# Patient Record
Sex: FEMALE | Race: WHITE | Employment: OTHER | URBAN - METROPOLITAN AREA
[De-identification: names, ages, dates, MRNs, and addresses within clinical notes are randomized per-mention and may not be internally consistent; named-entity substitution may affect disease eponyms.]

---

## 2017-01-09 ENCOUNTER — TELEPHONE (OUTPATIENT)
Dept: INTERNAL MEDICINE CLINIC | Facility: CLINIC | Age: 82
End: 2017-01-09

## 2017-01-09 NOTE — TELEPHONE ENCOUNTER
Patient's daughter calling to speak with RN. Would like to ask a question about dosing instructions for patient's medication:    Current Outpatient Prescriptions:  Escitalopram Oxalate (LEXAPRO) 10 MG Oral Tab Take 1 tablet (10 mg total) by mouth daily.  Janna Adame

## 2017-01-10 NOTE — TELEPHONE ENCOUNTER
Pt's daughter stts pt not feeling well or better  on current dose of Lexapro but not sure if pt is taking Lexapro 5 mg or 10 mg . Pt is also trying to cut down on Clonazepam. I advised daughter to check her mothers medicine bottle tomorrow when she is there

## 2017-01-11 NOTE — TELEPHONE ENCOUNTER
Pt's daughter stts she would like a call back to speak to nurse about the Rx lexapro.  Please advise

## 2017-01-12 NOTE — TELEPHONE ENCOUNTER
As Dr. on call covering for Dr. Taylor Plunkett -     would advise to discuss with her Psychiatrist since she is seeing a Psychiatrist and working with her on adjustments on her medications.   If that is not possible to discussed with her primary care doctor when

## 2017-01-12 NOTE — TELEPHONE ENCOUNTER
[place] [id number] [place] [id number]      Per 11/22/16 acute encounter  Thelma Reyes MD at 11/22/2016  1:08 PM      Status: Signed : Thelma Reyes MD (Physician)     Expand All Collapse All    I would hold the escitolapram and refer sushila ent to

## 2017-01-12 NOTE — TELEPHONE ENCOUNTER
Farhan Brown- daughter indicated that patient has been taking lexapro 5mg every evening and saw psychiatrist Dr Aevry Lagos which wanted to slowly wean patient off of the Clonazepam. Patient is down to 1/2 tablet daily of Clonazepam 0.5mg.   Daughter indicated that p

## 2017-01-12 NOTE — TELEPHONE ENCOUNTER
Daughter Shaneka Bad informed of EV note as stated below. Daughter verbalized understanding/compliance. Will discuss with psychiatrist for possible lexapro adjustment.  In the mean time did schedule appt with [facility] for next Thursday as they would like to meet wi

## 2017-01-19 ENCOUNTER — APPOINTMENT (OUTPATIENT)
Dept: LAB | Facility: HOSPITAL | Age: 82
End: 2017-01-19
Attending: INTERNAL MEDICINE
Payer: MEDICARE

## 2017-01-19 DIAGNOSIS — E03.9 ACQUIRED HYPOTHYROIDISM: ICD-10-CM

## 2017-01-19 PROBLEM — R00.2 HEART PALPITATIONS: Status: ACTIVE | Noted: 2017-01-19

## 2017-01-19 LAB
T4 FREE SERPL-MCNC: 1.16 NG/DL (ref 0.58–1.64)
TSH SERPL-ACNC: 3.33 UIU/ML (ref 0.34–5.6)

## 2017-01-19 PROCEDURE — 84439 ASSAY OF FREE THYROXINE: CPT

## 2017-01-19 PROCEDURE — 84443 ASSAY THYROID STIM HORMONE: CPT

## 2017-01-19 PROCEDURE — 36415 COLL VENOUS BLD VENIPUNCTURE: CPT

## 2017-01-19 NOTE — PROGRESS NOTES
Ruth Jo is a 80year old female. HPI:   1. Moderate single current episode of major depressive disorder ([address]    Still has significant depression. Still cannot be alone or she gets incredibly nervous.  She  has to have someone stay with her at all ti Smokeless Status: Former User                       Alcohol Use: Yes           0.0 oz/week       0 Standard drinks or equivalent per week       Comment: 1 drink nightly        REVIEW OF SYSTEMS:   GENERAL HEALTH: feels well otherwise  SKIN: denies any unus palpitations. Short lived and goes away right away. Will check TSH today. The patient indicates understanding of these issues and agrees to the plan.     The patient is asked to return in 3 months

## 2017-03-09 ENCOUNTER — TELEPHONE (OUTPATIENT)
Dept: INTERNAL MEDICINE CLINIC | Facility: CLINIC | Age: 82
End: 2017-03-09

## 2017-03-09 NOTE — TELEPHONE ENCOUNTER
Daughter calling states pt is having hard time sleeping. Pt states benadryl sometimes when sleeping is and issue. She would like to check with Dr if this med would be ok to take or will it interact with her other meds.  Is there any thing better she can connie

## 2017-03-09 NOTE — TELEPHONE ENCOUNTER
Reason for Call/Chief Complaint: trouble sleeping  Onset: 2 nights  Nursing Assessment/Associated Symptoms: pt's daughter stts pt  having trouble falling asleep last couple of nights.  Pt wondering if ok to take Benadryl or is there anything else she can tr

## 2017-04-27 NOTE — TELEPHONE ENCOUNTER
Refill Protocol Appointment Criteria; MED LISTED AS HISTORICAL. PLEASE ADVISE ON REFILL. THANKS.   · Appointment scheduled in the past 6 months or in the next 3 months  Recent Visits       Provider Department Primary Dx    3 months ago Semaj Rogers,

## 2017-05-01 RX ORDER — ESCITALOPRAM OXALATE 5 MG/1
TABLET ORAL
Qty: 30 TABLET | Refills: 0 | Status: SHIPPED | OUTPATIENT
Start: 2017-05-01 | End: 2017-05-26

## 2017-05-02 ENCOUNTER — APPOINTMENT (OUTPATIENT)
Dept: LAB | Age: 82
End: 2017-05-02
Attending: INTERNAL MEDICINE
Payer: MEDICARE

## 2017-05-02 DIAGNOSIS — E03.9 ACQUIRED HYPOTHYROIDISM: ICD-10-CM

## 2017-05-02 PROBLEM — M25.561 ARTHRALGIA OF RIGHT LOWER LEG: Status: ACTIVE | Noted: 2017-05-02

## 2017-05-02 PROCEDURE — 36415 COLL VENOUS BLD VENIPUNCTURE: CPT

## 2017-05-02 PROCEDURE — 84443 ASSAY THYROID STIM HORMONE: CPT

## 2017-05-02 NOTE — PROGRESS NOTES
Landen Martini is a 80year old female. HPI:   1. Moderate single current episode of major depressive disorder ([address]    Still has significant depression. Still cannot be alone or she gets incredibly nervous.  She  has to have someone stay with her at all ti Smokeless Status: Former User                       Alcohol Use: Yes           0.0 oz/week       0 Standard drinks or equivalent per week       Comment: 1 drink nightly        REVIEW OF SYSTEMS:   GENERAL HEALTH: feels well otherwise  SKIN: denies any unu it helps. Hurts 3/10.     4. Rash    Has some seborrheic dermatitis. Has some itching. Will use steroid cream to help patient. Has been having some heart palpitations. Short lived and goes away right away. Will check TSH today.     The patient indicates

## 2017-05-11 ENCOUNTER — TELEPHONE (OUTPATIENT)
Dept: INTERNAL MEDICINE CLINIC | Facility: CLINIC | Age: 82
End: 2017-05-11

## 2017-05-11 NOTE — TELEPHONE ENCOUNTER
Daughter calling regarding results of blood test.  Please advise daughter wants to know if medication needs to be changed or not for Levothyroxine Sodium (SYNTHROID) 25 MCG Oral Tab

## 2017-05-20 NOTE — TELEPHONE ENCOUNTER
Results mailed to pt with MD recommendations.     May 8, 2017     Gavin Rodriguez 7      Dear Stephan De Jesus:    Below are the results from your recent visit:    Resulted Orders   ASSAY, THYROID STIM HORMONE   Result Value Ref Range

## 2017-05-26 RX ORDER — ESCITALOPRAM OXALATE 5 MG/1
TABLET ORAL
Qty: 30 TABLET | Refills: 0 | Status: SHIPPED | OUTPATIENT
Start: 2017-05-26 | End: 2017-06-27

## 2017-06-14 ENCOUNTER — TELEPHONE (OUTPATIENT)
Dept: INTERNAL MEDICINE CLINIC | Facility: CLINIC | Age: 82
End: 2017-06-14

## 2017-06-14 NOTE — TELEPHONE ENCOUNTER
Actions Requested:    Please advise Dr. Lisa Gutierrez  Situation/Background   Problem:   Red area under the eye   Onset:   This Morning   Associated Symptoms:    Patient's daughter stated the patient woke up this morning had has red , warm to touch, slightly puf in the past  * Pregnant > 20 weeks  * Postpartum (i.e. < 1 month since delivery)  * Fever  * Taking an ACE Inhibitor medication    (e.g., benazepril/LOTENSIN, captopril/CAPOTEN, enalapril/VASOTEC, lisinopril/ZESTRIL)  * Patient sounds very sick or weak to

## 2017-06-22 ENCOUNTER — TELEPHONE (OUTPATIENT)
Dept: INTERNAL MEDICINE CLINIC | Facility: CLINIC | Age: 82
End: 2017-06-22

## 2017-06-22 RX ORDER — CLONAZEPAM 0.5 MG/1
TABLET ORAL
Qty: 30 TABLET | Refills: 0 | Status: SHIPPED | OUTPATIENT
Start: 2017-06-22 | End: 2017-10-23

## 2017-06-27 RX ORDER — ESCITALOPRAM OXALATE 5 MG/1
TABLET ORAL
Qty: 30 TABLET | Refills: 0 | Status: SHIPPED | OUTPATIENT
Start: 2017-06-27 | End: 2017-07-25

## 2017-07-06 NOTE — TELEPHONE ENCOUNTER
CLONAZEPAM 0.5 MG Oral Tab called into the [address] in [place] as ordered on 6/22/17 by Dr. Brenda Newell.

## 2017-07-06 NOTE — TELEPHONE ENCOUNTER
Daughter was called back and inform that medication was approved on 6/23. I will call pharmacy to see if it has been call in if not I will call in. Thanks

## 2017-07-26 RX ORDER — ESCITALOPRAM OXALATE 5 MG/1
TABLET ORAL
Qty: 30 TABLET | Refills: 0 | Status: SHIPPED | OUTPATIENT
Start: 2017-07-26 | End: 2017-08-20

## 2017-07-26 NOTE — TELEPHONE ENCOUNTER
Refill Protocol Appointment Criteria: Refilled per protocol    · Appointment scheduled in the past 6 months or in the next 3 months  Recent Outpatient Visits            2 months ago Moderate single current episode of major depressive disorder [facility]    Precious

## 2017-08-21 RX ORDER — ESCITALOPRAM OXALATE 5 MG/1
TABLET ORAL
Qty: 30 TABLET | Refills: 0 | Status: SHIPPED | OUTPATIENT
Start: 2017-08-21 | End: 2017-09-26

## 2017-09-26 NOTE — PROGRESS NOTES
HPI:    Patient ID: Lauren Stovall is a 80year old female. HPI    Review of Systems         Current Outpatient Prescriptions:  escitalopram 10 MG Oral Tab Take 1 tablet (10 mg total) by mouth once daily.  Disp: 30 tablet Rfl: 1   CLONAZEPAM 0.5 MG Oral T

## 2017-09-26 NOTE — PROGRESS NOTES
HPI:    Patient ID: Dimitry Jin is a 80year old female. Abdominal Pain   This is a chronic problem. The current episode started more than 1 year ago (a year and a half ago). The onset quality is sudden (when her  passed away).  The problem occu Packs/day: 0.00      Years: 0.00      Smokeless tobacco: Former User                     Alcohol use: Yes           0.0 oz/week     Comment: 1 drink nightly               Current Outpatient Prescriptions:  escitalopram 10 MG Oral Tab Ta anxiety  (primary encounter diagnosis)  Plan: This is a chronic problem. The patient is currently taking ESCITALOPRAM 5 MG and CLONAZEPAM 0.5 MG for treatment with significant improvement.  The patient has been trying to stop taking the CLONAZEPAM 0.5 MG bu

## 2017-10-23 RX ORDER — CLONAZEPAM 0.5 MG/1
TABLET ORAL
Qty: 30 TABLET | Refills: 0 | Status: SHIPPED | OUTPATIENT
Start: 2017-10-23 | End: 2018-06-14 | Stop reason: ALTCHOICE

## 2017-10-23 NOTE — TELEPHONE ENCOUNTER
Pt's daughter calling to request a refill on medication below and states Pt is almost out of medication.      Current Outpatient Prescriptions:   •  CLONAZEPAM 0.5 MG Oral Tab, TAKE ONE-HALF TABLET BY MOUTH TWICE DAILY AS NEEDED, Disp: 30 tablet, Rfl: 0   T

## 2017-10-26 ENCOUNTER — OFFICE VISIT (OUTPATIENT)
Dept: DERMATOLOGY CLINIC | Facility: CLINIC | Age: 82
End: 2017-10-26

## 2017-10-26 DIAGNOSIS — L82.1 SEBORRHEIC KERATOSES: ICD-10-CM

## 2017-10-26 DIAGNOSIS — L82.0 INFLAMED SEBORRHEIC KERATOSIS: ICD-10-CM

## 2017-10-26 DIAGNOSIS — D48.5 NEOPLASM OF UNCERTAIN BEHAVIOR OF SKIN: Primary | ICD-10-CM

## 2017-10-26 DIAGNOSIS — D23.5 BENIGN NEOPLASM OF SKIN OF TRUNK, EXCEPT SCROTUM: ICD-10-CM

## 2017-10-26 PROCEDURE — 99202 OFFICE O/P NEW SF 15 MIN: CPT | Performed by: DERMATOLOGY

## 2017-10-26 PROCEDURE — 11100 BIOPSY OF SKIN LESION: CPT | Performed by: DERMATOLOGY

## 2017-10-26 PROCEDURE — 88305 TISSUE EXAM BY PATHOLOGIST: CPT | Performed by: DERMATOLOGY

## 2017-10-26 PROCEDURE — 17110 DESTRUCTION B9 LES UP TO 14: CPT | Performed by: DERMATOLOGY

## 2017-10-26 NOTE — PROGRESS NOTES
HPI:     Chief Complaint     Lesion        HPI     Lesion    Additional comments: New pt here for eval of a lesion to the left temple pt states she has had it for about 6 mo was seen by her PCP was given betamethasone dipropionate 0.05% states that it neve Rfl:    aspirin 81 MG Oral Chew Tab Chew 81 mg by mouth daily.  Disp:  Rfl:      Allergies:     Aspirin                     Comment:Ulcer    Past Medical History:   Diagnosis Date   • Skin cancer 2006     Past Surgical History:  No date: TONSILLECTOMY    So resolve  Seborrheic keratoses- diagnosis discussed–reassurance–no treatment necessary.   Benign neoplasm of skin of trunk, except scrotum      Orders Placed This Encounter      BIOPSY OF SKIN LESION      dest benign <15      Specimen to Pathology, Tissue [I

## 2017-10-31 NOTE — PROGRESS NOTES
Results logged in as requested. Daughter informed of bx results and all LSS' recc - she agrees to the plan.  Path and pt's info faxed to Dr. Vincent Wu - his info provided to daughter (Shaquille)

## 2017-11-07 ENCOUNTER — TELEPHONE (OUTPATIENT)
Dept: INTERNAL MEDICINE CLINIC | Facility: CLINIC | Age: 82
End: 2017-11-07

## 2017-11-07 RX ORDER — LEVOTHYROXINE SODIUM 0.03 MG/1
TABLET ORAL
Qty: 90 TABLET | Refills: 0 | Status: CANCELLED | OUTPATIENT
Start: 2017-11-07

## 2017-11-07 RX ORDER — LEVOTHYROXINE SODIUM 0.03 MG/1
25 TABLET ORAL
Qty: 90 TABLET | Refills: 0 | Status: SHIPPED | OUTPATIENT
Start: 2017-11-07 | End: 2018-02-04

## 2017-11-07 NOTE — TELEPHONE ENCOUNTER
Requesting Levothyroxine refill    Prescription refilled per IM/FM refill protocol, notified daughter.     Hypothyroid Medications  Protocol Criteria:  Appointment scheduled in the past 12 months or the next 3 months  TSH resulted in the past 12 months that

## 2017-11-20 ENCOUNTER — NURSE TRIAGE (OUTPATIENT)
Dept: OTHER | Age: 82
End: 2017-11-20

## 2017-11-20 NOTE — TELEPHONE ENCOUNTER
Action Requested: Summary for Provider     []  Critical Lab, Recommendations Needed  [x] Need Additional Advice  [x]   FYI    []   Need Orders  [] Need Medications Sent to Pharmacy  []  Other     SUMMARY: Colton (daughter) reports pt weaning off clonazep not feeling any better    Protocols used: DEPRESSION-A-OH

## 2017-11-24 RX ORDER — ESCITALOPRAM OXALATE 10 MG/1
TABLET ORAL
Qty: 30 TABLET | Refills: 0 | OUTPATIENT
Start: 2017-11-24

## 2017-11-24 NOTE — TELEPHONE ENCOUNTER
Please call pt and make a follow up    Please reply to pool: JIMENA RN TRIAGE  Refill Protocol Appointment Criteria  · Appointment scheduled in the past 6 months or in the next 3 months  Recent Outpatient Visits            4 weeks ago Neoplasm of uncertain beh

## 2017-11-27 NOTE — TELEPHONE ENCOUNTER
Contacted pt and states her daughter usually takes her to her appts and pt's daughter will be over tomorrow, states she will have daughter call back and schedule an appt.

## 2017-11-28 RX ORDER — ESCITALOPRAM OXALATE 10 MG/1
TABLET ORAL
Qty: 30 TABLET | Refills: 0 | Status: SHIPPED | OUTPATIENT
Start: 2017-11-28 | End: 2017-11-29 | Stop reason: DRUGHIGH

## 2017-11-28 NOTE — TELEPHONE ENCOUNTER
Please advise.     Please reply to pool: EM CALL CENTER  Please call patient to schedule with a new PCP to establish care

## 2017-11-28 NOTE — TELEPHONE ENCOUNTER
Refilled per protocol. Medication pended,,please advise.     Refill Protocol Appointment Criteria  · Appointment scheduled in the past 6 months or in the next 3 months  Recent Outpatient Visits            1 month ago Neoplasm of uncertain behavior of skin

## 2017-11-29 ENCOUNTER — TELEPHONE (OUTPATIENT)
Dept: INTERNAL MEDICINE CLINIC | Facility: CLINIC | Age: 82
End: 2017-11-29

## 2017-11-29 PROBLEM — F41.1 GENERALIZED ANXIETY DISORDER: Status: ACTIVE | Noted: 2017-11-29

## 2017-11-29 PROBLEM — F13.930 BENZODIAZEPINE WITHDRAWAL WITHOUT COMPLICATION (HCC): Status: ACTIVE | Noted: 2017-11-29

## 2017-11-29 PROBLEM — F13.230 BENZODIAZEPINE WITHDRAWAL WITHOUT COMPLICATION (HCC): Status: ACTIVE | Noted: 2017-11-29

## 2017-11-29 PROBLEM — K21.9 CHRONIC GERD: Status: ACTIVE | Noted: 2017-11-29

## 2017-11-29 PROBLEM — F32.A DEPRESSION: Status: ACTIVE | Noted: 2017-11-29

## 2017-11-29 NOTE — TELEPHONE ENCOUNTER
Pharmacy said ins wont cover Omeprazole 20 MG Oral Tab EC    Questioning  if can change to capsule?     Pt saw Dr Myla Chi today

## 2017-11-29 NOTE — TELEPHONE ENCOUNTER
Spoke with Alex Serrano at  [address] and changed Omeprazole to capsule as EC tab not covered by insurance.

## 2017-11-29 NOTE — PROGRESS NOTES
Araceli Adams is a 80year old female. Patient presents with:  Abdominal Pain: patient c/o of bloating, constipation. Anxiety  Ear Problem: right ear      HPI:   Here for abdominal pain  She has generalized  anxiety and depression.   She is currently on NEEDED Disp: 30 tablet Rfl: 0   betamethasone dipropionate 0.05 % External Cream Apply 1 Application topically 2 (two) times daily. Disp: 30 g Rfl: 1   Cholecalciferol (VITAMIN D) 2000 UNITS Oral Cap Take 2,000 Units by mouth.  Disp:  Rfl:    aspirin 81 MG ear canal -cerumen impaction noted bilaterally, cerumen removal done with irrigation. Tympanic membrane clear bilaterally, nasal mucosa normal, posterior pharynx normal.  Tonsils not enlarged. No exudates.   NECK: supple, no thyromegaly, no thyroid nodule stools  This is due to benzodiazepine withdrawal.  No blood or mucus in the stools. Can use Imodium only as needed    Other orders  -     escitalopram 20 MG Oral Tab; Take 1 tablet (20 mg total) by mouth daily.  -     Omeprazole 20 MG Oral Tab EC;  Take 20

## 2017-11-29 NOTE — PATIENT INSTRUCTIONS
Stop zantac and start omeprazole 40 mg at lunchtime  Increased escitalopram to 20 mg. Contact me if there is any side effects after increasing the dose. Plan to taper clonazepam by 2 months. Continue escitalopram at the newly prescribed dose of 20 mg.

## 2017-12-11 ENCOUNTER — TELEPHONE (OUTPATIENT)
Dept: INTERNAL MEDICINE CLINIC | Facility: CLINIC | Age: 82
End: 2017-12-11

## 2017-12-11 DIAGNOSIS — E03.9 ACQUIRED HYPOTHYROIDISM: Primary | ICD-10-CM

## 2017-12-11 NOTE — TELEPHONE ENCOUNTER
Discussed about this with daughters patient Pawel Navas. Advised to decrease the Lexapro to 10 mg. She can discontinue omeprazole. She is not tolerating the weaning off clonazepam to well.   Can go back to the old regimen of clonazepam.  Also informed if possib

## 2017-12-11 NOTE — TELEPHONE ENCOUNTER
Patient's daughter calling, states pt saw Dr Prasanna Shaikh where she increased pt's dosage of lexapro from 5mg to 20mg and also added omeprazole. Patient's daughter states pt has diarrhea \"most mornings\" and her stomach is still bothering her.  Daughter states pt

## 2017-12-12 NOTE — TELEPHONE ENCOUNTER
Advised the patient not to take magnesium as it can make her diarrhea symptoms worse. She verbalizes understanding and agrees to the plan.

## 2017-12-12 NOTE — TELEPHONE ENCOUNTER
Patient's daughter called back in -     Wants to know if patient could take a magnesium supplement? If so, is there a certain dosage/amount that she can take? Will it affect any of the other medications that she is taking. Please advise.

## 2018-02-05 RX ORDER — LEVOTHYROXINE SODIUM 0.03 MG/1
TABLET ORAL
Qty: 90 TABLET | Refills: 0 | Status: SHIPPED | OUTPATIENT
Start: 2018-02-05 | End: 2018-05-04

## 2018-05-04 RX ORDER — LEVOTHYROXINE SODIUM 0.03 MG/1
TABLET ORAL
Qty: 90 TABLET | Refills: 0 | Status: SHIPPED | OUTPATIENT
Start: 2018-05-04 | End: 2018-08-17

## 2018-05-04 NOTE — TELEPHONE ENCOUNTER
This is okay–but noted Dr. Rob Hartmann note and the need for her to follow-up so please make sure she has a follow-up appointment–thank you  ronal- dr Tere Eckert

## 2018-06-14 ENCOUNTER — OFFICE VISIT (OUTPATIENT)
Dept: INTERNAL MEDICINE CLINIC | Facility: CLINIC | Age: 83
End: 2018-06-14

## 2018-06-14 ENCOUNTER — LAB ENCOUNTER (OUTPATIENT)
Dept: LAB | Facility: HOSPITAL | Age: 83
End: 2018-06-14
Attending: INTERNAL MEDICINE
Payer: MEDICARE

## 2018-06-14 ENCOUNTER — TELEPHONE (OUTPATIENT)
Dept: OTHER | Age: 83
End: 2018-06-14

## 2018-06-14 VITALS
WEIGHT: 131.88 LBS | SYSTOLIC BLOOD PRESSURE: 131 MMHG | HEART RATE: 62 BPM | TEMPERATURE: 99 F | HEIGHT: 59 IN | DIASTOLIC BLOOD PRESSURE: 72 MMHG | BODY MASS INDEX: 26.59 KG/M2

## 2018-06-14 DIAGNOSIS — E03.9 ACQUIRED HYPOTHYROIDISM: ICD-10-CM

## 2018-06-14 DIAGNOSIS — R60.0 LEG EDEMA, LEFT: ICD-10-CM

## 2018-06-14 DIAGNOSIS — K52.9 CHRONIC DIARRHEA: Primary | ICD-10-CM

## 2018-06-14 DIAGNOSIS — K52.9 CHRONIC DIARRHEA: ICD-10-CM

## 2018-06-14 PROCEDURE — 99213 OFFICE O/P EST LOW 20 MIN: CPT | Performed by: INTERNAL MEDICINE

## 2018-06-14 PROCEDURE — 84443 ASSAY THYROID STIM HORMONE: CPT

## 2018-06-14 PROCEDURE — 85025 COMPLETE CBC W/AUTO DIFF WBC: CPT

## 2018-06-14 PROCEDURE — 36415 COLL VENOUS BLD VENIPUNCTURE: CPT

## 2018-06-14 PROCEDURE — 84439 ASSAY OF FREE THYROXINE: CPT

## 2018-06-14 PROCEDURE — G0463 HOSPITAL OUTPT CLINIC VISIT: HCPCS | Performed by: INTERNAL MEDICINE

## 2018-06-14 PROCEDURE — 80053 COMPREHEN METABOLIC PANEL: CPT

## 2018-06-14 NOTE — PROGRESS NOTES
Obi Kitchen is a 80year old female.   Patient presents with:  Diarrhea  Swelling: left ankle      HPI:     HPI  Patient is a 79-year-old female with history of chronic diarrhea, generalized anxiety disorder, hypothyroidism here with the complaints of wor tablet daily ) Disp: 60 tablet Rfl: 2   betamethasone dipropionate 0.05 % External Cream Apply 1 Application topically 2 (two) times daily. Disp: 30 g Rfl: 1   aspirin 81 MG Oral Chew Tab Chew 81 mg by mouth daily.  Disp:  Rfl:       Past Medical History: has normal reflexes. Skin: Skin is warm and dry.          ASSESSMENT AND PLAN:     Problem List Items Addressed This Visit     None      Visit Diagnoses     Chronic diarrhea    -  Primary    Relevant Orders    TSH W REFLEX TO FREE T4    COMP METABOLIC PAN

## 2018-06-14 NOTE — TELEPHONE ENCOUNTER
Appt made for today, diarrhea stools-chronic but more than usual today-ate different foods over the week end, left swollen ankle , no recent OV advised Appt today for Eval with Pt's daughter

## 2018-06-15 ENCOUNTER — APPOINTMENT (OUTPATIENT)
Dept: LAB | Facility: HOSPITAL | Age: 83
End: 2018-06-15
Attending: INTERNAL MEDICINE
Payer: MEDICARE

## 2018-06-15 DIAGNOSIS — E03.9 ACQUIRED HYPOTHYROIDISM: Primary | ICD-10-CM

## 2018-06-15 PROCEDURE — 87493 C DIFF AMPLIFIED PROBE: CPT

## 2018-08-17 RX ORDER — LEVOTHYROXINE SODIUM 0.03 MG/1
TABLET ORAL
Qty: 90 TABLET | Refills: 0 | Status: SHIPPED | OUTPATIENT
Start: 2018-08-17 | End: 2018-11-14

## 2018-08-17 NOTE — TELEPHONE ENCOUNTER
Pt's daughter req refill for meds below and states Pt is out of meds.     •  LEVOTHYROXINE SODIUM 25 MCG Oral Tab, TAKE 1 TABLET(25 MCG) BY MOUTH BEFORE BREAKFAST, Disp: 90 tablet, Rfl: 0

## 2018-08-17 NOTE — TELEPHONE ENCOUNTER
Failed protocol, please advise.   LOV:6/14/18 LRF:5/4/18  Hypothyroid Medications  Protocol Criteria:  Appointment scheduled in the past 12 months or the next 3 months  TSH resulted in the past 12 months that is normal  Recent Outpatient Visits            2

## 2018-10-29 RX ORDER — ESCITALOPRAM OXALATE 20 MG/1
TABLET ORAL
Qty: 90 TABLET | Refills: 0 | Status: SHIPPED | OUTPATIENT
Start: 2018-10-29 | End: 2019-04-05

## 2018-11-14 RX ORDER — BETAMETHASONE DIPROPIONATE 0.5 MG/G
CREAM TOPICAL
Qty: 30 G | Refills: 0 | Status: SHIPPED | OUTPATIENT
Start: 2018-11-14 | End: 2019-07-15

## 2018-11-14 RX ORDER — LEVOTHYROXINE SODIUM 0.03 MG/1
TABLET ORAL
Qty: 90 TABLET | Refills: 0 | Status: SHIPPED | OUTPATIENT
Start: 2018-11-14 | End: 2019-02-05

## 2018-11-14 NOTE — TELEPHONE ENCOUNTER
Pt daughter called for refill on:      Current Outpatient Medications:  Levothyroxine Sodium 25 MCG Oral Tab TAKE 1 TABLET(25 MCG) BY MOUTH BEFORE BREAKFAST Disp: 90 tablet Rfl: 0

## 2018-11-14 NOTE — TELEPHONE ENCOUNTER
Hypothyroid Medications  Protocol Criteria:  Appointment scheduled in the past 12 months or the next 3 months  TSH resulted in the past 12 months that is normal  Recent Outpatient Visits            5 months ago Chronic diarrhea    [id number] [id number]

## 2019-01-25 RX ORDER — BETAMETHASONE DIPROPIONATE 0.5 MG/G
CREAM TOPICAL
Qty: 30 G | Refills: 0 | Status: SHIPPED | OUTPATIENT
Start: 2019-01-25 | End: 2019-04-07

## 2019-02-07 RX ORDER — LEVOTHYROXINE SODIUM 0.03 MG/1
TABLET ORAL
Qty: 90 TABLET | Refills: 1 | Status: SHIPPED | OUTPATIENT
Start: 2019-02-07 | End: 2019-08-20

## 2019-02-07 NOTE — TELEPHONE ENCOUNTER
Please review; protocol failed.     Hypothyroid Medications  Protocol Criteria:  Appointment scheduled in the past 12 months or the next 3 months  TSH resulted in the past 12 months that is normal  Recent Outpatient Visits            7 months ago Chronic diarrhea    Angelia Joshua MD    Office Visit    1 year ago Generalized anxiety disorder    Ramona Joshua Roxy Basil, MD    Office Visit    1 year ago Neoplasm of uncertain behavior of skin    [facility] Rosy Rodriguez MD    Office Visit    1 year ago Depression with anxiety    Thanh Joshua MD    Office Visit    1 year ago Moderate single current episode of major depressive disorder [facility])    [facility] Guillermo Hernandez MD    Office Visit          Lab Results   Component Value Date    TSH 5.62 (H) 06/14/2018

## 2019-03-13 ENCOUNTER — NURSE TRIAGE (OUTPATIENT)
Dept: OTHER | Age: 84
End: 2019-03-13

## 2019-03-13 DIAGNOSIS — R19.5 LOOSE STOOLS: Primary | ICD-10-CM

## 2019-03-13 NOTE — TELEPHONE ENCOUNTER
Advised patient's daughter of Dr. Irizarry Shape note. Patient's daughter verbalized understanding. Daughter states she will  specimen collection kit at the lab tomorrow.

## 2019-03-13 NOTE — TELEPHONE ENCOUNTER
Action Requested: Summary for Provider     []  Critical Lab, Recommendations Needed  [] Need Additional Advice  [x]   FYI    []   Need Orders  [] Need Medications Sent to Pharmacy  []  Other     SUMMARY: Patient's daughter calling with complaint of patient

## 2019-03-13 NOTE — TELEPHONE ENCOUNTER
I will order a stool test to rule out C. difficile. Will discuss further management based on the results. Please follow-up in the office if the symptoms continues.

## 2019-03-15 ENCOUNTER — APPOINTMENT (OUTPATIENT)
Dept: LAB | Facility: HOSPITAL | Age: 84
End: 2019-03-15
Attending: INTERNAL MEDICINE
Payer: MEDICARE

## 2019-03-15 DIAGNOSIS — E03.9 ACQUIRED HYPOTHYROIDISM: ICD-10-CM

## 2019-03-15 LAB
ADENOVIRUS F 40/41 PCR: NEGATIVE
ASTROVIRUS PCR: NEGATIVE
C CAYETANENSIS DNA SPEC QL NAA+PROBE: NEGATIVE
C. DIFFICILE TOXIN A/B PCR: NEGATIVE
CAMPY SP DNA.DIARRHEA STL QL NAA+PROBE: NEGATIVE
CRYPTOSP DNA SPEC QL NAA+PROBE: NEGATIVE
EAEC PAA PLAS AGGR+AATA ST NAA+NON-PRB: NEGATIVE
EC STX1+STX2 + H7 FLIC SPEC NAA+PROBE: NEGATIVE
ENTAMOEBA HISTOLYTICA PCR: NEGATIVE
EPEC EAE GENE STL QL NAA+NON-PROBE: NEGATIVE
ETEC LTA+ST1A+ST1B TOX ST NAA+NON-PROBE: NEGATIVE
GIARDIA LAMBLIA PCR: NEGATIVE
NOROVIRUS GI/GII PCR: NEGATIVE
P SHIGELLOIDES DNA STL QL NAA+PROBE: NEGATIVE
ROTAVIRUS A PCR: NEGATIVE
SALMONELLA DNA SPEC QL NAA+PROBE: NEGATIVE
SAPOVIRUS PCR: NEGATIVE
SHIGELLA SP+EIEC IPAH ST NAA+NON-PROBE: NEGATIVE
T4 FREE SERPL-MCNC: 1 NG/DL (ref 0.8–1.7)
TSI SER-ACNC: 3.82 MIU/ML (ref 0.36–3.74)
V CHOLERAE DNA SPEC QL NAA+PROBE: NEGATIVE
VIBRIO DNA SPEC NAA+PROBE: NEGATIVE
YERSINIA DNA SPEC NAA+PROBE: NEGATIVE

## 2019-03-15 PROCEDURE — 84439 ASSAY OF FREE THYROXINE: CPT

## 2019-03-15 PROCEDURE — 87507 IADNA-DNA/RNA PROBE TQ 12-25: CPT | Performed by: INTERNAL MEDICINE

## 2019-03-15 PROCEDURE — 36415 COLL VENOUS BLD VENIPUNCTURE: CPT

## 2019-03-15 PROCEDURE — 84443 ASSAY THYROID STIM HORMONE: CPT

## 2019-04-05 ENCOUNTER — TELEPHONE (OUTPATIENT)
Dept: OTHER | Age: 84
End: 2019-04-05

## 2019-04-05 NOTE — TELEPHONE ENCOUNTER
Daughter called and stated that patient worsening diarrhea came back 4 days ago, taking Imodium , been following BRAT diet, drinking Pedialyte and water, stopped drinking Pediasure,     3/15/19 GI stool panel tests were normal, daughter cannot tell how oft

## 2019-04-05 NOTE — TELEPHONE ENCOUNTER
Spoke with patient's daughter and informed her of Dr. Cong Bailey message and plan of care. Daughter voiced understanding but reports it is not in the best interest for her mother to see a GI specialist since she is 89.  Daughter would also like to know what no

## 2019-04-05 NOTE — TELEPHONE ENCOUNTER
Avoid greasy , oily, spicy food. Avoid caffeine. Stay hydrated, jamel diet . Imodium as needed. If change in mental status, signs of dehydration or lethargy ER as advised. She has chronic diarrhea.  Please inform the patient to follow up with GI

## 2019-04-07 RX ORDER — BETAMETHASONE DIPROPIONATE 0.5 MG/G
CREAM TOPICAL
Qty: 30 G | Refills: 0 | Status: SHIPPED | OUTPATIENT
Start: 2019-04-07 | End: 2019-07-15

## 2019-04-07 RX ORDER — ESCITALOPRAM OXALATE 20 MG/1
TABLET ORAL
Qty: 90 TABLET | Refills: 3 | Status: SHIPPED | OUTPATIENT
Start: 2019-04-07 | End: 2019-04-08

## 2019-04-08 RX ORDER — ESCITALOPRAM OXALATE 10 MG/1
10 TABLET ORAL DAILY
Qty: 90 TABLET | Refills: 0 | Status: SHIPPED | OUTPATIENT
Start: 2019-04-08 | End: 2019-07-15

## 2019-04-08 NOTE — TELEPHONE ENCOUNTER
Please inform the patient to take 10 mg instead of 20 mg due to nightmares. It is 1 of the common side effects of Lexapro. She can take the half tablet, on the next refill please request for 10 mg instead of 20 mg.

## 2019-04-08 NOTE — TELEPHONE ENCOUNTER
Informed pt's dtr of Dr. Mann Self advise to cut in half, and that the next scipt will be 10 mg, but dtr states cutting these pills in half gets messy and requesting a new script of 10 mg be sent to pharmacy. Sending in Lexapro 10 mg today.

## 2019-04-08 NOTE — TELEPHONE ENCOUNTER
Pt's dtr calling back and informed of Dr. Sasha Joyce advise as per below. Dtr states her mother is doing better today.      Dtr also questioning Escitalopram 20 mg as her mother has been having abnormal dreams and feels it may be better to cut back to the yasmany

## 2019-07-12 ENCOUNTER — TELEPHONE (OUTPATIENT)
Dept: OTHER | Age: 84
End: 2019-07-12

## 2019-07-12 RX ORDER — BETAMETHASONE DIPROPIONATE 0.5 MG/G
CREAM TOPICAL
Qty: 30 G | Refills: 0 | Status: SHIPPED | OUTPATIENT
Start: 2019-07-12 | End: 2019-07-15

## 2019-07-12 NOTE — TELEPHONE ENCOUNTER
Daughter (not VICKEY), patient with her, reports of chronic diarrhea, depression getting worse, just  came back from [place] and with skin ed on both cheeks look like bug bite but getting better as patient is applying Betamethasone cream, states that sushila

## 2019-07-12 NOTE — TELEPHONE ENCOUNTER
Review pended refill request as it does not fall under a protocol.     Last Rx: 4/7/19  LOV: Recent Visits  Date Type Provider Dept   06/14/18 Office Visit Melisa Phillips MD [facility]   Showing recent visits within past 540 days with a meds authoriz

## 2019-07-15 PROBLEM — L30.9 CHRONIC ECZEMA: Status: ACTIVE | Noted: 2019-07-15

## 2019-07-15 NOTE — PROGRESS NOTES
Jody Quinones is a 80year old female.   Patient presents with:  Depression: increased Deprression x 1 month  Diarrhea: Imodium helping,  states has random moments of dirrhea x 2 years      HPI:     HPI  Patient is a 17-year-old female with history of chron Past Surgical History:   Procedure Laterality Date   • Tonsillectomy        Social History:  Social History    Tobacco Use      Smoking status: Former Smoker      Smokeless tobacco: Former User    Alcohol use:  Yes      Alcohol/week: 0.0 oz      Commen nightmares. She has a very supportive family. Offered resources. Chronic diarrhea  Stable. Avoid simple and concentrated sugar and dairy. Stool panel was negative in March. Managing with Imodium.   Advised to take Imodium 1 capful in the morning and

## 2019-08-21 RX ORDER — LEVOTHYROXINE SODIUM 0.03 MG/1
TABLET ORAL
Qty: 90 TABLET | Refills: 1 | Status: SHIPPED | OUTPATIENT
Start: 2019-08-21 | End: 2020-02-24

## 2019-08-22 NOTE — TELEPHONE ENCOUNTER
Refill passed per [facility] protocol.   Hypothyroid Medications  Protocol Criteria:  Appointment scheduled in the past 12 months or the next 3 months  TSH resulted in the past 12 months that is normal  Recent Outpatient Visits            1 month ago M

## 2019-10-01 ENCOUNTER — NURSE TRIAGE (OUTPATIENT)
Dept: INTERNAL MEDICINE CLINIC | Facility: CLINIC | Age: 84
End: 2019-10-01

## 2019-10-01 NOTE — TELEPHONE ENCOUNTER
2 options  1 Go to ED, probably better as she may be dehydrated and can receive IV fluids and do some blood test to make sure there is no electrolyte abnormalities. 2 I can see him in the office tomorrow. My preferences she goes to the emergency room.   Please let patient know thank you

## 2019-10-02 ENCOUNTER — OFFICE VISIT (OUTPATIENT)
Dept: INTERNAL MEDICINE CLINIC | Facility: CLINIC | Age: 84
End: 2019-10-02
Payer: MEDICARE

## 2019-10-02 VITALS
SYSTOLIC BLOOD PRESSURE: 106 MMHG | HEART RATE: 61 BPM | DIASTOLIC BLOOD PRESSURE: 67 MMHG | HEIGHT: 59 IN | WEIGHT: 127 LBS | TEMPERATURE: 98 F | BODY MASS INDEX: 25.6 KG/M2

## 2019-10-02 DIAGNOSIS — R19.7 DIARRHEA, UNSPECIFIED TYPE: Primary | ICD-10-CM

## 2019-10-02 PROCEDURE — 99213 OFFICE O/P EST LOW 20 MIN: CPT | Performed by: INTERNAL MEDICINE

## 2019-10-02 NOTE — PROGRESS NOTES
Ruth Jo is a 80year old female. Patient presents with:  Diarrhea: ongoing for 2 weeks, worse in last 4 days, no other sx, taking immodium  Derm Problem: new spots on face    HPI:   59-year-old female with a past medical history of hypothyroidism here OF SYSTEMS:     Review of Systems   Constitutional: Negative for activity change, appetite change and fever. HENT: Negative for congestion and voice change. Respiratory: Negative for cough and shortness of breath.     Cardiovascular: Negative for chest not want to do any further invasive work-up. - CBC, PLATELET; NO DIFFERENTIAL; Future  - BASIC METABOLIC PANEL (8);  Future  - TSH W REFLEX TO FREE T4; Future  - CBC, PLATELET; NO DIFFERENTIAL  - BASIC METABOLIC PANEL (8)  - TSH W REFLEX TO FREE T4    Plan

## 2019-10-02 NOTE — PATIENT INSTRUCTIONS
Treating Diarrhea    Diarrhea happens when you have loose, watery, or frequent bowel movements. It is a common problem with many causes. Most cases of diarrhea clear up on their own. But certain cases may need treatment.  Be sure to see your healthcare pr © 0952-0277 [address] [facility], [address] [place]. All rights reserved. This information is not intended as a substitute for professional medical care. Always follow your healthcare professional's instructions.         Low-Fib · What to choose: white bread, biscuits, muffins, and white rolls; plain crackers; waffles; white pasta; white rice; cream of wheat; grits; white pancakes; corn flakes; cooked potatoes without skin. Fiber content of these foods should be less than 0.5 (1/2 Fats, oils, sauces, condiments (fewer than 8 teaspoons daily)  · What to choose: butter, margarine, oils, whipped cream, sour cream, mayonnaise, smooth dressings and sauces; plain gravy; smooth condiments  · What to avoid: dressing with seeds or fruit etienne

## 2019-10-22 ENCOUNTER — TELEPHONE (OUTPATIENT)
Dept: INTERNAL MEDICINE CLINIC | Facility: CLINIC | Age: 84
End: 2019-10-22

## 2019-10-22 DIAGNOSIS — R19.7 DIARRHEA, UNSPECIFIED TYPE: Primary | ICD-10-CM

## 2019-10-22 NOTE — TELEPHONE ENCOUNTER
Advised patient's daughter on Dr. Ileana Rogers information and recommendations. Daughter verbalized understanding, she will go tomorrow to get material to collect stool and return samples as soon as possible.

## 2019-10-22 NOTE — TELEPHONE ENCOUNTER
Spoke with daughter Temo Dear with patient's permission--reports diarrhea, Kelvinne Paul been better, but she had another bout this weekend. It comes and goes. It's worse in the morning.  Dr. Trace Samaniego said something about doing a stool test if the diarrhea continues

## 2019-10-22 NOTE — TELEPHONE ENCOUNTER
I have placed stool on parasite, stool WBC and stool cultures. Please let them submit the stool sample in the earliest convenience.   Thank you

## 2019-10-24 ENCOUNTER — LAB ENCOUNTER (OUTPATIENT)
Dept: LAB | Facility: HOSPITAL | Age: 84
End: 2019-10-24
Attending: INTERNAL MEDICINE
Payer: MEDICARE

## 2019-10-24 DIAGNOSIS — R19.7 DIARRHEA, UNSPECIFIED TYPE: ICD-10-CM

## 2019-10-24 PROCEDURE — 87427 SHIGA-LIKE TOXIN AG IA: CPT

## 2019-10-24 PROCEDURE — 87272 CRYPTOSPORIDIUM AG IF: CPT

## 2019-10-24 PROCEDURE — 87329 GIARDIA AG IA: CPT

## 2019-10-24 PROCEDURE — 87077 CULTURE AEROBIC IDENTIFY: CPT

## 2019-10-24 PROCEDURE — 87046 STOOL CULTR AEROBIC BACT EA: CPT

## 2019-10-24 PROCEDURE — 89055 LEUKOCYTE ASSESSMENT FECAL: CPT

## 2019-10-24 PROCEDURE — 87045 FECES CULTURE AEROBIC BACT: CPT

## 2019-10-29 ENCOUNTER — TELEPHONE (OUTPATIENT)
Dept: OTHER | Age: 84
End: 2019-10-29

## 2019-10-29 NOTE — TELEPHONE ENCOUNTER
I contacted Sylvia Cardenas the daughter and informed of Dr. Bettie Kenyon discussed with the patient. Sylvia Cardenas stated understood and thank me for calling due to the patient does not hear to well. Instructed to call back if needed.

## 2019-10-29 NOTE — TELEPHONE ENCOUNTER
I called patient and went over stool test again     1 I cant say that this is not because of cancer. Typically cancer related diarrhea is bloody. The only way we can say that for sure is by doing a colonoscopy.   If she is interested in pursuing further, t

## 2019-10-29 NOTE — TELEPHONE ENCOUNTER
Per the patient I can talked to the daughter Bradley Angulo and the patient stated she did not understand her lab results. Results were reviewed both for the original labs and for the stool results.     The daughter stated she is having 1 to 2 watery stools

## 2020-01-31 ENCOUNTER — NURSE TRIAGE (OUTPATIENT)
Dept: OTHER | Age: 85
End: 2020-01-31

## 2020-01-31 NOTE — TELEPHONE ENCOUNTER
Pt seeking recommendations for diarrhea    Per daughter pt has hx of diarrhea and has bee taking imodium    Per daughter Long Eleanorrabia spreads the medication out throughout the day so it will continue to work, but it doesn't seem to be as strong anymore\"    Per d

## 2020-01-31 NOTE — TELEPHONE ENCOUNTER
Daughter agreed to follow up appt, requesting appt for Tue at [facility], scheduled for Tue 2/4 at [facility] with Dr Lonny Winston per request.

## 2020-01-31 NOTE — TELEPHONE ENCOUNTER
She is a patient of Dr. Jose Stevenson. I have seen her back in October for diarrhea. I am not sure whether it  is appropriate for me to manage this or you should check with Dr. Jose Stevenson.     Korey Trimble

## 2020-02-01 NOTE — TELEPHONE ENCOUNTER
If this is chronic diarrhea , lets treat symptomatically now and we will do some stool testing during the visit   Can take imodium for now.  Try to avoid dairy products and see whether there is any change in consistency of stool  However I agree that she sh

## 2020-02-04 ENCOUNTER — OFFICE VISIT (OUTPATIENT)
Dept: INTERNAL MEDICINE CLINIC | Facility: CLINIC | Age: 85
End: 2020-02-04
Payer: MEDICARE

## 2020-02-04 VITALS
HEIGHT: 59 IN | WEIGHT: 122 LBS | DIASTOLIC BLOOD PRESSURE: 61 MMHG | BODY MASS INDEX: 24.6 KG/M2 | TEMPERATURE: 98 F | SYSTOLIC BLOOD PRESSURE: 100 MMHG | HEART RATE: 67 BPM

## 2020-02-04 DIAGNOSIS — R19.7 DIARRHEA, UNSPECIFIED TYPE: Primary | ICD-10-CM

## 2020-02-04 DIAGNOSIS — D22.9 MULTIPLE ATYPICAL SKIN MOLES: ICD-10-CM

## 2020-02-04 PROCEDURE — G0463 HOSPITAL OUTPT CLINIC VISIT: HCPCS | Performed by: INTERNAL MEDICINE

## 2020-02-04 PROCEDURE — 99213 OFFICE O/P EST LOW 20 MIN: CPT | Performed by: INTERNAL MEDICINE

## 2020-02-04 NOTE — PROGRESS NOTES
Estrellita Szymanski is a 80year old female. Patient presents with:  Diarrhea: onset 1 week ago, doing Altobeam, no dairy, immodium stopped working, taking Pedialyte  Derm Problem: moles on face and neck are growing    HPI:   71-year-old female with a past medic of Systems   Constitutional: Negative for activity change, appetite change and fever. HENT: Negative for congestion and voice change. Respiratory: Negative for cough and shortness of breath. Cardiovascular: Negative for chest pain.    Gastrointestin dermatology. - DERM - INTERNAL    Plan: As above. I will see her back in 3 months. The patient indicates understanding of these issues and agrees to the plan. No follow-ups on file.

## 2020-02-13 NOTE — H&P
[address] [facility] [address] Gastroenterology                                                                                                  Clinic History and Physical     Pa \"bouts. \"  This resolves self resolves. No significant upper GI symptoms including nausea, vomiting, hematemesis, dyspepsia, dysphagia, odynophagia. No chest pain or shortness of breath. Appetite and weight are stable.         Pertinent Surgical Hx: dipropionate 0.05 % External Cream APPLY EXTERNALLY TO THE AFFECTED AREA TWICE DAILY (Patient not taking: Reported on 2/20/2020 ) 30 g 1       Allergies:    Aspirin                     Comment:Ulcer    ROS:   CONSTITUTIONAL:  negative for fevers, rigors  E habits      1. Altered bowel habits: Patient has a history of diarrheal symptoms dating back to when her  was ill/passed away 5 years ago.   Her symptoms have been chronic since then without significant change until recently when her symptoms began

## 2020-02-20 ENCOUNTER — OFFICE VISIT (OUTPATIENT)
Dept: GASTROENTEROLOGY | Facility: CLINIC | Age: 85
End: 2020-02-20
Payer: MEDICARE

## 2020-02-20 VITALS
HEIGHT: 59 IN | WEIGHT: 123 LBS | BODY MASS INDEX: 24.8 KG/M2 | DIASTOLIC BLOOD PRESSURE: 70 MMHG | HEART RATE: 64 BPM | SYSTOLIC BLOOD PRESSURE: 109 MMHG

## 2020-02-20 DIAGNOSIS — R19.4 ALTERED BOWEL HABITS: Primary | ICD-10-CM

## 2020-02-20 PROCEDURE — 99203 OFFICE O/P NEW LOW 30 MIN: CPT | Performed by: NURSE PRACTITIONER

## 2020-02-20 PROCEDURE — G0463 HOSPITAL OUTPT CLINIC VISIT: HCPCS | Performed by: NURSE PRACTITIONER

## 2020-02-20 RX ORDER — DICYCLOMINE HYDROCHLORIDE 10 MG/1
10 CAPSULE ORAL 2 TIMES DAILY PRN
Qty: 60 CAPSULE | Refills: 1 | Status: SHIPPED | OUTPATIENT
Start: 2020-02-20

## 2020-02-21 RX ORDER — LEVOTHYROXINE SODIUM 0.03 MG/1
TABLET ORAL
Qty: 90 TABLET | Refills: 1 | Status: CANCELLED | OUTPATIENT
Start: 2020-02-21

## 2020-02-24 NOTE — TELEPHONE ENCOUNTER
Current Outpatient Medications:     •  LEVOTHYROXINE SODIUM 25 MCG Oral Tab, TAKE 1 TABLET(25 MCG) BY MOUTH BEFORE BREAKFAST, Disp: 90 tablet, Rfl: 1 REFILL

## 2020-02-25 RX ORDER — LEVOTHYROXINE SODIUM 0.03 MG/1
25 TABLET ORAL
Qty: 90 TABLET | Refills: 1 | Status: SHIPPED | OUTPATIENT
Start: 2020-02-25 | End: 2020-08-17

## 2020-02-25 NOTE — TELEPHONE ENCOUNTER
Please review; protocol failed. Daughter confirmed the dose.   Hypothyroid Medications  Protocol Criteria:  Appointment scheduled in the past 12 months or the next 3 months  TSH resulted in the past 12 months that is normal  Recent Outpatient Visits

## 2020-03-02 ENCOUNTER — OFFICE VISIT (OUTPATIENT)
Dept: DERMATOLOGY CLINIC | Facility: CLINIC | Age: 85
End: 2020-03-02
Payer: MEDICARE

## 2020-03-02 DIAGNOSIS — D48.5 NEOPLASM OF UNCERTAIN BEHAVIOR OF SKIN: Primary | ICD-10-CM

## 2020-03-02 PROCEDURE — 11102 TANGNTL BX SKIN SINGLE LES: CPT | Performed by: DERMATOLOGY

## 2020-03-02 PROCEDURE — 99212 OFFICE O/P EST SF 10 MIN: CPT | Performed by: DERMATOLOGY

## 2020-03-02 PROCEDURE — 11103 TANGNTL BX SKIN EA SEP/ADDL: CPT | Performed by: DERMATOLOGY

## 2020-03-02 PROCEDURE — 88305 TISSUE EXAM BY PATHOLOGIST: CPT | Performed by: DERMATOLOGY

## 2020-03-02 PROCEDURE — G0463 HOSPITAL OUTPT CLINIC VISIT: HCPCS | Performed by: DERMATOLOGY

## 2020-03-07 NOTE — PROGRESS NOTES
The pathology report from last visit showed A left central forehead Hypertrophic actinic keratosis, B right temple Squamous cell carcinoma in situ, C right lateral neck below ear,Squamous cell carcinoma in situ .   I would suggest Jorge to the base of all

## 2020-03-09 NOTE — PROGRESS NOTES
Results logged in. Patient and her daughter Martha Segal (they were both on the line) informed of test results and all KMT's recommendations. Voiced understanding. They both want to know how affective the cream is.  Pt states she had surgery for the same cance

## 2020-03-10 NOTE — PROGRESS NOTES
Informed mother and daughter of Alma Rosa Aguila further recommendations.  They will start using aldara

## 2020-03-11 RX ORDER — LEVOTHYROXINE SODIUM 0.03 MG/1
TABLET ORAL
Qty: 90 TABLET | Refills: 1 | OUTPATIENT
Start: 2020-03-11

## 2020-03-15 NOTE — PROGRESS NOTES
Operative Report                     Shave/  Tangential biopsy     Clinical diagnosis:    Size of lesion:  Diagnosis/Clinical History: pt with painful irritated lesions.  neck lesion treated with cryo previously no change  Spec 1 Description >>>>>: cali

## 2020-03-15 NOTE — PROGRESS NOTES
Ny Donovan is a 80year old female. HPI:     CC:  Patient presents with:  Moles: LOV 10/26/2017 - Pt presenting assessment of moles on face and rt leg. Pt states the moles have been present for about a year.   Pt states the mole on the lower jawline an Medical History:   Diagnosis Date   • Actinic keratosis 2020    hypertrophic AK - left central forehead   • SCC (squamous cell carcinoma) 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     • SCC (squamous cell carcinoma) 2020    right temple   • SCC (squamous Breast feeding: Not Asked        Reaction to local anesthetic: No    Social History Narrative      Not on file    Family History   Problem Relation Age of Onset   • Colon Cancer Mother    • Colon Cancer Sister        There were no vitals filed for this vis lesions noted . See assessment and plan below for specific lesions. Otherwise remarkable for lesions as noted on map.       Assessment / plan:    Orders Placed This Encounter      Specimen to Pathology, Tissue [IHP Pt to [address] noted in follow-up/ above. This note was generated using Dragon voice recognition software. Please contact me regarding any confusion resulting from errors in recognition. Roxy Garcia

## 2020-04-13 ENCOUNTER — TELEPHONE (OUTPATIENT)
Dept: DERMATOLOGY CLINIC | Facility: CLINIC | Age: 85
End: 2020-04-13

## 2020-04-13 NOTE — TELEPHONE ENCOUNTER
Spoke with pt herself and informed of side effects of Aldara (redness/irritation). Pt verbalized understanding.

## 2020-04-13 NOTE — TELEPHONE ENCOUNTER
Pt daughter states mother was in office 3/7/20states the aldara med she is using making mother face red and dry.

## 2020-08-17 RX ORDER — LEVOTHYROXINE SODIUM 0.03 MG/1
TABLET ORAL
Qty: 90 TABLET | Refills: 1 | Status: SHIPPED | OUTPATIENT
Start: 2020-08-17 | End: 2020-10-21

## 2020-10-19 ENCOUNTER — MED REC SCAN ONLY (OUTPATIENT)
Dept: INTERNAL MEDICINE CLINIC | Facility: CLINIC | Age: 85
End: 2020-10-19

## 2020-10-19 ENCOUNTER — OFFICE VISIT (OUTPATIENT)
Dept: INTERNAL MEDICINE CLINIC | Facility: CLINIC | Age: 85
End: 2020-10-19
Payer: MEDICARE

## 2020-10-19 VITALS
BODY MASS INDEX: 22.58 KG/M2 | DIASTOLIC BLOOD PRESSURE: 62 MMHG | OXYGEN SATURATION: 97 % | SYSTOLIC BLOOD PRESSURE: 102 MMHG | TEMPERATURE: 97 F | WEIGHT: 112 LBS | HEIGHT: 59 IN | HEART RATE: 61 BPM

## 2020-10-19 DIAGNOSIS — L30.9 CHRONIC ECZEMA: ICD-10-CM

## 2020-10-19 DIAGNOSIS — F41.1 GENERALIZED ANXIETY DISORDER: ICD-10-CM

## 2020-10-19 DIAGNOSIS — K21.9 CHRONIC GERD: ICD-10-CM

## 2020-10-19 DIAGNOSIS — Z23 NEED FOR INFLUENZA VACCINATION: ICD-10-CM

## 2020-10-19 DIAGNOSIS — Z00.00 ADULT GENERAL MEDICAL EXAM: Primary | ICD-10-CM

## 2020-10-19 DIAGNOSIS — F41.1 ANXIETY STATE: ICD-10-CM

## 2020-10-19 DIAGNOSIS — E03.9 ACQUIRED HYPOTHYROIDISM: ICD-10-CM

## 2020-10-19 DIAGNOSIS — Z23 NEED FOR PNEUMOCOCCAL VACCINATION: ICD-10-CM

## 2020-10-19 DIAGNOSIS — F32.A DEPRESSION, UNSPECIFIED DEPRESSION TYPE: ICD-10-CM

## 2020-10-19 DIAGNOSIS — C44.90 SKIN CANCER: ICD-10-CM

## 2020-10-19 DIAGNOSIS — D48.5 NEOPLASM OF UNCERTAIN BEHAVIOR OF SKIN: ICD-10-CM

## 2020-10-19 DIAGNOSIS — F32.1 MODERATE SINGLE CURRENT EPISODE OF MAJOR DEPRESSIVE DISORDER (HCC): ICD-10-CM

## 2020-10-19 DIAGNOSIS — L82.0 INFLAMED SEBORRHEIC KERATOSIS: ICD-10-CM

## 2020-10-19 DIAGNOSIS — M25.561 ARTHRALGIA OF RIGHT LOWER LEG: ICD-10-CM

## 2020-10-19 PROBLEM — F13.230 BENZODIAZEPINE WITHDRAWAL WITHOUT COMPLICATION (HCC): Status: RESOLVED | Noted: 2017-11-29 | Resolved: 2020-10-19

## 2020-10-19 PROBLEM — F13.930 BENZODIAZEPINE WITHDRAWAL WITHOUT COMPLICATION (HCC): Status: RESOLVED | Noted: 2017-11-29 | Resolved: 2020-10-19

## 2020-10-19 PROBLEM — R00.2 HEART PALPITATIONS: Status: RESOLVED | Noted: 2017-01-19 | Resolved: 2020-10-19

## 2020-10-19 PROCEDURE — G0009 ADMIN PNEUMOCOCCAL VACCINE: HCPCS | Performed by: INTERNAL MEDICINE

## 2020-10-19 PROCEDURE — G0439 PPPS, SUBSEQ VISIT: HCPCS | Performed by: INTERNAL MEDICINE

## 2020-10-19 PROCEDURE — 90732 PPSV23 VACC 2 YRS+ SUBQ/IM: CPT | Performed by: INTERNAL MEDICINE

## 2020-10-19 PROCEDURE — 36415 COLL VENOUS BLD VENIPUNCTURE: CPT | Performed by: INTERNAL MEDICINE

## 2020-10-19 PROCEDURE — G0008 ADMIN INFLUENZA VIRUS VAC: HCPCS | Performed by: INTERNAL MEDICINE

## 2020-10-19 PROCEDURE — 90662 IIV NO PRSV INCREASED AG IM: CPT | Performed by: INTERNAL MEDICINE

## 2020-10-19 RX ORDER — LOPERAMIDE HCL 1 MG/7.5ML
2 SOLUTION ORAL 3 TIMES DAILY PRN
COMMUNITY

## 2020-10-19 NOTE — PROGRESS NOTES
HPI:   Bisi Payne is a 80year old female who presents for a Medicare Subsequent Annual Wellness visit (Pt already had Initial Annual Wellness).     80year-old pleasant lady with a past medical history of hypothyroidism, chronic diarrhea, anxiety/depres Encourage patient to take vitamin D supplementation and vitamin C supplementation. Depression Screening (PHQ-2/PHQ-9): Over the LAST 2 WEEKS          Advanced Directive:  She does NOT have a Living Will on file in [place].    The patient has this document b She has no active allergies. CURRENT MEDICATIONS:     •  Loperamide HCl 1 MG/7.5ML Oral Liquid, Take 2 mg by mouth 3 (three) times daily as needed for Diarrhea.     •  LEVOTHYROXINE SODIUM 25 MCG Oral Tab, TAKE 1 TABLET(25 MCG) BY MOUTH BEFORE BREAKFAS diarrhea, constipation, blood in stool and abdominal distention. Endocrine: Negative for cold intolerance and heat intolerance. Genitourinary: Negative for dysuria, hematuria, flank pain and difficulty urinating.    Musculoskeletal: Negative for myalgia misunderstand what others are saying and make inappropriate responses: No I avoid social activities because I cannot hear well and fear I will reply improperly: No   Family members and friends have told me they think I may have hearing loss:  Yes patient today as well. Vaccines: Flu vaccine given today. PPSV23 given today. Labs: Ordered  Discussed about seatbelt use, fall prevention  Patient denies any abuse or depression.   Patient reports adherence with all the medication.    -     FLU VACC HIG separate sheet to patient  PREVENTATIVE SERVICES  INDICATIONS AND SCHEDULE Internal Lab or Procedure External Lab or Procedure   Diabetes Screening      HbgA1C   Annually No results found for: A1C No flowsheet data found.     Fasting Blood Sugar (FSB)Annual get once after your 65th birthday    Hepatitis B for Moderate/High Risk No vaccine history found Medium/high risk factors:   End-stage renal disease   Hemophiliacs who received Factor VIII or IX concentrates   Clients of institutions for the mentally retar

## 2021-01-29 ENCOUNTER — TELEPHONE (OUTPATIENT)
Dept: INTERNAL MEDICINE CLINIC | Facility: CLINIC | Age: 86
End: 2021-01-29

## 2021-01-29 NOTE — TELEPHONE ENCOUNTER
Patient's daughter would like Dr. Richmond Amend recommendation if patient should receive the covid vaccine due to her age. Please advise.

## 2021-01-30 NOTE — TELEPHONE ENCOUNTER
Absolutely. My recommendation is based on risk versus benefit. I think she is better off taking the vaccine.   Thank you

## 2021-01-30 NOTE — TELEPHONE ENCOUNTER
Spoke with patient daughter (  verified ) informed of  Dr. Elisabeth Cochran message below  [employer] understanding

## 2021-03-09 DIAGNOSIS — Z23 NEED FOR VACCINATION: ICD-10-CM

## 2021-03-11 ENCOUNTER — IMMUNIZATION (OUTPATIENT)
Dept: LAB | Facility: HOSPITAL | Age: 86
End: 2021-03-11
Attending: HOSPITALIST
Payer: MEDICARE

## 2021-03-11 DIAGNOSIS — Z23 NEED FOR VACCINATION: Primary | ICD-10-CM

## 2021-03-11 PROCEDURE — 0011A SARSCOV2 VAC 100MCG/0.5ML IM: CPT

## 2021-04-09 ENCOUNTER — IMMUNIZATION (OUTPATIENT)
Dept: LAB | Facility: HOSPITAL | Age: 86
End: 2021-04-09
Attending: EMERGENCY MEDICINE
Payer: MEDICARE

## 2021-04-09 DIAGNOSIS — Z23 NEED FOR VACCINATION: Primary | ICD-10-CM

## 2021-04-09 PROCEDURE — 0012A SARSCOV2 VAC 100MCG/0.5ML IM: CPT

## 2021-04-26 NOTE — TELEPHONE ENCOUNTER
Benadryl 25 mg is fine at night. If not sleeping we can try short term Jamse Pottier but there are risks of sleepiness and falls with stronger meds. Subjective:      Patient ID: Margot Whitman is a 61 y.o. male. HPI  Chief Complaint   Patient presents with    dysphgaia       History of Present Illness  Charlene Onofre is a(n) 61 y.o. male who presents with a 6 month history of diffiiculty swallowing. Dry foods and meats are difficult. Water is fine. Started after C1-2 laminectomy. December 2020. Dr. Fidelina Vazquez. No prior SLP appointments or therapy. No weight loss. Feels like things just get stuck. Pain: No  Location: throat  Onset: As above  Timing: waxing and waning  Severity: moderate  Frequency: daily  Otalgia: No  Odynophagia: No  Dysphagia: Yes  Dyspnea: No  Voice Changes: No  Lumps in neck: No  Hemoptysis: No  Fever: No  Chills: No  Sweats: No  Lethargy: No  Weight Loss: No  Modifying Factors: Non smoker  Associates Signs and Symptoms: None    Patient Active Problem List   Diagnosis    Gastritis    Hyperlipidemia    ED (erectile dysfunction)    S/P left knee arthroscopy, chondroplasty, synovectomy, partial medial and lateral meniscectomy 9/4/2015    Ocular migraine    Seborrheic keratoses    Chronic left shoulder pain    Action tremor    Cervical spine disease    Pharyngeal dysphagia     Past Surgical History:   Procedure Laterality Date    ANTERIOR CRUCIATE LIGAMENT REPAIR Right 1978    ACL repair    CERVICAL SPINE SURGERY  2020    Fusion-occiput    COLONOSCOPY  2007    normal    COLONOSCOPY N/A 11/05/2019    Diverticulosis. Recheck 10 years.     JOINT REPLACEMENT  2003    right hip    KNEE ARTHROSCOPY Left 09/04/2015    LEFT KNEE ARTHROSCOPY CHONDROPLASTY SYNOVECTOMY PARTIAL    TONSILLECTOMY       Family History   Problem Relation Age of Onset    Coronary Art Dis Father     Coronary Art Dis Brother      Social History     Socioeconomic History    Marital status:      Spouse name: Robby Brown Number of children: Not on file    Years of education: Not on file    Highest education level: Not on file   Occupational History    Not on file   Social Needs    Financial resource strain: Not hard at all   [place] insecurity     Worry: Never true     Inability: Never true   [employer] needs     Medical: No     Non-medical: No   Tobacco Use    Smoking status: Never Smoker    Smokeless tobacco: Never Used   Substance and Sexual Activity    Alcohol use: Yes     Alcohol/week: 0.0 standard drinks     Comment: beer and wine 1-3 weekend     Drug use: No    Sexual activity: Not on file   Lifestyle    Physical activity     Days per week: Not on file     Minutes per session: Not on file    Stress: Not on file   Relationships    Social connections     Talks on phone: Not on file     Gets together: Not on file     Attends Restoration service: Not on file     Active member of club or organization: Not on file     Attends meetings of clubs or organizations: Not on file     Relationship status: Not on file    Intimate partner violence     Fear of current or ex partner: Not on file     Emotionally abused: Not on file     Physically abused: Not on file     Forced sexual activity: Not on file   Other Topics Concern    Not on file   Social History Narrative    Not on file       DRUG/FOOD ALLERGIES: Patient has no known allergies. CURRENT MEDICATIONS  Prior to Admission medications    Medication Sig Start Date End Date Taking?  Authorizing Provider   acetaminophen (TYLENOL) 325 MG tablet Take 650 mg by mouth every 6 hours as needed for Pain   Yes Historical Provider, MD   simvastatin (ZOCOR) 80 MG tablet TAKE 1 TABLET BY MOUTH IN THE EVENING 1/25/21  Yes Emily Calvillo MD   ibuprofen (ADVIL;MOTRIN) 200 MG tablet Take 200 mg by mouth every 6 hours as needed for Pain   Yes Historical Provider, MD   omeprazole (PRILOSEC) 20 MG delayed release capsule Take 20 mg by mouth daily   Yes Historical Provider, MD   sildenafil (REVATIO) 20 MG tablet TAKE 2-5 TABLETS BY MOUTH AS NEEDED  Patient not taking: Reported on 4/26/2021 1/31/21   Emily Calvillo MD   meloxicam Negative for agitation, confusion, self-injury and sleep disturbance. The patient is not nervous/anxious. Objective:   Physical Exam  Constitutional:       Appearance: He is well-developed. He is not ill-appearing. HENT:      Head: Normocephalic and atraumatic. Not macrocephalic and not microcephalic. No raccoon eyes, Srivastava's sign, abrasion, contusion, right periorbital erythema, left periorbital erythema or laceration. Hair is normal.      Jaw: No trismus. Salivary Glands: Right salivary gland is not diffusely enlarged. Left salivary gland is not diffusely enlarged. Right Ear: Hearing, tympanic membrane and external ear normal. No decreased hearing noted. No drainage, swelling or tenderness. No middle ear effusion. No mastoid tenderness. Tympanic membrane is not perforated, retracted or bulging. Tympanic membrane has normal mobility. Left Ear: Hearing, tympanic membrane and external ear normal. No decreased hearing noted. No drainage, swelling or tenderness. No middle ear effusion. No mastoid tenderness. Tympanic membrane is not perforated, retracted or bulging. Tympanic membrane has normal mobility. Ears:      Wray exam findings: does not lateralize. Right Rinne: AC > BC. Left Rinne: AC > BC. Nose: No nasal deformity, septal deviation, laceration, mucosal edema or rhinorrhea. Right Nostril: No epistaxis. Left Nostril: No epistaxis. Right Turbinates: Not enlarged. Left Turbinates: Not enlarged. Right Sinus: No maxillary sinus tenderness or frontal sinus tenderness. Left Sinus: No maxillary sinus tenderness or frontal sinus tenderness. Mouth/Throat:      Lips: No lesions. Mouth: Mucous membranes are not pale, not dry and not cyanotic. No lacerations or oral lesions. Dentition: Normal dentition. No dental caries or dental abscesses. Tongue: No lesions. Palate: No mass. Pharynx: Uvula midline.  No oropharyngeal exudate, posterior oropharyngeal erythema or uvula swelling. Tonsils: No tonsillar abscesses. Eyes:      General: Lids are normal. Lids are everted, no foreign bodies appreciated. Right eye: No discharge. Left eye: No discharge. Extraocular Movements:      Right eye: Normal extraocular motion and no nystagmus. Left eye: Normal extraocular motion and no nystagmus. Conjunctiva/sclera:      Right eye: No chemosis or exudate. Left eye: No chemosis or exudate. Neck:      Musculoskeletal: Neck supple. Thyroid: No thyroid mass or thyromegaly. Vascular: Normal carotid pulses. Trachea: Trachea normal. No tracheal deviation. Cardiovascular:      Rate and Rhythm: Normal rate and regular rhythm. Pulmonary:      Effort: No tachypnea, bradypnea or respiratory distress. Breath sounds: No stridor. Musculoskeletal:      Right shoulder: He exhibits normal range of motion. Left shoulder: He exhibits normal range of motion. Lymphadenopathy:      Head:      Right side of head: No submental, submandibular, tonsillar, preauricular, posterior auricular or occipital adenopathy. Left side of head: No submental, submandibular, tonsillar, preauricular, posterior auricular or occipital adenopathy. Cervical:      Right cervical: No superficial, deep or posterior cervical adenopathy. Left cervical: No superficial, deep or posterior cervical adenopathy. Skin:     Findings: No bruising, erythema, laceration or lesion. Neurological:      Mental Status: He is alert and oriented to person, place, and time.    Psychiatric:         Speech: Speech normal.         Behavior: Behavior normal.         Due to the patients chronic benign and/or malignant laryngeal disease and/or history of laryngeal neoplasm for surveillance a flexible laryngoscopy will be performed to complete a significant physical examination of the patient which cannot be performed by indirect

## 2021-04-29 RX ORDER — LEVOTHYROXINE SODIUM 0.05 MG/1
TABLET ORAL
Qty: 90 TABLET | Refills: 1 | Status: SHIPPED | OUTPATIENT
Start: 2021-04-29 | End: 2021-10-22

## 2021-05-03 RX ORDER — ESCITALOPRAM OXALATE 10 MG/1
5 TABLET ORAL DAILY
Qty: 45 TABLET | Refills: 1 | Status: SHIPPED | OUTPATIENT
Start: 2021-05-03 | End: 2021-08-13

## 2021-07-17 ENCOUNTER — APPOINTMENT (OUTPATIENT)
Dept: ULTRASOUND IMAGING | Age: 86
End: 2021-07-17
Attending: PHYSICIAN ASSISTANT
Payer: MEDICARE

## 2021-07-17 ENCOUNTER — HOSPITAL ENCOUNTER (OUTPATIENT)
Age: 86
Discharge: HOME | End: 2021-07-17
Payer: MEDICARE

## 2021-07-17 ENCOUNTER — LAB ENCOUNTER (OUTPATIENT)
Dept: LAB | Age: 86
End: 2021-07-17
Attending: PHYSICIAN ASSISTANT
Payer: MEDICARE

## 2021-07-17 ENCOUNTER — NURSE TRIAGE (OUTPATIENT)
Dept: INTERNAL MEDICINE CLINIC | Facility: CLINIC | Age: 86
End: 2021-07-17

## 2021-07-17 VITALS
OXYGEN SATURATION: 96 % | SYSTOLIC BLOOD PRESSURE: 125 MMHG | RESPIRATION RATE: 20 BRPM | TEMPERATURE: 97 F | DIASTOLIC BLOOD PRESSURE: 56 MMHG | HEART RATE: 74 BPM

## 2021-07-17 DIAGNOSIS — E03.9 ACQUIRED HYPOTHYROIDISM: ICD-10-CM

## 2021-07-17 DIAGNOSIS — M54.32 SCIATICA OF LEFT SIDE: Primary | ICD-10-CM

## 2021-07-17 LAB — TSI SER-ACNC: 3.68 MIU/ML (ref 0.36–3.74)

## 2021-07-17 PROCEDURE — 93971 EXTREMITY STUDY: CPT | Performed by: PHYSICIAN ASSISTANT

## 2021-07-17 PROCEDURE — 36415 COLL VENOUS BLD VENIPUNCTURE: CPT

## 2021-07-17 PROCEDURE — 84443 ASSAY THYROID STIM HORMONE: CPT

## 2021-07-17 PROCEDURE — 99203 OFFICE O/P NEW LOW 30 MIN: CPT | Performed by: PHYSICIAN ASSISTANT

## 2021-07-17 RX ORDER — LIDOCAINE 50 MG/G
1 PATCH TOPICAL EVERY 24 HOURS
Qty: 7 PATCH | Refills: 0 | Status: SHIPPED | OUTPATIENT
Start: 2021-07-17 | End: 2021-07-24

## 2021-07-17 RX ORDER — PREDNISONE 20 MG/1
20 TABLET ORAL DAILY
Qty: 5 TABLET | Refills: 0 | Status: SHIPPED | OUTPATIENT
Start: 2021-07-17 | End: 2021-07-22

## 2021-07-17 NOTE — ED INITIAL ASSESSMENT (HPI)
Patient is here with left hip pain with pain going down her left thigh. She denies any injuries, she states it just started yesterday.

## 2021-07-17 NOTE — TELEPHONE ENCOUNTER
Action Requested: Summary for Provider     []  Critical Lab, Recommendations Needed  [] Need Additional Advice  [x]   FYI    []   Need Orders  [] Need Medications Sent to Pharmacy  []  Other     SUMMARY: Patient's son calling with complaint of patient havi

## 2021-07-17 NOTE — ED PROVIDER NOTES
Patient Seen in: [facility]      History   Patient presents with:  Leg Pain    Stated Complaint: leg problem    HPI/Subjective:   HPI    79 yo female with PMH as listed below here for evaluation of L sided leg pain.  Pt reports she noted the Physical Exam  Vitals and nursing note reviewed. Constitutional:       General: She is not in acute distress. HENT:      Head: Normocephalic and atraumatic.       Right Ear: External ear normal.      Left Ear: External ear normal.      Nose: Nose Disposition and Plan     Clinical Impression:  Sciatica of left side  (primary encounter diagnosis)     Disposition:  There is no disposition on file for this visit. There is no disposition time on file for this visit.     Follow-up:  Antony Goetz

## 2021-07-23 ENCOUNTER — TELEPHONE (OUTPATIENT)
Dept: INTERNAL MEDICINE CLINIC | Facility: CLINIC | Age: 86
End: 2021-07-23

## 2021-07-23 NOTE — TELEPHONE ENCOUNTER
Spoke with daughter ( verified) and relayed Dr. Jailene Taylor message below--she verbalizes understanding and agreement. Left Dr. Apolonia Tony on daughter's voicemail per her request, as she is currently driving.  No further questions/concerns at this ti

## 2021-07-23 NOTE — TELEPHONE ENCOUNTER
Can take Tylenol 500 mg every 6 hours. If pain is not better with the Tylenol, can take Motrin 200 mg 2 times a day with food only. I prefer her not to take too much of Motrin as it can cause stomach upset and bleeding.   Please inform her thank you

## 2021-07-23 NOTE — TELEPHONE ENCOUNTER
Dr. Tony Serrano: Daughter Mi Hernandez called. She would like to know what patient can take for pain. Please advise. Patient went to [facility] 7/17/21. For hip/left leg pain. Was treated with prednisone 20mg, lidocaine. Patient said it only helped minimally.

## 2021-08-13 RX ORDER — ESCITALOPRAM OXALATE 10 MG/1
TABLET ORAL
Qty: 45 TABLET | Refills: 1 | Status: SHIPPED | OUTPATIENT
Start: 2021-08-13

## 2021-08-13 NOTE — TELEPHONE ENCOUNTER
Spoke with the patient,verified full name and   Patient and Tristen Rebollar (caregiver) confirmed that patient currently taking.  Lexapro 5 mg daily    TE , medication was DC per patient

## 2021-08-30 ENCOUNTER — TELEPHONE (OUTPATIENT)
Dept: INTERNAL MEDICINE CLINIC | Facility: CLINIC | Age: 86
End: 2021-08-30

## 2021-08-30 NOTE — TELEPHONE ENCOUNTER
She can take 1 extra dose of dicyclomine if needed. If it makes her constipate,  may only take it once a day.   Okay to take probiotic

## 2021-08-30 NOTE — TELEPHONE ENCOUNTER
Verified pt name and  with pt's daughter, Fior Gu. Reviewed doctor's recommendations as noted below. Fior Gu agreed with plan of care, Fior Gu will call back if refill needed.

## 2021-08-30 NOTE — TELEPHONE ENCOUNTER
Dr. Halle Triana:   Daughter [facility], questions if taking a 2nd dose per day of dicyclomine would cause constipation? Daughter wants to make sure you agree with patient taking Florajen to help with diarrhea? Patient with hx of chronic diarrhea.  Has a presc

## 2021-10-22 RX ORDER — LEVOTHYROXINE SODIUM 0.05 MG/1
50 TABLET ORAL
Qty: 90 TABLET | Refills: 0 | Status: SHIPPED | OUTPATIENT
Start: 2021-10-22 | End: 2022-01-21

## 2021-10-22 NOTE — TELEPHONE ENCOUNTER
90 day refill given on 10/22/21, appointment needed for further refills. Please review. Protocol failed / No protocol.    Requested Prescriptions   Pending Prescriptions Disp Refills    LEVOTHYROXINE 50 MCG Oral Tab [Pharmacy Med Name: LEVOTHYROXINE 0.05MG (50MCG) TAB] 90 tablet 1     Sig: TAKE 1 TABLET(50 MCG) BY MOUTH BEFORE BREAKFAST        Thyroid Medication Protocol Failed - 10/22/2021  3:12 AM        Failed - Appointment in past 12 or next 3 months        Passed - TSH in past 12 months        Passed - Last TSH value is normal     Lab Results   Component Value Date    TSH 3.680 07/17/2021                         Recent Outpatient Visits              1 year ago Adult general medical exam    [address] [facility], [address], Adal Easton MD    Office Visit    1 year ago Neoplasm of uncertain behavior of skin    [facility] Surjit Fong MD    Office Visit    1 year ago Altered bowel habits    [address], [address] [facility] [place], CHERYL Carroll    Office Visit    1 year ago Diarrhea, unspecified type    [address], [address], Adal Easton MD    Office Visit    2 years ago Diarrhea, unspecified type    Adal Mcfarland MD    Office Visit

## 2022-01-21 RX ORDER — LEVOTHYROXINE SODIUM 0.05 MG/1
50 TABLET ORAL
Qty: 60 TABLET | Refills: 0 | Status: SHIPPED | OUTPATIENT
Start: 2022-01-21 | End: 2022-02-08

## 2022-01-21 RX ORDER — LEVOTHYROXINE SODIUM 0.05 MG/1
TABLET ORAL
Qty: 90 TABLET | Refills: 0 | OUTPATIENT
Start: 2022-01-21

## 2022-01-21 NOTE — TELEPHONE ENCOUNTER
Refill passed per [facility] protocol.     Requested Prescriptions   Pending Prescriptions Disp Refills    LEVOTHYROXINE 50 MCG Oral Tab [Pharmacy Med Name: LEVOTHYROXINE 0.05MG (50MCG) TAB] 90 tablet 0     Sig: TAKE 1 TABLET(50 MCG) BY MOUTH BEFORE BREAKFAST        Thyroid Medication Protocol Failed - 1/21/2022  3:31 PM        Failed - Appointment in past 12 or next 3 months        Passed - TSH in past 12 months        Passed - Last TSH value is normal     Lab Results   Component Value Date    TSH 3.680 07/17/2021                           Recent Outpatient Visits              1 year ago Adult general medical exam    [address], [address], Art Carr MD    Office Visit    1 year ago Neoplasm of uncertain behavior of skin    [facility] Juventino Raines MD    Office Visit    1 year ago Altered bowel habits    [address] [facility], [address] [facility] CHERYL Kramer    Office Visit    1 year ago Diarrhea, unspecified type    [address] [facility] [facility] [place], [address], Art Carr MD    Office Visit    2 years ago Diarrhea, unspecified type    Nathaniel Vicente, Art Carr MD    Office Visit

## 2022-01-25 RX ORDER — LEVOTHYROXINE SODIUM 0.05 MG/1
TABLET ORAL
Qty: 60 TABLET | Refills: 0 | OUTPATIENT
Start: 2022-01-25

## 2022-01-25 NOTE — TELEPHONE ENCOUNTER
Duplicate request, previously addressed. Outpatient Medication Detail     Disp Refills Start End    levothyroxine 50 MCG Oral Tab 60 tablet 0 1/21/2022     Sig - Route:  Take 1 tablet (50 mcg total) by mouth before breakfast. - Oral    Sent to pharmacy as: Levothyroxine Sodium 50 MCG Oral Tablet (SYNTHROID)    Notes to Pharmacy: FOR ADDITIONAL REFILLS, PLEASE SCHEDULE AN APPOINTMENT WITH DR. Nannette Ramos.    E-Prescribing Status: Receipt confirmed by pharmacy (1/21/2022  3:32 PM CST)

## 2022-02-08 RX ORDER — ESCITALOPRAM OXALATE 10 MG/1
5 TABLET ORAL DAILY
Qty: 45 TABLET | Refills: 1 | Status: SHIPPED | OUTPATIENT
Start: 2022-02-08 | End: 2022-02-09

## 2022-02-08 RX ORDER — LEVOTHYROXINE SODIUM 0.05 MG/1
50 TABLET ORAL
Qty: 90 TABLET | Refills: 1 | Status: SHIPPED | OUTPATIENT
Start: 2022-02-08 | End: 2022-02-09

## 2022-02-08 NOTE — TELEPHONE ENCOUNTER
Refill passed per Peg Pulling protocol. Next Appt: With Kamryn Contreras MEDICINE  02/09/2022 at 1:00 PM    Requested Prescriptions   Pending Prescriptions Disp Refills    LEVOTHYROXINE 50 MCG Oral Tab [Pharmacy Med Name: LEVOTHYROXINE 0.05MG (50MCG) TAB] 60 tablet 0     Sig: TAKE 1 TABLET(50 MCG) BY MOUTH BEFORE BREAKFAST        Thyroid Medication Protocol Failed - 2/8/2022 11:43 AM        Failed - Appointment in past 12 or next 3 months        Passed - TSH in past 12 months        Passed - Last TSH value is normal     Lab Results   Component Value Date    TSH 3.680 07/17/2021                    ESCITALOPRAM 10 MG Oral Tab [Pharmacy Med Name: ESCITALOPRAM 10MG TABLETS] 45 tablet 1     Sig: TAKE 1/2 TABLET(5 MG) BY MOUTH DAILY        Psychiatric Non-Scheduled (Anti-Anxiety) Failed - 2/8/2022 11:43 AM        Failed - Appointment in last 6 or next 3 months            Future Appointments         Provider Department Appt Notes    Tomorrow Elida Carvalho MD Peg Pulling, [address] [facility],[address], [facility] Jose  daughter helping with appt.           Recent Outpatient Visits              1 year ago Adult general medical exam    Adal Mcfarland MD    Office Visit    1 year ago Neoplasm of uncertain behavior of skin    [facility] Surjit Fong MD    Office Visit    1 year ago Altered bowel habits    Peg Pulling, [address] [facility], CHERYL Carroll    Office Visit    2 years ago Diarrhea, unspecified type    Peg Pulling, [address] [facility] [address], Adal Easton MD    Office Visit    2 years ago Diarrhea, unspecified type    Adal Mcfarland MD    Office Visit

## 2022-02-08 NOTE — TELEPHONE ENCOUNTER
Last refill request from 1/21/22-for additional refills, Pt needs to schedule an appointment. CSS, please call pt and asssit, then route back to Triage staff when scheduled. Thank you.

## 2022-02-09 ENCOUNTER — TELEMEDICINE (OUTPATIENT)
Dept: INTERNAL MEDICINE CLINIC | Facility: CLINIC | Age: 87
End: 2022-02-09

## 2022-02-09 DIAGNOSIS — F41.1 ANXIETY STATE: ICD-10-CM

## 2022-02-09 DIAGNOSIS — E03.9 ACQUIRED HYPOTHYROIDISM: Primary | ICD-10-CM

## 2022-02-09 PROCEDURE — 99213 OFFICE O/P EST LOW 20 MIN: CPT | Performed by: INTERNAL MEDICINE

## 2022-02-09 RX ORDER — ESCITALOPRAM OXALATE 10 MG/1
5 TABLET ORAL DAILY
Qty: 45 TABLET | Refills: 1 | Status: SHIPPED | OUTPATIENT
Start: 2022-02-09

## 2022-02-09 RX ORDER — LEVOTHYROXINE SODIUM 0.05 MG/1
50 TABLET ORAL
Qty: 90 TABLET | Refills: 1 | Status: SHIPPED | OUTPATIENT
Start: 2022-02-09

## 2022-10-18 RX ORDER — ESCITALOPRAM OXALATE 10 MG/1
5 TABLET ORAL DAILY
Qty: 45 TABLET | Refills: 1 | Status: SHIPPED | OUTPATIENT
Start: 2022-10-18

## 2022-10-20 NOTE — TELEPHONE ENCOUNTER
Continuity of Care Form    Patient Name: Ed Magdaleno   :  1964  MRN:  [id number]    Admit date:  10/18/2022  Discharge date:  10/20/2022    Code Status Order: Full Code   Advance Directives:     Admitting Physician:  Pa Marinelli DO  PCP: Addy Huerta DO    [facility] [address] Unit Phone Number: [phone]    Emergency Contact:   Extended Emergency Contact Information  Primary Emergency Contact: [address] [place] [address] Phone: [phone]  Mobile Phone: [phone]  Relation: Brother/Sister   needed?  No  Secondary Emergency Contact: [facility] Phone: [phone]  Relation: Child    Past Surgical History:  Past Surgical History:   Procedure Laterality Date    CARDIAC CATHETERIZATION      UPPER GASTROINTESTINAL ENDOSCOPY         Immunization History:   Immunization History   Administered Date(s) Administered    Hepatitis A Adult (Havrix, Vaqta) 2019    Hepatitis B 2013    Influenza Virus Vaccine 2012, 2017, 2019    Pneumococcal Polysaccharide (Cldneqrbg96) 2012    Tdap (Boostrix, Adacel) 2016       Active Problems:  Patient Active Problem List   Diagnosis Code    Type 2 diabetes mellitus with hyperlipidemia (HCC) E11.69, E78.5    Diabetes mellitus with coincident hypertension ([facility] E11.9, I10    Gastroesophageal reflux disease K21.9    Obesity, diabetes, and hypertension syndrome (HCC) E11.69, E66.9, E11.59, I15.2    Abnormal stress test R94.39    Atherosclerosis of coronary artery I25.10    Chronic kidney disease (CKD) stage G2/A2, mildly decreased glomerular filtration rate (GFR) between 60-89 mL/min/1.73 square meter and albuminuria creatinine ratio between  mg/g N18.2    Gastric ulcer with perforation ([address] K25.5    History of MI (myocardial infarction) I25.2    Tobacco abuse Z72.0    Chronic systolic heart failure (HCC) I50.22    Atrial fibrillation with RVR ([facility] [address] [facility] Patients daughter is calling states patient has been with diarrhea for 4 days and having accidents during the day and night. Please Advise. I48.91    Acute on chronic systolic heart failure ([facility]) I50.23    CHF (congestive heart failure), NYHA class I, acute on chronic, combined ([facility]) I50.43    PAF (paroxysmal atrial fibrillation) ([facility]) I48.0    Acute CVA (cerebrovascular accident) ([facility] I63.9    Symptomatic carotid artery stenosis, right I65.21    Dizziness R42    Stroke due to stenosis of right carotid artery ([facility]) I63.231    Carotid stenosis, symptomatic, with infarction [facility]) E53.119       Isolation/Infection:   Isolation            No Isolation          Patient Infection Status       Infection Onset Added Last Indicated Last Indicated By Review Planned Expiration Resolved Resolved By    None active    Resolved    COVID-19 (Rule Out) 07/23/22 07/23/22 07/23/22 COVID-19, Rapid (Ordered)   07/23/22 Rule-Out Test Resulted    COVID-19 03/19/22 03/19/22 03/19/22 COVID-19, Rapid   03/22/22 Jose Angel Paez RN    COVID-19 (Rule Out) 03/19/22 03/19/22 03/19/22 COVID-19, Rapid (Ordered)   03/19/22 Rule-Out Test Resulted            Nurse Assessment:  Last Vital Signs: /66   Pulse 72   Temp 98.5 °F (36.9 °C) (Oral)   Resp 16   Ht 6' 1\" (1.854 m)   Wt (!) 303 lb 5.7 oz (137.6 kg)   SpO2 97%   BMI 40.02 kg/m²     Last documented pain score (0-10 scale): Pain Level: 7  Last Weight:   Wt Readings from Last 1 Encounters:   10/20/22 (!) 303 lb 5.7 oz (137.6 kg)     Mental Status:  oriented and alert    IV Access:  - None    Nursing Mobility/ADLs:  Walking   Independent  Transfer  Independent  Bathing  Independent  Dressing  Independent  Toileting  Independent  Feeding  Independent  Med [address] [place]  Med Delivery   whole    Wound Care Documentation and Therapy:  Incision 10/18/22 Right (Active)   Dressing Status Clean; Intact;Dry 10/19/22 2130   Incision Cleansed Not Cleansed 10/19/22 1600   Dressing/Treatment Surgical glue 10/19/22 2130   Closure Surgical glue 10/19/22 2130   Margins Approximated 10/19/22 2130   Incision Assessment Dry 10/19/22 2130 Drainage Amount None 10/19/22 1600   Odor None 10/19/22 1600   Number of days: 1        Elimination:  Continence: Bowel: Yes  Bladder: Yes  Urinary Catheter: None   Colostomy/Ileostomy/Ileal Conduit: No       Date of Last BM:     Intake/Output Summary (Last 24 hours) at 10/20/2022 1105  Last data filed at 10/20/2022 0322  Gross per 24 hour   Intake 320 ml   Output 650 ml   Net -330 ml     I/O last 3 completed shifts: In: 3185 [P.O.:320; I.V.:887]  Out: 1250 [Urine:1250]    Safety Concerns: At Risk for Falls    Impairments/Disabilities:      None    Nutrition Therapy:  Current Nutrition Therapy:   - Oral Diet:  Carb Control 4 carbs/meal (1800kcals/day)  low sodium 2 gram    ,,     Routes of Feeding: Oral  Liquids: Thin Liquids  Daily Fluid Restriction: yes - amount 2000  Last Modified Barium Swallow with Video (Video Swallowing Test): not done    Treatments at the Time of Hospital Discharge:   Respiratory Treatments:   Oxygen Therapy:  is not on home oxygen therapy. Ventilator:    - No ventilator support    Rehab Therapies  Weight Bearing Status/Restrictions: No weight bearing restrictions  Other Medical Equipment (for information only, NOT a DME order):     Other Treatments:     Patient's personal belongings (please select all that are sent with patient):  None    RN SIGNATURE:  Electronically signed by Gabriela Klein RN on 10/20/22 at 11:08 AM EDT    CASE MANAGEMENT/SOCIAL WORK SECTION    Inpatient Status Date: ***    Readmission Risk Assessment Score:  Readmission Risk              Risk of Unplanned Readmission:  18           Discharging to Facility/ Agency   Name:   Address:  Phone:  Fax:    Dialysis Facility (if applicable)   Name:  Address:  Dialysis Schedule:  Phone:  Fax:    / signature: {Esignature:[id number]}    PHYSICIAN SECTION    Prognosis: {Prognosis:8678771256}    Condition at Discharge: [facility] Patient Condition:673024189}    Rehab Potential (if transferring to Rehab): {Prognosis:4828551426}    Recommended Labs or Other Treatments After Discharge: ***    Physician Certification: I certify the above information and transfer of Luis Macdonald  is necessary for the continuing treatment of the diagnosis listed and that he requires {Admit to Appropriate Level of Care:78916} for {GREATER/LESS:220362324} 30 days.      Update Admission H&P: {CHP DME Changes in SGKX:896308216}    PHYSICIAN SIGNATURE:  {Esignature:572491222}

## 2022-11-30 ENCOUNTER — NURSE TRIAGE (OUTPATIENT)
Dept: INTERNAL MEDICINE CLINIC | Facility: CLINIC | Age: 87
End: 2022-11-30

## 2023-02-08 ENCOUNTER — NURSE TRIAGE (OUTPATIENT)
Dept: INTERNAL MEDICINE CLINIC | Facility: CLINIC | Age: 88
End: 2023-02-08

## 2023-02-10 NOTE — TELEPHONE ENCOUNTER
It is very hard to say that psyllium will help her with diarrhea. As you know we use this also for constipation. However, it can be also used for making the stool little bit formed. I am okay for her to try. But please inform the daughter regarding the risk of worsening diarrhea.

## 2023-02-10 NOTE — TELEPHONE ENCOUNTER
Dr. Nereyda Harris - Please advise on questions:  1. They apparently found psyllium with Only insoluable OR Only soluble fiber. Which one do you recommend? 2. How much psyllium per dose? And how often? Wants specific recommendation from Dr Nereyda Harris.           RN called patient's daughter. Patient's date of birth and full name both confirmed. RN informed of provider's message as detailed below. She verbalizes understanding. But asks follow-up questions. See questions at beginning of this note. RN advised that psyllium can contain both soluble and insoluable fiber. She says her brother says he found one with just insoluble fiber.

## 2023-02-10 NOTE — TELEPHONE ENCOUNTER
Daughter calling to follow up.  Daughter asking if patient can start on a psyllium or insoluble fiber,

## 2023-02-14 NOTE — TELEPHONE ENCOUNTER
Soluble fiber 1 to 2 tablespoons a day. I think if there is no improvement, they should follow-up with gastroenterology.

## 2023-05-18 ENCOUNTER — NURSE TRIAGE (OUTPATIENT)
Dept: INTERNAL MEDICINE CLINIC | Facility: CLINIC | Age: 88
End: 2023-05-18

## 2023-05-18 RX ORDER — DICYCLOMINE HYDROCHLORIDE 10 MG/1
10 CAPSULE ORAL EVERY 12 HOURS PRN
Qty: 60 CAPSULE | Refills: 0 | Status: SHIPPED | OUTPATIENT
Start: 2023-05-18 | End: 2023-06-17

## 2023-05-18 NOTE — TELEPHONE ENCOUNTER
Spoke with the [facility] ,verified full name and     Informed her of message no further questions

## 2023-05-18 NOTE — TELEPHONE ENCOUNTER
I have sent dicyclomine to the pharmacy. If she has long-lasting worsening diarrhea, she should follow-up with gastroenterology.   Please inform thank you

## 2023-05-18 NOTE — TELEPHONE ENCOUNTER
Patients daughter requesting refill of:  Dicyclomine HCl 10 MG Oral Cap   She has only 5 pills remaining    Please send to:  Sandeep Ramey [address] [place] [address] [facility]

## 2023-05-30 ENCOUNTER — OFFICE VISIT (OUTPATIENT)
Facility: CLINIC | Age: 88
End: 2023-05-30

## 2023-05-30 ENCOUNTER — LAB ENCOUNTER (OUTPATIENT)
Dept: LAB | Facility: HOSPITAL | Age: 88
End: 2023-05-30
Attending: INTERNAL MEDICINE
Payer: MEDICARE

## 2023-05-30 VITALS
RESPIRATION RATE: 18 BRPM | HEIGHT: 59 IN | OXYGEN SATURATION: 96 % | BODY MASS INDEX: 27.62 KG/M2 | SYSTOLIC BLOOD PRESSURE: 114 MMHG | WEIGHT: 137 LBS | DIASTOLIC BLOOD PRESSURE: 52 MMHG | HEART RATE: 80 BPM

## 2023-05-30 DIAGNOSIS — F41.1 ANXIETY STATE: ICD-10-CM

## 2023-05-30 DIAGNOSIS — E03.9 ACQUIRED HYPOTHYROIDISM: Primary | ICD-10-CM

## 2023-05-30 DIAGNOSIS — K58.0 IRRITABLE BOWEL SYNDROME WITH DIARRHEA: ICD-10-CM

## 2023-05-30 DIAGNOSIS — C44.90 SKIN CANCER: ICD-10-CM

## 2023-05-30 DIAGNOSIS — E03.9 ACQUIRED HYPOTHYROIDISM: ICD-10-CM

## 2023-05-30 DIAGNOSIS — F32.1 MODERATE SINGLE CURRENT EPISODE OF MAJOR DEPRESSIVE DISORDER (HCC): ICD-10-CM

## 2023-05-30 DIAGNOSIS — M25.561 ARTHRALGIA OF RIGHT LOWER LEG: ICD-10-CM

## 2023-05-30 DIAGNOSIS — F41.1 GENERALIZED ANXIETY DISORDER: ICD-10-CM

## 2023-05-30 DIAGNOSIS — H61.23 BILATERAL IMPACTED CERUMEN: ICD-10-CM

## 2023-05-30 DIAGNOSIS — D48.5 NEOPLASM OF UNCERTAIN BEHAVIOR OF SKIN: ICD-10-CM

## 2023-05-30 DIAGNOSIS — L60.0 INGROWN NAIL OF GREAT TOE: ICD-10-CM

## 2023-05-30 LAB
ALBUMIN SERPL-MCNC: 2.7 G/DL (ref 3.4–5)
ALBUMIN/GLOB SERPL: 0.7 {RATIO} (ref 1–2)
ALP LIVER SERPL-CCNC: 73 U/L
ALT SERPL-CCNC: 10 U/L
ANION GAP SERPL CALC-SCNC: 3 MMOL/L (ref 0–18)
AST SERPL-CCNC: 22 U/L (ref 15–37)
BILIRUB SERPL-MCNC: 0.3 MG/DL (ref 0.1–2)
BUN BLD-MCNC: 17 MG/DL (ref 7–18)
BUN/CREAT SERPL: 22.1 (ref 10–20)
CALCIUM BLD-MCNC: 8.8 MG/DL (ref 8.5–10.1)
CHLORIDE SERPL-SCNC: 108 MMOL/L (ref 98–112)
CHOLEST SERPL-MCNC: 201 MG/DL (ref ?–200)
CO2 SERPL-SCNC: 28 MMOL/L (ref 21–32)
CREAT BLD-MCNC: 0.77 MG/DL
DEPRECATED RDW RBC AUTO: 50.7 FL (ref 35.1–46.3)
ERYTHROCYTE [DISTWIDTH] IN BLOOD BY AUTOMATED COUNT: 14.8 % (ref 11–15)
FASTING PATIENT LIPID ANSWER: NO
FASTING STATUS PATIENT QL REPORTED: NO
GFR SERPLBLD BASED ON 1.73 SQ M-ARVRAT: 69 ML/MIN/1.73M2 (ref 60–?)
GLOBULIN PLAS-MCNC: 4 G/DL (ref 2.8–4.4)
GLUCOSE BLD-MCNC: 98 MG/DL (ref 70–99)
HCT VFR BLD AUTO: 37.7 %
HDLC SERPL-MCNC: 46 MG/DL (ref 40–59)
HGB BLD-MCNC: 12.3 G/DL
LDLC SERPL CALC-MCNC: 143 MG/DL (ref ?–100)
MCH RBC QN AUTO: 30.2 PG (ref 26–34)
MCHC RBC AUTO-ENTMCNC: 32.6 G/DL (ref 31–37)
MCV RBC AUTO: 92.6 FL
NONHDLC SERPL-MCNC: 155 MG/DL (ref ?–130)
OSMOLALITY SERPL CALC.SUM OF ELEC: 290 MOSM/KG (ref 275–295)
PLATELET # BLD AUTO: 253 10(3)UL (ref 150–450)
POTASSIUM SERPL-SCNC: 3.8 MMOL/L (ref 3.5–5.1)
PROT SERPL-MCNC: 6.7 G/DL (ref 6.4–8.2)
RBC # BLD AUTO: 4.07 X10(6)UL
SODIUM SERPL-SCNC: 139 MMOL/L (ref 136–145)
T4 FREE SERPL-MCNC: 1.3 NG/DL (ref 0.8–1.7)
TRIGL SERPL-MCNC: 67 MG/DL (ref 30–149)
TSI SER-ACNC: 5.4 MIU/ML (ref 0.36–3.74)
VLDLC SERPL CALC-MCNC: 12 MG/DL (ref 0–30)
WBC # BLD AUTO: 8.1 X10(3) UL (ref 4–11)

## 2023-05-30 PROCEDURE — 99213 OFFICE O/P EST LOW 20 MIN: CPT | Performed by: INTERNAL MEDICINE

## 2023-05-30 PROCEDURE — 84443 ASSAY THYROID STIM HORMONE: CPT

## 2023-05-30 PROCEDURE — 36415 COLL VENOUS BLD VENIPUNCTURE: CPT

## 2023-05-30 PROCEDURE — 1126F AMNT PAIN NOTED NONE PRSNT: CPT | Performed by: INTERNAL MEDICINE

## 2023-05-30 PROCEDURE — 85027 COMPLETE CBC AUTOMATED: CPT

## 2023-05-30 PROCEDURE — 80053 COMPREHEN METABOLIC PANEL: CPT

## 2023-05-30 PROCEDURE — G0439 PPPS, SUBSEQ VISIT: HCPCS | Performed by: INTERNAL MEDICINE

## 2023-05-30 PROCEDURE — 84439 ASSAY OF FREE THYROXINE: CPT

## 2023-05-30 PROCEDURE — 80061 LIPID PANEL: CPT

## 2023-06-14 ENCOUNTER — HOSPITAL ENCOUNTER (EMERGENCY)
Facility: HOSPITAL | Age: 88
Discharge: HOME | End: 2023-06-14
Attending: EMERGENCY MEDICINE
Payer: MEDICARE

## 2023-06-14 ENCOUNTER — NURSE TRIAGE (OUTPATIENT)
Dept: FAMILY MEDICINE CLINIC | Facility: CLINIC | Age: 88
End: 2023-06-14

## 2023-06-14 VITALS
WEIGHT: 136.88 LBS | BODY MASS INDEX: 26.87 KG/M2 | RESPIRATION RATE: 18 BRPM | HEART RATE: 82 BPM | HEIGHT: 60 IN | TEMPERATURE: 98 F | OXYGEN SATURATION: 93 % | DIASTOLIC BLOOD PRESSURE: 59 MMHG | SYSTOLIC BLOOD PRESSURE: 119 MMHG

## 2023-06-14 DIAGNOSIS — E86.0 DEHYDRATION: ICD-10-CM

## 2023-06-14 DIAGNOSIS — N30.00 ACUTE CYSTITIS WITHOUT HEMATURIA: Primary | ICD-10-CM

## 2023-06-14 LAB
ALBUMIN SERPL-MCNC: 2.5 G/DL (ref 3.4–5)
ALBUMIN/GLOB SERPL: 0.7 {RATIO} (ref 1–2)
ALP LIVER SERPL-CCNC: 64 U/L
ALT SERPL-CCNC: 19 U/L
ANION GAP SERPL CALC-SCNC: 10 MMOL/L (ref 0–18)
AST SERPL-CCNC: 41 U/L (ref 15–37)
BASOPHILS # BLD AUTO: 0.03 X10(3) UL (ref 0–0.2)
BASOPHILS NFR BLD AUTO: 0.3 %
BILIRUB SERPL-MCNC: 0.2 MG/DL (ref 0.1–2)
BILIRUB UR QL: NEGATIVE
BUN BLD-MCNC: 21 MG/DL (ref 7–18)
BUN/CREAT SERPL: 16.5 (ref 10–20)
CALCIUM BLD-MCNC: 8.3 MG/DL (ref 8.5–10.1)
CHLORIDE SERPL-SCNC: 105 MMOL/L (ref 98–112)
CO2 SERPL-SCNC: 23 MMOL/L (ref 21–32)
COLOR UR: YELLOW
CREAT BLD-MCNC: 1.27 MG/DL
DEPRECATED RDW RBC AUTO: 52.1 FL (ref 35.1–46.3)
EOSINOPHIL # BLD AUTO: 0.12 X10(3) UL (ref 0–0.7)
EOSINOPHIL NFR BLD AUTO: 1 %
ERYTHROCYTE [DISTWIDTH] IN BLOOD BY AUTOMATED COUNT: 15.5 % (ref 11–15)
GFR SERPLBLD BASED ON 1.73 SQ M-ARVRAT: 38 ML/MIN/1.73M2 (ref 60–?)
GLOBULIN PLAS-MCNC: 3.6 G/DL (ref 2.8–4.4)
GLUCOSE BLD-MCNC: 122 MG/DL (ref 70–99)
GLUCOSE UR-MCNC: NORMAL MG/DL
HCT VFR BLD AUTO: 41.2 %
HGB BLD-MCNC: 13.7 G/DL
HYALINE CASTS #/AREA URNS AUTO: PRESENT /LPF
IMM GRANULOCYTES # BLD AUTO: 0.05 X10(3) UL (ref 0–1)
IMM GRANULOCYTES NFR BLD: 0.4 %
KETONES UR-MCNC: NEGATIVE MG/DL
LEUKOCYTE ESTERASE UR QL STRIP.AUTO: 500
LYMPHOCYTES # BLD AUTO: 3.72 X10(3) UL (ref 1–4)
LYMPHOCYTES NFR BLD AUTO: 31 %
MAGNESIUM SERPL-MCNC: 2.2 MG/DL (ref 1.6–2.6)
MCH RBC QN AUTO: 30.5 PG (ref 26–34)
MCHC RBC AUTO-ENTMCNC: 33.3 G/DL (ref 31–37)
MCV RBC AUTO: 91.8 FL
MONOCYTES # BLD AUTO: 1.09 X10(3) UL (ref 0.1–1)
MONOCYTES NFR BLD AUTO: 9.1 %
NEUTROPHILS # BLD AUTO: 6.98 X10 (3) UL (ref 1.5–7.7)
NEUTROPHILS # BLD AUTO: 6.98 X10(3) UL (ref 1.5–7.7)
NEUTROPHILS NFR BLD AUTO: 58.2 %
OSMOLALITY SERPL CALC.SUM OF ELEC: 290 MOSM/KG (ref 275–295)
PH UR: 6 [PH] (ref 5–8)
PLATELET # BLD AUTO: 321 10(3)UL (ref 150–450)
POTASSIUM SERPL-SCNC: 3.5 MMOL/L (ref 3.5–5.1)
PROT SERPL-MCNC: 6.1 G/DL (ref 6.4–8.2)
PROT UR-MCNC: 30 MG/DL
RBC # BLD AUTO: 4.49 X10(6)UL
SODIUM SERPL-SCNC: 138 MMOL/L (ref 136–145)
SP GR UR STRIP: 1.02 (ref 1–1.03)
UROBILINOGEN UR STRIP-ACNC: NORMAL
WBC # BLD AUTO: 12 X10(3) UL (ref 4–11)
WBC #/AREA URNS AUTO: >50 /HPF

## 2023-06-14 PROCEDURE — 85025 COMPLETE CBC W/AUTO DIFF WBC: CPT | Performed by: EMERGENCY MEDICINE

## 2023-06-14 PROCEDURE — 96361 HYDRATE IV INFUSION ADD-ON: CPT

## 2023-06-14 PROCEDURE — 87186 SC STD MICRODIL/AGAR DIL: CPT | Performed by: EMERGENCY MEDICINE

## 2023-06-14 PROCEDURE — 81001 URINALYSIS AUTO W/SCOPE: CPT | Performed by: EMERGENCY MEDICINE

## 2023-06-14 PROCEDURE — 99284 EMERGENCY DEPT VISIT MOD MDM: CPT

## 2023-06-14 PROCEDURE — 87086 URINE CULTURE/COLONY COUNT: CPT | Performed by: EMERGENCY MEDICINE

## 2023-06-14 PROCEDURE — 96360 HYDRATION IV INFUSION INIT: CPT

## 2023-06-14 PROCEDURE — 87088 URINE BACTERIA CULTURE: CPT | Performed by: EMERGENCY MEDICINE

## 2023-06-14 PROCEDURE — 83735 ASSAY OF MAGNESIUM: CPT | Performed by: EMERGENCY MEDICINE

## 2023-06-14 PROCEDURE — 80053 COMPREHEN METABOLIC PANEL: CPT | Performed by: EMERGENCY MEDICINE

## 2023-06-14 RX ORDER — CEFUROXIME AXETIL 250 MG/1
250 TABLET ORAL 2 TIMES DAILY
Qty: 14 TABLET | Refills: 0 | Status: SHIPPED | OUTPATIENT
Start: 2023-06-14 | End: 2023-06-21

## 2023-06-14 RX ORDER — CEPHALEXIN 500 MG/1
500 CAPSULE ORAL ONCE
Status: COMPLETED | OUTPATIENT
Start: 2023-06-14 | End: 2023-06-14

## 2023-06-14 NOTE — TELEPHONE ENCOUNTER
Spoke to patient's daughter Tevin Adrian (name and  of patient verified). She states she is unsure where to take patient based on symptoms-- immediate care or the ER. She reports her family member used a urine test strip on patient's urine that showed blood so she is unsure if patient may have a urinary tract infection. She also reports she checked patient's oxygen saturation which was 93% and she continues to have hallucinations, which is very unlike her stating patient is seeing \"mice on walls, and her   who passed away 7 years ago. \"  Advised daughter to take patient to the ER for evaluation due to change in mental status. Daughter states she will take patient to [facility] within the next hour, she is on her way to her house now.     Dr. Tony Navarro-- Emperatriz Brock, daughter is taking patient to [facility]

## 2023-06-14 NOTE — ED INITIAL ASSESSMENT (HPI)
Pt with history of IBS brought in by son for dehydration, per son pt had positive home test for UTI this morning. Per son pt has long problem with diarrhea taking Dicyclomine. Pt is alert. Breathing unlabored.

## 2023-06-28 ENCOUNTER — TELEPHONE (OUTPATIENT)
Dept: INTERNAL MEDICINE CLINIC | Facility: CLINIC | Age: 88
End: 2023-06-28

## 2023-06-28 RX ORDER — LEVOTHYROXINE SODIUM 0.05 MG/1
50 TABLET ORAL
Qty: 90 TABLET | Refills: 0 | Status: SHIPPED | OUTPATIENT
Start: 2023-06-28

## 2023-07-03 ENCOUNTER — TELEPHONE (OUTPATIENT)
Dept: INTERNAL MEDICINE CLINIC | Facility: CLINIC | Age: 88
End: 2023-07-03

## 2023-07-03 ENCOUNTER — TELEMEDICINE (OUTPATIENT)
Dept: INTERNAL MEDICINE CLINIC | Facility: CLINIC | Age: 88
End: 2023-07-03

## 2023-07-03 DIAGNOSIS — N17.9 ACUTE KIDNEY INJURY (HCC): Primary | ICD-10-CM

## 2023-07-03 DIAGNOSIS — R53.1 WEAKNESS GENERALIZED: ICD-10-CM

## 2023-07-03 DIAGNOSIS — N30.00 ACUTE CYSTITIS WITHOUT HEMATURIA: ICD-10-CM

## 2023-07-03 PROCEDURE — 99215 OFFICE O/P EST HI 40 MIN: CPT

## 2023-07-03 RX ORDER — DICYCLOMINE HYDROCHLORIDE 10 MG/1
10 CAPSULE ORAL
COMMUNITY

## 2023-07-08 ENCOUNTER — APPOINTMENT (OUTPATIENT)
Dept: CT IMAGING | Facility: HOSPITAL | Age: 88
End: 2023-07-08
Attending: EMERGENCY MEDICINE
Payer: MEDICARE

## 2023-07-08 ENCOUNTER — HOSPITAL ENCOUNTER (EMERGENCY)
Facility: HOSPITAL | Age: 88
Discharge: HOME | End: 2023-07-08
Attending: EMERGENCY MEDICINE
Payer: MEDICARE

## 2023-07-08 ENCOUNTER — HOSPITAL ENCOUNTER (OUTPATIENT)
Age: 88
Discharge: ANOTHER HOSPITAL | End: 2023-07-08
Payer: MEDICARE

## 2023-07-08 ENCOUNTER — NURSE TRIAGE (OUTPATIENT)
Dept: INTERNAL MEDICINE CLINIC | Facility: CLINIC | Age: 88
End: 2023-07-08

## 2023-07-08 VITALS
SYSTOLIC BLOOD PRESSURE: 117 MMHG | HEART RATE: 71 BPM | RESPIRATION RATE: 20 BRPM | TEMPERATURE: 98 F | WEIGHT: 136.88 LBS | OXYGEN SATURATION: 93 % | BODY MASS INDEX: 27 KG/M2 | DIASTOLIC BLOOD PRESSURE: 59 MMHG

## 2023-07-08 VITALS
OXYGEN SATURATION: 95 % | HEART RATE: 78 BPM | DIASTOLIC BLOOD PRESSURE: 70 MMHG | TEMPERATURE: 98 F | SYSTOLIC BLOOD PRESSURE: 127 MMHG | RESPIRATION RATE: 20 BRPM

## 2023-07-08 DIAGNOSIS — R44.3 HALLUCINATIONS: Primary | ICD-10-CM

## 2023-07-08 LAB
ALBUMIN SERPL-MCNC: 2.7 G/DL (ref 3.4–5)
ALP LIVER SERPL-CCNC: 75 U/L
ALT SERPL-CCNC: 11 U/L
ANION GAP SERPL CALC-SCNC: 6 MMOL/L (ref 0–18)
AST SERPL-CCNC: 21 U/L (ref 15–37)
BASOPHILS # BLD AUTO: 0.03 X10(3) UL (ref 0–0.2)
BASOPHILS NFR BLD AUTO: 0.4 %
BILIRUB DIRECT SERPL-MCNC: <0.1 MG/DL (ref 0–0.2)
BILIRUB SERPL-MCNC: 0.4 MG/DL (ref 0.1–2)
BILIRUB UR QL: NEGATIVE
BUN BLD-MCNC: 8 MG/DL (ref 7–18)
BUN/CREAT SERPL: 11.3 (ref 10–20)
CALCIUM BLD-MCNC: 8.4 MG/DL (ref 8.5–10.1)
CHLORIDE SERPL-SCNC: 108 MMOL/L (ref 98–112)
CLARITY UR: CLEAR
CO2 SERPL-SCNC: 28 MMOL/L (ref 21–32)
CREAT BLD-MCNC: 0.71 MG/DL
DEPRECATED RDW RBC AUTO: 51.8 FL (ref 35.1–46.3)
EOSINOPHIL # BLD AUTO: 0.1 X10(3) UL (ref 0–0.7)
EOSINOPHIL NFR BLD AUTO: 1.2 %
ERYTHROCYTE [DISTWIDTH] IN BLOOD BY AUTOMATED COUNT: 14.9 % (ref 11–15)
GFR SERPLBLD BASED ON 1.73 SQ M-ARVRAT: 76 ML/MIN/1.73M2 (ref 60–?)
GLUCOSE BLD-MCNC: 98 MG/DL (ref 70–99)
GLUCOSE UR-MCNC: NORMAL MG/DL
HCT VFR BLD AUTO: 36.8 %
HGB BLD-MCNC: 12 G/DL
IMM GRANULOCYTES # BLD AUTO: 0.01 X10(3) UL (ref 0–1)
IMM GRANULOCYTES NFR BLD: 0.1 %
LEUKOCYTE ESTERASE UR QL STRIP.AUTO: 25
LYMPHOCYTES # BLD AUTO: 4.04 X10(3) UL (ref 1–4)
LYMPHOCYTES NFR BLD AUTO: 47.1 %
MCH RBC QN AUTO: 30.7 PG (ref 26–34)
MCHC RBC AUTO-ENTMCNC: 32.6 G/DL (ref 31–37)
MCV RBC AUTO: 94.1 FL
MONOCYTES # BLD AUTO: 0.91 X10(3) UL (ref 0.1–1)
MONOCYTES NFR BLD AUTO: 10.6 %
NEUTROPHILS # BLD AUTO: 3.48 X10 (3) UL (ref 1.5–7.7)
NEUTROPHILS # BLD AUTO: 3.48 X10(3) UL (ref 1.5–7.7)
NEUTROPHILS NFR BLD AUTO: 40.6 %
NITRITE UR QL STRIP.AUTO: NEGATIVE
OSMOLALITY SERPL CALC.SUM OF ELEC: 292 MOSM/KG (ref 275–295)
PH UR: 5.5 [PH] (ref 5–8)
PLATELET # BLD AUTO: 253 10(3)UL (ref 150–450)
POTASSIUM SERPL-SCNC: 3.3 MMOL/L (ref 3.5–5.1)
PROT SERPL-MCNC: 6.6 G/DL (ref 6.4–8.2)
PROT UR-MCNC: NEGATIVE MG/DL
RBC # BLD AUTO: 3.91 X10(6)UL
SODIUM SERPL-SCNC: 142 MMOL/L (ref 136–145)
SP GR UR STRIP: 1.01 (ref 1–1.03)
UROBILINOGEN UR STRIP-ACNC: NORMAL
WBC # BLD AUTO: 8.6 X10(3) UL (ref 4–11)

## 2023-07-08 PROCEDURE — 80048 BASIC METABOLIC PNL TOTAL CA: CPT | Performed by: EMERGENCY MEDICINE

## 2023-07-08 PROCEDURE — 81001 URINALYSIS AUTO W/SCOPE: CPT | Performed by: EMERGENCY MEDICINE

## 2023-07-08 PROCEDURE — 99285 EMERGENCY DEPT VISIT HI MDM: CPT

## 2023-07-08 PROCEDURE — 80076 HEPATIC FUNCTION PANEL: CPT | Performed by: EMERGENCY MEDICINE

## 2023-07-08 PROCEDURE — 36415 COLL VENOUS BLD VENIPUNCTURE: CPT

## 2023-07-08 PROCEDURE — 70450 CT HEAD/BRAIN W/O DYE: CPT | Performed by: EMERGENCY MEDICINE

## 2023-07-08 PROCEDURE — 85025 COMPLETE CBC W/AUTO DIFF WBC: CPT | Performed by: EMERGENCY MEDICINE

## 2023-07-08 PROCEDURE — 99284 EMERGENCY DEPT VISIT MOD MDM: CPT

## 2023-07-08 PROCEDURE — 87086 URINE CULTURE/COLONY COUNT: CPT | Performed by: EMERGENCY MEDICINE

## 2023-07-08 PROCEDURE — 99214 OFFICE O/P EST MOD 30 MIN: CPT | Performed by: NURSE PRACTITIONER

## 2023-07-08 RX ORDER — CEPHALEXIN 500 MG/1
500 CAPSULE ORAL 2 TIMES DAILY
Qty: 14 CAPSULE | Refills: 0 | Status: SHIPPED | OUTPATIENT
Start: 2023-07-08 | End: 2023-07-15

## 2023-07-08 NOTE — ED INITIAL ASSESSMENT (HPI)
Patient arrives via triage accompanied by daughter from [address] [place] [address] for hallucinations that started last night. Per daughter she had a recent diagnosis of UTI and recently finished Cefuroxime about 1 hour ago. Per daughter, she had hallucations last time as well. Denies any dysuria, fevers. Daughter would like blood work CBC and CMP while she is here due to dehydration from 6/14.

## 2023-07-08 NOTE — ED QUICK NOTES
While this RN was in the room, patient started to state \"Oh no, its sad again. \" When asked what she was talking about, patient stated \"The baby is sad. \" Patient stated she has not seen the baby before. Patients daughter stated the hallucinations started last night. Patient is A&Ox4, occasional hallucinations. Calm and cooperative at this time.

## 2023-07-08 NOTE — DISCHARGE INSTRUCTIONS
Please follow up with your physician. Your urine is being sent for culture and we will call you if the antibiotic needs to be changed.

## 2023-07-08 NOTE — TELEPHONE ENCOUNTER
Action Requested: Summary for Provider     []  Critical Lab, Recommendations Needed  [] Need Additional Advice  [x]   FYI    []   Need Orders  [] Need Medications Sent to Pharmacy  []  Other     SUMMARY: Daughter Ailyn Betancourt(on HIPAA) indicated that yesterday and today patient is having hallucinations- seeing/hearing dogs and children crying. Stated last time had that was diagnosed with a urinary tract infection. No other symptoms. Wanted to get an antibiotic prescribed. Advised daughter no appointments in office but should take patient now to urgent care or the ER for an evaluation so they can check her urine. Daughter agreed and will take patient to urgent care in [facility] now.   Reason for call: Neurologic Problem (hallucinations)  Onset: Jul 7, 2023     Reason for Disposition   Brief confusion (now gone)    Protocols used: Confusion - Delirium-A-OH

## 2023-07-08 NOTE — Clinical Note
Transferred to [address] [place] [address] Emergency Department for a higher level of care.

## 2023-07-09 ENCOUNTER — TELEPHONE (OUTPATIENT)
Dept: INTERNAL MEDICINE CLINIC | Facility: CLINIC | Age: 88
End: 2023-07-09

## 2023-07-09 DIAGNOSIS — E87.6 HYPOKALEMIA: Primary | ICD-10-CM

## 2023-07-09 RX ORDER — CIPROFLOXACIN 500 MG/1
500 TABLET, FILM COATED ORAL 2 TIMES DAILY
Qty: 10 TABLET | Refills: 0 | Status: SHIPPED | OUTPATIENT
Start: 2023-07-09 | End: 2023-07-14

## 2023-07-09 RX ORDER — POTASSIUM CHLORIDE 1.5 G/1.77G
20 POWDER, FOR SOLUTION ORAL DAILY
Qty: 3 PACKET | Refills: 0 | Status: SHIPPED | OUTPATIENT
Start: 2023-07-09 | End: 2023-07-12

## 2023-07-09 NOTE — TELEPHONE ENCOUNTER
Naomie Lou Daughter called [phone] regarding was seen in the ER yesterday for UTI started being very agitated and hallucinating. Left message to call back. Message also sent to PCP. Got a call back. Patient was in the emergency room with hallucinations told was dehydrated and given IV fluids and antibiotics. Finish the course of antibiotics was doing good. Urine culture came out positive for E. coli. Has a history with chronic diarrhea. Has a caretaker and lives at home. Had been talking to the caretaker about correct wiping. Started again hallucinations on Friday. No fevers or chills no nausea no vomiting. This time went to the emergency room again UA was sent urine culture still pending. Given different dose of the same antibiotic. Hallucinations were getting worse. CT head was normal.  All of the labs were normal per daughter. Discussed with daughter. Will change antibiotic to Cipro 500 twice a day which looks like the previous organism was sensitive to. Await urine culture. Hydrate. Make sure about correct wiping and separate wiping if possible. Do not hold urine. Also given potassium looks like her potassium was low in the ER and was not given any potassium per daughter. We will recheck blood work in 1 week. Message to PCP.

## 2023-07-10 RX ORDER — ESCITALOPRAM OXALATE 10 MG/1
5 TABLET ORAL DAILY
Qty: 45 TABLET | Refills: 3 | Status: SHIPPED | OUTPATIENT
Start: 2023-07-10

## 2023-07-10 NOTE — TELEPHONE ENCOUNTER
escitalopram 10 MG Oral Tab, Take 0.5 tablets (5 mg total) by mouth daily. , Disp: 45 tablet, Rfl: 1

## 2023-07-10 NOTE — TELEPHONE ENCOUNTER
Patient was seen in urgent care and referred to ER on 7/8/2023. Disposition and Plan    Clinical Impression:  Hallucinations  (primary encounter diagnosis)      Disposition:  Discharge  7/8/2023  3:12 pm     Follow-up:  Ap Alegria MD  [address] [facility] [address]  [phone]     Follow up in 3 day(s)      We recommend that you schedule follow up care with a primary care provider within the next three months to obtain basic health screening including reassessment of your blood pressure. Medications Prescribed:  Discharge Medication List as of 7/8/2023  3:15 PM     START taking these medications     cephalexin 500 MG Oral Cap  Take 1 capsule (500 mg total) by mouth 2 (two) times daily for 7 days. , Normal, Disp-14 capsule, R-0

## 2023-07-11 ENCOUNTER — PATIENT OUTREACH (OUTPATIENT)
Dept: CASE MANAGEMENT | Age: 88
End: 2023-07-11

## 2023-07-11 NOTE — PROGRESS NOTES
1st attempt ED f/up apt request    Aline Vargas  PCP  [address] [facility]   [phone]  Apt: July 18 @12:45pm    Confirmed w/ pt dtr  Closing encounter

## 2023-07-11 NOTE — TELEPHONE ENCOUNTER
Refill passed per [address] [place] [address] protocol. Requested Prescriptions   Pending Prescriptions Disp Refills    escitalopram 10 MG Oral Tab 45 tablet 1     Sig: Take 0.5 tablets (5 mg total) by mouth daily.        Psychiatric Non-Scheduled (Anti-Anxiety) Passed - 7/10/2023  2:51 PM        Passed - In person appointment or virtual visit in the past 6 mos or appointment in next 3 mos     Recent Outpatient Visits              1 week ago Acute kidney injury [facility])    Endy Harris, [address] [facility], [address] [place] [address],[address], [place], [facility]    Telemedicine    1 month ago Acquired hypothyroidism    Josefa Denson MD    Office Visit    1 year ago Acquired hypothyroidism    [facility], [address] [facility],[address], MD Josefa    Telemedicine    2 years ago Adult general medical exam    [address] [place] [address], MD Josefa    Office Visit    3 years ago [facility] behavior of [facility]    [facility]    [facility]                                 Recent Outpatient Visits              1 week ago Acute kidney injury [facility])    Endy Harris, [address] [facility]    Telemedicine    1 month ago Acquired hypothyroidism    Josefa Denson MD    Office Visit    1 year ago Acquired hypothyroidism    [facility], [address], MD Josefa    Telemedicine    2 years ago Adult general medical exam    Endy Harris, [address] [facility] [address], MD Josefa    Office Visit    3 years ago Neoplasm of uncertain behavior of skin    [facility] Surjit Fong MD    Office Visit

## 2023-07-18 ENCOUNTER — LAB ENCOUNTER (OUTPATIENT)
Dept: LAB | Facility: HOSPITAL | Age: 88
End: 2023-07-18
Attending: INTERNAL MEDICINE
Payer: MEDICARE

## 2023-07-18 ENCOUNTER — OFFICE VISIT (OUTPATIENT)
Facility: CLINIC | Age: 88
End: 2023-07-18

## 2023-07-18 VITALS
WEIGHT: 128 LBS | HEIGHT: 60 IN | SYSTOLIC BLOOD PRESSURE: 104 MMHG | HEART RATE: 64 BPM | DIASTOLIC BLOOD PRESSURE: 60 MMHG | RESPIRATION RATE: 14 BRPM | BODY MASS INDEX: 25.13 KG/M2 | OXYGEN SATURATION: 95 %

## 2023-07-18 DIAGNOSIS — E87.6 HYPOKALEMIA: ICD-10-CM

## 2023-07-18 DIAGNOSIS — N30.00 ACUTE CYSTITIS WITHOUT HEMATURIA: ICD-10-CM

## 2023-07-18 DIAGNOSIS — N17.9 ACUTE KIDNEY INJURY (HCC): ICD-10-CM

## 2023-07-18 DIAGNOSIS — K58.0 IRRITABLE BOWEL SYNDROME WITH DIARRHEA: ICD-10-CM

## 2023-07-18 DIAGNOSIS — E03.9 ACQUIRED HYPOTHYROIDISM: Primary | ICD-10-CM

## 2023-07-18 LAB
ALBUMIN SERPL-MCNC: 2.5 G/DL (ref 3.4–5)
ALBUMIN/GLOB SERPL: 0.6 {RATIO} (ref 1–2)
ALP LIVER SERPL-CCNC: 73 U/L
ALT SERPL-CCNC: 12 U/L
ANION GAP SERPL CALC-SCNC: 7 MMOL/L (ref 0–18)
AST SERPL-CCNC: 24 U/L (ref 15–37)
BILIRUB SERPL-MCNC: 0.3 MG/DL (ref 0.1–2)
BUN BLD-MCNC: 10 MG/DL (ref 7–18)
BUN/CREAT SERPL: 12.7 (ref 10–20)
CALCIUM BLD-MCNC: 8.6 MG/DL (ref 8.5–10.1)
CHLORIDE SERPL-SCNC: 107 MMOL/L (ref 98–112)
CO2 SERPL-SCNC: 29 MMOL/L (ref 21–32)
CREAT BLD-MCNC: 0.79 MG/DL
DEPRECATED RDW RBC AUTO: 51.9 FL (ref 35.1–46.3)
ERYTHROCYTE [DISTWIDTH] IN BLOOD BY AUTOMATED COUNT: 14.7 % (ref 11–15)
FASTING STATUS PATIENT QL REPORTED: NO
GFR SERPLBLD BASED ON 1.73 SQ M-ARVRAT: 67 ML/MIN/1.73M2 (ref 60–?)
GLOBULIN PLAS-MCNC: 4.4 G/DL (ref 2.8–4.4)
GLUCOSE BLD-MCNC: 91 MG/DL (ref 70–99)
HCT VFR BLD AUTO: 39.4 %
HGB BLD-MCNC: 12.5 G/DL
MCH RBC QN AUTO: 30.2 PG (ref 26–34)
MCHC RBC AUTO-ENTMCNC: 31.7 G/DL (ref 31–37)
MCV RBC AUTO: 95.2 FL
OSMOLALITY SERPL CALC.SUM OF ELEC: 295 MOSM/KG (ref 275–295)
PLATELET # BLD AUTO: 268 10(3)UL (ref 150–450)
POTASSIUM SERPL-SCNC: 4 MMOL/L (ref 3.5–5.1)
PROT SERPL-MCNC: 6.9 G/DL (ref 6.4–8.2)
RBC # BLD AUTO: 4.14 X10(6)UL
SODIUM SERPL-SCNC: 143 MMOL/L (ref 136–145)
WBC # BLD AUTO: 7.9 X10(3) UL (ref 4–11)

## 2023-07-18 PROCEDURE — 36415 COLL VENOUS BLD VENIPUNCTURE: CPT

## 2023-07-18 PROCEDURE — 99214 OFFICE O/P EST MOD 30 MIN: CPT | Performed by: INTERNAL MEDICINE

## 2023-07-18 PROCEDURE — 80053 COMPREHEN METABOLIC PANEL: CPT

## 2023-07-18 PROCEDURE — 85027 COMPLETE CBC AUTOMATED: CPT

## 2023-07-18 PROCEDURE — 1126F AMNT PAIN NOTED NONE PRSNT: CPT | Performed by: INTERNAL MEDICINE

## 2023-07-20 ENCOUNTER — TELEPHONE (OUTPATIENT)
Dept: INTERNAL MEDICINE CLINIC | Facility: CLINIC | Age: 88
End: 2023-07-20

## 2023-07-20 RX ORDER — CIPROFLOXACIN 250 MG/1
250 TABLET, FILM COATED ORAL 2 TIMES DAILY
Qty: 10 TABLET | Refills: 0 | Status: SHIPPED | OUTPATIENT
Start: 2023-07-20 | End: 2023-07-25

## 2023-07-20 NOTE — TELEPHONE ENCOUNTER
Spoke with patient daughter Leon Dear , (  Name and  verified ) informed of Dr. Dominga Aaron  instructions below    Patient daughter verbalizes understanding and agrees with plan.

## 2023-07-20 NOTE — TELEPHONE ENCOUNTER
Cipro sent into the pharmacy. Please inform. If there is any worsening symptoms or if there is no improvement, needs evaluation in the emergency room.

## 2023-07-20 NOTE — TELEPHONE ENCOUNTER
Colton (dtg) called stated patient told her she was hallucinating today, seeing things on the walls    Asking for additional round of antibiotic for just in case of UTI not completed gone    Did advised her of labs, encourage her to increase fluids.     Please advise

## 2023-07-24 ENCOUNTER — TELEPHONE (OUTPATIENT)
Facility: CLINIC | Age: 88
End: 2023-07-24

## 2023-07-24 DIAGNOSIS — R30.0 DYSURIA: Primary | ICD-10-CM

## 2023-07-24 NOTE — TELEPHONE ENCOUNTER
Patients son is calling and states that the patient has been having recurrent uti. He states that she has been having hallucinations from them and difficulty sleeping due to her having conversations with the things that she is seeing. He wants to know if it is possible to extend the length of time that she takes the medication for the UTI and he wants to know if something can be done with the Escitalopram 10MG medication that she also takes because he read that it can also cause insomnia.

## 2023-07-24 NOTE — TELEPHONE ENCOUNTER
Patients son states patient is not showing symptoms of UTI or hallucinating but he states symptom shave been returning a few days after she stops medication and in order to avoid that he was hoping to extend antibiotic again. RN advised medication has been extended already, may need retest. Son states he may be able to collect a sample to retest if needed. Patient also having insomnia, son read this could be caused by [facility] and would like to know if you believe this could be the cause and if so if medication can be stopped. Verified preferred pharmacy and patient allergies.

## 2023-07-28 NOTE — TELEPHONE ENCOUNTER
Action Requested: Summary for Provider     []  Critical Lab, Recommendations Needed  [] Need Additional Advice  []   FYI    [x]   Need Orders  [] Need Medications Sent to Pharmacy  []  Other     Karl Silveira called Jai Perdue pt son and informed him of your message below. He was not sure but he believes pt has stopped this medication he will ask his sister. Jai Perdue also wanted to know if pt can have a urine test done to make sure the UTI has resolved. He will like to be called once the order has been placed.  Please advise

## 2023-07-28 NOTE — TELEPHONE ENCOUNTER
If you want to stop escitalopram, I recommend taking it every other day for couple of weeks and then stop it. Please inform.   Thank you

## 2023-07-28 NOTE — TELEPHONE ENCOUNTER
Patient's son Pancho(on VICKEY) advised urine tests have been ordered and he will  the urine collection cup today.

## 2023-07-30 ENCOUNTER — LAB ENCOUNTER (OUTPATIENT)
Dept: LAB | Facility: HOSPITAL | Age: 88
End: 2023-07-30
Attending: INTERNAL MEDICINE
Payer: MEDICARE

## 2023-07-30 DIAGNOSIS — R30.0 DYSURIA: ICD-10-CM

## 2023-07-30 PROCEDURE — 87086 URINE CULTURE/COLONY COUNT: CPT

## 2023-07-30 PROCEDURE — 87186 SC STD MICRODIL/AGAR DIL: CPT

## 2023-07-30 PROCEDURE — 81015 MICROSCOPIC EXAM OF URINE: CPT

## 2023-07-30 PROCEDURE — 87077 CULTURE AEROBIC IDENTIFY: CPT

## 2023-07-30 RX ORDER — NITROFURANTOIN 25; 75 MG/1; MG/1
100 CAPSULE ORAL 2 TIMES DAILY
Qty: 10 CAPSULE | Refills: 0 | Status: SHIPPED | OUTPATIENT
Start: 2023-07-30 | End: 2023-08-04

## 2023-07-31 ENCOUNTER — TELEPHONE (OUTPATIENT)
Dept: INTERNAL MEDICINE CLINIC | Facility: CLINIC | Age: 88
End: 2023-07-31

## 2023-08-07 ENCOUNTER — TELEPHONE (OUTPATIENT)
Dept: INTERNAL MEDICINE CLINIC | Facility: CLINIC | Age: 88
End: 2023-08-07

## 2023-08-07 NOTE — TELEPHONE ENCOUNTER
Patient's daughter Delilah Paredse calling - on VICKEY (patient name and  verified) states patient has completed her Macrobid for her UTI on . Asking if they need to repeat the urine culture since she never exhibited any symptoms prior to the last positive urine test. Please advise.

## 2023-08-07 NOTE — TELEPHONE ENCOUNTER
I do not think we need to repeat urine test as we do not need to treat abnormal urine all the time without any positive cultures.

## 2023-08-07 NOTE — TELEPHONE ENCOUNTER
Called daughter Cuca Friedman (HIPAA), verified name and . Relayed message per Dr. Jennifer Christie. Seems stable for now, will call back with any changes. No further questions at this time.

## 2023-09-15 ENCOUNTER — LAB ENCOUNTER (OUTPATIENT)
Dept: LAB | Facility: HOSPITAL | Age: 88
End: 2023-09-15
Attending: NURSE PRACTITIONER
Payer: MEDICARE

## 2023-09-15 ENCOUNTER — TELEMEDICINE (OUTPATIENT)
Dept: INTERNAL MEDICINE CLINIC | Facility: CLINIC | Age: 88
End: 2023-09-15

## 2023-09-15 ENCOUNTER — NURSE TRIAGE (OUTPATIENT)
Dept: INTERNAL MEDICINE CLINIC | Facility: CLINIC | Age: 88
End: 2023-09-15

## 2023-09-15 DIAGNOSIS — Z87.440 HISTORY OF RECURRENT UTIS: Primary | ICD-10-CM

## 2023-09-15 DIAGNOSIS — Z87.440 HISTORY OF RECURRENT UTIS: ICD-10-CM

## 2023-09-15 LAB
BILIRUB UR QL: NEGATIVE
CLARITY UR: CLEAR
COLOR UR: COLORLESS
GLUCOSE UR-MCNC: NORMAL MG/DL
HGB UR QL STRIP.AUTO: NEGATIVE
KETONES UR-MCNC: NEGATIVE MG/DL
LEUKOCYTE ESTERASE UR QL STRIP.AUTO: NEGATIVE
NITRITE UR QL STRIP.AUTO: NEGATIVE
PH UR: 6.5 [PH] (ref 5–8)
PROT UR-MCNC: NEGATIVE MG/DL
SP GR UR STRIP: <1.005 (ref 1–1.03)
UROBILINOGEN UR STRIP-ACNC: NORMAL

## 2023-09-15 PROCEDURE — 99213 OFFICE O/P EST LOW 20 MIN: CPT | Performed by: NURSE PRACTITIONER

## 2023-09-15 RX ORDER — CIPROFLOXACIN 250 MG/1
250 TABLET, FILM COATED ORAL 2 TIMES DAILY
Qty: 10 TABLET | Refills: 0 | Status: SHIPPED | OUTPATIENT
Start: 2023-09-15 | End: 2023-09-20

## 2023-09-25 RX ORDER — LEVOTHYROXINE SODIUM 0.05 MG/1
50 TABLET ORAL
Qty: 90 TABLET | Refills: 0 | Status: SHIPPED | OUTPATIENT
Start: 2023-09-25

## 2023-09-25 RX ORDER — LEVOTHYROXINE SODIUM 0.05 MG/1
50 TABLET ORAL
Qty: 90 TABLET | Refills: 0 | OUTPATIENT
Start: 2023-09-25

## 2023-09-25 NOTE — TELEPHONE ENCOUNTER
Please review. Protocol failed / Has no protocol.      Requested Prescriptions   Pending Prescriptions Disp Refills    LEVOTHYROXINE 50 MCG Oral Tab [Pharmacy Med Name: LEVOTHYROXINE 0.05MG (50MCG) TAB] 90 tablet 0     Sig: TAKE 1 TABLET(50 MCG) BY MOUTH BEFORE BREAKFAST       Thyroid Medication Protocol Failed - 9/23/2023 12:56 PM        Failed - Last TSH value is normal     Lab Results   Component Value Date    TSH 5.400 (H) 05/30/2023                 Passed - TSH in past 12 months        Passed - In person appointment or virtual visit in the past 12 mos or appointment in next 3 mos     Recent Outpatient Visits              1 week ago History of recurrent UTIs    [facility], [address] [place] [address]    Telemedicine    2 months ago Acquired hypothyroidism    Josefa Fink MD    Office Visit    2 months ago Acute kidney injury [facility])    [address], [address] [facility], [address] [place] [address],[address], [facility]    Telemedicine    3 months ago Acquired hypothyroidism    [address], [address] [facility], MD Josefa    Office Visit    1 year ago Acquired hypothyroidism    [facility], [address] [facility],[address], MD Josefa    Telemedicine                            Recent Outpatient Visits              1 week ago History of recurrent UTIs    [facility], [address] [facility]    Telemedicine    2 months ago Acquired hypothyroidism    Josefa Fink MD    Office Visit    2 months ago Acute kidney injury [facility])    [address], [address] [facility], [facility]    Telemedicine    3 months ago Acquired hypothyroidism    Josefa Fink MD    Office Visit    1 year ago Acquired hypothyroidism    [address], [address] [facility],[address], Sanya Torres MD    Telemedicine

## 2023-09-25 NOTE — TELEPHONE ENCOUNTER
Daughter, is requesting a refill for the patient's medication for levothyroxine. Daughter, states that the patient is out of medication.   Pharmacy: [facility], [place] (Listed)     Current Outpatient Medications   Medication Sig Dispense Refill    levothyroxine 50 MCG Oral Tab Take 1 tablet (50 mcg total) by mouth before breakfast. 90 tablet 0

## 2023-12-19 NOTE — TELEPHONE ENCOUNTER
Please review; protocol failed.    Requested Prescriptions   Pending Prescriptions Disp Refills    LEVOTHYROXINE 50 MCG Oral Tab [Pharmacy Med Name: LEVOTHYROXINE 0.05MG (50MCG) TAB] 90 tablet 0     Sig: TAKE 1 TABLET(50 MCG) BY MOUTH BEFORE BREAKFAST       Thyroid Medication Protocol Failed - 12/18/2023  3:01 PM        Failed - Last TSH value is normal     Lab Results   Component Value Date    TSH 5.400 (H) 05/30/2023                 Passed - TSH in past 12 months        Passed - In person appointment or virtual visit in the past 12 mos or appointment in next 3 mos     Recent Outpatient Visits              3 months ago History of recurrent UTIs    [facility], [address] [place], [place] [address]    Telemedicine    5 months ago Acquired hypothyroidism    [facility], [address] [place] [address], MD Josefa    Office Visit    5 months ago Acute kidney injury [facility])    Maryan Cruz, [address] [facility], [facility]    Telemedicine    6 months ago Acquired hypothyroidism    Josefa Khanna MD    Office Visit    1 year ago Acquired hypothyroidism    [facility], [address] [facility], MD Josefa    Telemedicine                         Recent Outpatient Visits              3 months ago History of recurrent UTIs    [facility] [place], APRTEA    Telemedicine    5 months ago Acquired hypothyroidism    [facility], [address] [place] [address], MD Josefa    Office Visit    5 months ago Acute kidney injury [facility])    Maryan Cruz, [facility], APRTEA    Telemedicine    6 months ago Acquired hypothyroidism    Josefa Khanna MD    Office Visit    1 year ago Acquired hypothyroidism [facility], Hortensia Mayer MD    Telemedicine

## 2023-12-20 RX ORDER — LEVOTHYROXINE SODIUM 0.05 MG/1
50 TABLET ORAL
Qty: 90 TABLET | Refills: 0 | Status: SHIPPED | OUTPATIENT
Start: 2023-12-20

## 2024-03-09 ENCOUNTER — NURSE TRIAGE (OUTPATIENT)
Dept: INTERNAL MEDICINE CLINIC | Facility: CLINIC | Age: 89
End: 2024-03-09

## 2024-03-09 ENCOUNTER — LAB ENCOUNTER (OUTPATIENT)
Dept: LAB | Facility: HOSPITAL | Age: 89
End: 2024-03-09
Attending: INTERNAL MEDICINE
Payer: MEDICARE

## 2024-03-09 DIAGNOSIS — R82.90 ABNORMAL URINE ODOR: ICD-10-CM

## 2024-03-09 DIAGNOSIS — R82.90 ABNORMAL URINE ODOR: Primary | ICD-10-CM

## 2024-03-09 LAB
BILIRUB UR QL: NEGATIVE
CLARITY UR: CLEAR
COLOR UR: COLORLESS
GLUCOSE UR-MCNC: NORMAL MG/DL
KETONES UR-MCNC: NEGATIVE MG/DL
LEUKOCYTE ESTERASE UR QL STRIP.AUTO: NEGATIVE
NITRITE UR QL STRIP.AUTO: NEGATIVE
PH UR: 7 [PH] (ref 5–8)
PROT UR-MCNC: NEGATIVE MG/DL
SP GR UR STRIP: 1.01 (ref 1–1.03)
UROBILINOGEN UR STRIP-ACNC: NORMAL

## 2024-03-09 PROCEDURE — 87086 URINE CULTURE/COLONY COUNT: CPT

## 2024-03-09 PROCEDURE — 81001 URINALYSIS AUTO W/SCOPE: CPT

## 2024-03-09 RX ORDER — CEPHALEXIN 500 MG/1
500 CAPSULE ORAL 2 TIMES DAILY
Qty: 14 CAPSULE | Refills: 0 | Status: SHIPPED | OUTPATIENT
Start: 2024-03-09

## 2024-03-09 NOTE — TELEPHONE ENCOUNTER
Son notified, verbalized understanding, stated he's hoping Pt can get Abx today, stated pt is seeing spiders/web on the wall  Advised Dr will review lab result  Noticed last UA- no infection in September-pt was hallucinating   Last Infection was July

## 2024-03-09 NOTE — TELEPHONE ENCOUNTER
Action Requested: Summary for Provider     []  Critical Lab, Recommendations Needed  [] Need Additional Advice  []   FYI    []   Need Orders  [] Need Medications Sent to Pharmacy  []  Other     SUMMARY- requesting order for UA-s/s son stated when pt hallucinates, she has a UTI, urine has strong odor - stated he will stop by hosp to p/u specimen cup  Order pending   Sent to Oncall    Reason for call: Urinary Symptoms  Onset: few days                    Reason for Disposition   Patient wants to be seen    Protocols used: Urinary Symptoms-A-OH

## 2024-03-09 NOTE — TELEPHONE ENCOUNTER
Ok to start empirically on keflex 500mg po bid for one week while awaiting ua and culture result.  Erx sent Make sure urine sample collected before pt starts abx so ua and culture will be accurate.   Ffup with pcp Monday with urine cutlure result ; ER if pt symptoms persist/worsens.

## 2024-03-12 ENCOUNTER — TELEPHONE (OUTPATIENT)
Dept: INTERNAL MEDICINE CLINIC | Facility: CLINIC | Age: 89
End: 2024-03-12

## 2024-03-12 NOTE — TELEPHONE ENCOUNTER
Kavita Paz RN  3/12/2024 12:30 PM CDT Back to Top      I called and spoke with son Pancho - advised of below  He would really like to do this video with you if possible but you don't have any openings in the near future. She finished ABT Saturday.     Are you able to accommodate a video visit, son is agreeable and likes this idea.    Patrick Stafford MD  3/11/2024  3:28 PM CDT       Cont ABX . Please schedule tele med visit     JA

## 2024-03-12 NOTE — TELEPHONE ENCOUNTER
Spoke with son Pancho and he was preferring this be sooner as she will be off ABT Saturday  He is asking if prophylactic ABT is appropriate for patient since she seems to keep getting UTI's.   Son states that she is stable now and no symptoms but when she gets these, she start seeing things such as spiders on the walls, dogs or animals in the room. She is not having this now that she is on medications and is feeling a bit better.     He is helping her push fluids. Caregivers are with her and family checks in often.     Future Appointments   Date Time Provider Department Center   3/14/2024 10:40 AM Shira Lim APRN [facility]

## 2024-03-13 NOTE — TELEPHONE ENCOUNTER
Spoke with son, Pancho regarding provider's message.   Per Pancho appt with NP scheduled for tomorrow canceled, and appt with Dr. Stafford scheduled.

## 2024-03-15 ENCOUNTER — TELEMEDICINE (OUTPATIENT)
Facility: CLINIC | Age: 89
End: 2024-03-15

## 2024-03-15 DIAGNOSIS — Z87.440 HISTORY OF RECURRENT UTIS: Primary | ICD-10-CM

## 2024-03-15 DIAGNOSIS — F41.1 ANXIETY STATE: ICD-10-CM

## 2024-03-15 DIAGNOSIS — R30.0 DYSURIA: ICD-10-CM

## 2024-03-15 PROCEDURE — 99214 OFFICE O/P EST MOD 30 MIN: CPT | Performed by: INTERNAL MEDICINE

## 2024-03-15 RX ORDER — ALPRAZOLAM 0.25 MG/1
0.25 TABLET ORAL NIGHTLY PRN
Qty: 10 TABLET | Refills: 0 | Status: SHIPPED | OUTPATIENT
Start: 2024-03-15 | End: 2024-04-14

## 2024-03-15 RX ORDER — CEPHALEXIN 250 MG/1
250 CAPSULE ORAL 3 TIMES DAILY
Qty: 15 CAPSULE | Refills: 0 | Status: SHIPPED | OUTPATIENT
Start: 2024-03-15 | End: 2024-03-20

## 2024-03-15 NOTE — PROGRESS NOTES
Virtual Virtual Check-In    Christy Lowry verbally consents to a Virtual Video Check-In service on 03/15/24. Patient understands and accepts financial responsibility for any deductible, co-insurance and/or co-pays associated with this service. This visit is conducted using Telemedicine with live, interactive video and audio.     I spoke with Christy Lowry by secure video chat, verified date of birth, and discussed their current concerns:   Duration: 20 minutes    HPI  89-year-old pleasant lady requesting a telemedicine consultation along with the son for recurrent UTIs, anxiety.  Patient gets UTIs and then gets confused and son informed me that after taking antibiotics for a day she gets back to normal.  However, this time her urine culture was negative.  Son is not sure whether this episode was from UTI or from dehydration.  She also gets anxious and that she cannot sleep.  Today, she was very comfortable and happy.  She was very thankful for our care.    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     SCC (squamous cell carcinoma) 2020    right temple    SCC (squamous cell carcinoma)  2020    right neck    Skin cancer 2006      Reviewed Family History:  Family History   Problem Relation Age of Onset    Colon Cancer Mother     Colon Cancer Sister        Reviewed Social History:  Social History     Socioeconomic History    Marital status:    Tobacco Use    Smoking status: Former    Smokeless tobacco: Former   Vaping Use    Vaping Use: Never used   Substance and Sexual Activity    Alcohol use: Not Currently     Alcohol/week: 0.0 standard drinks of alcohol     Comment: none recently    Drug use: No   Other Topics Concern    Reaction to local anesthetic No      Reviewed Current Medications:  Current Outpatient Medications   Medication Sig Dispense Refill    cephalexin 250 MG Oral Cap Take 1 capsule (250 mg total) by mouth 3 (three) times daily for 5 days. 15 capsule 0    ALPRAZolam (XANAX) 0.25 MG Oral Tab Take 1 tablet (0.25 mg total) by mouth nightly as needed. 10 tablet 0    cephalexin 500 MG Oral Cap Take 1 capsule (500 mg total) by mouth 2 (two) times daily. 14 capsule 0    levothyroxine 50 MCG Oral Tab Take 1 tablet (50 mcg total) by mouth before breakfast. 90 tablet 0    escitalopram 10 MG Oral Tab Take 0.5 tablets (5 mg total) by mouth daily. 45 tablet 3    dicyclomine 10 MG Oral Cap Take 1 capsule (10 mg total) by mouth daily as needed (diarrhea).      Loperamide HCl 1 MG/7.5ML Oral Liquid Take 15 mL (2 mg total) by mouth 3 (three) times daily as needed for Diarrhea.            Physical Exam  There were no vitals filed for this visit.  Physical Exam   Constitutional:       General: He is not in acute distress.     Appearance: Normal appearance.   HENT:      Head: Normocephalic and atraumatic.      Nose: Nose normal.   Neurological:      Mental Status: He is alert and oriented to person, place, and time.   Psychiatric:         Behavior: Behavior normal.         Thought Content: Thought content normal.         Judgment: Judgment normal.       Diagnosis:  1. History of recurrent UTIs  We  discussed precautions to prevent UTIs.  If she develops recurrence of confusion, I have encouraged son to provide more water and see whether there is any improvement.  Collect urine and submit the urine sample.  If there is no improvement in the confusion, then give antibiotics.  If she continues to have recurrent UTIs, then we can think about prophylactic antibiotics.  - UA Microscopic only, urine; Future  - Urine Culture, Routine; Future    2. Anxiety state  Discussed regarding the pros and cons of low-dose of Xanax on an as-needed basis.  We have decided to try it.  Aware of drowsiness and fall precautions.  - ALPRAZolam (XANAX) 0.25 MG Oral Tab; Take 1 tablet (0.25 mg total) by mouth nightly as needed.  Dispense: 10 tablet; Refill: 0    3. Dysuria  With the next episode, we will repeat urinalysis and urine culture.  - UA Microscopic only, urine; Future  - Urine Culture, Routine; Future     Plan: Medication prescribed Labs ordered    Follow up: No follow-ups on file.    Please note that the following visit was completed using two-way, real-time interactive audio and/or video communication.  This has been done in good jesica to provide continuity of care in the best interest of the provider-patient relationship, due to the ongoing public health crisis/national emergency and because of restrictions of visitation.  There are limitations of this visit as no physical exam could be performed.  Every conscious effort was taken to allow for sufficient and adequate time.  This billing was spent on reviewing labs, medications, radiology tests and decision making.  Appropriate medical decision-making and tests are ordered as detailed in the plan of care above. Christy Lowry understands video evaluation is not a substitute for in person examination or emergency care. Patient advised to go to ER or call 911 for worsening symptoms or acute distress.     This note was prepared using Dragon Medical voice recognition dictation  software. As a result errors may occur. When identified these errors have been corrected. While every attempt is made to correct errors during dictation discrepancies may still exist.  Patrick Stafford MD

## 2024-03-20 RX ORDER — LEVOTHYROXINE SODIUM 0.05 MG/1
50 TABLET ORAL
Qty: 90 TABLET | Refills: 1 | Status: SHIPPED | OUTPATIENT
Start: 2024-03-20

## 2024-03-20 NOTE — TELEPHONE ENCOUNTER
Please review, protocol failed/No protocol.    Requested Prescriptions   Pending Prescriptions Disp Refills    LEVOTHYROXINE 50 MCG Oral Tab [Pharmacy Med Name: LEVOTHYROXINE 0.05MG (50MCG) TAB] 90 tablet 0     Sig: TAKE 1 TABLET(50 MCG) BY MOUTH BEFORE BREAKFAST       Thyroid Medication Protocol Failed - 3/18/2024 11:45 AM        Failed - Last TSH value is normal     Lab Results   Component Value Date    TSH 5.400 (H) 05/30/2023                 Passed - TSH in past 12 months        Passed - In person appointment or virtual visit in the past 12 mos or appointment in next 3 mos     Recent Outpatient Visits              5 days ago History of recurrent UTIs    [facility], Patrick Velasquez MD    Telemedicine    6 months ago History of recurrent UTIs    [facility], Shira Pelletier APRN    Telemedicine    8 months ago Acquired hypothyroidism    [facility], Patrick Velasquez MD    Office Visit    8 months ago Acute kidney injury (HCC)    [facility], [place]    Telemedicine    9 months ago Acquired hypothyroidism    [facility], Patrick Velasquez MD    Office Visit                             Recent Outpatient Visits              5 days ago History of recurrent UTIs    [facility], Patrick Velasquez MD    Telemedicine    6 months ago History of recurrent UTIs    [facility], Shira Pelletier APRN    Telemedicine    8 months ago Acquired hypothyroidism    [facility], Patrick Velasquez MD    Office Visit    8 months ago Acute kidney injury (HCC)    [facility], [place] APR[place]    Telemedicine    9 months ago  Acquired hypothyroidism    [facility], [facility], [place] Patrick Stafford MD    Office Visit

## 2024-05-06 RX ORDER — DICYCLOMINE HYDROCHLORIDE 10 MG/1
10 CAPSULE ORAL EVERY 12 HOURS PRN
Qty: 180 CAPSULE | Refills: 3 | Status: SHIPPED | OUTPATIENT
Start: 2024-05-06

## 2024-05-06 RX ORDER — DICYCLOMINE HYDROCHLORIDE 10 MG/1
CAPSULE ORAL
Qty: 180 CAPSULE | Refills: 0 | OUTPATIENT
Start: 2024-05-06

## 2024-05-06 NOTE — TELEPHONE ENCOUNTER
Refill passed per protocol.    Requested Prescriptions   Pending Prescriptions Disp Refills    dicyclomine 10 MG Oral Cap 180 capsule 0     Sig: Take 1 capsule (10 mg total) by mouth every 12 (twelve) hours as needed.       Gastrointestional Medication Protocol Passed - 5/6/2024 10:45 AM        Passed - In person appointment or virtual visit in the past 12 mos or appointment in next 3 mos     Recent Outpatient Visits              1 month ago [facility] of recurrent UTIs    [facility]    7 months ago History of recurrent UTIs    [facility] Shira Lim APRN    Telemedicine    9 months ago Acquired hypothyroidism    [facility], Patrick Velasquez MD    Office Visit    10 months ago Acute kidney injury (HCC)    [facility] Kavita Munoz, APRTEA    Telemedicine    11 months ago Acquired hypothyroidism    [facility], [place]    Office Visit                       Refused Prescriptions Disp Refills    DICYCLOMINE 10 MG Oral Cap [Pharmacy Med Name: DICYCLOMINE 10MG CAPSULES] 180 capsule 0     Sig: TAKE 1 CAPSULE(10 MG) BY MOUTH EVERY 12 HOURS AS NEEDED       Gastrointestional Medication Protocol Passed - 5/6/2024 10:45 AM        Passed - In person appointment or virtual visit in the past 12 mos or appointment in next 3 mos     Recent Outpatient Visits              1 month ago History of recurrent UTIs    [facility]    [facility]    7 months ago History of recurrent UTIs    [facility] Shira Lim, CHERYL    Telemedicine    9 months ago Acquired hypothyroidism    [facility]     Office Visit    10 months ago Acute kidney injury (HCC)    [facility] [place]TE[place]    Telemedicine    11 months ago Acquired hypothyroidism    [facility], [facility], Patrick Velasquez MD    Office Visit                             Recent Outpatient Visits              1 month ago History of recurrent UTIs    [facility], [facility], Patrick Velasquez MD    Telemedicine    7 months ago History of recurrent UTIs    [facility], [place]    Telemedicine    9 months ago Acquired hypothyroidism    [facility], [facility], Patrick Velasquez MD    Office Visit    10 months ago Acute kidney injury (HCC)    [facility], [facility], [place], CHERYL    Telemedicine    11 months ago Acquired hypothyroidism    [facility], [facility], Patrick Velasquez MD    Office Visit

## 2024-05-06 NOTE — TELEPHONE ENCOUNTER
Patient's daughter called to follow up on below refill request. Patient only has 2 tablets left.

## 2024-05-07 RX ORDER — POTASSIUM CHLORIDE 1.5 G/1.58G
20 POWDER, FOR SOLUTION ORAL DAILY
Qty: 90 EACH | Refills: 0 | Status: SHIPPED | OUTPATIENT
Start: 2024-05-07

## 2024-05-07 NOTE — TELEPHONE ENCOUNTER
Please review.  Protocol failed / Has no protocol.     Requested Prescriptions   Pending Prescriptions Disp Refills    KLOR-CON 20 MEQ Oral Powd Pack [Pharmacy Med Name: KLOR-CON POWDER PKTS 20MEQ 30S] 90 each 0     Sig: TAKE 1 PACKET BY MOUTH EVERY DAY X 3 DAYS       There is no refill protocol information for this order          Recent Outpatient Visits              1 month ago History of recurrent UTIs    [facility] Patrick Stafford MD    Telemedicine    7 months ago History of recurrent UTIs    [facility] Shira Lim, APRTEA    Telemedicine    9 months ago [facility] hypothyroidism    [facility]    [facility]    10 months ago Acute kidney injury (HCC)    [facility] Kavita Munoz, APRN    Telemedicine    11 months ago Acquired hypothyroidism    [facility] Patrick Stafford MD    Office Visit

## 2024-06-27 RX ORDER — LEVOTHYROXINE SODIUM 0.05 MG/1
50 TABLET ORAL
Qty: 90 TABLET | Refills: 3 | Status: SHIPPED | OUTPATIENT
Start: 2024-06-27

## 2024-06-27 NOTE — TELEPHONE ENCOUNTER
Please review. Protocol Failed; No Protocol    Requested Prescriptions   Pending Prescriptions Disp Refills    LEVOTHYROXINE 50 MCG Oral Tab [Pharmacy Med Name: LEVOTHYROXINE 0.05MG (50MCG) TAB] 90 tablet 1     Sig: TAKE 1 TABLET(50 MCG) BY MOUTH BEFORE BREAKFAST       Thyroid Medication Protocol Failed - 6/25/2024  8:04 AM        Failed - TSH in past 12 months        Failed - Last TSH value is normal     Lab Results   Component Value Date    TSH 5.400 (H) 05/30/2023                 Passed - In person appointment or virtual visit in the past 12 mos or appointment in next 3 mos     Recent Outpatient Visits              3 months ago History of recurrent UTIs    [facility], Patrick Velasquez MD    Telemedicine    9 months ago History of recurrent UTIs    [facility], Shira Pelletier APRN    Telemedicine    11 months ago Acquired hypothyroidism    [facility], Patrick Velasquez MD    Office Visit    12 months ago Acute kidney injury (HCC)    [facility] [place]    Telemedicine    1 year ago Acquired hypothyroidism    [facility], Patrick Velasquez MD    Office Visit                               Recent Outpatient Visits              3 months ago History of recurrent UTIs    [facility] [place] [facility]    Telemedicine    9 months ago History of recurrent UTIs    [facility], Shira Pelletier APRN    Telemedicine    11 months ago Acquired hypothyroidism    [facility], Patrick Velasquez MD    Office Visit    12 months ago Acute kidney injury (HCC)    [facility] [place]    Telemedicine    1 year ago  Acquired hypothyroidism    [facility], [facility], [place] Patrick Stafford MD    Office Visit

## 2024-07-26 RX ORDER — ESCITALOPRAM OXALATE 10 MG/1
5 TABLET ORAL DAILY
Qty: 45 TABLET | Refills: 3 | Status: SHIPPED | OUTPATIENT
Start: 2024-07-26

## 2024-07-26 NOTE — TELEPHONE ENCOUNTER
Daughter, is requesting a refill for the patient's escitalopram medication.. Daughter, states that the patient is out of medication. Pharmacy: [place], [place] (listed)     Current Outpatient Medications   Medication Sig Dispense Refill    escitalopram 10 MG Oral Tab Take 0.5 tablets (5 mg total) by mouth daily. 45 tablet 3

## 2024-07-26 NOTE — TELEPHONE ENCOUNTER
Please review; protocol failed/ has no protocol      Routing to on call providers Patrick Gorman is out of office today  and providers from internal medicine [facility] are out of office todayas well.      Clyde Harp7 minutes ago (2:54 PM)     BP  Daughter, is requesting a refill for the patient's escitalopram medication.. Daughter, states that the patient is out of medication. Pharmacy: [place], [place] (listed)             Current Outpatient Medications   Medication Sig Dispense Refill    escitalopram 10 MG Oral Tab Take 0.5 tablets (5 mg total) by mouth daily. 45 tablet 3               Patient's Last Telemedicine Visit 03/15/2024  Requested Prescriptions   Pending Prescriptions Disp Refills    escitalopram 10 MG Oral Tab 45 tablet 3     Sig: Take 0.5 tablets (5 mg total) by mouth daily.       Psychiatric Non-Scheduled (Anti-Anxiety) Failed - 7/26/2024  3:38 PM        Failed - Depression Screening completed within the past 12 months        Passed - In person appointment or virtual visit in the past 6 mos or appointment in next 3 mos     Recent Outpatient Visits              4 months ago [facility]    10 months ago History of recurrent UTIs    [facility] [place] Shira Lim APRN    Telemedicine    1 year ago [facility] hypothyroidism    [facility]    [facility]    1 year ago Acute kidney injury (HCC)    [facility] [place] [facility]    Telemedicine    1 year ago Acquired hypothyroidism    [facility]    [facility]                         Recent Outpatient Visits              4 months ago History of recurrent UTIs    [facility]  Patrick Velasquez MD    Telemedicine    10 months ago History of recurrent UTIs    [facility], [place] Shira Lim APRN    Telemedicine    1 year ago Acquired hypothyroidism    [facility], [facility], Patrick Velasquez MD    Office Visit    1 year ago Acute kidney injury (HCC)    [facility], [place] Kavita Munoz APRN    Telemedicine    1 year ago Acquired hypothyroidism    [facility], [facility], Patrick Velasquez MD    Office Visit

## 2024-07-30 RX ORDER — ESCITALOPRAM OXALATE 10 MG/1
5 TABLET ORAL DAILY
Qty: 45 TABLET | Refills: 3 | OUTPATIENT
Start: 2024-07-30

## 2024-08-06 ENCOUNTER — LAB ENCOUNTER (OUTPATIENT)
Dept: LAB | Facility: HOSPITAL | Age: 89
End: 2024-08-06
Attending: INTERNAL MEDICINE
Payer: MEDICARE

## 2024-08-06 ENCOUNTER — NURSE TRIAGE (OUTPATIENT)
Dept: INTERNAL MEDICINE CLINIC | Facility: CLINIC | Age: 89
End: 2024-08-06

## 2024-08-06 DIAGNOSIS — N30.00 ACUTE CYSTITIS WITHOUT HEMATURIA: Primary | ICD-10-CM

## 2024-08-06 DIAGNOSIS — N30.00 ACUTE CYSTITIS WITHOUT HEMATURIA: ICD-10-CM

## 2024-08-06 PROCEDURE — 87086 URINE CULTURE/COLONY COUNT: CPT

## 2024-08-06 PROCEDURE — 87077 CULTURE AEROBIC IDENTIFY: CPT

## 2024-08-06 NOTE — TELEPHONE ENCOUNTER
Elva Zuniga for  I received a call from patient daughter Christy Betancourt. She stated that patient has a history of UTI. Per daughter patient symptoms of UTI are patient starts to see spider webs and starts to hallucinate. Patient started to see Spider webs 2 days ago and was hallucinating 2 days go. Daughter has been pushing the fluids so patient is not dehydrated. Per daughter patient does not have burning on urinating she usually does not have these symptoms when she has a UTI in the past. Daughter stated that patient is having urine frequency but that is because of all the fluids she is drinking. Daughter wants to know if a order is in for a urine culture and if they can give patient Cephalexin 500 mg that was given to patient in March. Please advise. Antibiotic was picked up but patient never took it. Per daughter its very hard to bring patient in to the office. Please advise   Dr. Stafford there is a order in the system for a urine culture.       Reason for Disposition   Urinating more frequently than usual (i.e., frequency)    Protocols used: Urinary Symptoms-A-OH

## 2024-08-06 NOTE — TELEPHONE ENCOUNTER
I have approved urinalysis and urine culture.  They can submit the urine first and then start taking the antibiotics.  If there is any worsening symptoms, please take her to the emergency room.

## 2024-10-31 ENCOUNTER — NURSE TRIAGE (OUTPATIENT)
Dept: INTERNAL MEDICINE CLINIC | Facility: CLINIC | Age: 89
End: 2024-10-31

## 2024-10-31 ENCOUNTER — APPOINTMENT (OUTPATIENT)
Dept: CT IMAGING | Facility: HOSPITAL | Age: 89
End: 2024-10-31
Attending: EMERGENCY MEDICINE
Payer: MEDICARE

## 2024-10-31 ENCOUNTER — HOSPITAL ENCOUNTER (EMERGENCY)
Facility: HOSPITAL | Age: 89
Discharge: HOME | End: 2024-10-31
Attending: EMERGENCY MEDICINE
Payer: MEDICARE

## 2024-10-31 VITALS
BODY MASS INDEX: 25.14 KG/M2 | TEMPERATURE: 98 F | OXYGEN SATURATION: 93 % | HEART RATE: 79 BPM | SYSTOLIC BLOOD PRESSURE: 113 MMHG | DIASTOLIC BLOOD PRESSURE: 63 MMHG | WEIGHT: 128.06 LBS | HEIGHT: 60 IN | RESPIRATION RATE: 20 BRPM

## 2024-10-31 DIAGNOSIS — R11.2 NAUSEA VOMITING AND DIARRHEA: Primary | ICD-10-CM

## 2024-10-31 DIAGNOSIS — K52.9 ENTEROCOLITIS: ICD-10-CM

## 2024-10-31 DIAGNOSIS — R19.7 NAUSEA VOMITING AND DIARRHEA: Primary | ICD-10-CM

## 2024-10-31 LAB
ALBUMIN SERPL-MCNC: 2.8 G/DL (ref 3.2–4.8)
ALP LIVER SERPL-CCNC: 81 U/L
ALT SERPL-CCNC: <7 U/L
ANION GAP SERPL CALC-SCNC: 9 MMOL/L (ref 0–18)
AST SERPL-CCNC: 22 U/L (ref ?–34)
BASOPHILS # BLD AUTO: 0.04 X10(3) UL (ref 0–0.2)
BASOPHILS NFR BLD AUTO: 0.3 %
BILIRUB DIRECT SERPL-MCNC: 0.1 MG/DL (ref ?–0.3)
BILIRUB SERPL-MCNC: 0.4 MG/DL (ref 0.2–0.9)
BILIRUB UR QL: NEGATIVE
BUN BLD-MCNC: 12 MG/DL (ref 9–23)
BUN/CREAT SERPL: 17.4 (ref 10–20)
CALCIUM BLD-MCNC: 7.2 MG/DL (ref 8.7–10.4)
CHLORIDE SERPL-SCNC: 109 MMOL/L (ref 98–112)
CLARITY UR: CLEAR
CO2 SERPL-SCNC: 22 MMOL/L (ref 21–32)
COLOR UR: YELLOW
CREAT BLD-MCNC: 0.69 MG/DL
DEPRECATED RDW RBC AUTO: 49.1 FL (ref 35.1–46.3)
EGFRCR SERPLBLD CKD-EPI 2021: 77 ML/MIN/1.73M2 (ref 60–?)
EOSINOPHIL # BLD AUTO: 0.06 X10(3) UL (ref 0–0.7)
EOSINOPHIL NFR BLD AUTO: 0.4 %
ERYTHROCYTE [DISTWIDTH] IN BLOOD BY AUTOMATED COUNT: 14.7 % (ref 11–15)
GLUCOSE BLD-MCNC: 97 MG/DL (ref 70–99)
GLUCOSE UR-MCNC: NORMAL MG/DL
HCT VFR BLD AUTO: 42.3 %
HGB BLD-MCNC: 13.8 G/DL
HYALINE CASTS #/AREA URNS AUTO: PRESENT /LPF
IMM GRANULOCYTES # BLD AUTO: 0.07 X10(3) UL (ref 0–1)
IMM GRANULOCYTES NFR BLD: 0.4 %
KETONES UR-MCNC: 60 MG/DL
LEUKOCYTE ESTERASE UR QL STRIP.AUTO: NEGATIVE
LIPASE SERPL-CCNC: 17 U/L (ref 12–53)
LYMPHOCYTES # BLD AUTO: 4.24 X10(3) UL (ref 1–4)
LYMPHOCYTES NFR BLD AUTO: 27 %
MCH RBC QN AUTO: 29.4 PG (ref 26–34)
MCHC RBC AUTO-ENTMCNC: 32.6 G/DL (ref 31–37)
MCV RBC AUTO: 90 FL
MONOCYTES # BLD AUTO: 1.08 X10(3) UL (ref 0.1–1)
MONOCYTES NFR BLD AUTO: 6.9 %
NEUTROPHILS # BLD AUTO: 10.2 X10 (3) UL (ref 1.5–7.7)
NEUTROPHILS # BLD AUTO: 10.2 X10(3) UL (ref 1.5–7.7)
NEUTROPHILS NFR BLD AUTO: 65 %
NITRITE UR QL STRIP.AUTO: NEGATIVE
OSMOLALITY SERPL CALC.SUM OF ELEC: 290 MOSM/KG (ref 275–295)
PH UR: 5.5 [PH] (ref 5–8)
PLATELET # BLD AUTO: 356 10(3)UL (ref 150–450)
POTASSIUM SERPL-SCNC: 3.4 MMOL/L (ref 3.5–5.1)
PROT SERPL-MCNC: 5.6 G/DL (ref 5.7–8.2)
PROT UR-MCNC: 30 MG/DL
RBC # BLD AUTO: 4.7 X10(6)UL
SARS-COV-2 RNA RESP QL NAA+PROBE: NOT DETECTED
SODIUM SERPL-SCNC: 140 MMOL/L (ref 136–145)
SP GR UR STRIP: 1.02 (ref 1–1.03)
UROBILINOGEN UR STRIP-ACNC: NORMAL
WBC # BLD AUTO: 15.7 X10(3) UL (ref 4–11)

## 2024-10-31 PROCEDURE — 99285 EMERGENCY DEPT VISIT HI MDM: CPT

## 2024-10-31 PROCEDURE — 96361 HYDRATE IV INFUSION ADD-ON: CPT

## 2024-10-31 PROCEDURE — 96374 THER/PROPH/DIAG INJ IV PUSH: CPT

## 2024-10-31 PROCEDURE — 83690 ASSAY OF LIPASE: CPT | Performed by: EMERGENCY MEDICINE

## 2024-10-31 PROCEDURE — 96375 TX/PRO/DX INJ NEW DRUG ADDON: CPT

## 2024-10-31 PROCEDURE — 93010 ELECTROCARDIOGRAM REPORT: CPT

## 2024-10-31 PROCEDURE — 81001 URINALYSIS AUTO W/SCOPE: CPT | Performed by: EMERGENCY MEDICINE

## 2024-10-31 PROCEDURE — 80048 BASIC METABOLIC PNL TOTAL CA: CPT | Performed by: EMERGENCY MEDICINE

## 2024-10-31 PROCEDURE — 85025 COMPLETE CBC W/AUTO DIFF WBC: CPT | Performed by: EMERGENCY MEDICINE

## 2024-10-31 PROCEDURE — 80076 HEPATIC FUNCTION PANEL: CPT | Performed by: EMERGENCY MEDICINE

## 2024-10-31 PROCEDURE — 74177 CT ABD & PELVIS W/CONTRAST: CPT | Performed by: EMERGENCY MEDICINE

## 2024-10-31 PROCEDURE — 93005 ELECTROCARDIOGRAM TRACING: CPT

## 2024-10-31 PROCEDURE — 96372 THER/PROPH/DIAG INJ SC/IM: CPT

## 2024-10-31 PROCEDURE — S0028 INJECTION, FAMOTIDINE, 20 MG: HCPCS | Performed by: EMERGENCY MEDICINE

## 2024-10-31 RX ORDER — DICYCLOMINE HCL 20 MG
20 TABLET ORAL 3 TIMES DAILY PRN
Qty: 13 TABLET | Refills: 0 | Status: ON HOLD | OUTPATIENT
Start: 2024-10-31

## 2024-10-31 RX ORDER — DICYCLOMINE HYDROCHLORIDE 10 MG/ML
20 INJECTION INTRAMUSCULAR ONCE
Status: COMPLETED | OUTPATIENT
Start: 2024-10-31 | End: 2024-10-31

## 2024-10-31 RX ORDER — FAMOTIDINE 10 MG/ML
20 INJECTION, SOLUTION INTRAVENOUS ONCE
Status: COMPLETED | OUTPATIENT
Start: 2024-10-31 | End: 2024-10-31

## 2024-10-31 RX ORDER — ONDANSETRON 8 MG/1
8 TABLET, ORALLY DISINTEGRATING ORAL EVERY 4 HOURS PRN
Qty: 10 TABLET | Refills: 0 | Status: SHIPPED | OUTPATIENT
Start: 2024-10-31 | End: 2024-10-31

## 2024-10-31 RX ORDER — ONDANSETRON 2 MG/ML
4 INJECTION INTRAMUSCULAR; INTRAVENOUS ONCE
Status: COMPLETED | OUTPATIENT
Start: 2024-10-31 | End: 2024-10-31

## 2024-10-31 RX ORDER — ONDANSETRON 8 MG/1
8 TABLET, ORALLY DISINTEGRATING ORAL EVERY 4 HOURS PRN
Qty: 10 TABLET | Refills: 0 | Status: ON HOLD | OUTPATIENT
Start: 2024-10-31 | End: 2024-11-07

## 2024-10-31 RX ORDER — DICYCLOMINE HCL 20 MG
20 TABLET ORAL EVERY 6 HOURS PRN
Qty: 9 TABLET | Refills: 0 | Status: SHIPPED | OUTPATIENT
Start: 2024-10-31 | End: 2024-10-31

## 2024-10-31 NOTE — ED INITIAL ASSESSMENT (HPI)
Pt brought in by sons for N/V/D with generalized abdominal pain started around 3 AM today.  Pt is alert, breathing unlabored.  Denies cough, fever.

## 2024-10-31 NOTE — TELEPHONE ENCOUNTER
Action Requested: Summary for Provider     []  Critical Lab, Recommendations Needed  [] Need Additional Advice  [x]   ANDREA    []   Need Orders  [] Need Medications Sent to Pharmacy  []  Other     SUMMARY:     Spoke with patient's son per VICKEY, Date of Birth verified  He stated patient care giver informed him of patient stomach upset started last noc, he's wife  brought protein bar to patient with peanut on it.   She's been complaining of stomach upset.  Patient felt if she throw up she will feel much better.   She's been drinking ginger ale, the pharmacist at [facility] advised to give her  gaviscon, she belch few times and she looks yumiko more comfortable. Patient complained of feeling tired.  She woke up today, still has very upset stomach.  She takes imodium  3-4 x a day for a long time as MD is aware.   He stated patient O2 level is low 80's, he was advised if patient oxygen level is in the 80's to take her to ER for eval and treat, he agree and taking patient to [place] ER.  Unable to initiate Triage protocol due to patient currently not with her son.     ANDREA

## 2024-11-01 LAB
ATRIAL RATE: 85 BPM
P AXIS: 71 DEGREES
P-R INTERVAL: 174 MS
Q-T INTERVAL: 406 MS
QRS DURATION: 70 MS
QTC CALCULATION (BEZET): 483 MS
R AXIS: 2 DEGREES
T AXIS: 72 DEGREES
VENTRICULAR RATE: 85 BPM

## 2024-11-01 NOTE — TELEPHONE ENCOUNTER
ED  Discharged  10/31/2024 (4 hours)  [facility] Emergency Department     Howard Ferrer MD  Last attending  Treatment team Nausea vomiting and diarrhea +1 more  Clinical impression Abdominal Pain  Nausea/Vomiting/Diarrhea  Chief complaint

## 2024-11-01 NOTE — ED PROVIDER NOTES
Patient Seen in: [facility] Emergency Department    History     Chief Complaint   Patient presents with    Abdominal Pain    Nausea/Vomiting/Diarrhea       HPI    89-year-old female here with 1 day of nonbloody nonbilious emesis in addition to watery diarrhea, generalized abdominal pain.  She denies any fevers or any blood in her stool.  No chest pain or dyspnea.  No obvious sick contacts.    History reviewed.   Past Medical History:    Actinic keratosis    hypertrophic AK - left central forehead    Benzodiazepine withdrawal without complication (HCC)    Chronic GERD    Constipation    Diarrhea    Eustachian tube dysfunction    Heart palpitations    Loose stools    Occult blood in stools    SCC (squamous cell carcinoma)    left forehead, invasive, well-diff.     SCC (squamous cell carcinoma)    right temple    SCC (squamous cell carcinoma)    right neck    Skin cancer       History reviewed.   Past Surgical History:   Procedure Laterality Date    Tonsillectomy           Medications :  Prescriptions Prior to Admission[1]     Family History   Problem Relation Age of Onset    Colon Cancer Mother     Colon Cancer Sister        Smoking Status:   Social History     Socioeconomic History    Marital status:    Tobacco Use    Smoking status: Former    Smokeless tobacco: Former   Vaping Use    Vaping status: Never Used   Substance and Sexual Activity    Alcohol use: Not Currently     Alcohol/week: 0.0 standard drinks of alcohol     Comment: none recently    Drug use: No   Other Topics Concern    Reaction to local anesthetic No       Constitutional and vital signs reviewed.      Social History and Family History elements reviewed from today, pertinent positives to the presenting problem noted.    Physical Exam     ED Triage Vitals [10/31/24 1856]   /59   Pulse 79   Resp 20   Temp 97.7 °F (36.5 °C)   Temp src Oral   SpO2 95 %   O2 Device None (Room air)       All measures to prevent infection transmission  during my interaction with the patient were taken. The patient was already wearing a droplet mask on my arrival to the room. Personal protective equipment was worn throughout the duration of the exam.  Handwashing was performed prior to and after the exam.  Stethoscope and any equipment used during my examination was cleaned with super sani-cloth germicidal wipes following the exam.     Physical Exam    General: NAD  Head: Normocephalic and atraumatic.  Mouth/Throat/Ears/Nose: Oropharynx is clear    Eyes: Conjunctivae and EOM are normal.   Neck: Normal range of motion. Supple.   Cardiovascular: Normal rate, regular rhythm, normal heart sounds.  Respiratory/Chest: Clear and equal bilaterally. Exhibits no tenderness.  Gastrointestinal: Soft, periumbilical ttp, non-distended. Bowel sounds are normal.   Musculoskeletal:No swelling or deformity.   Neurological: Alert and appropriate. No focal deficits.   Skin: Skin is warm and dry. No pallor.  Psychiatric: Has a normal mood and affect.      ED Course        Labs Reviewed   BASIC METABOLIC PANEL (8) - Abnormal; Notable for the following components:       Result Value    Potassium 3.4 (*)     Calcium, Total 7.2 (*)     All other components within normal limits   HEPATIC FUNCTION PANEL (7) - Abnormal; Notable for the following components:    ALT <7 (*)     Total Protein 5.6 (*)     Albumin 2.8 (*)     All other components within normal limits   CBC WITH DIFFERENTIAL WITH PLATELET - Abnormal; Notable for the following components:    WBC 15.7 (*)     RDW-SD 49.1 (*)     Neutrophil Absolute Prelim 10.20 (*)     Neutrophil Absolute 10.20 (*)     Lymphocyte Absolute 4.24 (*)     Monocyte Absolute 1.08 (*)     All other components within normal limits   URINALYSIS WITH CULTURE REFLEX - Abnormal; Notable for the following components:    Ketones Urine 60 (*)     Blood Urine 2+ (*)     Protein Urine 30 (*)     RBC Urine 6-10 (*)     Squamous Epi. Cells Few (*)     Hyaline Casts  Present (*)     All other components within normal limits   LIPASE - Normal   RAPID SARS-COV-2 BY PCR - Normal     EKG    Rate, intervals and axes as noted on EKG Report.  Rate: 85  Rhythm: Sinus Rhythm  Reading: No STEMI.  This is my interpretation.           As Interpreted by me    Imaging Results Available and Reviewed while in ED: CT ABDOMEN+PELVIS(CONTRAST ONLY)(CPT=74177)    Result Date: 10/31/2024  CONCLUSION:  1. Findings are most compatible with moderate-severe infectious/inflammatory enterocolitis.  Specifically, there are nondilated fluid-filled loops of small bowel throughout the abdomen, several of which demonstrate circumferential wall thickening.  Also noted is abnormal fluid throughout the colon to the level of the rectum with long segment proximal colonic wall thickening and mild proximal pericolonic inflammation.  Small volume intra-abdominal ascites with associated mild interloop free fluid; these findings could be reactive in nature or relate to venous congestion.  No free intraperitoneal air or well-defined/drainable intra-abdominal collection.  No definite CT manifestations of small-bowel obstruction. 2. Small dependent pleural effusions at both lung bases.  A 3.6 cm lobulated fluid density structure in the dependent left lower lobe probably relates to loculated pleural fluid.  Associated compressive atelectasis at both lower lobes. 3. Coronary and peripheral atherosclerosis. 4. Small 0.6 cm indeterminate left upper pole renal cortical lesion, possibly representing a small enhancing mass versus a benign proteinaceous/hemorrhagic cyst.  There is also a 1.7 cm pancreatic body cyst.  Depending upon goals of care, consider 6-12 month surveillance imaging to assess stability of both findings. 5. Circumferential urinary bladder wall thickening, which may relate to incomplete distention or cystitis.  If there are referable symptoms, urinalysis correlation is requested.   Dictated by (CST): Don  MD Dakotah on 10/31/2024 at 9:50 PM     Finalized by (CST): Dakotah Ochoa MD on 10/31/2024 at 10:00 PM         ED Medications Administered:   Medications   famotidine (Pepcid) 20 mg/2mL injection 20 mg (20 mg Intravenous Given 10/31/24 1929)   ondansetron (Zofran) 4 MG/2ML injection 4 mg (4 mg Intravenous Given 10/31/24 1929)   sodium chloride 0.9 % IV bolus 500 mL (0 mL Intravenous Stopped 10/31/24 2017)   iopamidol 76% (ISOVUE-370) injection for power injector (80 mL Intravenous Given 10/31/24 2123)   dicyclomine (Bentyl) 10 MG/ML injection 20 mg (20 mg Intramuscular Given 10/31/24 2300)   amoxicillin clavulanate (Augmentin) 875-125 MG per tab 875 mg (875 mg Oral Given 10/31/24 2302)         MDM     Vitals:    10/31/24 1900 10/31/24 2000 10/31/24 2100 10/31/24 2200   BP: 111/75 135/55 117/60 113/63   Pulse: 90 72 76 79   Resp: 18 17 21 20   Temp:       TempSrc:       SpO2: 94% 94% 92% 93%   Weight:       Height:         *I personally reviewed and interpreted all ED vitals.    Pulse Ox: 95%, Room air, Normal     Monitor Interpretation:   normal sinus rhythm as interpreted by me.  The cardiac monitor was ordered given nausea      Medical Decision Making      Differential Diagnosis/ Diagnostic Considerations: Gastroenteritis, colitis, bowel obstruction    Complicating Factors: The patient already has anxiety to contribute to the complexity of this ED evaluation.    I reviewed prior chart records including telephone communication note prompting referral to the emergency department.  Patient is here with CT scan demonstrating enterocolitis.  No free air on my interpretation  of the CT scan.     I recommended admission multiple times however the patient declines.  No fevers or blood in stool however given leukocytosis, on my interpretation of the lab work, and the fact that the patient does not want to be admitted, we will start antibiotics.  First dose provided in the emergency department and a prescription of the  same provided.   The patient is decisional.  The sons try to give answer to stay however she declines.  Dc In stable condition.  Additional incidental findings on CT scan were pasted into the patient's discharge paperwork and she was instructed to follow-up with PCP regarding these    Prescriptions: as below      Disposition and Plan     Clinical Impression:  1. Nausea vomiting and diarrhea    2. Enterocolitis        Disposition:  Discharge    Follow-up:  Patrick Stafford MD  [address] [address]  [phone]    Schedule an appointment as soon as possible for a visit in 1 day(s)        Medications Prescribed:  Discharge Medication List as of 10/31/2024 11:02 PM        START taking these medications    Details   ondansetron 8 MG Oral Tablet Dispersible Take 1 tablet (8 mg total) by mouth every 4 (four) hours as needed for Nausea., Normal, Disp-10 tablet, R-0      dicyclomine 20 MG Oral Tab Take 1 tablet (20 mg total) by mouth every 6 (six) hours as needed., Normal, Disp-9 tablet, R-0      amoxicillin clavulanate 875-125 MG Oral Tab Take 1 tablet by mouth 2 (two) times daily for 7 days., Normal, Disp-14 tablet, R-0                              [1] (Not in a hospital admission)

## 2024-11-04 ENCOUNTER — TELEPHONE (OUTPATIENT)
Dept: INTERNAL MEDICINE CLINIC | Facility: CLINIC | Age: 89
End: 2024-11-04

## 2024-11-04 ENCOUNTER — TELEPHONE (OUTPATIENT)
Facility: LOCATION | Age: 89
End: 2024-11-04

## 2024-11-04 RX ORDER — ONDANSETRON 4 MG/1
4 TABLET, FILM COATED ORAL EVERY 8 HOURS PRN
Qty: 30 TABLET | Refills: 0 | Status: ON HOLD | OUTPATIENT
Start: 2024-11-04

## 2024-11-04 NOTE — TELEPHONE ENCOUNTER
Patient daughter Aubrie Betancourt calling ( name and date of birth of patient verified ) on Release of Information     Aburie Betancourt states patient is having intermittent abdominal pain,  no longer has diarrhea ,  some nausea has been  taking the medications from the ER  10/31/2024  ( Augmentin, Dicyclomine, Zofran )       Daughter was unaware to follow up from ER the next day  with Dr. Stafford   States it is very difficult to get the patient out of the home     Daughter asking what to do next , should the patient continue with the medications?  If so,  will tone a refill on Zofran       Please advise and thank you.      Best call back number: aubrie Betancourt  at [phone]

## 2024-11-05 ENCOUNTER — HOSPITAL ENCOUNTER (INPATIENT)
Facility: HOSPITAL | Age: 89
LOS: 9 days | Discharge: HOME HEALTH | End: 2024-11-14
Attending: EMERGENCY MEDICINE | Admitting: HOSPITALIST
Payer: MEDICARE

## 2024-11-05 ENCOUNTER — HOSPITAL ENCOUNTER (INPATIENT)
Facility: HOSPITAL | Age: 89
LOS: 9 days | Discharge: HOME | End: 2024-11-14
Attending: EMERGENCY MEDICINE | Admitting: HOSPITALIST
Payer: MEDICARE

## 2024-11-05 DIAGNOSIS — R11.2 NAUSEA AND VOMITING IN ADULT: Primary | ICD-10-CM

## 2024-11-05 DIAGNOSIS — R11.15 PERSISTENT VOMITING IN ADULT PATIENT: ICD-10-CM

## 2024-11-05 PROBLEM — K52.9 ENTERITIS: Status: ACTIVE | Noted: 2024-11-05

## 2024-11-05 LAB
ALBUMIN SERPL-MCNC: 3.6 G/DL (ref 3.2–4.8)
ALP LIVER SERPL-CCNC: 84 U/L
ALT SERPL-CCNC: 9 U/L
ANION GAP SERPL CALC-SCNC: 4 MMOL/L (ref 0–18)
AST SERPL-CCNC: 25 U/L (ref ?–34)
BASOPHILS # BLD AUTO: 0.03 X10(3) UL (ref 0–0.2)
BASOPHILS NFR BLD AUTO: 0.2 %
BILIRUB DIRECT SERPL-MCNC: 0.1 MG/DL (ref ?–0.3)
BILIRUB SERPL-MCNC: 0.3 MG/DL (ref 0.2–0.9)
BUN BLD-MCNC: 9 MG/DL (ref 9–23)
BUN/CREAT SERPL: 10.1 (ref 10–20)
CALCIUM BLD-MCNC: 9.7 MG/DL (ref 8.7–10.4)
CHLORIDE SERPL-SCNC: 101 MMOL/L (ref 98–112)
CO2 SERPL-SCNC: 30 MMOL/L (ref 21–32)
CREAT BLD-MCNC: 0.89 MG/DL
DEPRECATED RDW RBC AUTO: 49.1 FL (ref 35.1–46.3)
EGFRCR SERPLBLD CKD-EPI 2021: 58 ML/MIN/1.73M2 (ref 60–?)
EOSINOPHIL # BLD AUTO: 0.05 X10(3) UL (ref 0–0.7)
EOSINOPHIL NFR BLD AUTO: 0.4 %
ERYTHROCYTE [DISTWIDTH] IN BLOOD BY AUTOMATED COUNT: 14.8 % (ref 11–15)
GLUCOSE BLD-MCNC: 118 MG/DL (ref 70–99)
HCT VFR BLD AUTO: 39.9 %
HGB BLD-MCNC: 13.5 G/DL
IMM GRANULOCYTES # BLD AUTO: 0.06 X10(3) UL (ref 0–1)
IMM GRANULOCYTES NFR BLD: 0.5 %
LIPASE SERPL-CCNC: 21 U/L (ref 12–53)
LYMPHOCYTES # BLD AUTO: 2.38 X10(3) UL (ref 1–4)
LYMPHOCYTES NFR BLD AUTO: 19.6 %
MCH RBC QN AUTO: 30.6 PG (ref 26–34)
MCHC RBC AUTO-ENTMCNC: 33.8 G/DL (ref 31–37)
MCV RBC AUTO: 90.5 FL
MONOCYTES # BLD AUTO: 1.03 X10(3) UL (ref 0.1–1)
MONOCYTES NFR BLD AUTO: 8.5 %
NEUTROPHILS # BLD AUTO: 8.57 X10 (3) UL (ref 1.5–7.7)
NEUTROPHILS # BLD AUTO: 8.57 X10(3) UL (ref 1.5–7.7)
NEUTROPHILS NFR BLD AUTO: 70.8 %
OSMOLALITY SERPL CALC.SUM OF ELEC: 280 MOSM/KG (ref 275–295)
PLATELET # BLD AUTO: 338 10(3)UL (ref 150–450)
POTASSIUM SERPL-SCNC: 3.7 MMOL/L (ref 3.5–5.1)
PROT SERPL-MCNC: 7 G/DL (ref 5.7–8.2)
RBC # BLD AUTO: 4.41 X10(6)UL
SODIUM SERPL-SCNC: 135 MMOL/L (ref 136–145)
WBC # BLD AUTO: 12.1 X10(3) UL (ref 4–11)

## 2024-11-05 PROCEDURE — 99223 1ST HOSP IP/OBS HIGH 75: CPT | Performed by: HOSPITALIST

## 2024-11-05 RX ORDER — MORPHINE SULFATE 2 MG/ML
2 INJECTION, SOLUTION INTRAMUSCULAR; INTRAVENOUS EVERY 2 HOUR PRN
Status: DISCONTINUED | OUTPATIENT
Start: 2024-11-05 | End: 2024-11-14

## 2024-11-05 RX ORDER — DEXTROSE MONOHYDRATE, SODIUM CHLORIDE, AND POTASSIUM CHLORIDE 50; 1.49; 4.5 G/1000ML; G/1000ML; G/1000ML
INJECTION, SOLUTION INTRAVENOUS CONTINUOUS
Status: DISCONTINUED | OUTPATIENT
Start: 2024-11-05 | End: 2024-11-07

## 2024-11-05 RX ORDER — KETOROLAC TROMETHAMINE 15 MG/ML
15 INJECTION, SOLUTION INTRAMUSCULAR; INTRAVENOUS ONCE
Status: COMPLETED | OUTPATIENT
Start: 2024-11-05 | End: 2024-11-05

## 2024-11-05 RX ORDER — ONDANSETRON 2 MG/ML
4 INJECTION INTRAMUSCULAR; INTRAVENOUS EVERY 6 HOURS PRN
Status: DISCONTINUED | OUTPATIENT
Start: 2024-11-05 | End: 2024-11-14

## 2024-11-05 RX ORDER — METOCLOPRAMIDE HYDROCHLORIDE 5 MG/ML
5 INJECTION INTRAMUSCULAR; INTRAVENOUS EVERY 8 HOURS PRN
Status: DISCONTINUED | OUTPATIENT
Start: 2024-11-05 | End: 2024-11-14

## 2024-11-05 RX ORDER — ONDANSETRON 2 MG/ML
4 INJECTION INTRAMUSCULAR; INTRAVENOUS ONCE
Status: COMPLETED | OUTPATIENT
Start: 2024-11-05 | End: 2024-11-05

## 2024-11-05 RX ORDER — MORPHINE SULFATE 4 MG/ML
4 INJECTION, SOLUTION INTRAMUSCULAR; INTRAVENOUS EVERY 2 HOUR PRN
Status: DISCONTINUED | OUTPATIENT
Start: 2024-11-05 | End: 2024-11-14

## 2024-11-05 RX ORDER — MORPHINE SULFATE 2 MG/ML
1 INJECTION, SOLUTION INTRAMUSCULAR; INTRAVENOUS EVERY 2 HOUR PRN
Status: DISCONTINUED | OUTPATIENT
Start: 2024-11-05 | End: 2024-11-14

## 2024-11-05 RX ORDER — PROCHLORPERAZINE EDISYLATE 5 MG/ML
5 INJECTION INTRAMUSCULAR; INTRAVENOUS ONCE
Status: COMPLETED | OUTPATIENT
Start: 2024-11-05 | End: 2024-11-05

## 2024-11-05 RX ORDER — HEPARIN SODIUM 5000 [USP'U]/ML
5000 INJECTION, SOLUTION INTRAVENOUS; SUBCUTANEOUS EVERY 12 HOURS SCHEDULED
Status: DISCONTINUED | OUTPATIENT
Start: 2024-11-05 | End: 2024-11-14

## 2024-11-05 NOTE — TELEPHONE ENCOUNTER
ANDREA    Upon chart review patient was sent back to the ER by the on-call provider    Rn was going to ask for the refill on the zofran

## 2024-11-05 NOTE — ED INITIAL ASSESSMENT (HPI)
Pt presents to ED r/t RLQ pain. Pt was recently dx with enterocolitis and taking antibiotics. However pt is complaining of n/v.

## 2024-11-05 NOTE — TELEPHONE ENCOUNTER
Patient's daughter Christy Betancourt called (on Release of Information), verified patient's Name and . States patient remains to have abdominal pain and nausea, has been taking Pepto-Bismol and anti-nausea medication. Daughter did not bring the patient back to Emergency Department as advised by on-call provider, CHERYL Lazo.     Relayed importance of going back to Emergency Department for re-evaluation given that patient's symptoms are persisting, but still declined at this time. States she would like to await Dr. Patrick Stafford's review of patient's imaging result, and other recommendation.     Dr. Patrick Stafford please advise.

## 2024-11-05 NOTE — TELEPHONE ENCOUNTER
Regarding patient Christy Lowry, spoke with patient's daughter Noreen, on-call service for complaints of continued abdominal pain. Daughter states she is still having loose stools, abdominal pain that seems to be mostly on right side, and nausea. Denies blood in stools, vomiting, or any mental status changes. Recent ER visit in past week, given antiemetic and antibiotic for home, but have not seen improvement. Recommend reevaluation in ER tonight. Daughter verbalized understanding.

## 2024-11-06 LAB
ADENOVIRUS F 40/41 PCR: NEGATIVE
ANION GAP SERPL CALC-SCNC: 6 MMOL/L (ref 0–18)
ASTROVIRUS PCR: NEGATIVE
BASOPHILS # BLD AUTO: 0.02 X10(3) UL (ref 0–0.2)
BASOPHILS NFR BLD AUTO: 0.2 %
BUN BLD-MCNC: 9 MG/DL (ref 9–23)
BUN/CREAT SERPL: 9.8 (ref 10–20)
C CAYETANENSIS DNA SPEC QL NAA+PROBE: NEGATIVE
C DIFF TOX B STL QL: NEGATIVE
CALCIUM BLD-MCNC: 8.9 MG/DL (ref 8.7–10.4)
CAMPY SP DNA.DIARRHEA STL QL NAA+PROBE: NEGATIVE
CHLORIDE SERPL-SCNC: 102 MMOL/L (ref 98–112)
CO2 SERPL-SCNC: 28 MMOL/L (ref 21–32)
CREAT BLD-MCNC: 0.92 MG/DL
CRYPTOSP DNA SPEC QL NAA+PROBE: NEGATIVE
DEPRECATED RDW RBC AUTO: 50.5 FL (ref 35.1–46.3)
EAEC PAA PLAS AGGR+AATA ST NAA+NON-PRB: NEGATIVE
EC STX1+STX2 + H7 FLIC SPEC NAA+PROBE: NEGATIVE
EGFRCR SERPLBLD CKD-EPI 2021: 56 ML/MIN/1.73M2 (ref 60–?)
ENTAMOEBA HISTOLYTICA PCR: NEGATIVE
EOSINOPHIL # BLD AUTO: 0.03 X10(3) UL (ref 0–0.7)
EOSINOPHIL NFR BLD AUTO: 0.3 %
EPEC EAE GENE STL QL NAA+NON-PROBE: NEGATIVE
ERYTHROCYTE [DISTWIDTH] IN BLOOD BY AUTOMATED COUNT: 14.9 % (ref 11–15)
ETEC LTA+ST1A+ST1B TOX ST NAA+NON-PROBE: NEGATIVE
GIARDIA LAMBLIA PCR: NEGATIVE
GLUCOSE BLD-MCNC: 143 MG/DL (ref 70–99)
HCT VFR BLD AUTO: 37.3 %
HGB BLD-MCNC: 12.5 G/DL
IMM GRANULOCYTES # BLD AUTO: 0.03 X10(3) UL (ref 0–1)
IMM GRANULOCYTES NFR BLD: 0.3 %
LYMPHOCYTES # BLD AUTO: 1.87 X10(3) UL (ref 1–4)
LYMPHOCYTES NFR BLD AUTO: 18.6 %
MCH RBC QN AUTO: 30.7 PG (ref 26–34)
MCHC RBC AUTO-ENTMCNC: 33.5 G/DL (ref 31–37)
MCV RBC AUTO: 91.6 FL
MONOCYTES # BLD AUTO: 0.99 X10(3) UL (ref 0.1–1)
MONOCYTES NFR BLD AUTO: 9.9 %
NEUTROPHILS # BLD AUTO: 7.1 X10 (3) UL (ref 1.5–7.7)
NEUTROPHILS # BLD AUTO: 7.1 X10(3) UL (ref 1.5–7.7)
NEUTROPHILS NFR BLD AUTO: 70.7 %
NOROVIRUS GI/GII PCR: NEGATIVE
OSMOLALITY SERPL CALC.SUM OF ELEC: 283 MOSM/KG (ref 275–295)
P SHIGELLOIDES DNA STL QL NAA+PROBE: NEGATIVE
PLATELET # BLD AUTO: 298 10(3)UL (ref 150–450)
POTASSIUM SERPL-SCNC: 4 MMOL/L (ref 3.5–5.1)
RBC # BLD AUTO: 4.07 X10(6)UL
ROTAVIRUS A PCR: NEGATIVE
SALMONELLA DNA SPEC QL NAA+PROBE: NEGATIVE
SAPOVIRUS PCR: NEGATIVE
SHIGELLA SP+EIEC IPAH ST NAA+NON-PROBE: NEGATIVE
SODIUM SERPL-SCNC: 136 MMOL/L (ref 136–145)
V CHOLERAE DNA SPEC QL NAA+PROBE: NEGATIVE
VIBRIO DNA SPEC NAA+PROBE: NEGATIVE
WBC # BLD AUTO: 10 X10(3) UL (ref 4–11)
YERSINIA DNA SPEC NAA+PROBE: NEGATIVE

## 2024-11-06 PROCEDURE — 99233 SBSQ HOSP IP/OBS HIGH 50: CPT | Performed by: HOSPITALIST

## 2024-11-06 PROCEDURE — 99223 1ST HOSP IP/OBS HIGH 75: CPT | Performed by: INTERNAL MEDICINE

## 2024-11-06 RX ORDER — BISACODYL 10 MG
10 SUPPOSITORY, RECTAL RECTAL
Status: DISCONTINUED | OUTPATIENT
Start: 2024-11-06 | End: 2024-11-14

## 2024-11-06 NOTE — PROGRESS NOTES
[facility]  part of [facility]    Progress Note    Christy Lowry Patient Status:  Inpatient    1924 MRN [id number]   Location [facility] 4W/SW/SE Attending Gillian Connelly MD   Hosp Day # 1 PCP Patrick Stafford MD       Subjective:   Christy Lowry had a bowel movement  after suppository.Still feeling nauseous. Afebrile.     Objective:   Blood pressure 114/52, pulse 75, temperature 98.2 °F (36.8 °C), temperature source Oral, resp. rate 20, height 5' 4\" (1.626 m), weight 152 lb (68.9 kg), SpO2 95%.    Physical Exam:    General: No acute distress.   Respiratory: Clear to auscultation bilaterally. No wheezes. No rhonchi.  Cardiovascular: S1, S2. Regular rate and rhythm. No murmurs, rubs or gallops.   Abdomen: no abdominal pain, soft nontender.   Neurologic: No focal neurological deficits.   Musculoskeletal: Moves all extremities.  Extremities: No edema.    Results:     Lab Results   Component Value Date    WBC 10.0 2024    HGB 12.5 2024    HCT 37.3 2024    .0 2024    CREATSERUM 0.92 2024    BUN 9 2024     2024    K 4.0 2024     2024    CO2 28.0 2024     (H) 2024    CA 8.9 2024    ALB 3.6 2024    ALKPHO 84 2024    BILT 0.3 2024    TP 7.0 2024    AST 25 2024    ALT 9 (L) 2024    T4F 1.3 2023    TSH 5.400 (H) 2023    LIP 21 2024    MG 2.2 2023       No results found.       piperacillin-tazobactam  3.375 g Intravenous Q8H    heparin  5,000 Units Subcutaneous 2 times per day       bisacodyl    morphINE **OR** morphINE **OR** morphINE    ondansetron    metoclopramide      Assessment and Plan:      Acute enterocolitis  Acute intractable nausea and vomiting  - IV antibiotics  - IV fluids  - advance diet as tolerated  - IV zofran for nausea  - pain control  - PT/OT eval       Quality:  DVT Prophylaxis: Heparin  CODE status: Full    MDM.  High      LASHAE HUMMEL MD  11/06/24

## 2024-11-06 NOTE — CONSULTS
Is this a shared or split note between Advanced Practice Provider and Physician? Yes      [facility]   Gastroenterology Consultation Note    Christy Lowry  Patient Status:    Inpatient  Date of Admission:         11/5/2024, Hospital day #1  Attending:   Gillian Connelly MD  PCP:     Patrick Stafford MD    Reason for Consultation:  ABD pain, loose stools    History of Present Illness:  Christy Lowry is a 89 year old female w/ PMHx of BMI 26.09, actinic keratosis, OA, Osteoporosis, GERD, Hypothyroidism, Anxiety who presented to the ED with persistent ABD pain and loose stools.  Pt initially presented to ED 10/31 with similar symptoms with CT A/P at that time demonstrating severe infectious inflammatory enterocolitis but declined admission at that time.  She was discharged home on Augmentin.    Pt's daughter at bedside and assisted with HPI.    Pt completed augmentin course and dicyclomine prescribed with improvement in vomiting and diarrhea but Pt continued to feel unwell with diffuse ABD pain, nausea and poor appetite.  She had 3 hard brown bowel movements overnight with improvement in symptoms and currently is feeling well.  She typically takes imodium daily for chronic loose stools and as needed dicyclomine.  She stopped imodium since last Thursday but daughter reports that she took dicyclomine three times daily until medication ran out.  Dicyclomine prescribed as 20mg TID as needed with 13 tabs total, noted in chart review.    Her initial symptoms came on suddenly last Thursday.  No dyspepsia, black/bloody stools, fever or chills.  She now feels constipated and requested a suppository, which the nurse administered without subsequent stool yet.      Pertinent Family Hx:  - No known history of esophageal, gastric cancers.  Mother + colon cancer, Sister + colon CA.   - No known IBD  - No known liver pathologies    Endoscopy Hx:  - >10 years ago colonoscopy performed at [facility], findings  per patient: Unremarkable     Social Hx:  - No current tobacco use  - + social ETOH  - Denies cannabis/illicit drug use  - Denies NSAIDs  - Has care taker  - Occupation: Retired     History:  Past Medical History:    Actinic keratosis    hypertrophic AK - left central forehead    Benzodiazepine withdrawal without complication (HCC)    Chronic GERD    Constipation    Diarrhea    Eustachian tube dysfunction    Heart palpitations    Loose stools    Occult blood in stools    SCC (squamous cell carcinoma)    left forehead, invasive, well-diff.     SCC (squamous cell carcinoma)    right temple    SCC (squamous cell carcinoma)    right neck    Skin cancer     Past Surgical History:   Procedure Laterality Date    Tonsillectomy       Family History   Problem Relation Age of Onset    Colon Cancer Mother     Colon Cancer Sister       reports that she has quit smoking. She has quit using smokeless tobacco. She reports that she does not currently use alcohol. She reports that she does not use drugs.    Allergies:  Allergies[1]    Medications:    Current Facility-Administered Medications:     bisacodyl (Dulcolax) 10 MG rectal suppository 10 mg, 10 mg, Rectal, Daily PRN    piperacillin-tazobactam (Zosyn) 3.375 g in dextrose 5% 100 mL IVPB-ADDV, 3.375 g, Intravenous, Q8H    potassium chloride 20 mEq in dextrose 5%-sodium chloride 0.45% 1000mL infusion premix, , Intravenous, Continuous    heparin (Porcine) 5000 UNIT/ML injection 5,000 Units, 5,000 Units, Subcutaneous, 2 times per day    morphINE PF 2 MG/ML injection 1 mg, 1 mg, Intravenous, Q2H PRN **OR** morphINE PF 2 MG/ML injection 2 mg, 2 mg, Intravenous, Q2H PRN **OR** morphINE PF 4 MG/ML injection 4 mg, 4 mg, Intravenous, Q2H PRN    ondansetron (Zofran) 4 MG/2ML injection 4 mg, 4 mg, Intravenous, Q6H PRN    metoclopramide (Reglan) 5 mg/mL injection 5 mg, 5 mg, Intravenous, Q8H PRN    Review of Systems:   CONSTITUTIONAL:  negative for fevers, chills, unintentional weight loss    EYES:  negative for diplopia or change in vision   RESPIRATORY:  negative for severe shortness of breath  CARDIOVASCULAR:  negative for crushing sub-sternal chest pain  GASTROINTESTINAL:  see HPI  GENITOURINARY:  negative for dysuria or gross hematuria  INTEGUMENT/BREAST:  SKIN:  negative for jaundice or new rash   ALLERGIC/IMMUNOLOGIC:  negative for hay fever   ENDOCRINE:  negative for cold intolerance and heat intolerance  MUSCULOSKELETAL:  negative for joint effusion/severe erythema  NEURO: negative for new loss of consciousness or dizziness   BEHAVIOR/PSYCH:  negative for psychotic behavior    Physical Exam:    Blood pressure 108/59, pulse 73, temperature 98.1 °F (36.7 °C), temperature source Oral, resp. rate 22, height 5' 4\" (1.626 m), weight 152 lb (68.9 kg), SpO2 92%. Body mass index is 26.09 kg/m².    Gen: awake, alert patient, NAD  HEENT: EOMI, the sclera appears anicteric, oropharynx clear, mucus membranes appear moist  CV: RRR  Lung: no conversational dyspnea   Abdomen: soft NT, mildly distended abdomen with hyperactive BS appreciated, mild tympany to percussion   Back: No CVA tenderness   Skin: dry, warm, no jaundice  Ext: no LE edema is evident  Neuro: Alert, oriented x3 and interactive  Psych: calm, cooperative    Laboratory Data:  Lab Results   Component Value Date    WBC 10.0 11/06/2024    HGB 12.5 11/06/2024    HCT 37.3 11/06/2024    .0 11/06/2024    CREATSERUM 0.92 11/06/2024    BUN 9 11/06/2024     11/06/2024    K 4.0 11/06/2024     11/06/2024    CO2 28.0 11/06/2024     11/06/2024    CA 8.9 11/06/2024    ALB 3.6 11/05/2024    ALKPHO 84 11/05/2024    BILT 0.3 11/05/2024    TP 7.0 11/05/2024    AST 25 11/05/2024    ALT 9 11/05/2024    LIP 21 11/05/2024       Imaging:  No results found.    Assessment & Plan   Christy Lowry is a 89 year old female w/ PMHx of BMI 26.09, actinic keratosis, OA, Osteoporosis, GERD, Hypothyroidism, Anxiety who presented to the ED with  persistent ABD pain and loose stools.  Pt initially presented to ED 10/31 with similar symptoms with CT A/P at that time demonstrating severe infectious inflammatory enterocolitis but declined admission at that time.  She was discharged home on Augmentin.    #Altered bowel habits  #N/V  -Labs on admission with slight leukocytosis and elevated ANC, otherwise unremarkable  -CT A/P on 10/31/2024 demonstrated no evidence of bowel obstruction, HH, fluid filled loops of small bowel but nondilated compatible with moderate-severe infectious/inflammatory enterocolitis, fluid throughout the colon to the level of the rectum with long segment proximal colonic wall thickening and mild proximal pericolonic inflammation  -Last colonoscopy in the remote past  -Small hard stools overnight  -Etiology likely infectious source given acute onset and improvement.  Possible component of constipation now given imodium and dicyclomine use.    -Symptoms improved following bowel movement but Pt continues to feel constipated.  Awaiting suppository effects to activate.  Ok from GI stance to trial liquids and ADAT.  If symptoms including diarrhea recur, would send stool studies.    Recommend:  -Clear liquid diet, ADAT  -GI stool and C. Diff PCR if symptoms recur  -Bowel regimen as needed    Thank you for the opportunity to participate in the care of this patient.    Case discussed with Tosha Valle MD and Alisia ANGULO.    Rosemarie Han DNP, FNP-[facility]  11/6/2024          [1] No Known Allergies

## 2024-11-06 NOTE — PLAN OF CARE
Patient having nausea this am, no vomiting. Patient asking for suppository, suppository given. Patient had loose BM this afternoon and again in the evening, specimen sent for C-diff and GI panel. GI following and started patient on a clear liquid diet. Advance as tolerated, advanced to full liquid diet and patient felt nauseated after full liquids. PT worked with patient. Ambulating up with walker with assist. Family and caregiver at bedside and involved in care.    Problem: Patient Centered Care  Goal: Patient preferences are identified and integrated in the patient's plan of care  Description: Interventions:  - What would you like us to know as we care for you? Has a caregiver at home. Normally has several loose BM's daily.   - Provide timely, complete, and accurate information to patient/family  - Incorporate patient and family knowledge, values, beliefs, and cultural backgrounds into the planning and delivery of care  - Encourage patient/family to participate in care and decision-making at the level they choose  - Honor patient and family perspectives and choices  11/6/2024 1501 by Alisia Dasilva RN  Outcome: Progressing  11/6/2024 1459 by Alisia Dasilva RN  Outcome: Progressing     Problem: Patient/Family Goals  Goal: Patient/Family Long Term Goal  Description: Patient's Long Term Goal: Tolerate diet    Interventions:  - Npo with sip of liquid, antibiotic, GI consult  - See additional Care Plan goals for specific interventions  11/6/2024 1501 by Alisia Dasilva RN  Outcome: Progressing  11/6/2024 1459 by Alisia Dasilva RN  Outcome: Progressing  Goal: Patient/Family Short Term Goal  Description: Patient's Short Term Goal:     Interventions:   -   - See additional Care Plan goals for specific interventions  11/6/2024 1501 by Alisia Dasilva RN  Outcome: Progressing  11/6/2024 1459 by Alisia Dasilva RN  Outcome: Progressing     Problem: PAIN - ADULT  Goal: Verbalizes/displays adequate  comfort level or patient's stated pain goal  Description: INTERVENTIONS:  - Encourage pt to monitor pain and request assistance  - Assess pain using appropriate pain scale  - Administer analgesics based on type and severity of pain and evaluate response  - Implement non-pharmacological measures as appropriate and evaluate response  - Consider cultural and social influences on pain and pain management  - Manage/alleviate anxiety  - Utilize distraction and/or relaxation techniques  - Monitor for opioid side effects  - Notify MD/LIP if interventions unsuccessful or patient reports new pain  - Anticipate increased pain with activity and pre-medicate as appropriate  11/6/2024 1501 by Alisia Dasilva RN  Outcome: Progressing  11/6/2024 1459 by Alisia Dasilva RN  Outcome: Progressing     Problem: RISK FOR INFECTION - ADULT  Goal: Absence of fever/infection during anticipated neutropenic period  Description: INTERVENTIONS  - Monitor WBC  - Administer growth factors as ordered  - Implement neutropenic guidelines  11/6/2024 1501 by Alisia Dasilva RN  Outcome: Progressing  11/6/2024 1459 by Alisia Dasilva RN  Outcome: Progressing     Problem: SAFETY ADULT - FALL  Goal: Free from fall injury  Description: INTERVENTIONS:  - Assess pt frequently for physical needs  - Identify cognitive and physical deficits and behaviors that affect risk of falls.  - [place] fall precautions as indicated by assessment.  - Educate pt/family on patient safety including physical limitations  - Instruct pt to call for assistance with activity based on assessment  - Modify environment to reduce risk of injury  - Provide assistive devices as appropriate  - Consider OT/PT consult to assist with strengthening/mobility  - Encourage toileting schedule  11/6/2024 1501 by Alisia Dasilva RN  Outcome: Progressing  11/6/2024 1459 by Alisia Dasilva RN  Outcome: Progressing     Problem: DISCHARGE PLANNING  Goal: Discharge to home or  other facility with appropriate resources  Description: INTERVENTIONS:  - Identify barriers to discharge w/pt and caregiver  - Include patient/family/discharge partner in discharge planning  - Arrange for needed discharge resources and transportation as appropriate  - Identify discharge learning needs (meds, wound care, etc)  - Arrange for interpreters to assist at discharge as needed  - Consider post-discharge preferences of patient/family/discharge partner  - Complete POLST form as appropriate  - Assess patient's ability to be responsible for managing their own health  - Refer to Case Management Department for coordinating discharge planning if the patient needs post-hospital services based on physician/LIP order or complex needs related to functional status, cognitive ability or social support system  11/6/2024 1501 by Alisia Dasilva, RN  Outcome: Progressing  11/6/2024 1459 by Alisia Dasilva, RN  Outcome: Progressing     Problem: GASTROINTESTINAL - ADULT  Goal: Minimal or absence of nausea and vomiting  Description: INTERVENTIONS:  - Maintain adequate hydration with IV or PO as ordered and tolerated  - Nasogastric tube to low intermittent suction as ordered  - Evaluate effectiveness of ordered antiemetic medications  - Provide nonpharmacologic comfort measures as appropriate  - Advance diet as tolerated, if ordered  - Obtain nutritional consult as needed  - Evaluate fluid balance  Outcome: Progressing  Goal: Maintains or returns to baseline bowel function  Description: INTERVENTIONS:  - Assess bowel function  - Maintain adequate hydration with IV or PO as ordered and tolerated  - Evaluate effectiveness of GI medications  - Encourage mobilization and activity  - Obtain nutritional consult as needed  - Establish a toileting routine/schedule  - Consider collaborating with pharmacy to review patient's medication profile  Outcome: Progressing  Goal: Maintains adequate nutritional intake  (undernourished)  Description: INTERVENTIONS:  - Monitor percentage of each meal consumed  - Identify factors contributing to decreased intake, treat as appropriate  - Assist with meals as needed  - Monitor I&O, WT and lab values  - Obtain nutritional consult as needed  - Optimize oral hygiene and moisture  - Encourage food from home; allow for food preferences  - Enhance eating environment  Outcome: Progressing

## 2024-11-06 NOTE — PLAN OF CARE
Patient alert x4, can be forgetful. Pain managed with PRN morphine. Nausea managed with PRN zofran and reglan. X1 episode of emesis. Currently NPO. IVF and IV abx continued. Safety precautions in place. Call light within reach.    Problem: Patient Centered Care  Goal: Patient preferences are identified and integrated in the patient's plan of care  Description: Interventions:  - What would you like us to know as we care for you?  - Provide timely, complete, and accurate information to patient/family  - Incorporate patient and family knowledge, values, beliefs, and cultural backgrounds into the planning and delivery of care  - Encourage patient/family to participate in care and decision-making at the level they choose  - Honor patient and family perspectives and choices  Outcome: Progressing

## 2024-11-06 NOTE — H&P (VIEW-ONLY)
Is this a shared or split note between Advanced Practice Provider and Physician? Yes      [facility]   Gastroenterology Consultation Note    Christy Lowry  Patient Status:    Inpatient  Date of Admission:         11/5/2024, Hospital day #1  Attending:   Gillian Connelly MD  PCP:     Patrick Stafford MD    Reason for Consultation:  ABD pain, loose stools    History of Present Illness:  Christy Lowry is a 89 year old female w/ PMHx of BMI 26.09, actinic keratosis, OA, Osteoporosis, GERD, Hypothyroidism, Anxiety who presented to the ED with persistent ABD pain and loose stools.  Pt initially presented to ED 10/31 with similar symptoms with CT A/P at that time demonstrating severe infectious inflammatory enterocolitis but declined admission at that time.  She was discharged home on Augmentin.    Pt's daughter at bedside and assisted with HPI.    Pt completed augmentin course and dicyclomine prescribed with improvement in vomiting and diarrhea but Pt continued to feel unwell with diffuse ABD pain, nausea and poor appetite.  She had 3 hard brown bowel movements overnight with improvement in symptoms and currently is feeling well.  She typically takes imodium daily for chronic loose stools and as needed dicyclomine.  She stopped imodium since last Thursday but daughter reports that she took dicyclomine three times daily until medication ran out.  Dicyclomine prescribed as 20mg TID as needed with 13 tabs total, noted in chart review.    Her initial symptoms came on suddenly last Thursday.  No dyspepsia, black/bloody stools, fever or chills.  She now feels constipated and requested a suppository, which the nurse administered without subsequent stool yet.      Pertinent Family Hx:  - No known history of esophageal, gastric cancers.  Mother + colon cancer, Sister + colon CA.   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     SCC (squamous cell carcinoma)    right temple    SCC (squamous cell carcinoma)    right neck    Skin cancer     Past Surgical History:   Procedure Laterality Date    Tonsillectomy       Family History   Problem Relation Age of Onset    Colon Cancer Mother     Colon Cancer Sister       reports that she has quit smoking. She has quit using smokeless tobacco. She reports that she does not currently use alcohol. She reports that she does not use drugs.    Allergies:  Allergies[1]    Medications:    Current Facility-Administered Medications:     bisacodyl (Dulcolax) 10 MG rectal suppository 10 mg, 10 mg, Rectal, Daily PRN    piperacillin-tazobactam (Zosyn) 3.375 g in dextrose 5% 100 mL IVPB-ADDV, 3.375 g, Intravenous, Q8H    potassium chloride 20 mEq in dextrose 5%-sodium chloride 0.45% 1000mL infusion premix, , Intravenous, Continuous    heparin (Porcine) 5000 UNIT/ML injection 5,000 Units, 5,000 Units, Subcutaneous, 2 times per day    morphINE PF 2 MG/ML injection 1 mg, 1 mg, Intravenous, Q2H PRN **OR** morphINE PF 2 MG/ML injection 2 mg, 2 mg, Intravenous, Q2H PRN **OR** morphINE PF 4 MG/ML injection 4 mg, 4 mg, Intravenous, Q2H PRN    ondansetron (Zofran) 4 MG/2ML injection 4 mg, 4 mg, Intravenous, Q6H PRN    metoclopramide (Reglan) 5 mg/mL injection 5 mg, 5 mg, Intravenous, Q8H PRN    Review of Systems:   CONSTITUTIONAL:  negative for fevers, chills, unintentional weight loss    EYES:  negative for diplopia or change in vision   RESPIRATORY:  negative for severe shortness of breath  CARDIOVASCULAR:  negative for crushing sub-sternal chest pain  GASTROINTESTINAL:  see HPI  GENITOURINARY:  negative for dysuria or gross hematuria  INTEGUMENT/BREAST:  SKIN:  negative for jaundice or new rash   ALLERGIC/IMMUNOLOGIC:  negative for hay fever   ENDOCRINE:  negative for cold intolerance and heat intolerance  MUSCULOSKELETAL:  negative for joint effusion/severe erythema  NEURO: negative for new loss of consciousness or dizziness   BEHAVIOR/PSYCH:  negative for psychotic behavior    Physical Exam:    Blood pressure 108/59, pulse 73, temperature 98.1 °F (36.7 °C), temperature source Oral, resp. rate 22, height 5' 4\" (1.626 m), weight 152 lb (68.9 kg), SpO2 92%. Body mass index is 26.09 kg/m².    Gen: awake, alert patient, NAD  HEENT: EOMI, the sclera appears anicteric, oropharynx clear, mucus membranes appear moist  CV: RRR  Lung: no conversational dyspnea   Abdomen: soft NT, mildly distended abdomen with hyperactive BS appreciated, mild tympany to percussion   Back: No CVA tenderness   Skin: dry, warm, no jaundice  Ext: no LE edema is evident  Neuro: Alert, oriented x3 and interactive  Psych: calm, cooperative    Laboratory Data:  Lab Results   Component Value Date    WBC 10.0 11/06/2024    HGB 12.5 11/06/2024    HCT 37.3 11/06/2024    .0 11/06/2024    CREATSERUM 0.92 11/06/2024    BUN 9 11/06/2024     11/06/2024    K 4.0 11/06/2024     11/06/2024    CO2 28.0 11/06/2024     11/06/2024    CA 8.9 11/06/2024    ALB 3.6 11/05/2024    ALKPHO 84 11/05/2024    BILT 0.3 11/05/2024    TP 7.0 11/05/2024    AST 25 11/05/2024    ALT 9 11/05/2024    LIP 21 11/05/2024       Imaging:  No results found.    Assessment & Plan   Christy Lowry is a 89 year old female w/ PMHx of BMI 26.09, actinic keratosis, OA, Osteoporosis, GERD, Hypothyroidism, Anxiety who presented to the ED with  persistent ABD pain and loose stools.  Pt initially presented to ED 10/31 with similar symptoms with CT A/P at that time demonstrating severe infectious inflammatory enterocolitis but declined admission at that time.  She was discharged home on Augmentin.    #Altered bowel habits  #N/V  -Labs on admission with slight leukocytosis and elevated ANC, otherwise unremarkable  -CT A/P on 10/31/2024 demonstrated no evidence of bowel obstruction, HH, fluid filled loops of small bowel but nondilated compatible with moderate-severe infectious/inflammatory enterocolitis, fluid throughout the colon to the level of the rectum with long segment proximal colonic wall thickening and mild proximal pericolonic inflammation  -Last colonoscopy in the remote past  -Small hard stools overnight  -Etiology likely infectious source given acute onset and improvement.  Possible component of constipation now given imodium and dicyclomine use.    -Symptoms improved following bowel movement but Pt continues to feel constipated.  Awaiting suppository effects to activate.  Ok from GI stance to trial liquids and ADAT.  If symptoms including diarrhea recur, would send stool studies.    Recommend:  -Clear liquid diet, ADAT  -GI stool and C. Diff PCR if symptoms recur  -Bowel regimen as needed    Thank you for the opportunity to participate in the care of this patient.    Case discussed with Tosha Valle MD and Alisia ANGULO.    Rosemarie Han DNP, FNP-[facility]  11/6/2024          [1] No Known Allergies

## 2024-11-06 NOTE — ED QUICK NOTES
Orders for admission, patient is aware of plan and ready to go upstairs. Any questions, please call ED RN Negrita at extension [phone].     Patient Covid vaccination status: Fully vaccinated     COVID Test Ordered in ED: None    COVID Suspicion at Admission: N/A    Running Infusions:  None    Mental Status/LOC at time of transport: a/o x 4    Other pertinent information:   CIWA score: N/A   NIH score:  N/A

## 2024-11-06 NOTE — ED PROVIDER NOTES
Patient Seen in: [facility] Emergency Department    History     Chief Complaint   Patient presents with    Nausea/Vomiting/Diarrhea       HPI    The patient presents to the ED complaining of diffuse abdominal pain and ongoing nausea and vomiting.  Patient was seen several days ago for the same symptoms and had laboratory testing and a CT at that time that showed enterocolitis.  Son states that since going home she has taken her medications that were prescribed but states symptoms have gotten worse.  Patient now with worsened nausea and ongoing vomiting.  No fevers or chills, no other complaints.    History reviewed.   Past Medical History:    Actinic keratosis    hypertrophic AK - left central forehead    Benzodiazepine withdrawal without complication (HCC)    Chronic GERD    Constipation    Diarrhea    Eustachian tube dysfunction    Heart palpitations    Loose stools    Occult blood in stools    SCC (squamous cell carcinoma)    left forehead, invasive, well-diff.     SCC (squamous cell carcinoma)    right temple    SCC (squamous cell carcinoma)    right neck    Skin cancer       History reviewed.   Past Surgical History:   Procedure Laterality Date    Tonsillectomy           Medications :  Prescriptions Prior to Admission[1]     Family History   Problem Relation Age of Onset    Colon Cancer Mother     Colon Cancer Sister        Smoking Status:   Social History     Socioeconomic History    Marital status:    Tobacco Use    Smoking status: Former    Smokeless tobacco: Former   Vaping Use    Vaping status: Never Used   Substance and Sexual Activity    Alcohol use: Not Currently     Alcohol/week: 0.0 standard drinks of alcohol     Comment: none recently    Drug use: No   Other Topics Concern    Reaction to local anesthetic No       Constitutional and vital signs reviewed.      Social History and Family History elements reviewed from today, pertinent positives to the presenting problem noted.    Physical  Exam     ED Triage Vitals [11/05/24 1617]   BP (!) 143/93   Pulse 101   Resp 20   Temp 98.4 °F (36.9 °C)   Temp src Temporal   SpO2 93 %   O2 Device None (Room air)       All measures to prevent infection transmission during my interaction with the patient were taken. Handwashing was performed prior to and after the exam.  Stethoscope and any equipment used during my examination was cleaned with super sani-cloth germicidal wipes following the exam.     Physical Exam  Vitals and nursing note reviewed.   Constitutional:       General: She is not in acute distress.     Appearance: She is well-developed. She is not ill-appearing or toxic-appearing.   HENT:      Head: Normocephalic and atraumatic.   Eyes:      General:         Right eye: No discharge.         Left eye: No discharge.      Conjunctiva/sclera: Conjunctivae normal.   Neck:      Trachea: No tracheal deviation.   Cardiovascular:      Rate and Rhythm: Normal rate and regular rhythm.   Pulmonary:      Effort: Pulmonary effort is normal. No respiratory distress.      Breath sounds: No stridor.   Abdominal:      General: There is no distension.      Palpations: Abdomen is soft.      Tenderness: There is no abdominal tenderness.      Hernia: No hernia is present.   Musculoskeletal:         General: No deformity.   Skin:     General: Skin is warm and dry.   Neurological:      Mental Status: She is alert and oriented to person, place, and time.   Psychiatric:         Mood and Affect: Mood normal.         Behavior: Behavior normal.         ED Course        Labs Reviewed   BASIC METABOLIC PANEL (8) - Abnormal; Notable for the following components:       Result Value    Glucose 118 (*)     Sodium 135 (*)     eGFR-Cr 58 (*)     All other components within normal limits   HEPATIC FUNCTION PANEL (7) - Abnormal; Notable for the following components:    ALT 9 (*)     All other components within normal limits   CBC WITH DIFFERENTIAL WITH PLATELET - Abnormal; Notable for the  following components:    WBC 12.1 (*)     RDW-SD 49.1 (*)     Neutrophil Absolute Prelim 8.57 (*)     Neutrophil Absolute 8.57 (*)     Monocyte Absolute 1.03 (*)     All other components within normal limits   LIPASE - Normal   BASIC METABOLIC PANEL (8)   CBC WITH DIFFERENTIAL WITH PLATELET   C. DIFFICILE(TOXIGENIC)PCR   GI STOOL PANEL BY PCR       As Interpreted by me    Imaging Results Available and Reviewed while in ED: No results found.  ED Medications Administered:   Medications   piperacillin-tazobactam (Zosyn) 3.375 g in dextrose 5% 100 mL IVPB-ADDV (3.375 g Intravenous New Bag 11/5/24 2047)   potassium chloride 20 mEq in dextrose 5%-sodium chloride 0.45% 1000mL infusion premix ( Intravenous New Bag 11/5/24 2047)   heparin (Porcine) 5000 UNIT/ML injection 5,000 Units (5,000 Units Subcutaneous Given 11/5/24 2048)   morphINE PF 2 MG/ML injection 1 mg ( Intravenous See Alternative 11/5/24 2047)     Or   morphINE PF 2 MG/ML injection 2 mg (2 mg Intravenous Given 11/5/24 2047)     Or   morphINE PF 4 MG/ML injection 4 mg ( Intravenous See Alternative 11/5/24 2047)   ondansetron (Zofran) 4 MG/2ML injection 4 mg (has no administration in time range)   metoclopramide (Reglan) 5 mg/mL injection 5 mg (5 mg Intravenous Given 11/5/24 2048)   sodium chloride 0.9 % IV bolus 1,000 mL (0 mL Intravenous Stopped 11/5/24 1916)   ondansetron (Zofran) 4 MG/2ML injection 4 mg (4 mg Intravenous Given 11/5/24 1755)   ketorolac (Toradol) 15 MG/ML injection 15 mg (15 mg Intravenous Given 11/5/24 1911)   prochlorperazine (Compazine) 10 MG/2ML injection 5 mg (5 mg Intravenous Given 11/5/24 1911)         MDM     Vitals:    11/05/24 1845 11/05/24 1930 11/05/24 2034 11/05/24 2111   BP: 140/71 116/68 125/63    BP Location:   Right arm    Pulse: 89 95 90    Resp: 16 24 22    Temp:   97.7 °F (36.5 °C)    TempSrc:   Oral    SpO2: 91% 90% 93%    Weight:    68.9 kg   Height:         *I personally reviewed and interpreted all ED vitals.    Pulse  Ox: 93%, Room air, Normal     Monitor Interpretation:   normal sinus rhythm with sinus arrhythmia as interpreted by me.  The cardiac monitor was ordered to monitor heart rate.    Differential Diagnosis/ Diagnostic Considerations: Viral enterocolitis, dehydration, BRODERICK, intra-abdominal infection, other    Complicating Factors: The patient already has does not have any pertinent problems on file. to contribute to the complexity of this ED evaluation.    Medical Decision Making  The patient returns to the ED with ongoing nausea and vomiting.  She states intermittent pain in her abdomen as well.  Abdomen benign on exam and laboratory testing improved from her prior visit.  Decreasing WBC and stable lab testing otherwise.  Patient's nausea minimally improved in the ED with Zofran and Compazine.  She feels unable to go home given her ongoing symptoms and request to be admitted.  I discussed with Dr. Dangelo for admission and with Dr. Muniz for GI consultation    Problems Addressed:  Nausea and vomiting in adult: acute illness or injury  Persistent vomiting in adult patient: acute illness or injury    Amount and/or Complexity of Data Reviewed  Labs: ordered. Decision-making details documented in ED Course.  Discussion of management or test interpretation with external provider(s): I discussed with Dr. Dangelo for admission and with Dr. Muniz for GI consultation          Condition upon leaving the department: Stable    Disposition and Plan     Clinical Impression:  1. Nausea and vomiting in adult    2. Persistent vomiting in adult patient        Disposition:  Admit    Follow-up:  No follow-up provider specified.    Medications Prescribed:  Current Discharge Medication List          Hospital Problems       Present on Admission  Date Reviewed: 3/15/2024            ICD-10-CM Noted POA    * (Principal) Nausea and vomiting in adult R11.2 11/5/2024 Unknown    Enteritis K52.9 11/5/2024 Unknown    Persistent vomiting in adult  patient R11.15 11/5/2024 Unknown                       [1]   Medications Prior to Admission   Medication Sig Dispense Refill Last Dose/Taking    ondansetron (ZOFRAN) 4 mg tablet Take 1 tablet (4 mg total) by mouth every 8 (eight) hours as needed for Nausea. 30 tablet 0 11/5/2024 at  9:00 AM    amoxicillin clavulanate 875-125 MG Oral Tab Take 1 tablet by mouth 2 (two) times daily for 7 days. 14 tablet 0 11/5/2024 at  9:00 AM    dicyclomine 20 MG Oral Tab Take 1 tablet (20 mg total) by mouth 3 (three) times daily as needed. 13 tablet 0 11/4/2024    escitalopram 10 MG Oral Tab Take 0.5 tablets (5 mg total) by mouth daily. 45 tablet 3 11/5/2024 at  9:00 AM    levothyroxine 50 MCG Oral Tab Take 1 tablet (50 mcg total) by mouth before breakfast. 90 tablet 3 11/5/2024 at  9:00 AM    dicyclomine 10 MG Oral Cap Take 1 capsule (10 mg total) by mouth every 12 (twelve) hours as needed. 180 capsule 3 11/4/2024    Loperamide HCl 1 MG/7.5ML Oral Liquid Take 15 mL (2 mg total) by mouth 3 (three) times daily as needed for Diarrhea.   11/4/2024 at  8:00 PM    ondansetron 8 MG Oral Tablet Dispersible Take 1 tablet (8 mg total) by mouth every 4 (four) hours as needed for Nausea. 10 tablet 0 Unknown    potassium chloride (KLOR-CON) 20 MEQ Oral Powd Pack Take 20 mEq by mouth daily. 90 each 0 Unknown    cephalexin 500 MG Oral Cap Take 1 capsule (500 mg total) by mouth 2 (two) times daily. (Patient not taking: Reported on 10/31/2024) 14 capsule 0 Unknown

## 2024-11-06 NOTE — PHYSICAL THERAPY NOTE
PHYSICAL THERAPY EVALUATION - INPATIENT     Room Number: 457/457-A  Evaluation Date: 11/6/2024  Type of Evaluation: Initial   Physician Order: PT Eval and Treat    Presenting Problem: nausea and vomiting     Reason for Therapy: Mobility Dysfunction and Discharge Planning    PHYSICAL THERAPY ASSESSMENT   Patient is a 89 year old female admitted 11/5/2024 for nausea and vomiting.  Prior to admission, patient's baseline is assist for all ADLs and functional mobility from 24 hour caregiver, ambulates with RW.  Patient is currently functioning near baseline with bed mobility, transfers, gait, standing prolonged periods, and performing household tasks.  Patient is requiring minimal assist as a result of the following impairments: decreased functional strength, impaired dynamic standing balance, decreased muscular endurance, and medical status.  Physical Therapy will continue to follow for duration of hospitalization.    Patient will benefit from continued skilled PT Services at discharge to promote prior level of function and safety with additional support and return home with home health PT.    PLAN DURING HOSPITALIZATION  Nursing Mobility Recommendation : 1 Assist  PT Device Recommendation: Rolling walker  PT Treatment Plan: Bed mobility;Body mechanics;Endurance;Energy conservation;Patient education;Family education;Gait training;Strengthening;Transfer training;Balance training  Rehab Potential : Good  Frequency (Obs): 3-5x/week     PHYSICAL THERAPY MEDICAL/SOCIAL HISTORY     Problem List  Principal Problem:    Nausea and vomiting in adult  Active Problems:    Persistent vomiting in adult patient    Enteritis    HOME SITUATION  Type of Home: House  Home Layout: One level  Stairs to Enter : 0   Lives With: Caregiver 24 hours    Drives: No   Patient Regularly Uses: Rolling walker;Wheelchair     Prior Level of [place]: assist for ADLs, ambulates with RW     SUBJECTIVE  \"I think I need to have a BM\"    PHYSICAL  THERAPY EXAMINATION   OBJECTIVE  Precautions: Bed/chair alarm;Hard of hearing  Fall Risk: High fall risk    WEIGHT BEARING RESTRICTION  none    PAIN ASSESSMENT  Ratin    COGNITION  Overall Cognitive Status:  WFL - within functional limits    RANGE OF MOTION AND STRENGTH ASSESSMENT  Upper extremity ROM and strength are within functional limits.  Lower extremity ROM is within functional limits.  Lower extremity strength is within functional limits.    BALANCE  Static Sitting: Good  Dynamic Sitting: Fair +  Static Standing: Fair  Dynamic Standing: Fair -    AM-PAC '6-Clicks' INPATIENT SHORT FORM - BASIC MOBILITY  How much difficulty does the patient currently have...  Patient Difficulty: Turning over in bed (including adjusting bedclothes, sheets and blankets)?: A Little   Patient Difficulty: Sitting down on and standing up from a chair with arms (e.g., wheelchair, bedside commode, etc.): A Little   Patient Difficulty: Moving from lying on back to sitting on the side of the bed?: A Little   How much help from another person does the patient currently need...   Help from Another: Moving to and from a bed to a chair (including a wheelchair)?: A Little   Help from Another: Need to walk in hospital room?: A Little   Help from Another: Climbing 3-5 steps with a railing?: A Lot     AM-PAC Score:  Raw Score: 17   Approx Degree of Impairment: 50.57%   Standardized Score (AM-PAC Scale): 42.13   CMS Modifier (G-Code): CK    FUNCTIONAL ABILITY STATUS  Functional Mobility/Gait Assessment  Gait Assistance: Contact guard assist  Distance (ft): 15 x 2  Assistive Device: Rolling walker  Pattern: Shuffle (slow pace, forward flexed posture)  Supine to Sit: minimal assist  Sit to Supine: minimal assist  Sit to Stand: contact guard assist    Exercise/Education Provided:  Education Provided To: Patient;Family/Caregiver     Patient Education: Role of Physical Therapy;Plan of Care;Discharge Recommendations;DME Recommendations;Functional  Transfer Techniques;Posture/Positioning;Energy Conservation;Proper Body Mechanics;Gait Training     Patient's Response to Education: Verbalized Understanding;Returned Demonstration;Requires Further Education     Skilled Therapy Provided: Pt received resting in bed with dtr at bedside. Introduced self and role. Pt agreeable to activity. No c/o pain and reported improvement in symptoms. Requested to go to bathroom. Demos bed mobility with min A; STS transfer to/from bed and toilet with RW and CGA; ambulated to/from bathroom with RW and CGA, slow and shuffled but overall steady gait. Assisted with ronnie cares in bathroom and demos good static and dynamic standing balance for ronnie cares and don/doff brief in standing. Returned to bed at end of session, left resting in bed with alarm on, needs within reach, handoff to RN complete.     The patient's Approx Degree of Impairment: 50.57% has been calculated based on documentation in the [facility] '6 clicks' Inpatient Basic Mobility Short Form.  Research supports that patients with this level of impairment may benefit from rehab facility however anticipate pt is close to baseline and on track to DC Home with HH PT and continued 24 hour caregiver services.  Final disposition will be made by interdisciplinary medical team.    Patient End of Session: In bed;Needs met;Call light within reach;RN aware of session/findings;All patient questions and concerns addressed;Hospital anti-slip socks;Alarm set;Family present    CURRENT GOALS  Goals to be met by: 11/13/24  Patient Goal Patient's self-stated goal is: go home   Goal #1 Patient is able to demonstrate supine - sit EOB @ level: supervision     Goal #1   Current Status    Goal #2 Patient is able to demonstrate transfers Sit to/from Stand at assistance level: supervision with walker - rolling     Goal #2  Current Status    Goal #3 Patient is able to ambulate 75 feet with assist device: walker - rolling at assistance level: supervision    Goal #3   Current Status    Goal #4    Goal #4   Current Status    Goal #5 Patient to demonstrate independence with home activity/exercise instructions provided to patient in preparation for discharge.   Goal #5   Current Status    Goal #6    Goal #6  Current Status      Patient Evaluation Complexity Level:  History Moderate - 1 or 2 personal factors and/or co-morbidities   Examination of body systems Moderate - addressing a total of 3 or more elements   Clinical Presentation  Moderate - Evolving   Clinical Decision Making  Moderate Complexity     Therapeutic Activity:  23 minutes

## 2024-11-07 ENCOUNTER — APPOINTMENT (OUTPATIENT)
Dept: PICC SERVICES | Facility: HOSPITAL | Age: 89
End: 2024-11-07
Attending: HOSPITALIST
Payer: MEDICARE

## 2024-11-07 ENCOUNTER — APPOINTMENT (OUTPATIENT)
Dept: CT IMAGING | Facility: HOSPITAL | Age: 89
End: 2024-11-07
Attending: HOSPITALIST
Payer: MEDICARE

## 2024-11-07 LAB
ANION GAP SERPL CALC-SCNC: 5 MMOL/L (ref 0–18)
BUN BLD-MCNC: 6 MG/DL (ref 9–23)
BUN/CREAT SERPL: 7.1 (ref 10–20)
CALCIUM BLD-MCNC: 8.8 MG/DL (ref 8.7–10.4)
CHLORIDE SERPL-SCNC: 104 MMOL/L (ref 98–112)
CO2 SERPL-SCNC: 27 MMOL/L (ref 21–32)
CREAT BLD-MCNC: 0.85 MG/DL
DEPRECATED RDW RBC AUTO: 49.3 FL (ref 35.1–46.3)
EGFRCR SERPLBLD CKD-EPI 2021: 61 ML/MIN/1.73M2 (ref 60–?)
ERYTHROCYTE [DISTWIDTH] IN BLOOD BY AUTOMATED COUNT: 14.9 % (ref 11–15)
GLUCOSE BLD-MCNC: 117 MG/DL (ref 70–99)
HCT VFR BLD AUTO: 36.9 %
HGB BLD-MCNC: 12 G/DL
MCH RBC QN AUTO: 29.4 PG (ref 26–34)
MCHC RBC AUTO-ENTMCNC: 32.5 G/DL (ref 31–37)
MCV RBC AUTO: 90.4 FL
OSMOLALITY SERPL CALC.SUM OF ELEC: 281 MOSM/KG (ref 275–295)
PLATELET # BLD AUTO: 311 10(3)UL (ref 150–450)
POTASSIUM SERPL-SCNC: 4 MMOL/L (ref 3.5–5.1)
RBC # BLD AUTO: 4.08 X10(6)UL
SODIUM SERPL-SCNC: 136 MMOL/L (ref 136–145)
WBC # BLD AUTO: 6.4 X10(3) UL (ref 4–11)

## 2024-11-07 PROCEDURE — 99233 SBSQ HOSP IP/OBS HIGH 50: CPT | Performed by: HOSPITALIST

## 2024-11-07 PROCEDURE — 74177 CT ABD & PELVIS W/CONTRAST: CPT | Performed by: HOSPITALIST

## 2024-11-07 PROCEDURE — 71260 CT THORAX DX C+: CPT | Performed by: HOSPITALIST

## 2024-11-07 PROCEDURE — 99232 SBSQ HOSP IP/OBS MODERATE 35: CPT | Performed by: PHYSICIAN ASSISTANT

## 2024-11-07 RX ORDER — DEXTROSE MONOHYDRATE, SODIUM CHLORIDE, AND POTASSIUM CHLORIDE 50; 1.49; 9 G/1000ML; G/1000ML; G/1000ML
INJECTION, SOLUTION INTRAVENOUS CONTINUOUS
Status: DISCONTINUED | OUTPATIENT
Start: 2024-11-07 | End: 2024-11-08

## 2024-11-07 NOTE — PROGRESS NOTES
[facility]  part of [facility]    Progress Note    Christy Lowry Patient Status:  Inpatient    1924 MRN [id number]   Location [facility] 4W/SW/SE Attending Gillian Connelly MD   Hosp Day # 2 PCP Patrick Stafford MD       Subjective:   Christy Lowry feels well. Afebrile.Tolerating full liquid diet. Afebrile.     Objective:   Blood pressure 132/67, pulse 81, temperature 98 °F (36.7 °C), temperature source Oral, resp. rate 18, height 5' 4\" (1.626 m), weight 152 lb (68.9 kg), SpO2 94%.    Physical Exam:    General: No acute distress.   Respiratory: Clear to auscultation bilaterally. No wheezes. No rhonchi.  Cardiovascular: S1, S2. Regular rate and rhythm. No murmurs, rubs or gallops.   Abdomen: no abdominal pain, soft nontender.   Neurologic: No focal neurological deficits.   Musculoskeletal: Moves all extremities.  Extremities: No edema.    Results:     Lab Results   Component Value Date    WBC 6.4 2024    HGB 12.0 2024    HCT 36.9 2024    .0 2024    CREATSERUM 0.85 2024    BUN 6 (L) 2024     2024    K 4.0 2024     2024    CO2 27.0 2024     (H) 2024    CA 8.8 2024    ALB 3.6 2024    ALKPHO 84 2024    BILT 0.3 2024    TP 7.0 2024    AST 25 2024    ALT 9 (L) 2024    T4F 1.3 2023    TSH 5.400 (H) 2023    LIP 21 2024    MG 2.2 2023       No results found.       pantoprazole  40 mg Intravenous Q24H    piperacillin-tazobactam  3.375 g Intravenous Q8H    heparin  5,000 Units Subcutaneous 2 times per day       bisacodyl    morphINE **OR** morphINE **OR** morphINE    ondansetron    metoclopramide      Assessment and Plan:      Acute enterocolitis  Acute intractable nausea and vomiting  - IV antibiotics  - advance diet as tolerated  - soft diet for dinner   - IV zofran for nausea  - pain control  - PT/OT eval       Quality:  DVT  Prophylaxis: Heparin  CODE status: Full    MDM. High LASHAE HUMMEL MD  11/06/24

## 2024-11-07 NOTE — PROGRESS NOTES
[facility]     Gastroenterology Progress Note    Christy Lowry Patient Status:  Inpatient    1924 MRN [id number]   Location [facility] 4W/SW/SE Attending Gillian Connelly MD   Hosp Day # 2 PCP Patrick Stafford MD       Subjective:   Patient had diarrhea overnight   States she got limited sleep    Does feel better today, mild epigastric pain  No nausea or vomiting   Wants to eat more food    Objective:   Blood pressure 152/66, pulse 80, temperature 98.4 °F (36.9 °C), temperature source Oral, resp. rate 18, height 5' 4\" (1.626 m), weight 152 lb (68.9 kg), SpO2 92%. Body mass index is 26.09 kg/m².    Gen: awake, alert patient, NAD  HEENT: EOMI, the sclera appears anicteric, oropharynx clear, mucus membranes appear moist  CV: RRR  Lung: no conversational dyspnea   Abdomen: mild distention, soft Nt abdomen with NABS appreciated   Skin: dry, warm, no jaundice  Ext: no LE edema is evident  Neuro: Alert and interactive  Psych: calm, cooperative    Assessment and Plan:   Christy Lowry is a 89 year old female w/ PMHx of BMI 26.09, actinic keratosis, OA, Osteoporosis, GERD, Hypothyroidism, Anxiety who presented to the ED with persistent ABD pain and loose stools.  Pt initially presented to ED 10/31 with similar symptoms with CT A/P at that time demonstrating severe infectious inflammatory enterocolitis but declined admission at that time.  She was discharged home on Augmentin.     #Altered bowel habits  #N/V  -Labs on admission with slight leukocytosis and elevated ANC, otherwise unremarkable  -CT A/P on 10/31/2024 demonstrated no evidence of bowel obstruction, HH, fluid filled loops of small bowel but nondilated compatible with moderate-severe infectious/inflammatory enterocolitis, fluid throughout the colon to the level of the rectum with long segment proximal colonic wall thickening and mild proximal pericolonic inflammation  -Last colonoscopy in the remote past  -Small hard stools  overnight  -Etiology likely infectious source given acute onset and improvement.  Possible component of constipation now given imodium and dicyclomine use.    -Received stool softeners last night, had increased diarrhea. Stool studies completed negative for infection   -feeling better today, complains of being hungry discussed trial of full liquids this am and then can advance as tolerated     Recommend:  -Full liquid diet, ADAT  -Bowel regimen as needed     Addendum: Plan to have patient complete 5 day of empiric antibiotics. Can continue to advance diet as tolerated. GI will sign off, please call back with any concerns.     Case discussed with Tosha Valle MD and Nadia ANGULO.    Paulette Kraft PA-C  [facility] Gastroenterology  11/7/2024      Results:     Lab Results   Component Value Date    WBC 6.4 11/07/2024    HGB 12.0 11/07/2024    HCT 36.9 11/07/2024    .0 11/07/2024    CREATSERUM 0.85 11/07/2024    BUN 6 (L) 11/07/2024     11/07/2024    K 4.0 11/07/2024     11/07/2024    CO2 27.0 11/07/2024     (H) 11/07/2024    CA 8.8 11/07/2024    ALB 3.6 11/05/2024    ALKPHO 84 11/05/2024    BILT 0.3 11/05/2024    TP 7.0 11/05/2024    AST 25 11/05/2024    ALT 9 (L) 11/05/2024    T4F 1.3 05/30/2023    TSH 5.400 (H) 05/30/2023    LIP 21 11/05/2024    MG 2.2 06/14/2023       No results found.

## 2024-11-07 NOTE — PLAN OF CARE
Christy is A&Ox4 and on room air. Pt ambulates standby assist with walker, IVFs infusing, voiding, N/V throughout the day that occurs after eating. Pt denies pain. Frequent rounding by nursing staff. Safety precautions maintained/call light within reach, 24 hour caregiver at bedside.   Problem: Patient Centered Care  Goal: Patient preferences are identified and integrated in the patient's plan of care  Description: Interventions:  - What would you like us to know as we care for you? I live with my caregiver   - Provide timely, complete, and accurate information to patient/family  - Incorporate patient and family knowledge, values, beliefs, and cultural backgrounds into the planning and delivery of care  - Encourage patient/family to participate in care and decision-making at the level they choose  - Honor patient and family perspectives and choices  Outcome: Progressing     Problem: Patient/Family Goals  Goal: Patient/Family Long Term Goal  Description: Patient's Long Term Goal: to go home    Interventions:  - Tolerate diet advancement   - Pain management   - See additional Care Plan goals for specific interventions  Outcome: Progressing  Goal: Patient/Family Short Term Goal  Description: Patient's Short Term Goal: to be able to keep food down    Interventions:   - Antiemetics as needed   - Slow diet advancement   - See additional Care Plan goals for specific interventions  Outcome: Progressing     Problem: PAIN - ADULT  Goal: Verbalizes/displays adequate comfort level or patient's stated pain goal  Description: INTERVENTIONS:  - Encourage pt to monitor pain and request assistance  - Assess pain using appropriate pain scale  - Administer analgesics based on type and severity of pain and evaluate response  - Implement non-pharmacological measures as appropriate and evaluate response  - Consider cultural and social influences on pain and pain management  - Manage/alleviate anxiety  - Utilize distraction and/or relaxation  techniques  - Monitor for opioid side effects  - Notify MD/LIP if interventions unsuccessful or patient reports new pain  - Anticipate increased pain with activity and pre-medicate as appropriate  Outcome: Progressing     Problem: RISK FOR INFECTION - ADULT  Goal: Absence of fever/infection during anticipated neutropenic period  Description: INTERVENTIONS  - Monitor WBC  - Administer growth factors as ordered  - Implement neutropenic guidelines  Outcome: Progressing     Problem: SAFETY ADULT - FALL  Goal: Free from fall injury  Description: INTERVENTIONS:  - Assess pt frequently for physical needs  - Identify cognitive and physical deficits and behaviors that affect risk of falls.  - [place] fall precautions as indicated by assessment.  - Educate pt/family on patient safety including physical limitations  - Instruct pt to call for assistance with activity based on assessment  - Modify environment to reduce risk of injury  - Provide assistive devices as appropriate  - Consider OT/PT consult to assist with strengthening/mobility  - Encourage toileting schedule  Outcome: Progressing     Problem: DISCHARGE PLANNING  Goal: Discharge to home or other facility with appropriate resources  Description: INTERVENTIONS:  - Identify barriers to discharge w/pt and caregiver  - Include patient/family/discharge partner in discharge planning  - Arrange for needed discharge resources and transportation as appropriate  - Identify discharge learning needs (meds, wound care, etc)  - Arrange for interpreters to assist at discharge as needed  - Consider post-discharge preferences of patient/family/discharge partner  - Complete POLST form as appropriate  - Assess patient's ability to be responsible for managing their own health  - Refer to Case Management Department for coordinating discharge planning if the patient needs post-hospital services based on physician/LIP order or complex needs related to functional status, cognitive ability  or social support system  Outcome: Progressing     Problem: GASTROINTESTINAL - ADULT  Goal: Minimal or absence of nausea and vomiting  Description: INTERVENTIONS:  - Maintain adequate hydration with IV or PO as ordered and tolerated  - Nasogastric tube to low intermittent suction as ordered  - Evaluate effectiveness of ordered antiemetic medications  - Provide nonpharmacologic comfort measures as appropriate  - Advance diet as tolerated, if ordered  - Obtain nutritional consult as needed  - Evaluate fluid balance  Outcome: Progressing  Goal: Maintains or returns to baseline bowel function  Description: INTERVENTIONS:  - Assess bowel function  - Maintain adequate hydration with IV or PO as ordered and tolerated  - Evaluate effectiveness of GI medications  - Encourage mobilization and activity  - Obtain nutritional consult as needed  - Establish a toileting routine/schedule  - Consider collaborating with pharmacy to review patient's medication profile  Outcome: Progressing  Goal: Maintains adequate nutritional intake (undernourished)  Description: INTERVENTIONS:  - Monitor percentage of each meal consumed  - Identify factors contributing to decreased intake, treat as appropriate  - Assist with meals as needed  - Monitor I&O, WT and lab values  - Obtain nutritional consult as needed  - Optimize oral hygiene and moisture  - Encourage food from home; allow for food preferences  - Enhance eating environment  Outcome: Progressing

## 2024-11-07 NOTE — PHYSICAL THERAPY NOTE
Attempted follow up treatment this AM, chart reviewed, RN approved participation. Pt rec'd in bed, stating she hasn't slept in two days or eaten and does not want to have therapy right now. Pt agreeable to re-attempt later today. Will plan for this afternoon as schedule allows.

## 2024-11-07 NOTE — PLAN OF CARE
No acute changes overnight. No complaints of pain. No complaints of nausea/vomiting. GI panel negative. IVF running and IV abx given. Up with assist and walker. Caregiver at bedside throughout the night. Plan of care on going.    Problem: Patient Centered Care  Goal: Patient preferences are identified and integrated in the patient's plan of care  Description: Interventions:  - What would you like us to know as we care for you? I live with my caregiver   - Provide timely, complete, and accurate information to patient/family  - Incorporate patient and family knowledge, values, beliefs, and cultural backgrounds into the planning and delivery of care  - Encourage patient/family to participate in care and decision-making at the level they choose  - Honor patient and family perspectives and choices  Outcome: Progressing     Problem: Patient/Family Goals  Goal: Patient/Family Long Term Goal  Description: Patient's Long Term Goal: to go home    Interventions:  - Tolerate diet advancement   - Pain management   - See additional Care Plan goals for specific interventions  Outcome: Progressing  Goal: Patient/Family Short Term Goal  Description: Patient's Short Term Goal: to be able to keep food down    Interventions:   - Antiemetics as needed   - Slow diet advancement   - See additional Care Plan goals for specific interventions  Outcome: Progressing     Problem: PAIN - ADULT  Goal: Verbalizes/displays adequate comfort level or patient's stated pain goal  Description: INTERVENTIONS:  - Encourage pt to monitor pain and request assistance  - Assess pain using appropriate pain scale  - Administer analgesics based on type and severity of pain and evaluate response  - Implement non-pharmacological measures as appropriate and evaluate response  - Consider cultural and social influences on pain and pain management  - Manage/alleviate anxiety  - Utilize distraction and/or relaxation techniques  - Monitor for opioid side effects  -  Notify MD/LIP if interventions unsuccessful or patient reports new pain  - Anticipate increased pain with activity and pre-medicate as appropriate  Outcome: Progressing     Problem: RISK FOR INFECTION - ADULT  Goal: Absence of fever/infection during anticipated neutropenic period  Description: INTERVENTIONS  - Monitor WBC  - Administer growth factors as ordered  - Implement neutropenic guidelines  Outcome: Progressing     Problem: SAFETY ADULT - FALL  Goal: Free from fall injury  Description: INTERVENTIONS:  - Assess pt frequently for physical needs  - Identify cognitive and physical deficits and behaviors that affect risk of falls.  - [place] fall precautions as indicated by assessment.  - Educate pt/family on patient safety including physical limitations  - Instruct pt to call for assistance with activity based on assessment  - Modify environment to reduce risk of injury  - Provide assistive devices as appropriate  - Consider OT/PT consult to assist with strengthening/mobility  - Encourage toileting schedule  Outcome: Progressing     Problem: DISCHARGE PLANNING  Goal: Discharge to home or other facility with appropriate resources  Description: INTERVENTIONS:  - Identify barriers to discharge w/pt and caregiver  - Include patient/family/discharge partner in discharge planning  - Arrange for needed discharge resources and transportation as appropriate  - Identify discharge learning needs (meds, wound care, etc)  - Arrange for interpreters to assist at discharge as needed  - Consider post-discharge preferences of patient/family/discharge partner  - Complete POLST form as appropriate  - Assess patient's ability to be responsible for managing their own health  - Refer to Case Management Department for coordinating discharge planning if the patient needs post-hospital services based on physician/LIP order or complex needs related to functional status, cognitive ability or social support system  Outcome: Progressing      Problem: GASTROINTESTINAL - ADULT  Goal: Minimal or absence of nausea and vomiting  Description: INTERVENTIONS:  - Maintain adequate hydration with IV or PO as ordered and tolerated  - Nasogastric tube to low intermittent suction as ordered  - Evaluate effectiveness of ordered antiemetic medications  - Provide nonpharmacologic comfort measures as appropriate  - Advance diet as tolerated, if ordered  - Obtain nutritional consult as needed  - Evaluate fluid balance  Outcome: Progressing  Goal: Maintains or returns to baseline bowel function  Description: INTERVENTIONS:  - Assess bowel function  - Maintain adequate hydration with IV or PO as ordered and tolerated  - Evaluate effectiveness of GI medications  - Encourage mobilization and activity  - Obtain nutritional consult as needed  - Establish a toileting routine/schedule  - Consider collaborating with pharmacy to review patient's medication profile  Outcome: Progressing  Goal: Maintains adequate nutritional intake (undernourished)  Description: INTERVENTIONS:  - Monitor percentage of each meal consumed  - Identify factors contributing to decreased intake, treat as appropriate  - Assist with meals as needed  - Monitor I&O, WT and lab values  - Obtain nutritional consult as needed  - Optimize oral hygiene and moisture  - Encourage food from home; allow for food preferences  - Enhance eating environment  Outcome: Progressing

## 2024-11-07 NOTE — CM/SW NOTE
Anticipated therapy need: Home with Home Healthcare    Per chart review prior to admission, patient's baseline is assist for all ADLs and functional mobility from 24 hour caregiver, ambulates with RW.  Patient is currently functioning near baseline     No indication for HHC at this time    Plan  Home w/ cg    / to remain available for support and/or discharge planning.     Haritha Marsh RN    Ext [phone]

## 2024-11-08 LAB
ANION GAP SERPL CALC-SCNC: 6 MMOL/L (ref 0–18)
BUN BLD-MCNC: 7 MG/DL (ref 9–23)
CALCIUM BLD-MCNC: 8.6 MG/DL (ref 8.7–10.4)
CHLORIDE SERPL-SCNC: 101 MMOL/L (ref 98–112)
CO2 SERPL-SCNC: 26 MMOL/L (ref 21–32)
CREAT BLD-MCNC: 0.89 MG/DL
DEPRECATED RDW RBC AUTO: 49 FL (ref 35.1–46.3)
EGFRCR SERPLBLD CKD-EPI 2021: 58 ML/MIN/1.73M2 (ref 60–?)
ERYTHROCYTE [DISTWIDTH] IN BLOOD BY AUTOMATED COUNT: 14.9 % (ref 11–15)
GLUCOSE BLD-MCNC: 129 MG/DL (ref 70–99)
HCT VFR BLD AUTO: 39.1 %
HGB BLD-MCNC: 12.7 G/DL
MCH RBC QN AUTO: 29.1 PG (ref 26–34)
MCHC RBC AUTO-ENTMCNC: 32.5 G/DL (ref 31–37)
MCV RBC AUTO: 89.7 FL
PLATELET # BLD AUTO: 374 10(3)UL (ref 150–450)
POTASSIUM SERPL-SCNC: 4 MMOL/L (ref 3.5–5.1)
RBC # BLD AUTO: 4.36 X10(6)UL
SODIUM SERPL-SCNC: 133 MMOL/L (ref 136–145)
WBC # BLD AUTO: 5.9 X10(3) UL (ref 4–11)

## 2024-11-08 PROCEDURE — 99233 SBSQ HOSP IP/OBS HIGH 50: CPT | Performed by: HOSPITALIST

## 2024-11-08 RX ORDER — IPRATROPIUM BROMIDE AND ALBUTEROL SULFATE 2.5; .5 MG/3ML; MG/3ML
3 SOLUTION RESPIRATORY (INHALATION) EVERY 6 HOURS PRN
Status: DISCONTINUED | OUTPATIENT
Start: 2024-11-08 | End: 2024-11-14

## 2024-11-08 NOTE — PHYSICAL THERAPY NOTE
PHYSICAL THERAPY TREATMENT NOTE - INPATIENT     Room Number: 457/457-A       Presenting Problem: nausea and vomiting       Problem List  Principal Problem:    Nausea and vomiting in adult  Active Problems:    Persistent vomiting in adult patient    Enteritis      PHYSICAL THERAPY ASSESSMENT   Patient demonstrates good  progress this session, goals  remain in progress.      Patient is requiring contact guard assist and moderate assist as a result of the following impairments: decreased functional strength, decreased endurance/aerobic capacity, and pain.     Patient continues to function below baseline with bed mobility, transfers, and gait.  Next session anticipate patient to progress bed mobility, transfers, and gait.  Physical Therapy will continue to follow patient for duration of hospitalization.    Patient continues to benefit from continued skilled PT services: at discharge to promote prior level of function.  Anticipate patient will return home with home health PT.    PLAN DURING HOSPITALIZATION  Nursing Mobility Recommendation : 1 Assist  PT Device Recommendation: Rolling walker  PT Treatment Plan: Bed mobility;Body mechanics;Endurance;Gait training  Frequency (Obs): 3-5x/week     SUBJECTIVE  Pt reports being ready for PT RX    OBJECTIVE  Precautions: Bed/chair alarm    WEIGHT BEARING RESTRICTION       PAIN ASSESSMENT   Ratin          BALANCE  Static Sitting: Good  Dynamic Sitting: Fair +  Static Standing: Fair  Dynamic Standing: Fair -    ACTIVITY TOLERANCE                          O2 WALK       AM-PAC '6-Clicks' INPATIENT SHORT FORM - BASIC MOBILITY  How much difficulty does the patient currently have...  Patient Difficulty: Turning over in bed (including adjusting bedclothes, sheets and blankets)?: A Little   Patient Difficulty: Sitting down on and standing up from a chair with arms (e.g., wheelchair, bedside commode, etc.): A Little   Patient Difficulty: Moving from lying on back to sitting on the  side of the bed?: A Little   How much help from another person does the patient currently need...   Help from Another: Moving to and from a bed to a chair (including a wheelchair)?: A Little   Help from Another: Need to walk in hospital room?: A Little   Help from Another: Climbing 3-5 steps with a railing?: A Lot     AM-PAC Score:  Raw Score: 17   Approx Degree of Impairment: 50.57%   Standardized Score (AM-PAC Scale): 42.13   CMS Modifier (G-Code): CK    FUNCTIONAL ABILITY STATUS  Functional Mobility/Gait Assessment  Gait Assistance: Contact guard assist  Distance (ft): 2 x 30  Assistive Device: Rolling walker  Pattern: Shuffle  Rolling: moderate assist  Supine to Sit: moderate assist  Sit to Supine: moderate assist  Sit to Stand: moderate assist    Skilled Therapy Provided: Pt seen daily.Mod a for bed mobility and transfer;extra time provided to complete task.EOB sitting balance activity with emphasis on core stabilization.Pt educated on deep breathing,relaxation as well as energy conservation technique.Family present;family education;all questions and concerns addressed.There  ex.pt amb 2 x 30 ft with RW and CGa;provided cuing for gait pattern a swell as for postural awareness.    The patient's Approx Degree of Impairment: 50.57% has been calculated based on documentation in the [facility] '6 clicks' Inpatient Daily Activity Short Form.  Research supports that patients with this level of impairment may benefit from Home with Home Health PT.  Final disposition will be made by interdisciplinary medical team.    THERAPEUTIC EXERCISES  Lower Extremity Ankle pumps  Glut sets  Quad sets     Position Supine       Patient End of Session: In bed;Call light within reach;RN aware of session/findings;All patient questions and concerns addressed    CURRENT GOALS     Patient Goal Patient's self-stated goal is: go home   Goal #1 Patient is able to demonstrate supine - sit EOB @ level: supervision     Goal #1   Current Status Mod a    Goal #2 Patient is able to demonstrate transfers Sit to/from Stand at assistance level: supervision with walker - rolling     Goal #2  Current Status Mod a   Goal #3 Patient is able to ambulate 75 feet with assist device: walker - rolling at assistance level: supervision   Goal #3   Current Status Pt amb 2 x 30 ft with RW and CGa   Goal #4    Goal #4   Current Status    Goal #5 Patient to demonstrate independence with home activity/exercise instructions provided to patient in preparation for discharge.   Goal #5   Current Status In progress   Goal #6    Goal #6  Current Status      Gait Training:  minutes  Therapeutic Activity: 20 minutes  Neuromuscular Re-education:  minutes  Therapeutic Exercise: 10 minutes  [place] Repositioning:  minutes  Manual Therapy:  minutes  Can add/delete any of these

## 2024-11-08 NOTE — PROGRESS NOTES
[facility]  part of [facility]    Progress Note    Christy Lowry Patient Status:  Inpatient    1924 MRN [id number]   Location [facility] 4W/SW/SE Attending Gillian Connelly MD   Hosp Day # 3 PCP Patrick Stafford MD       Subjective:   Christy Lowry Patient vomited last night. Afebrile. She could not tolerate a soft diet. CT scan was ordered. Patient currently NPO     Objective:   Blood pressure 126/77, pulse 94, temperature 98.7 °F (37.1 °C), temperature source Oral, resp. rate 18, height 5' 4\" (1.626 m), weight 152 lb (68.9 kg), SpO2 90%.    Physical Exam:    General: No acute distress.   Respiratory: Clear to auscultation bilaterally. No wheezes. No rhonchi.  Cardiovascular: S1, S2. Regular rate and rhythm. No murmurs, rubs or gallops.   Abdomen: no abdominal pain, soft nontender.   Neurologic: No focal neurological deficits.   Musculoskeletal: Moves all extremities.  Extremities: No edema.    Results:     Lab Results   Component Value Date    WBC 5.9 2024    HGB 12.7 2024    HCT 39.1 2024    .0 2024    CREATSERUM 0.89 2024    BUN 7 (L) 2024     (L) 2024    K 4.0 2024     2024    CO2 26.0 2024     (H) 2024    CA 8.6 (L) 2024    ALB 3.6 2024    ALKPHO 84 2024    BILT 0.3 2024    TP 7.0 2024    AST 25 2024    ALT 9 (L) 2024    T4F 1.3 2023    TSH 5.400 (H) 2023    LIP 21 2024    MG 2.2 2023       CT CHEST+ABDOMEN+PELVIS(ALL [facility] ONLY)(CPT=71260/92987)    Result Date: 2024  CONCLUSION:  1. Fluid-filled loops of small and large bowel and extensive mucosal thickening/enhancement, which could reflect underlying infectious/inflammatory enterocolitis or malabsorption in the appropriate clinical setting.  2. Nonspecific decompressed-appearance of the hepatic flexure; while not fully characterized within the parameters of this  study, the possibility of an underlying lesion or non-neoplastic stricture should be considered.  3. Mild mesenteric edema and trace intraperitoneal ascites, likely reactive.  4. Moderate right and small left pleural effusions and associated basilar atelectasis, increased from previous, with or without superimposed pneumonia.  5. Redemonstration of a lobulated cystic lesion of the left lower lobe. This may reflect loculated pleural fluid.  6. Mild gallbladder distension, which may be reactive.  7. Anasarca is present.  8. Lesser incidental findings as above.    A preliminary report was issued by the [facility] teleradiology service. There are no major discrepancies.   Dictated by (CST): Anthony Garces MD on 11/08/2024 at 10:51 AM     Finalized by (CST): Anthony Garces MD on 11/08/2024 at 11:04 AM              pantoprazole  40 mg Intravenous Q24H    piperacillin-tazobactam  3.375 g Intravenous Q8H    heparin  5,000 Units Subcutaneous 2 times per day       bisacodyl    morphINE **OR** morphINE **OR** morphINE    ondansetron    metoclopramide      Assessment and Plan:      Acute enterocolitis  Acute intractable nausea and vomiting  - found again on CT scan of the abdomen and pelvis  - patient also has loculated pleural effusions- may need to get Pulm eval   - IV zosyn  - NPO  - going for an EGD and flex sig tomorrow   - gentle IV hydration  - IV zofran for nausea  - pain control  - PT/OT eval           Quality:  DVT Prophylaxis: Heparin  CODE status: Full    MDM. High      LASHAE HUMMEL MD  11/08/24

## 2024-11-08 NOTE — PLAN OF CARE
Patient with continued episodes of emesis today. GI notified. Made FLD but still with emesis. GI aware. Planning for EGD with Flex Sig in morning. NPO at midnight. Per GI, okay to continue with FLD as tolerated, if patient wishes, but continue NPO at midnight. Family and caregiver updated on plan and in agreement. They remain at bedside today assisting with much of patient care.     Problem: Patient Centered Care  Goal: Patient preferences are identified and integrated in the patient's plan of care  Description: Interventions:  - What would you like us to know as we care for you? I live with my caregiver   - Provide timely, complete, and accurate information to patient/family  - Incorporate patient and family knowledge, values, beliefs, and cultural backgrounds into the planning and delivery of care  - Encourage patient/family to participate in care and decision-making at the level they choose  - Honor patient and family perspectives and choices  Outcome: Progressing     Problem: Patient/Family Goals  Goal: Patient/Family Long Term Goal  Description: Patient's Long Term Goal: to go home    Interventions:  - Tolerate diet advancement   - Pain management   - See additional Care Plan goals for specific interventions  Outcome: Progressing  Goal: Patient/Family Short Term Goal  Description: Patient's Short Term Goal: to be able to keep food down    Interventions:   - Antiemetics as needed   - Slow diet advancement   - See additional Care Plan goals for specific interventions  Outcome: Progressing     Problem: PAIN - ADULT  Goal: Verbalizes/displays adequate comfort level or patient's stated pain goal  Description: INTERVENTIONS:  - Encourage pt to monitor pain and request assistance  - Assess pain using appropriate pain scale  - Administer analgesics based on type and severity of pain and evaluate response  - Implement non-pharmacological measures as appropriate and evaluate response  - Consider cultural and social  influences on pain and pain management  - Manage/alleviate anxiety  - Utilize distraction and/or relaxation techniques  - Monitor for opioid side effects  - Notify MD/LIP if interventions unsuccessful or patient reports new pain  - Anticipate increased pain with activity and pre-medicate as appropriate  Outcome: Progressing     Problem: RISK FOR INFECTION - ADULT  Goal: Absence of fever/infection during anticipated neutropenic period  Description: INTERVENTIONS  - Monitor WBC  - Administer growth factors as ordered  - Implement neutropenic guidelines  Outcome: Progressing     Problem: SAFETY ADULT - FALL  Goal: Free from fall injury  Description: INTERVENTIONS:  - Assess pt frequently for physical needs  - Identify cognitive and physical deficits and behaviors that affect risk of falls.  - [place] fall precautions as indicated by assessment.  - Educate pt/family on patient safety including physical limitations  - Instruct pt to call for assistance with activity based on assessment  - Modify environment to reduce risk of injury  - Provide assistive devices as appropriate  - Consider OT/PT consult to assist with strengthening/mobility  - Encourage toileting schedule  Outcome: Progressing     Problem: DISCHARGE PLANNING  Goal: Discharge to home or other facility with appropriate resources  Description: INTERVENTIONS:  - Identify barriers to discharge w/pt and caregiver  - Include patient/family/discharge partner in discharge planning  - Arrange for needed discharge resources and transportation as appropriate  - Identify discharge learning needs (meds, wound care, etc)  - Arrange for interpreters to assist at discharge as needed  - Consider post-discharge preferences of patient/family/discharge partner  - Complete POLST form as appropriate  - Assess patient's ability to be responsible for managing their own health  - Refer to Case Management Department for coordinating discharge planning if the patient needs  post-hospital services based on physician/LIP order or complex needs related to functional status, cognitive ability or social support system  Outcome: Progressing     Problem: GASTROINTESTINAL - ADULT  Goal: Minimal or absence of nausea and vomiting  Description: INTERVENTIONS:  - Maintain adequate hydration with IV or PO as ordered and tolerated  - Nasogastric tube to low intermittent suction as ordered  - Evaluate effectiveness of ordered antiemetic medications  - Provide nonpharmacologic comfort measures as appropriate  - Advance diet as tolerated, if ordered  - Obtain nutritional consult as needed  - Evaluate fluid balance  Outcome: Progressing  Goal: Maintains or returns to baseline bowel function  Description: INTERVENTIONS:  - Assess bowel function  - Maintain adequate hydration with IV or PO as ordered and tolerated  - Evaluate effectiveness of GI medications  - Encourage mobilization and activity  - Obtain nutritional consult as needed  - Establish a toileting routine/schedule  - Consider collaborating with pharmacy to review patient's medication profile  Outcome: Progressing  Goal: Maintains adequate nutritional intake (undernourished)  Description: INTERVENTIONS:  - Monitor percentage of each meal consumed  - Identify factors contributing to decreased intake, treat as appropriate  - Assist with meals as needed  - Monitor I&O, WT and lab values  - Obtain nutritional consult as needed  - Optimize oral hygiene and moisture  - Encourage food from home; allow for food preferences  - Enhance eating environment  Outcome: Progressing

## 2024-11-08 NOTE — PLAN OF CARE
Problem: Patient Centered Care  Goal: Patient preferences are identified and integrated in the patient's plan of care  Description: Interventions:  - What would you like us to know as we care for you? I live with my caregiver   - Provide timely, complete, and accurate information to patient/family  - Incorporate patient and family knowledge, values, beliefs, and cultural backgrounds into the planning and delivery of care  - Encourage patient/family to participate in care and decision-making at the level they choose  - Honor patient and family perspectives and choices  Outcome: Progressing     Problem: Patient/Family Goals  Goal: Patient/Family Long Term Goal  Description: Patient's Long Term Goal: to go home    Interventions:  - Tolerate diet advancement   - Pain management   - See additional Care Plan goals for specific interventions  Outcome: Not Progressing  Goal: Patient/Family Short Term Goal  Description: Patient's Short Term Goal: to be able to keep food down    Interventions:   - Antiemetics as needed   - Slow diet advancement   - See additional Care Plan goals for specific interventions  Outcome: Not Progressing     Problem: PAIN - ADULT  Goal: Verbalizes/displays adequate comfort level or patient's stated pain goal  Description: INTERVENTIONS:  - Encourage pt to monitor pain and request assistance  - Assess pain using appropriate pain scale  - Administer analgesics based on type and severity of pain and evaluate response  - Implement non-pharmacological measures as appropriate and evaluate response  - Consider cultural and social influences on pain and pain management  - Manage/alleviate anxiety  - Utilize distraction and/or relaxation techniques  - Monitor for opioid side effects  - Notify MD/LIP if interventions unsuccessful or patient reports new pain  - Anticipate increased pain with activity and pre-medicate as appropriate  Outcome: Progressing     Problem: RISK FOR INFECTION - ADULT  Goal: Absence  of fever/infection during anticipated neutropenic period  Description: INTERVENTIONS  - Monitor WBC  - Administer growth factors as ordered  - Implement neutropenic guidelines  Outcome: Progressing     Problem: SAFETY ADULT - FALL  Goal: Free from fall injury  Description: INTERVENTIONS:  - Assess pt frequently for physical needs  - Identify cognitive and physical deficits and behaviors that affect risk of falls.  - [place] fall precautions as indicated by assessment.  - Educate pt/family on patient safety including physical limitations  - Instruct pt to call for assistance with activity based on assessment  - Modify environment to reduce risk of injury  - Provide assistive devices as appropriate  - Consider OT/PT consult to assist with strengthening/mobility  - Encourage toileting schedule  Outcome: Progressing     Problem: DISCHARGE PLANNING  Goal: Discharge to home or other facility with appropriate resources  Description: INTERVENTIONS:  - Identify barriers to discharge w/pt and caregiver  - Include patient/family/discharge partner in discharge planning  - Arrange for needed discharge resources and transportation as appropriate  - Identify discharge learning needs (meds, wound care, etc)  - Arrange for interpreters to assist at discharge as needed  - Consider post-discharge preferences of patient/family/discharge partner  - Complete POLST form as appropriate  - Assess patient's ability to be responsible for managing their own health  - Refer to Case Management Department for coordinating discharge planning if the patient needs post-hospital services based on physician/LIP order or complex needs related to functional status, cognitive ability or social support system  Outcome: Progressing     Problem: GASTROINTESTINAL - ADULT  Goal: Minimal or absence of nausea and vomiting  Description: INTERVENTIONS:  - Maintain adequate hydration with IV or PO as ordered and tolerated  - Nasogastric tube to low intermittent  suction as ordered  - Evaluate effectiveness of ordered antiemetic medications  - Provide nonpharmacologic comfort measures as appropriate  - Advance diet as tolerated, if ordered  - Obtain nutritional consult as needed  - Evaluate fluid balance  Outcome: Not Progressing  Goal: Maintains or returns to baseline bowel function  Description: INTERVENTIONS:  - Assess bowel function  - Maintain adequate hydration with IV or PO as ordered and tolerated  - Evaluate effectiveness of GI medications  - Encourage mobilization and activity  - Obtain nutritional consult as needed  - Establish a toileting routine/schedule  - Consider collaborating with pharmacy to review patient's medication profile  Outcome: Not Progressing  Goal: Maintains adequate nutritional intake (undernourished)  Description: INTERVENTIONS:  - Monitor percentage of each meal consumed  - Identify factors contributing to decreased intake, treat as appropriate  - Assist with meals as needed  - Monitor I&O, WT and lab values  - Obtain nutritional consult as needed  - Optimize oral hygiene and moisture  - Encourage food from home; allow for food preferences  - Enhance eating environment  Outcome: Not Progressing

## 2024-11-08 NOTE — DIETARY NOTE
ADULT NUTRITION INITIAL ASSESSMENT    Pt is at moderate nutrition risk.  Pt does not meet malnutrition criteria.      RECOMMENDATIONS TO MD:   ALYSSA added ONS to promote improved PO intake.   See Nutrition Intervention for diet and ONS specifics     ADMITTING DIAGNOSIS:  Persistent vomiting in adult patient [R11.15]  Nausea and vomiting in adult [R11.2]  PERTINENT PAST MEDICAL HISTORY:   Past Medical History:    Actinic keratosis    hypertrophic AK - left central forehead    Benzodiazepine withdrawal without complication (HCC)    Chronic GERD    Constipation    Diarrhea    Eustachian tube dysfunction    Heart palpitations    Loose stools    Occult blood in stools    SCC (squamous cell carcinoma)    left forehead, invasive, well-diff.     SCC (squamous cell carcinoma)    right temple    SCC (squamous cell carcinoma)    right neck    Skin cancer     PATIENT STATUS:   11/08/24 INITIAL VISIT: Pt assessed due to RN consult for pt \"requesting protein supplement\". Pt presented to hospital with complaints of abdominal pain and and N/V - acute enterocolitis. PMH as above noted. Pt sitting up in chair with son and caretaker at bedside who all participated in diet hx. Pt reports poor appetite/intake since the onset of her symptoms ~1 wk ago. Initially presented with abdominal pain and diarrhea. Now c/o nausea, reporting pt recently vomited. Pt reports good appetite/intake prior to onset of acute illness ~1 wk ago. Typically consumes ~2 meals  daily, occasionally consuming an ONS. Diet advanced to full liquids now. Plan for NPO past midnight for tentative EGD and flexible sigmoidoscopy in AM.      FOOD/NUTRITION RELATED HISTORY:  Appetite: Poor x1 week  Intake:  Poor x1 week, <50% of usual intake  Intake Meeting Needs: No, but oral nutrition supplements (ONS) to maximize  Percent Meals Eaten (last 3 days)       Date/Time Percent Meals Eaten (%)    11/06/24 2608 --     Percent Meals Eaten (%): couple sips of brouth and crackers at  11/06/24 1709        Food Allergies: No Known Food Allergies (NKFA)  Cultural/Ethnic/Quaker Preferences: Not Obtained    GASTROINTESTINAL: +BM small, soft, brown stool x1 / 24 hrs , abdominal pain, nausea, vomiting, and emesis x5 recorded / 24 hrs, abdomen soft, rounded, non-distended, with hypoactive bowel sounds    MEDICATIONS: reviewed; Noted noncardiac electrolyte replacement protocol ordered   potassium chloride in dextrose 5%-sodium chloride 0.9% 60 mL/hr at 11/08/24 1256      pantoprazole  40 mg Intravenous Q24H    piperacillin-tazobactam  3.375 g Intravenous Q8H    heparin  5,000 Units Subcutaneous 2 times per day     LABS: reviewed  Recent Labs     11/05/24  1750 11/06/24  0620 11/07/24  0543 11/08/24  0535 11/08/24  0647   * 143* 117* 129*  --    BUN 9 9 6*  --  7*   CREATSERUM 0.89 0.92 0.85 0.89  --    CA 9.7 8.9 8.8 8.6*  --    * 136 136 133*  --    K 3.7 4.0 4.0  --  4.0    102 104 101  --    CO2 30.0 28.0 27.0 26.0  --    OSMOCALC 280 283 281  --   --        NUTRITION RELATED PHYSICAL FINDINGS:  - Nutrition Focused Physical Exam (NFPE): mild depletion muscle mass temple region, sarcopenia c/w advanced age noted.   - Fluid Accumulation: none  see RN documentation for details  - Skin Integrity: at risk and intact see RN documentation for details    ANTHROPOMETRICS:  HT: 162.6 cm (5' 4\")  WT: 68.9 kg (152 lb) (up 19% (24 lbs) from recent ER visit ~ 1 wk ago) - requested reweigh  DOSING WT: 58 kg (128 lbs - ER wt from 10/31/24) - wt utilized for anthropometric assessment  BMI: Body mass index is 21.95 kg/m².  BMI CLASSIFICATION: <22 considered underweight for advanced age (>65)  IBW: 120 lbs        107% IBW  Usual Body Wt: 137 lbs (per EMR 1.5 yrs ago)      93% UBW    WEIGHT HISTORY:  Patient Weight(s) for the past 336 hrs:   Weight   11/05/24 2111 68.9 kg (152 lb)   11/05/24 1617 58 kg (127 lb 13.9 oz)     Wt Readings from Last 10 Encounters:   11/05/24 68.9 kg (152 lb)   10/31/24  58.1 kg (128 lb 1.4 oz)   07/18/23 58.1 kg (128 lb)   07/08/23 62.1 kg (136 lb 14.5 oz)   06/14/23 62.1 kg (136 lb 14.5 oz)   05/30/23 62.1 kg (137 lb)   10/19/20 50.8 kg (112 lb)   02/20/20 55.8 kg (123 lb)   02/04/20 55.3 kg (122 lb)   10/02/19 57.6 kg (127 lb)     NUTRITION DIAGNOSIS/PROBLEM:   Inadequate protein energy intake related to Decreased ability to consume sufficient energy in the setting of GI illness as evidenced by diet hx reflecting poor appetite/intake x ~1 wk.    NUTRITION INTERVENTION:     NUTRITION PRESCRIPTION:   Estimated Nutrition needs: --dosing wt of 58 kg - wt taken on 10/31/24  Calories: 7002-5048 calories/day (22-25 calories per kg Dosing wt)  Protein: 58-70 g protein/day (1-1.2 g protein/kg Dosing wt)  Fluid Needs: 1450 ml/day (chronological age method)    - Diet:       Procedures    Full liquid diet Is Patient on Accuchecks? No; Misc Restriction: Low Fiber/Soft      - RD Care Plan: Encouraged small frequent meals with emphasis on high calorie/high protein and Initiated ONS (oral nutritional supplements)  - Meals and snacks: Encouraged small frequent meals and Encouraged increased PO intake  - Medical Food Supplements: RD added Ensure Compact (220 calories/ 9 g protein each) TID Chocolate  - Vitamin and mineral supplements: none  - Feeding assistance: meal set up  - Nutrition education: Discussed importance of adequate energy and protein intake   - Coordination of nutrition care: collaboration with other providers - discussed with RN on unit and via secure message  - Discharge and transfer of nutrition care to new setting or provider: monitor plans - from home with caretaker    MONITOR AND EVALUATE/NUTRITION GOALS:  - Food and Nutrient Intake:      Monitor: adequacy of PO intake, tolerance of PO intake, adequacy of supplement intake, tolerance of supplement intake, and for PO diet advancement  - Food and Nutrient Administration:      Monitor: need for temporary nutrition support and  goc/family wishes regarding nutrition  - Anthropometric Measurement:    Monitor weight  - Nutrition Goals:      PO and supplement greater than 75% of needs, prevent skin breakdown, maintain true wt within 5%, and improved GI status    DIETITIAN FOLLOW UP: RD to follow and monitor nutrition status    Zahra Perez MS, RD, LDN, [facility]  [phone]

## 2024-11-08 NOTE — PROGRESS NOTES
[facility]     Gastroenterology Progress Note    Christy Lowry Patient Status:  Inpatient    1924 MRN [id number]   Location [facility] 4W/SW/SE Attending Gillian Connelly MD   Hosp Day # 3 PCP Patrick Stafford MD       Subjective:   Patient sitting comfortably in chair  Overnight had multiple episodes of emesis  Underwent CT a/p     Today denies abdominal pain  Had some nausea this am  Small amount of vomit    No hematemesis or coffee ground emesis   Objective:   Blood pressure 123/55, pulse 87, temperature 98.7 °F (37.1 °C), temperature source Oral, resp. rate 18, height 5' 4\" (1.626 m), weight 152 lb (68.9 kg), SpO2 93%. Body mass index is 26.09 kg/m².    Gen: awake, alert patient, NAD  HEENT: EOMI, the sclera appears anicteric, oropharynx clear, mucus membranes appear moist  CV: RRR  Lung: no conversational dyspnea   Abdomen: soft NTND abdomen with NABS appreciated   Skin: dry, warm, no jaundice  Ext: no LE edema is evident  Neuro: Alert and interactive  Psych: calm, cooperative    Assessment and Plan:     Christy Lowry is a 89 year old female w/ PMHx of BMI 26.09, actinic keratosis, OA, Osteoporosis, GERD, Hypothyroidism, Anxiety who presented to the ED with persistent ABD pain and loose stools.  Pt initially presented to ED 10/31 with similar symptoms with CT A/P at that time demonstrating severe infectious inflammatory enterocolitis but declined admission at that time.  She was discharged home on Augmentin.     #Altered bowel habits  #N/V  -Labs on admission with slight leukocytosis and elevated ANC, otherwise unremarkable  -CT A/P on 10/31/2024 demonstrated no evidence of bowel obstruction, HH, fluid filled loops of small bowel but nondilated compatible with moderate-severe infectious/inflammatory enterocolitis, fluid throughout the colon to the level of the rectum with long segment proximal colonic wall thickening and mild proximal pericolonic inflammation  -Last  colonoscopy in the remote past  -Small hard stools overnight  -Etiology likely infectious source given acute onset and improvement.  Possible component of constipation now given imodium and dicyclomine use.    -stool studies negative, patient was tolerating diet however began to have increased emesis last night  - CT a/p completed overnight, fluid filled loops of small and large bowel and extensive mucosal thickening/enhancement     -long discussed with son Tristen (PONICOLA) and patient today about plan, will restart full liquid diet at this time if patient tolerates discussed option of canceling procedure for tomorrow am   -tentatively can plan for EGD and flexible sigmoidoscopy tomorrow to further evaluate enterocolitis and reason behind nausea and continuous vomiting   -discussed risk and benefits, family acknowledged understanding   -will touch base tomorrow am to check in on symptoms and if family wants to continue with plan     Recommend:  -Full liquid diet  -if recurrent emesis with full consider switch back to NPO  -NPO at midnight for tentative EGD and flexible sigmoidoscopy    EGD consent: I have discussed the risks, benefits, and alternatives to upper endoscopy/enteroscopy with the patient/primary decision maker [who demonstrated understanding], including but not limited to the risks of bleeding, infection, pain, death, as well as the risks of anesthesia and perforation all leading to prolonged hospitalization, surgical intervention, or even death. I also specifically mentioned the miss rate of upper endoscopy of 5-10% in the best of all circumstances.  The patient has agreed to sign an informed consent and elected to proceed with procedure with possible intervention [i.e. polypectomy, stent placement, etc.] as indicated.    Flexible sigmoidoscopy consent: I have discussed the risks, benefits, and alternatives to flexible sigmoidoscopy with the patient/primary decision maker [who demonstrated understanding],  including but not limited to the risks of bleeding, infection, pain, death, as well as the risks of anesthesia and perforation all leading to prolonged hospitalization, surgical intervention, or even death. I also mentioned flexible sigmoidoscopy does have a miss rate associated with the procedure. All questions were answered to the patient’s satisfaction. The patient has agreed to sign an informed consent and elected to proceed with flexible sigmoidoscopy with possible intervention [i.e. polypectomy, stent placement, etc.] as indicated.        Case discussed with Tosha nice MD and Tirso ANGULO.    Paulette Kraft PA-C  [facility] Gastroenterology  11/8/2024      Results:     Lab Results   Component Value Date    WBC 5.9 11/08/2024    HGB 12.7 11/08/2024    HCT 39.1 11/08/2024    .0 11/08/2024    CREATSERUM 0.89 11/08/2024    BUN 7 (L) 11/08/2024     (L) 11/08/2024    K 4.0 11/08/2024     11/08/2024    CO2 26.0 11/08/2024     (H) 11/08/2024    CA 8.6 (L) 11/08/2024    ALB 3.6 11/05/2024    ALKPHO 84 11/05/2024    BILT 0.3 11/05/2024    TP 7.0 11/05/2024    AST 25 11/05/2024    ALT 9 (L) 11/05/2024    T4F 1.3 05/30/2023    TSH 5.400 (H) 05/30/2023    LIP 21 11/05/2024    MG 2.2 06/14/2023       CT CHEST+ABDOMEN+PELVIS(ALL [facility] ONLY)(CPT=71260/00714)    Result Date: 11/8/2024  CONCLUSION:  1. Fluid-filled loops of small and large bowel and extensive mucosal thickening/enhancement, which could reflect underlying infectious/inflammatory enterocolitis or malabsorption in the appropriate clinical setting.  2. Nonspecific decompressed-appearance of the hepatic flexure; while not fully characterized within the parameters of this study, the possibility of an underlying lesion or non-neoplastic stricture should be considered.  3. Mild mesenteric edema and trace intraperitoneal ascites, likely reactive.  4. Moderate right and small left pleural effusions and associated basilar atelectasis,  increased from previous, with or without superimposed pneumonia.  5. Redemonstration of a lobulated cystic lesion of the left lower lobe. This may reflect loculated pleural fluid.  6. Mild gallbladder distension, which may be reactive.  7. Anasarca is present.  8. Lesser incidental findings as above.    A preliminary report was issued by the [facility] teleradiology service. There are no major discrepancies.   Dictated by (CST): Anthony Garces MD on 11/08/2024 at 10:51 AM     Finalized by (CST): Anthony Garces MD on 11/08/2024 at 11:04 AM

## 2024-11-09 ENCOUNTER — APPOINTMENT (OUTPATIENT)
Dept: GENERAL RADIOLOGY | Facility: HOSPITAL | Age: 89
End: 2024-11-09
Attending: HOSPITALIST
Payer: MEDICARE

## 2024-11-09 ENCOUNTER — ANESTHESIA EVENT (OUTPATIENT)
Dept: ENDOSCOPY | Facility: HOSPITAL | Age: 89
End: 2024-11-09
Payer: MEDICARE

## 2024-11-09 ENCOUNTER — ANESTHESIA (OUTPATIENT)
Dept: ENDOSCOPY | Facility: HOSPITAL | Age: 89
End: 2024-11-09
Payer: MEDICARE

## 2024-11-09 LAB
ANION GAP SERPL CALC-SCNC: 8 MMOL/L (ref 0–18)
BUN BLD-MCNC: 10 MG/DL (ref 9–23)
BUN/CREAT SERPL: 11.4 (ref 10–20)
CALCIUM BLD-MCNC: 9.1 MG/DL (ref 8.7–10.4)
CHLORIDE SERPL-SCNC: 104 MMOL/L (ref 98–112)
CO2 SERPL-SCNC: 25 MMOL/L (ref 21–32)
CREAT BLD-MCNC: 0.88 MG/DL
DEPRECATED RDW RBC AUTO: 49.2 FL (ref 35.1–46.3)
EGFRCR SERPLBLD CKD-EPI 2021: 59 ML/MIN/1.73M2 (ref 60–?)
ERYTHROCYTE [DISTWIDTH] IN BLOOD BY AUTOMATED COUNT: 15.2 % (ref 11–15)
GLUCOSE BLD-MCNC: 126 MG/DL (ref 70–99)
HCT VFR BLD AUTO: 38.8 %
HCT VFR BLD AUTO: 40.1 %
HGB BLD-MCNC: 12.7 G/DL
HGB BLD-MCNC: 13.2 G/DL
MCH RBC QN AUTO: 29.1 PG (ref 26–34)
MCHC RBC AUTO-ENTMCNC: 32.7 G/DL (ref 31–37)
MCV RBC AUTO: 89 FL
OSMOLALITY SERPL CALC.SUM OF ELEC: 285 MOSM/KG (ref 275–295)
PLATELET # BLD AUTO: 397 10(3)UL (ref 150–450)
POTASSIUM SERPL-SCNC: 4.8 MMOL/L (ref 3.5–5.1)
RBC # BLD AUTO: 4.36 X10(6)UL
SODIUM SERPL-SCNC: 137 MMOL/L (ref 136–145)
WBC # BLD AUTO: 9.5 X10(3) UL (ref 4–11)

## 2024-11-09 PROCEDURE — 0DBE8ZX EXCISION OF LARGE INTESTINE, VIA NATURAL OR ARTIFICIAL OPENING ENDOSCOPIC, DIAGNOSTIC: ICD-10-PCS | Performed by: INTERNAL MEDICINE

## 2024-11-09 PROCEDURE — 71045 X-RAY EXAM CHEST 1 VIEW: CPT | Performed by: HOSPITALIST

## 2024-11-09 PROCEDURE — 0DB68ZX EXCISION OF STOMACH, VIA NATURAL OR ARTIFICIAL OPENING ENDOSCOPIC, DIAGNOSTIC: ICD-10-PCS | Performed by: INTERNAL MEDICINE

## 2024-11-09 PROCEDURE — 99233 SBSQ HOSP IP/OBS HIGH 50: CPT | Performed by: HOSPITALIST

## 2024-11-09 PROCEDURE — 43239 EGD BIOPSY SINGLE/MULTIPLE: CPT | Performed by: INTERNAL MEDICINE

## 2024-11-09 PROCEDURE — 45330 DIAGNOSTIC SIGMOIDOSCOPY: CPT | Performed by: INTERNAL MEDICINE

## 2024-11-09 RX ORDER — LIDOCAINE HYDROCHLORIDE 10 MG/ML
INJECTION, SOLUTION EPIDURAL; INFILTRATION; INTRACAUDAL; PERINEURAL AS NEEDED
Status: DISCONTINUED | OUTPATIENT
Start: 2024-11-09 | End: 2024-11-09 | Stop reason: SURG

## 2024-11-09 RX ADMIN — LIDOCAINE HYDROCHLORIDE 50 MG: 10 INJECTION, SOLUTION EPIDURAL; INFILTRATION; INTRACAUDAL; PERINEURAL at 11:31:00

## 2024-11-09 NOTE — INTERVAL H&P NOTE
Pre-op Diagnosis: diarrhea, nausea and vomiting, abnormal ct    The above referenced H&P was reviewed by Breana Barbosa MD on 11/9/2024, the patient was examined and no significant changes have occurred in the patient's condition since the H&P was performed.  I discussed with the patient and/or legal representative the potential benefits, risks and side effects of this procedure; the likelihood of the patient achieving goals; and potential problems that might occur during recuperation.  I discussed reasonable alternatives to the procedure, including risks, benefits and side effects related to the alternatives and risks related to not receiving this procedure.  We will proceed with procedure as planned.

## 2024-11-09 NOTE — OPERATIVE REPORT
ESOPHAGOGASTRODUODENOSCOPY (EGD) & Flexible sigmoidoscopy REPORT    Christy Lowry     1924 Age 89 year old   PCP Patrick Stafford MD Endoscopist Breana Barbosa MD     Date of procedure: 24    Procedure: EGD w/ biopsies & Flex sig    Pre-operative diagnosis: nausea, vomiting, abnormal CT    Post-operative diagnosis: stomach full of bile, hiatal hernia, gastric erythema, flex sig to distal descending normal hemorrhoids    Medications: MAC sedation    Complications: none    Procedure: Informed consent was obtained from the patient after the risks of the procedure were discussed, including but not limited to bleeding, perforation, aspiration, infection, or possibility of a missed lesion. We discussed the risks/benefits and alternatives to this procedure, as well as the planned sedation. EGD procedure: The patient was placed in the left lateral decubitus position and begun on continuous blood pressure pulse oximetry and EKG monitoring and this was maintained throughout the procedure. Once an adequate level of sedation was obtained a bite block was placed. Then the lubricated tip of the [id number] diagnostic video upper endoscope was inserted and advanced using direct visualization into the posterior pharynx and ultimately into the esophagus.     Flexible sigmoidoscopy procedure: Once an adequate level of sedation was obtained a digital rectal exam was completed. Then the lubricated tip of the [id number] diagnostic video colonoscope was inserted and advanced without difficulty to the descending using the CO2 insufflation technique. Withdrawal was begun with thorough washing and careful examination of the colonic walls and folds. Photodocumentation was obtained. The bowel prep was fair. Views of the colon were fair with washing. I then carefully withdrew the instrument from the patient who tolerated the procedure well.     Complications: None    EGD findings:      1. Esophagus: The squamocolumnar  junction was noted at 35 cm and appeared irregular. The GE junction was noted at 35 cm from the incisors. Friable esophagus likely from vomiting, wide open ring and small hiatal hernia.   2. Stomach: The scope was introduced into the stomach it was full of bile.  Suctioned a significant amount of bile out of the stomach and the remainder of the stomach appeared very erythematous.  Biopsies taken.  The stomach distended normally. Normal rugal folds were seen. The pylorus was patent. Retroflexion revealed a normal fundus and a normal cardia.   3. Duodenum: The duodenal mucosa appeared normal in the 1st and 2nd portion of the duodenum.     Flexible sigmoidoscopy findings:    1. NATHANAEL: normal rectal tone, no masses palpated.   2. No polyp(s) noted to the proximal sigmoid/distal descending  3. The colonic mucosa throughout the colon showed normal vascular pattern, without evidence of angioectasias or inflammation. Random colon biopsies taken  4. Diverticulosis: none noted.  5. A retroflexed view of the rectum revealed hemorrhoids.    Impression:  No etiology for symptoms found, possible infectious    Recommend:  Await pathology  NPO given slowed motility and vomiting recently   Antiemetics  IVF    >>>If tissue was obtained and you have not received your pathology results either by phone or letter within 2 weeks, please call our office at [phone].    Specimens: gastric and random colon biopsies

## 2024-11-09 NOTE — PLAN OF CARE
Patient alert x2. Denies pain. Nausea managed with PRN zofran. X1 emesis episode. Currently NPO for EGD today. Tap water enema delayed due to continuous vomiting when staff got patient up to bathroom to do the enema. Patient could not stand up long enough without throwing up to do enema. Safety precautions in place. Call light within reach.    Problem: Patient Centered Care  Goal: Patient preferences are identified and integrated in the patient's plan of care  Description: Interventions:  - What would you like us to know as we care for you? I live with my caregiver   - Provide timely, complete, and accurate information to patient/family  - Incorporate patient and family knowledge, values, beliefs, and cultural backgrounds into the planning and delivery of care  - Encourage patient/family to participate in care and decision-making at the level they choose  - Honor patient and family perspectives and choices  Outcome: Progressing     Problem: Patient/Family Goals  Goal: Patient/Family Long Term Goal  Description: Patient's Long Term Goal: to go home    Interventions:  - Tolerate diet advancement   - Pain management   - See additional Care Plan goals for specific interventions  Outcome: Progressing  Goal: Patient/Family Short Term Goal  Description: Patient's Short Term Goal: to be able to keep food down    Interventions:   - Antiemetics as needed   - Slow diet advancement   - See additional Care Plan goals for specific interventions  Outcome: Progressing     Problem: PAIN - ADULT  Goal: Verbalizes/displays adequate comfort level or patient's stated pain goal  Description: INTERVENTIONS:  - Encourage pt to monitor pain and request assistance  - Assess pain using appropriate pain scale  - Administer analgesics based on type and severity of pain and evaluate response  - Implement non-pharmacological measures as appropriate and evaluate response  - Consider cultural and social influences on pain and pain management  -  Manage/alleviate anxiety  - Utilize distraction and/or relaxation techniques  - Monitor for opioid side effects  - Notify MD/LIP if interventions unsuccessful or patient reports new pain  - Anticipate increased pain with activity and pre-medicate as appropriate  Outcome: Progressing     Problem: RISK FOR INFECTION - ADULT  Goal: Absence of fever/infection during anticipated neutropenic period  Description: INTERVENTIONS  - Monitor WBC  - Administer growth factors as ordered  - Implement neutropenic guidelines  Outcome: Progressing     Problem: SAFETY ADULT - FALL  Goal: Free from fall injury  Description: INTERVENTIONS:  - Assess pt frequently for physical needs  - Identify cognitive and physical deficits and behaviors that affect risk of falls.  - [place] fall precautions as indicated by assessment.  - Educate pt/family on patient safety including physical limitations  - Instruct pt to call for assistance with activity based on assessment  - Modify environment to reduce risk of injury  - Provide assistive devices as appropriate  - Consider OT/PT consult to assist with strengthening/mobility  - Encourage toileting schedule  Outcome: Progressing     Problem: DISCHARGE PLANNING  Goal: Discharge to home or other facility with appropriate resources  Description: INTERVENTIONS:  - Identify barriers to discharge w/pt and caregiver  - Include patient/family/discharge partner in discharge planning  - Arrange for needed discharge resources and transportation as appropriate  - Identify discharge learning needs (meds, wound care, etc)  - Arrange for interpreters to assist at discharge as needed  - Consider post-discharge preferences of patient/family/discharge partner  - Complete POLST form as appropriate  - Assess patient's ability to be responsible for managing their own health  - Refer to Case Management Department for coordinating discharge planning if the patient needs post-hospital services based on physician/LIP order  or complex needs related to functional status, cognitive ability or social support system  Outcome: Progressing     Problem: GASTROINTESTINAL - ADULT  Goal: Minimal or absence of nausea and vomiting  Description: INTERVENTIONS:  - Maintain adequate hydration with IV or PO as ordered and tolerated  - Nasogastric tube to low intermittent suction as ordered  - Evaluate effectiveness of ordered antiemetic medications  - Provide nonpharmacologic comfort measures as appropriate  - Advance diet as tolerated, if ordered  - Obtain nutritional consult as needed  - Evaluate fluid balance  Outcome: Progressing  Goal: Maintains or returns to baseline bowel function  Description: INTERVENTIONS:  - Assess bowel function  - Maintain adequate hydration with IV or PO as ordered and tolerated  - Evaluate effectiveness of GI medications  - Encourage mobilization and activity  - Obtain nutritional consult as needed  - Establish a toileting routine/schedule  - Consider collaborating with pharmacy to review patient's medication profile  Outcome: Progressing  Goal: Maintains adequate nutritional intake (undernourished)  Description: INTERVENTIONS:  - Monitor percentage of each meal consumed  - Identify factors contributing to decreased intake, treat as appropriate  - Assist with meals as needed  - Monitor I&O, WT and lab values  - Obtain nutritional consult as needed  - Optimize oral hygiene and moisture  - Encourage food from home; allow for food preferences  - Enhance eating environment  Outcome: Progressing     Problem: Delirium  Goal: Minimize duration of delirium  Description: Interventions:  - Encourage use of hearing aids, eye glasses  - Promote highest level of mobility daily  - Provide frequent reorientation  - Promote wakefulness i.e. lights on, blinds open  - Promote sleep, encourage patient's normal rest cycle i.e. lights off, TV off, minimize noise and interruptions  - Encourage family to assist in orientation and promotion of  home routines  Outcome: Progressing

## 2024-11-09 NOTE — ANESTHESIA PREPROCEDURE EVALUATION
Is this a shared or split note between Advanced Practice Provider and Physician? Yes      [facility]   Gastroenterology Consultation Note    Christy Lowry  Patient Status:    Inpatient  Date of Admission:         11/5/2024, Hospital day #4  Attending:   Gillian Connelly MD  PCP:     Patrick Stafford MD    Reason for Consultation:  ABD pain, loose stools    History of Present Illness:  Christy Lowry is a 89 year old female w/ PMHx of BMI 26.09, actinic keratosis, OA, Osteoporosis, GERD, Hypothyroidism, Anxiety who presented to the ED with persistent ABD pain and loose stools.  Pt initially presented to ED 10/31 with similar symptoms with CT A/P at that time demonstrating severe infectious inflammatory enterocolitis but declined admission at that time.  She was discharged home on Augmentin.    Pt's daughter at bedside and assisted with HPI.    Pt completed augmentin course and dicyclomine prescribed with improvement in vomiting and diarrhea but Pt continued to feel unwell with diffuse ABD pain, nausea and poor appetite.  She had 3 hard brown bowel movements overnight with improvement in symptoms and currently is feeling well.  She typically takes imodium daily for chronic loose stools and as needed dicyclomine.  She stopped imodium since last Thursday but daughter reports that she took dicyclomine three times daily until medication ran out.  Dicyclomine prescribed as 20mg TID as needed with 13 tabs total, noted in chart review.    Her initial symptoms came on suddenly last Thursday.  No dyspepsia, black/bloody stools, fever or chills.  She now feels constipated and requested a suppository, which the nurse administered without subsequent stool yet.      Pertinent Family Hx:  - No known history of esophageal, gastric cancers.  Mother + colon cancer, Sister + colon CA.   - No known IBD  - No known liver pathologies    Endoscopy Hx:  - >10 years ago colonoscopy performed at [facility], findings  per patient: Unremarkable     Social Hx:  - No current tobacco use  - + social ETOH  - Denies cannabis/illicit drug use  - Denies NSAIDs  - Has care taker  - Occupation: Retired     History:  Past Medical History:    Actinic keratosis    hypertrophic AK - left central forehead    Benzodiazepine withdrawal without complication (HCC)    Chronic GERD    Constipation    Diarrhea    Eustachian tube dysfunction    Heart palpitations    Loose stools    Occult blood in stools    SCC (squamous cell carcinoma)    left forehead, invasive, well-diff.     SCC (squamous cell carcinoma)    right temple    SCC (squamous cell carcinoma)    right neck    Skin cancer     Past Surgical History:   Procedure Laterality Date    Tonsillectomy       Family History   Problem Relation Age of Onset    Colon Cancer Mother     Colon Cancer Sister       reports that she has quit smoking. She has quit using smokeless tobacco. She reports that she does not currently use alcohol. She reports that she does not use drugs.    Allergies:  Allergies[1]    Medications:    Current Facility-Administered Medications:     ipratropium-albuterol (Duoneb) 0.5-2.5 (3) MG/3ML inhalation solution 3 mL, 3 mL, Nebulization, Q6H PRN    benzocaine-menthol (Cepacol) lozenge 1 lozenge, 1 lozenge, Oral, PRN    bisacodyl (Dulcolax) 10 MG rectal suppository 10 mg, 10 mg, Rectal, Daily PRN    pantoprazole (Protonix) 40 mg in sodium chloride 0.9% PF 10 mL IV push, 40 mg, Intravenous, Q24H    piperacillin-tazobactam (Zosyn) 3.375 g in dextrose 5% 100 mL IVPB-ADDV, 3.375 g, Intravenous, Q8H    heparin (Porcine) 5000 UNIT/ML injection 5,000 Units, 5,000 Units, Subcutaneous, 2 times per day    morphINE PF 2 MG/ML injection 1 mg, 1 mg, Intravenous, Q2H PRN **OR** morphINE PF 2 MG/ML injection 2 mg, 2 mg, Intravenous, Q2H PRN **OR** morphINE PF 4 MG/ML injection 4 mg, 4 mg, Intravenous, Q2H PRN    ondansetron (Zofran) 4 MG/2ML injection 4 mg, 4 mg, Intravenous, Q6H PRN     metoclopramide (Reglan) 5 mg/mL injection 5 mg, 5 mg, Intravenous, Q8H PRN    Review of Systems:   CONSTITUTIONAL:  negative for fevers, chills, unintentional weight loss   EYES:  negative for diplopia or change in vision   RESPIRATORY:  negative for severe shortness of breath  CARDIOVASCULAR:  negative for crushing sub-sternal chest pain  GASTROINTESTINAL:  see HPI  GENITOURINARY:  negative for dysuria or gross hematuria  INTEGUMENT/BREAST:  SKIN:  negative for jaundice or new rash   ALLERGIC/IMMUNOLOGIC:  negative for hay fever   ENDOCRINE:  negative for cold intolerance and heat intolerance  MUSCULOSKELETAL:  negative for joint effusion/severe erythema  NEURO: negative for new loss of consciousness or dizziness   BEHAVIOR/PSYCH:  negative for psychotic behavior    Physical Exam:    Blood pressure 129/72, pulse 91, temperature 98.8 °F (37.1 °C), temperature source Oral, resp. rate 25, height 1.626 m (5' 4\"), weight 68.9 kg (152 lb), SpO2 90%. Body mass index is 26.09 kg/m².    Gen: awake, alert patient, NAD  HEENT: EOMI, the sclera appears anicteric, oropharynx clear, mucus membranes appear moist  CV: RRR  Lung: no conversational dyspnea   Abdomen: soft NT, mildly distended abdomen with hyperactive BS appreciated, mild tympany to percussion   Back: No CVA tenderness   Skin: dry, warm, no jaundice  Ext: no LE edema is evident  Neuro: Alert, oriented x3 and interactive  Psych: calm, cooperative    Laboratory Data:  Lab Results   Component Value Date    WBC 9.5 11/09/2024    HGB 12.7 11/09/2024    HCT 38.8 11/09/2024    .0 11/09/2024    CREATSERUM 0.88 11/09/2024    BUN 10 11/09/2024     11/09/2024    K 4.8 11/09/2024     11/09/2024    CO2 25.0 11/09/2024     11/09/2024    CA 9.1 11/09/2024       Imaging:  CT CHEST+ABDOMEN+PELVIS(ALL CNTRST ONLY)(CPT=71260/47292)    Result Date: 11/8/2024  CONCLUSION:  1. Fluid-filled loops of small and large bowel and extensive mucosal  thickening/enhancement, which could reflect underlying infectious/inflammatory enterocolitis or malabsorption in the appropriate clinical setting.  2. Nonspecific decompressed-appearance of the hepatic flexure; while not fully characterized within the parameters of this study, the possibility of an underlying lesion or non-neoplastic stricture should be considered.  3. Mild mesenteric edema and trace intraperitoneal ascites, likely reactive.  4. Moderate right and small left pleural effusions and associated basilar atelectasis, increased from previous, with or without superimposed pneumonia.  5. Redemonstration of a lobulated cystic lesion of the left lower lobe. This may reflect loculated pleural fluid.  6. Mild gallbladder distension, which may be reactive.  7. Anasarca is present.  8. Lesser incidental findings as above.    A preliminary report was issued by the [facility] teleradiology service. There are no major discrepancies.   Dictated by (CST): Anthony Garces MD on 11/08/2024 at 10:51 AM     Finalized by (CST): Anthony Garces MD on 11/08/2024 at 11:04 AM           Assessment & Plan   Christy Lowry is a 89 year old female w/ PMHx of BMI 26.09, actinic keratosis, OA, Osteoporosis, GERD, Hypothyroidism, Anxiety who presented to the ED with persistent ABD pain and loose stools.  Pt initially presented to ED 10/31 with similar symptoms with CT A/P at that time demonstrating severe infectious inflammatory enterocolitis but declined admission at that time.  She was discharged home on Augmentin.    #Altered bowel habits  #N/V  -Labs on admission with slight leukocytosis and elevated ANC, otherwise unremarkable  -CT A/P on 10/31/2024 demonstrated no evidence of bowel obstruction, HH, fluid filled loops of small bowel but nondilated compatible with moderate-severe infectious/inflammatory enterocolitis, fluid throughout the colon to the level of the rectum with long segment proximal colonic wall thickening and  mild proximal pericolonic inflammation  -Last colonoscopy in the remote past  -Small hard stools overnight  -Etiology likely infectious source given acute onset and improvement.  Possible component of constipation now given imodium and dicyclomine use.    -Symptoms improved following bowel movement but Pt continues to feel constipated.  Awaiting suppository effects to activate.  Ok from GI stance to trial liquids and ADAT.  If symptoms including diarrhea recur, would send stool studies.    Recommend:  -Clear liquid diet, ADAT  -GI stool and C. Diff PCR if symptoms recur  -Bowel regimen as needed    Thank you for the opportunity to participate in the care of this patient.    Case discussed with Tosha Valle MD and lAisia ANGULO.    [facility], [facility] Gastroenterology  11/6/2024   Anesthesia PreOp Note    HPI:     Christy Lowry is a 89 year old female who presents for preoperative consultation requested by: Breana Barbosa MD    Date of Surgery: 11/5/2024 - 11/9/2024    Procedure(s):  ESOPHAGOGASTRODUODENOSCOPY (EGD)  FLEXIBLE SIGMOIDOSCOPY  Indication: diarrhea, nausea and vomiting, abnormal ct    Relevant Problems   No relevant active problems       NPO:  Last Liquid Consumption Date: 11/08/24  Last Liquid Consumption Time: 2300  Last Solid Consumption Date: 11/09/24  Last Solid Consumption Time: 0000  Last Liquid Consumption Date: 11/08/24          History Review:  Patient Active Problem List    Diagnosis Date Noted    Nausea and vomiting in adult 11/05/2024    Persistent vomiting in adult patient 11/05/2024    Enteritis 11/05/2024    Irritable bowel syndrome with diarrhea 05/30/2023    Generalized anxiety disorder 11/29/2017    Neoplasm of uncertain behavior of skin 10/26/2017    Arthralgia of right lower leg 05/02/2017    Acquired hypothyroidism 07/12/2016    Moderate single current episode of major depressive disorder (HCC) 02/15/2016    Skin cancer        Past Medical History:    Actinic  keratosis    hypertrophic AK - left central forehead    Benzodiazepine withdrawal without complication (HCC)    Chronic GERD    Constipation    Diarrhea    Eustachian tube dysfunction    Heart palpitations    Loose stools    Occult blood in stools    SCC (squamous cell carcinoma)    left forehead, invasive, well-diff.     SCC (squamous cell carcinoma)    right temple    SCC (squamous cell carcinoma)    right neck    Skin cancer       Past Surgical History:   Procedure Laterality Date    Tonsillectomy         Prescriptions Prior to Admission[2]  Current Medications and Prescriptions Ordered in Epic[3]    Allergies[4]    Family History   Problem Relation Age of Onset    Colon Cancer Mother     Colon Cancer Sister      Social History     Socioeconomic History    Marital status:    Tobacco Use    Smoking status: Former    Smokeless tobacco: Former   Vaping Use    Vaping status: Never Used   Substance and Sexual Activity    Alcohol use: Not Currently    Drug use: No   Other Topics Concern    Reaction to local anesthetic No       Available pre-op labs reviewed.  Lab Results   Component Value Date    WBC 9.5 11/09/2024    RBC 4.36 11/09/2024    HGB 12.7 11/09/2024    HCT 38.8 11/09/2024    MCV 89.0 11/09/2024    MCH 29.1 11/09/2024    MCHC 32.7 11/09/2024    RDW 15.2 (H) 11/09/2024    .0 11/09/2024     Lab Results   Component Value Date     11/09/2024    K 4.8 11/09/2024     11/09/2024    CO2 25.0 11/09/2024    BUN 10 11/09/2024    CREATSERUM 0.88 11/09/2024     (H) 11/09/2024    CA 9.1 11/09/2024          Vital Signs:  Body mass index is 26.09 kg/m².   height is 1.626 m (5' 4\") and weight is 68.9 kg (152 lb). Her oral temperature is 98.8 °F (37.1 °C). Her blood pressure is 129/72 and her pulse is 91. Her respiration is 25 and oxygen saturation is 90%.   Vitals:    11/08/24 2017 11/09/24 0356 11/09/24 0616 11/09/24 0955   BP: 145/78 127/50  129/72   Pulse: 99 98  91   Resp: 18 18  25    Temp: 98.6 °F (37 °C) 99.3 °F (37.4 °C) 98.8 °F (37.1 °C)    TempSrc: Axillary Oral Oral    SpO2: 92% 91%  90%   Weight:       Height:            Anesthesia Evaluation     Patient summary reviewed and Nursing notes reviewed    Airway   Mallampati: II  TM distance: >3 FB  Neck ROM: full  Dental          Pulmonary    (+) shortness of breath, rhonchi, rales  Cardiovascular   Exercise tolerance: good    NYHA Classification: I  ECG reviewed  Rhythm: regular  Rate: normal  ROS comment: arrative  Performed by: [facility]  Normal sinus rhythm  Possible Inferior infarct , age undetermined  Cannot rule out Anterior infarct , age undetermined  Abnormal ECG  No previous ECGs found in [facility]  Confirmed by KHOA BENNETT ([id number]) on 11/1/2024 10:04:20 AM  Specimen Collected: 10/31/24 19:12 Last Resulted: 11/01/24 10:04           Neuro/Psych      GI/Hepatic/Renal    (+) GERD    Endo/Other      Comments:   Assessment and Plan:     Acute enterocolitis  Acute intractable nausea and vomiting  - found again on CT scan of the abdomen and pelvis  - patient also has loculated pleural effusions- may need to get Pulm eval   - IV zosyn  - NPO  - going for an EGD and flex sig tomorrow   - gentle IV hydration  - IV zofran for nausea  - pain control  - PT/OT eval     Abdominal      Other findings: Bridge   Very bad dentition             Anesthesia Plan:   ASA:  4  Plan:   MAC  Plan Comments: Discussed with son over the phone   Informed Consent Plan and Risks Discussed With:  Patient  Discussed plan with:  Attending and surgeon      I have informed Christy Lowry and/or legal guardian or family member of the nature of the anesthetic plan, benefits, risks including possible dental damage if relevant, major complications, and any alternative forms of anesthetic management.   All of the patient's questions were answered to the best of my ability. The patient desires the anesthetic management as planned.  LADY GOODSON MD  11/9/2024 10:27 AM  Present on  Admission:  **None**           [1] No Known Allergies  [2]   Medications Prior to Admission   Medication Sig Dispense Refill Last Dose/Taking    ondansetron (ZOFRAN) 4 mg tablet Take 1 tablet (4 mg total) by mouth every 8 (eight) hours as needed for Nausea. 30 tablet 0 2024 at  9:00 AM    [] amoxicillin clavulanate 875-125 MG Oral Tab Take 1 tablet by mouth 2 (two) times daily for 7 days. 14 tablet 0 2024 at  9:00 AM    dicyclomine 20 MG Oral Tab Take 1 tablet (20 mg total) by mouth 3 (three) times daily as needed. 13 tablet 0 2024    escitalopram 10 MG Oral Tab Take 0.5 tablets (5 mg total) by mouth daily. 45 tablet 3 2024 at  9:00 AM    levothyroxine 50 MCG Oral Tab Take 1 tablet (50 mcg total) by mouth before breakfast. 90 tablet 3 2024 at  9:00 AM    dicyclomine 10 MG Oral Cap Take 1 capsule (10 mg total) by mouth every 12 (twelve) hours as needed. 180 capsule 3 2024    Loperamide HCl 1 MG/7.5ML Oral Liquid Take 15 mL (2 mg total) by mouth 3 (three) times daily as needed for Diarrhea.   2024 at  8:00 PM    [] ondansetron 8 MG Oral Tablet Dispersible Take 1 tablet (8 mg total) by mouth every 4 (four) hours as needed for Nausea. 10 tablet 0 Unknown    potassium chloride (KLOR-CON) 20 MEQ Oral Powd Pack Take 20 mEq by mouth daily. 90 each 0 Unknown    cephalexin 500 MG Oral Cap Take 1 capsule (500 mg total) by mouth 2 (two) times daily. (Patient not taking: Reported on 10/31/2024) 14 capsule 0 Unknown   [3]   Current Facility-Administered Medications Ordered in Epic   Medication Dose Route Frequency Provider Last Rate Last Admin    ipratropium-albuterol (Duoneb) 0.5-2.5 (3) MG/3ML inhalation solution 3 mL  3 mL Nebulization Q6H PRN Gillian Connelly MD   3 mL at 24 0526    benzocaine-menthol (Cepacol) lozenge 1 lozenge  1 lozenge Oral PRN Jojo Rush MD   1 lozenge at 24 3659    bisacodyl (Dulcolax) 10 MG rectal suppository 10 mg  10 mg Rectal Daily  PRN Gillian Connelly MD   10 mg at 11/06/24 1047    pantoprazole (Protonix) 40 mg in sodium chloride 0.9% PF 10 mL IV push  40 mg Intravenous Q24H Rosemarie Han APRN   40 mg at 11/08/24 1528    piperacillin-tazobactam (Zosyn) 3.375 g in dextrose 5% 100 mL IVPB-ADDV  3.375 g Intravenous Q8H Chelly Dangelo MD 25 mL/hr at 11/09/24 0808 3.375 g at 11/09/24 0808    heparin (Porcine) 5000 UNIT/ML injection 5,000 Units  5,000 Units Subcutaneous 2 times per day Chelly Dangelo MD   5,000 Units at 11/08/24 2041    morphINE PF 2 MG/ML injection 1 mg  1 mg Intravenous Q2H PRN Chelly Dangelo MD        Or    morphINE PF 2 MG/ML injection 2 mg  2 mg Intravenous Q2H PRN Chelly Dangelo MD   2 mg at 11/06/24 0121    Or    morphINE PF 4 MG/ML injection 4 mg  4 mg Intravenous Q2H PRN Chelly Dangelo MD        ondansetron (Zofran) 4 MG/2ML injection 4 mg  4 mg Intravenous Q6H PRN Chelly Dangelo MD   4 mg at 11/09/24 0614    metoclopramide (Reglan) 5 mg/mL injection 5 mg  5 mg Intravenous Q8H PRN Chelly Dangelo MD   5 mg at 11/09/24 0638     No current [facility]-ordered outpatient medications on file.   [4] No Known Allergies

## 2024-11-09 NOTE — PROGRESS NOTES
[facility]  part of [facility]    Progress Note    Christy Lowry Patient Status:  Inpatient    1924 MRN [id number]   Location [facility] 4W/SW/SE Attending Gillian Connelly MD   Hosp Day # 4 PCP Patrick Stafford MD       Subjective:   Christy Lowry just got done from her EGD and flex sig. Patient is resting. Breathing well.     Objective:   Blood pressure 113/52, pulse 79, temperature 98.8 °F (37.1 °C), temperature source Oral, resp. rate 24, height 5' 4\" (1.626 m), weight 152 lb (68.9 kg), SpO2 91%.    Physical Exam:    General: No acute distress.   Respiratory: Clear to auscultation bilaterally. No wheezes. No rhonchi.  Cardiovascular: S1, S2. Regular rate and rhythm. No murmurs, rubs or gallops.   Abdomen: no abdominal pain, soft nontender.   Neurologic: No focal neurological deficits.   Musculoskeletal: Moves all extremities.  Extremities: No edema.    Results:     Lab Results   Component Value Date    WBC 9.5 2024    HGB 12.7 2024    HCT 38.8 2024    .0 2024    CREATSERUM 0.88 2024    BUN 10 2024     2024    K 4.8 2024     2024    CO2 25.0 2024     (H) 2024    CA 9.1 2024    ALB 3.6 2024    ALKPHO 84 2024    BILT 0.3 2024    TP 7.0 2024    AST 25 2024    ALT 9 (L) 2024    T4F 1.3 2023    TSH 5.400 (H) 2023    LIP 21 2024    MG 2.2 2023       CT CHEST+ABDOMEN+PELVIS(ALL CNTRST ONLY)(CPT=71260/20530)    Result Date: 2024  CONCLUSION:  1. Fluid-filled loops of small and large bowel and extensive mucosal thickening/enhancement, which could reflect underlying infectious/inflammatory enterocolitis or malabsorption in the appropriate clinical setting.  2. Nonspecific decompressed-appearance of the hepatic flexure; while not fully characterized within the parameters of this study, the possibility of an underlying lesion or  non-neoplastic stricture should be considered.  3. Mild mesenteric edema and trace intraperitoneal ascites, likely reactive.  4. Moderate right and small left pleural effusions and associated basilar atelectasis, increased from previous, with or without superimposed pneumonia.  5. Redemonstration of a lobulated cystic lesion of the left lower lobe. This may reflect loculated pleural fluid.  6. Mild gallbladder distension, which may be reactive.  7. Anasarca is present.  8. Lesser incidental findings as above.    A preliminary report was issued by the [facility] teleradiology service. There are no major discrepancies.   Dictated by (CST): Anthony Garces MD on 11/08/2024 at 10:51 AM     Finalized by (CST): Anthony Garces MD on 11/08/2024 at 11:04 AM              pantoprazole  40 mg Intravenous Q24H    piperacillin-tazobactam  3.375 g Intravenous Q8H    heparin  5,000 Units Subcutaneous 2 times per day       ipratropium-albuterol    benzocaine-menthol    bisacodyl    morphINE **OR** morphINE **OR** morphINE    ondansetron    metoclopramide      Assessment and Plan:      Acute enterocolitis  Acute intractable nausea and vomiting  - found again on CT scan of the abdomen and pelvis  - patient also has loculated pleural effusions- may need to get Pulm eval   - IV zosyn  - NPO  - going for an EGD and flex sig today-hemorhoids and bile  - gentle IV hydration  - IV zofran for nausea  - pain control  - PT/OT eval   - discussed with family at bedside         Quality:  DVT Prophylaxis: Heparin  CODE status: Full    MDM. [facility]      LASHAE HUMMEL MD  11/09/24

## 2024-11-10 ENCOUNTER — APPOINTMENT (OUTPATIENT)
Dept: GENERAL RADIOLOGY | Facility: HOSPITAL | Age: 89
End: 2024-11-10
Attending: HOSPITALIST
Payer: MEDICARE

## 2024-11-10 LAB
ALBUMIN SERPL-MCNC: 3.1 G/DL (ref 3.2–4.8)
ALBUMIN/GLOB SERPL: 1 {RATIO} (ref 1–2)
ALP LIVER SERPL-CCNC: 78 U/L
ALT SERPL-CCNC: <7 U/L
AMMONIA PLAS-MCNC: 29 UMOL/L (ref 11–32)
ANION GAP SERPL CALC-SCNC: 6 MMOL/L (ref 0–18)
ANION GAP SERPL CALC-SCNC: 6 MMOL/L (ref 0–18)
AST SERPL-CCNC: 20 U/L (ref ?–34)
BASE EXCESS BLD CALC-SCNC: 7.4 MMOL/L (ref ?–2)
BASOPHILS # BLD AUTO: 0.06 X10(3) UL (ref 0–0.2)
BASOPHILS NFR BLD AUTO: 0.5 %
BILIRUB SERPL-MCNC: 0.5 MG/DL (ref 0.2–0.9)
BUN BLD-MCNC: 14 MG/DL (ref 9–23)
BUN BLD-MCNC: 14 MG/DL (ref 9–23)
BUN/CREAT SERPL: 16.1 (ref 10–20)
BUN/CREAT SERPL: 16.1 (ref 10–20)
CA-I BLD-SCNC: 1.2 MMOL/L (ref 0.95–1.32)
CALCIUM BLD-MCNC: 8.7 MG/DL (ref 8.7–10.4)
CALCIUM BLD-MCNC: 8.7 MG/DL (ref 8.7–10.4)
CHLORIDE SERPL-SCNC: 103 MMOL/L (ref 98–112)
CHLORIDE SERPL-SCNC: 103 MMOL/L (ref 98–112)
CO2 SERPL-SCNC: 29 MMOL/L (ref 21–32)
CO2 SERPL-SCNC: 29 MMOL/L (ref 21–32)
COHGB MFR BLD: 1.7 % (ref 0–3)
CREAT BLD-MCNC: 0.87 MG/DL
CREAT BLD-MCNC: 0.87 MG/DL
DEPRECATED RDW RBC AUTO: 51.7 FL (ref 35.1–46.3)
EGFRCR SERPLBLD CKD-EPI 2021: 59 ML/MIN/1.73M2 (ref 60–?)
EGFRCR SERPLBLD CKD-EPI 2021: 59 ML/MIN/1.73M2 (ref 60–?)
EOSINOPHIL # BLD AUTO: 0.05 X10(3) UL (ref 0–0.7)
EOSINOPHIL NFR BLD AUTO: 0.4 %
ERYTHROCYTE [DISTWIDTH] IN BLOOD BY AUTOMATED COUNT: 15.3 % (ref 11–15)
GLOBULIN PLAS-MCNC: 3.1 G/DL (ref 2–3.5)
GLUCOSE BLD-MCNC: 106 MG/DL (ref 70–99)
GLUCOSE BLD-MCNC: 106 MG/DL (ref 70–99)
HCO3 BLDA-SCNC: 30.6 MEQ/L (ref 21–27)
HCT VFR BLD AUTO: 39.2 %
HGB BLD-MCNC: 12.5 G/DL
HGB BLD-MCNC: 13.2 G/DL
IMM GRANULOCYTES # BLD AUTO: 0.07 X10(3) UL (ref 0–1)
IMM GRANULOCYTES NFR BLD: 0.6 %
LACTATE BLD-SCNC: 1 MMOL/L (ref 0.5–2)
LYMPHOCYTES # BLD AUTO: 1.48 X10(3) UL (ref 1–4)
LYMPHOCYTES NFR BLD AUTO: 12.7 %
MCH RBC QN AUTO: 29.3 PG (ref 26–34)
MCHC RBC AUTO-ENTMCNC: 31.9 G/DL (ref 31–37)
MCV RBC AUTO: 92 FL
METHGB MFR BLD: 0.8 % SAT (ref 0.4–1.5)
MODIFIED ALLEN TEST: POSITIVE
MONOCYTES # BLD AUTO: 1.28 X10(3) UL (ref 0.1–1)
MONOCYTES NFR BLD AUTO: 11 %
NEUTROPHILS # BLD AUTO: 8.7 X10 (3) UL (ref 1.5–7.7)
NEUTROPHILS # BLD AUTO: 8.7 X10(3) UL (ref 1.5–7.7)
NEUTROPHILS NFR BLD AUTO: 74.8 %
O2 CT BLD-SCNC: 17.7 VOL% (ref 15–23)
OSMOLALITY SERPL CALC.SUM OF ELEC: 287 MOSM/KG (ref 275–295)
OSMOLALITY SERPL CALC.SUM OF ELEC: 287 MOSM/KG (ref 275–295)
PCO2 BLDA: 44 MM HG (ref 35–45)
PH BLDA: 7.47 [PH] (ref 7.35–7.45)
PLATELET # BLD AUTO: 329 10(3)UL (ref 150–450)
PO2 BLDA: 85 MM HG (ref 80–100)
POTASSIUM BLD-SCNC: 4.1 MMOL/L (ref 3.6–5.1)
POTASSIUM SERPL-SCNC: 4.1 MMOL/L (ref 3.5–5.1)
POTASSIUM SERPL-SCNC: 4.1 MMOL/L (ref 3.5–5.1)
PROT SERPL-MCNC: 6.2 G/DL (ref 5.7–8.2)
PUNCTURE CHARGE: YES
RBC # BLD AUTO: 4.26 X10(6)UL
SAO2 % BLDA: 97.2 % (ref 94–100)
SODIUM BLD-SCNC: 134 MMOL/L (ref 135–145)
SODIUM SERPL-SCNC: 138 MMOL/L (ref 136–145)
SODIUM SERPL-SCNC: 138 MMOL/L (ref 136–145)
WBC # BLD AUTO: 11.6 X10(3) UL (ref 4–11)

## 2024-11-10 PROCEDURE — 71045 X-RAY EXAM CHEST 1 VIEW: CPT | Performed by: HOSPITALIST

## 2024-11-10 PROCEDURE — 99222 1ST HOSP IP/OBS MODERATE 55: CPT | Performed by: STUDENT IN AN ORGANIZED HEALTH CARE EDUCATION/TRAINING PROGRAM

## 2024-11-10 PROCEDURE — 99233 SBSQ HOSP IP/OBS HIGH 50: CPT | Performed by: HOSPITALIST

## 2024-11-10 PROCEDURE — 99233 SBSQ HOSP IP/OBS HIGH 50: CPT | Performed by: INTERNAL MEDICINE

## 2024-11-10 RX ORDER — DEXTROSE MONOHYDRATE AND SODIUM CHLORIDE 5; .45 G/100ML; G/100ML
INJECTION, SOLUTION INTRAVENOUS CONTINUOUS
Status: DISCONTINUED | OUTPATIENT
Start: 2024-11-10 | End: 2024-11-14

## 2024-11-10 NOTE — PLAN OF CARE
Patient tolerating restraints that MD ordered. Restrained due to patient pulling NG tube. Spoke to ALISSA hurd about current plan of care. Frequent rounding.   Problem: Safety Risk - Non-Violent Restraints  Goal: Patient will remain free from self-harm  Description: INTERVENTIONS:  - Apply the least restrictive restraint to prevent harm  - Notify patient and family of reasons restraints applied  - Assess for any contributing factors to confusion (electrolyte disturbances, delirium, medications)  - Discontinue any unnecessary medical devices as soon as possible  - Assess the patient's physical comfort, circulation, skin condition, hydration, nutrition and elimination needs   - Reorient and redirection as needed  - Assess for the need to continue restraints  Outcome: Progressing

## 2024-11-10 NOTE — CONSULTS
[facility]  part of [facility]    Report of Consultation    Christy Lowry Patient Status:  Inpatient    1924 MRN [id number]   Location [facility] 4W/SW/SE Attending Gillian Connelly MD   Hosp Day # 5 PCP Patrick Stafford MD     Date of Admission:  2024  Date of Consult:  2024  Reason for Consultation:   Nausea and vomiting in adult    History of Present Illness:   Patient is a 89 year old female who was admitted to the hospital for Nausea and vomiting in adult:    She presented to the hospital on 24 with nausea, vomiting. CT with enteritis, possible stricture at hepatic flexure. GI panel negative. Underwent EGD, flex sig yesterday per GI largely unremarkable. She then had worsening n/v, NGT was placed overnight. Today the patient had a BM. Denies abdominal pain. NGT with ~300 bilious output. No prior abdominal surgical hx.     Past Medical History  Past Medical History:    Actinic keratosis    hypertrophic AK - left central forehead    Benzodiazepine withdrawal without complication (HCC)    Chronic GERD    Constipation    Diarrhea    Eustachian tube dysfunction    Heart palpitations    Loose stools    Occult blood in stools    SCC (squamous cell carcinoma)    left forehead, invasive, well-diff.     SCC (squamous cell carcinoma)    right temple    SCC (squamous cell carcinoma)    right neck    Skin cancer       Past Surgical History  Past Surgical History:   Procedure Laterality Date    Tonsillectomy         Family History  Family History   Problem Relation Age of Onset    Colon Cancer Mother     Colon Cancer Sister        Social History  Social History     Socioeconomic History    Marital status:    Tobacco Use    Smoking status: Former    Smokeless tobacco: Former   Vaping Use    Vaping status: Never Used   Substance and Sexual Activity    Alcohol use: Not Currently    Drug use: No   Other Topics Concern    Reaction to local anesthetic No     Social Drivers  of Health     Food Insecurity: No Food Insecurity (2024)    Food Insecurity     Food Insecurity: Never true   Transportation Needs: No Transportation Needs (2024)    Transportation Needs     Lack of Transportation: No   Housing Stability: Low Risk  (2024)    Housing Stability     Housing Instability: No           Current Medications:  Current Facility-Administered Medications   Medication Dose Route Frequency    dextrose 5%-sodium chloride 0.45% infusion   Intravenous Continuous    ipratropium-albuterol (Duoneb) 0.5-2.5 (3) MG/3ML inhalation solution 3 mL  3 mL Nebulization Q6H PRN    benzocaine-menthol (Cepacol) lozenge 1 lozenge  1 lozenge Oral PRN    bisacodyl (Dulcolax) 10 MG rectal suppository 10 mg  10 mg Rectal Daily PRN    pantoprazole (Protonix) 40 mg in sodium chloride 0.9% PF 10 mL IV push  40 mg Intravenous Q24H    piperacillin-tazobactam (Zosyn) 3.375 g in dextrose 5% 100 mL IVPB-ADDV  3.375 g Intravenous Q8H    heparin (Porcine) 5000 UNIT/ML injection 5,000 Units  5,000 Units Subcutaneous 2 times per day    morphINE PF 2 MG/ML injection 1 mg  1 mg Intravenous Q2H PRN    Or    morphINE PF 2 MG/ML injection 2 mg  2 mg Intravenous Q2H PRN    Or    morphINE PF 4 MG/ML injection 4 mg  4 mg Intravenous Q2H PRN    ondansetron (Zofran) 4 MG/2ML injection 4 mg  4 mg Intravenous Q6H PRN    metoclopramide (Reglan) 5 mg/mL injection 5 mg  5 mg Intravenous Q8H PRN     Medications Prior to Admission   Medication Sig    ondansetron (ZOFRAN) 4 mg tablet Take 1 tablet (4 mg total) by mouth every 8 (eight) hours as needed for Nausea.    [] amoxicillin clavulanate 875-125 MG Oral Tab Take 1 tablet by mouth 2 (two) times daily for 7 days.    dicyclomine 20 MG Oral Tab Take 1 tablet (20 mg total) by mouth 3 (three) times daily as needed.    escitalopram 10 MG Oral Tab Take 0.5 tablets (5 mg total) by mouth daily.    levothyroxine 50 MCG Oral Tab Take 1 tablet (50 mcg total) by mouth before breakfast.     dicyclomine 10 MG Oral Cap Take 1 capsule (10 mg total) by mouth every 12 (twelve) hours as needed.    Loperamide HCl 1 MG/7.5ML Oral Liquid Take 15 mL (2 mg total) by mouth 3 (three) times daily as needed for Diarrhea.    [] ondansetron 8 MG Oral Tablet Dispersible Take 1 tablet (8 mg total) by mouth every 4 (four) hours as needed for Nausea.    potassium chloride (KLOR-CON) 20 MEQ Oral Powd Pack Take 20 mEq by mouth daily.    cephalexin 500 MG Oral Cap Take 1 capsule (500 mg total) by mouth 2 (two) times daily. (Patient not taking: Reported on 10/31/2024)       Allergies  Allergies[1]    Review of Systems:   Review of Systems   All other systems reviewed and are negative.       Physical Exam:   Vital Signs:  Blood pressure 137/61, pulse 81, temperature 98.3 °F (36.8 °C), temperature source Oral, resp. rate 18, height 5' 4\" (1.626 m), weight 152 lb (68.9 kg), SpO2 97%.     Physical Exam  Vitals and nursing note reviewed. Exam conducted with a chaperone present.   Constitutional:       General: She is not in acute distress.  HENT:      Head: Normocephalic and atraumatic.      Nose: Nose normal.      Mouth/Throat:      Mouth: Mucous membranes are moist.   Eyes:      Extraocular Movements: Extraocular movements intact.      Pupils: Pupils are equal, round, and reactive to light.   Cardiovascular:      Rate and Rhythm: Normal rate and regular rhythm.      Pulses: Normal pulses.      Heart sounds: Normal heart sounds.   Pulmonary:      Effort: Pulmonary effort is normal.      Breath sounds: Normal breath sounds.   Abdominal:      General: Abdomen is flat. There is distension.      Palpations: Abdomen is soft.      Tenderness: There is no abdominal tenderness.   Musculoskeletal:         General: Normal range of motion.      Cervical back: Normal range of motion and neck supple.   Skin:     General: Skin is warm and dry.      Capillary Refill: Capillary refill takes less than 2 seconds.   Neurological:       General: No focal deficit present.      Mental Status: She is alert and oriented to person, place, and time.   Psychiatric:         Mood and Affect: Mood normal.         Behavior: Behavior normal.          Results:     Laboratory Data:  Lab Results   Component Value Date    WBC 11.6 (H) 11/10/2024    HGB 12.5 11/10/2024    HCT 39.2 11/10/2024    .0 11/10/2024    CREATSERUM 0.87 11/10/2024    CREATSERUM 0.87 11/10/2024    BUN 14 11/10/2024    BUN 14 11/10/2024     11/10/2024     11/10/2024    K 4.1 11/10/2024    K 4.1 11/10/2024     11/10/2024     11/10/2024    CO2 29.0 11/10/2024    CO2 29.0 11/10/2024     (H) 11/10/2024     (H) 11/10/2024    CA 8.7 11/10/2024    CA 8.7 11/10/2024    ALB 3.1 (L) 11/10/2024    ALKPHO 78 11/10/2024    TP 6.2 11/10/2024    AST 20 11/10/2024    ALT <7 (L) 11/10/2024    T4F 1.3 05/30/2023    TSH 5.400 (H) 05/30/2023    LIP 21 11/05/2024    MG 2.2 06/14/2023         Imaging:  XR CHEST AP PORTABLE  (CPT=71045)    Result Date: 11/10/2024  CONCLUSION:  1. NG tube tip in gastric antrum. 2. Right greater than left basilar pleural effusions with compressive atelectasis. 3. Borderline cardiomegaly with pulmonary venous congestion. 4. No significant change.    Dictated by (CST): Yfn Workman MD on 11/10/2024 at 10:05 AM     Finalized by (CST): Yfn Workman MD on 11/10/2024 at 10:06 AM          XR CHEST AP PORTABLE  (CPT=71045)    Result Date: 11/10/2024  CONCLUSION:  1. Mild fluid overload/CHF with right greater than left pleural effusions and compressive atelectasis.  Coexistent basilar pneumonia should be excluded clinically.    Dictated by (CST): Yfn Workman MD on 11/10/2024 at 9:53 AM     Finalized by (CST): Yfn Workman MD on 11/10/2024 at 9:58 AM              Impression:   89 year old female with nausea/vomiting, unclear enteritis vs pSBO picture, nonsurgical abdomen.       Recommendations:  - Plan for SBFT tomorrow  - NPO, NGT to  [facility]  - Will follow     Thank you for allowing me to participate in the care of your patient.      Mavis Garg MD  [facility] - General Surgery   [address] [place] [address]  p [phone]    11/10/2024           [1] No Known Allergies

## 2024-11-10 NOTE — PROGRESS NOTES
[facility]  part of [facility]    Progress Note    Christy Lowry Patient Status:  Inpatient    1924 MRN [id number]   Location [facility] 4W/SW/SE Attending Gillian Connelly MD   Hosp Day # 5 PCP Patrick Stafford MD       Subjective:   Christy Lowry was throwing up last night. She had an NG tube placed No fever. Family at bedside. Had small hard stools last night.     Objective:   Blood pressure 137/61, pulse 81, temperature 98.3 °F (36.8 °C), temperature source Oral, resp. rate 18, height 5' 4\" (1.626 m), weight 152 lb (68.9 kg), SpO2 97%.    Physical Exam:    General: No acute distress.   Respiratory: Clear to auscultation bilaterally. No wheezes. No rhonchi.  Cardiovascular: S1, S2. Regular rate and rhythm. No murmurs, rubs or gallops.   Abdomen: no abdominal pain, soft nontender.   Neurologic: No focal neurological deficits.   Musculoskeletal: Moves all extremities.  Extremities: No edema.    Results:     Lab Results   Component Value Date    WBC 11.6 (H) 11/10/2024    HGB 12.5 11/10/2024    HCT 39.2 11/10/2024    .0 11/10/2024    CREATSERUM 0.87 11/10/2024    CREATSERUM 0.87 11/10/2024    BUN 14 11/10/2024    BUN 14 11/10/2024     11/10/2024     11/10/2024    K 4.1 11/10/2024    K 4.1 11/10/2024     11/10/2024     11/10/2024    CO2 29.0 11/10/2024    CO2 29.0 11/10/2024     (H) 11/10/2024     (H) 11/10/2024    CA 8.7 11/10/2024    CA 8.7 11/10/2024    ALB 3.1 (L) 11/10/2024    ALKPHO 78 11/10/2024    BILT 0.5 11/10/2024    TP 6.2 11/10/2024    AST 20 11/10/2024    ALT <7 (L) 11/10/2024    T4F 1.3 2023    TSH 5.400 (H) 2023    LIP 21 2024    MG 2.2 2023       XR CHEST AP PORTABLE  (CPT=71045)    Result Date: 11/10/2024  CONCLUSION:  1. NG tube tip in gastric antrum. 2. Right greater than left basilar pleural effusions with compressive atelectasis. 3. Borderline cardiomegaly with pulmonary venous congestion. 4.  No significant change.    Dictated by (CST): Yfn Workman MD on 11/10/2024 at 10:05 AM     Finalized by (CST): Yfn Workman MD on 11/10/2024 at 10:06 AM          XR CHEST AP PORTABLE  (CPT=71045)    Result Date: 11/10/2024  CONCLUSION:  1. Mild fluid overload/CHF with right greater than left pleural effusions and compressive atelectasis.  Coexistent basilar pneumonia should be excluded clinically.    Dictated by (CST): Yfn Workman MD on 11/10/2024 at 9:53 AM     Finalized by (CST): Yfn Workman MD on 11/10/2024 at 9:58 AM          CT CHEST+ABDOMEN+PELVIS(ALL CNTRST ONLY)(CPT=71260/86480)    Result Date: 11/8/2024  CONCLUSION:  1. Fluid-filled loops of small and large bowel and extensive mucosal thickening/enhancement, which could reflect underlying infectious/inflammatory enterocolitis or malabsorption in the appropriate clinical setting.  2. Nonspecific decompressed-appearance of the hepatic flexure; while not fully characterized within the parameters of this study, the possibility of an underlying lesion or non-neoplastic stricture should be considered.  3. Mild mesenteric edema and trace intraperitoneal ascites, likely reactive.  4. Moderate right and small left pleural effusions and associated basilar atelectasis, increased from previous, with or without superimposed pneumonia.  5. Redemonstration of a lobulated cystic lesion of the left lower lobe. This may reflect loculated pleural fluid.  6. Mild gallbladder distension, which may be reactive.  7. Anasarca is present.  8. Lesser incidental findings as above.    A preliminary report was issued by the [facility] [facility] teleradiology service. There are no major discrepancies.   Dictated by (CST): Anthony Garces MD on 11/08/2024 at 10:51 AM     Finalized by (CST): Anthony Garces MD on 11/08/2024 at 11:04 AM              pantoprazole  40 mg Intravenous Q24H    piperacillin-tazobactam  3.375 g Intravenous Q8H    heparin  5,000 Units Subcutaneous 2 times  per day       ipratropium-albuterol    benzocaine-menthol    bisacodyl    morphINE **OR** morphINE **OR** morphINE    ondansetron    metoclopramide      Assessment and Plan:      Acute enterocolitis  Acute intractable nausea and vomiting  Acute SBO   - found again on CT scan of the abdomen and pelvis 11/6  - IV zosyn  - NPO  - status post EGD and flex sig- showed hemorrhoids   - gentle IV hydration  - IV zofran for nausea  - pain control  - PT/OT eval   - discussed with family at bedside   - concern now for obstruction  - start D5 45 at 83 ml/hour  - Surgery consulted- discussed with Dr Garg  - continue NG tube  - Small bowel follow through tomorrow         Quality:  DVT Prophylaxis: Heparin  CODE status: Full    MDM. High LASHAE HUMMEL MD  11/10/24

## 2024-11-10 NOTE — PLAN OF CARE
Patient alert and oriented x2. NPO, except ice chips. NG tube to LIS. Soft wrist restraints in place, Q2 hour assessments performed as ordered. Purewick in place, patient voiding freely. Patient began having bowel movements this afternoon. Denies pain. Family at bedside, all questions answered. Call light in reach. Frequent rounding performed.    Problem: Patient Centered Care  Goal: Patient preferences are identified and integrated in the patient's plan of care  Description: Interventions:  - What would you like us to know as we care for you? I live with my caregiver   - Provide timely, complete, and accurate information to patient/family  - Incorporate patient and family knowledge, values, beliefs, and cultural backgrounds into the planning and delivery of care  - Encourage patient/family to participate in care and decision-making at the level they choose  - Honor patient and family perspectives and choices  Outcome: Not Progressing     Problem: Patient/Family Goals  Goal: Patient/Family Long Term Goal  Description: Patient's Long Term Goal: to go home    Interventions:  - Tolerate diet advancement   - Pain management   - See additional Care Plan goals for specific interventions  Outcome: Not Progressing  Goal: Patient/Family Short Term Goal  Description: Patient's Short Term Goal: to be able to keep food down    Interventions:   - Antiemetics as needed   - Slow diet advancement   - See additional Care Plan goals for specific interventions  Outcome: Not Progressing     Problem: PAIN - ADULT  Goal: Verbalizes/displays adequate comfort level or patient's stated pain goal  Description: INTERVENTIONS:  - Encourage pt to monitor pain and request assistance  - Assess pain using appropriate pain scale  - Administer analgesics based on type and severity of pain and evaluate response  - Implement non-pharmacological measures as appropriate and evaluate response  - Consider cultural and social influences on pain and pain  management  - Manage/alleviate anxiety  - Utilize distraction and/or relaxation techniques  - Monitor for opioid side effects  - Notify MD/LIP if interventions unsuccessful or patient reports new pain  - Anticipate increased pain with activity and pre-medicate as appropriate  Outcome: Not Progressing     Problem: RISK FOR INFECTION - ADULT  Goal: Absence of fever/infection during anticipated neutropenic period  Description: INTERVENTIONS  - Monitor WBC  - Administer growth factors as ordered  - Implement neutropenic guidelines  Outcome: Not Progressing     Problem: SAFETY ADULT - FALL  Goal: Free from fall injury  Description: INTERVENTIONS:  - Assess pt frequently for physical needs  - Identify cognitive and physical deficits and behaviors that affect risk of falls.  - [place] fall precautions as indicated by assessment.  - Educate pt/family on patient safety including physical limitations  - Instruct pt to call for assistance with activity based on assessment  - Modify environment to reduce risk of injury  - Provide assistive devices as appropriate  - Consider OT/PT consult to assist with strengthening/mobility  - Encourage toileting schedule  Outcome: Not Progressing     Problem: DISCHARGE PLANNING  Goal: Discharge to home or other facility with appropriate resources  Description: INTERVENTIONS:  - Identify barriers to discharge w/pt and caregiver  - Include patient/family/discharge partner in discharge planning  - Arrange for needed discharge resources and transportation as appropriate  - Identify discharge learning needs (meds, wound care, etc)  - Arrange for interpreters to assist at discharge as needed  - Consider post-discharge preferences of patient/family/discharge partner  - Complete POLST form as appropriate  - Assess patient's ability to be responsible for managing their own health  - Refer to Case Management Department for coordinating discharge planning if the patient needs post-hospital services  based on physician/LIP order or complex needs related to functional status, cognitive ability or social support system  Outcome: Not Progressing     Problem: GASTROINTESTINAL - ADULT  Goal: Minimal or absence of nausea and vomiting  Description: INTERVENTIONS:  - Maintain adequate hydration with IV or PO as ordered and tolerated  - Nasogastric tube to low intermittent suction as ordered  - Evaluate effectiveness of ordered antiemetic medications  - Provide nonpharmacologic comfort measures as appropriate  - Advance diet as tolerated, if ordered  - Obtain nutritional consult as needed  - Evaluate fluid balance  Outcome: Not Progressing  Goal: Maintains or returns to baseline bowel function  Description: INTERVENTIONS:  - Assess bowel function  - Maintain adequate hydration with IV or PO as ordered and tolerated  - Evaluate effectiveness of GI medications  - Encourage mobilization and activity  - Obtain nutritional consult as needed  - Establish a toileting routine/schedule  - Consider collaborating with pharmacy to review patient's medication profile  Outcome: Not Progressing  Goal: Maintains adequate nutritional intake (undernourished)  Description: INTERVENTIONS:  - Monitor percentage of each meal consumed  - Identify factors contributing to decreased intake, treat as appropriate  - Assist with meals as needed  - Monitor I&O, WT and lab values  - Obtain nutritional consult as needed  - Optimize oral hygiene and moisture  - Encourage food from home; allow for food preferences  - Enhance eating environment  Outcome: Not Progressing     Problem: Delirium  Goal: Minimize duration of delirium  Description: Interventions:  - Encourage use of hearing aids, eye glasses  - Promote highest level of mobility daily  - Provide frequent reorientation  - Promote wakefulness i.e. lights on, blinds open  - Promote sleep, encourage patient's normal rest cycle i.e. lights off, TV off, minimize noise and interruptions  - Encourage  family to assist in orientation and promotion of home routines  Outcome: Not Progressing     Problem: Safety Risk - Non-Violent Restraints  Goal: Patient will remain free from self-harm  Description: INTERVENTIONS:  - Apply the least restrictive restraint to prevent harm  - Notify patient and family of reasons restraints applied  - Assess for any contributing factors to confusion (electrolyte disturbances, delirium, medications)  - Discontinue any unnecessary medical devices as soon as possible  - Assess the patient's physical comfort, circulation, skin condition, hydration, nutrition and elimination needs   - Reorient and redirection as needed  - Assess for the need to continue restraints  Outcome: Not Progressing

## 2024-11-10 NOTE — PROGRESS NOTES
[facility]     Gastroenterology Progress Note    Christy Lowry Patient Status:  Inpatient    1924 MRN [id number]   Location [facility] 4W/SW/SE Attending Gillian Connelly MD   Hosp Day # 5 PCP Patrick Stafford MD       Subjective:   Patient sitting comfortably in bed  Better now with NG placed  EGD with stomach full of bile  Flex sig unable to go further then distal descending due to stool  Overnight more vomiting again so NG placed at 4 am    Discussed CT results with daughter in law in the room    No hematemesis or coffee ground emesis   Objective:   Blood pressure 132/69, pulse 80, temperature 98 °F (36.7 °C), temperature source Oral, resp. rate 18, height 5' 4\" (1.626 m), weight 152 lb (68.9 kg), SpO2 95%. Body mass index is 26.09 kg/m².    Gen: awake, alert patient, NAD  HEENT: EOMI, the sclera appears anicteric, oropharynx clear, mucus membranes appear moist  CV: RRR  Lung: no conversational dyspnea   Abdomen: soft NTND abdomen with NABS appreciated   Skin: dry, warm, no jaundice  Ext: no LE edema is evident  Neuro: Alert and interactive  Psych: calm, cooperative    Assessment and Plan:     Christy Lowry is a 89 year old female w/ PMHx of BMI 26.09, actinic keratosis, OA, Osteoporosis, GERD, Hypothyroidism, Anxiety who presented to the ED with persistent ABD pain and loose stools.  Pt initially presented to ED 10/31 with similar symptoms with CT A/P at that time demonstrating severe infectious inflammatory enterocolitis but declined admission at that time.  She was discharged home on Augmentin.     #Altered bowel habits  #N/V  - Labs on admission with slight leukocytosis and elevated ANC, otherwise unremarkable  - CT A/P on 10/31/2024 demonstrated no evidence of bowel obstruction, HH, fluid filled loops of small bowel but nondilated compatible with moderate-severe infectious/inflammatory enterocolitis, fluid throughout the colon to the level of the rectum with long segment  proximal colonic wall thickening and mild proximal pericolonic inflammation. Possible transition point at the hepatic flexure. Not able to get to area on flex sig given stool.  - Last colonoscopy in the remote past  - Small hard stools overnight  - Etiology initially thought to be infectious but given no resolution after EGD/flex and now ongoing for 10 days concern about obstruction    - stool studies negative, patient was tolerating diet however began to have increased emesis last night    EGD negative and flex sig up to distal descending normal, biopsies taken    Recommend:  - NG to LIS  - ok for ice chips and mouth swab  - small BM today so likely pSBO  - discussed surgery consult given possible bowel obstruction per family request though likely not a surgical candidate    Discussed with sons and daughter in law      Breana Barbosa MD  [facility]astroenter[facility]  11/10/24        Results:     Lab Results   Component Value Date    WBC 11.6 (H) 11/10/2024    HGB 12.5 11/10/2024    HCT 39.2 11/10/2024    .0 11/10/2024    CREATSERUM 0.87 11/10/2024    CREATSERUM 0.87 11/10/2024    BUN 14 11/10/2024    BUN 14 11/10/2024     11/10/2024     11/10/2024    K 4.1 11/10/2024    K 4.1 11/10/2024     11/10/2024     11/10/2024    CO2 29.0 11/10/2024    CO2 29.0 11/10/2024     (H) 11/10/2024     (H) 11/10/2024    CA 8.7 11/10/2024    CA 8.7 11/10/2024    ALB 3.1 (L) 11/10/2024    ALKPHO 78 11/10/2024    BILT 0.5 11/10/2024    TP 6.2 11/10/2024    AST 20 11/10/2024    ALT <7 (L) 11/10/2024    T4F 1.3 05/30/2023    TSH 5.400 (H) 05/30/2023    LIP 21 11/05/2024    MG 2.2 06/14/2023       CT 11/7/2024  CONCLUSION:   1. Fluid-filled loops of small and large bowel and extensive mucosal thickening/enhancement, which could reflect underlying infectious/inflammatory enterocolitis or malabsorption in the appropriate clinical setting.      2. Nonspecific decompressed-appearance of the  hepatic flexure; while not fully characterized within the parameters of this study, the possibility of an underlying lesion or non-neoplastic stricture should be considered.      3. Mild mesenteric edema and trace intraperitoneal ascites, likely reactive.      4. Moderate right and small left pleural effusions and associated basilar atelectasis, increased from previous, with or without superimposed pneumonia.      5. Redemonstration of a lobulated cystic lesion of the left lower lobe. This may reflect loculated pleural fluid.      6. Mild gallbladder distension, which may be reactive.      7. Anasarca is present.      8. Lesser incidental findings as above.     XR CHEST AP PORTABLE  (CPT=71045)    Result Date: 11/10/2024  CONCLUSION:  1. NG tube tip in gastric antrum. 2. Right greater than left basilar pleural effusions with compressive atelectasis. 3. Borderline cardiomegaly with pulmonary venous congestion. 4. No significant change.    Dictated by (CST): Yfn Workman MD on 11/10/2024 at 10:05 AM     Finalized by (CST): Yfn Workman MD on 11/10/2024 at 10:06 AM          XR CHEST AP PORTABLE  (CPT=71045)    Result Date: 11/10/2024  CONCLUSION:  1. Mild fluid overload/CHF with right greater than left pleural effusions and compressive atelectasis.  Coexistent basilar pneumonia should be excluded clinically.    Dictated by (CST): Yfn Workman MD on 11/10/2024 at 9:53 AM     Finalized by (CST): Yfn Workman MD on 11/10/2024 at 9:58 AM

## 2024-11-10 NOTE — ANESTHESIA POSTPROCEDURE EVALUATION
Patient: Christy Lowry    Procedure Summary       Date: 11/09/24 Room / Location: [facility] ENDOSCOPY 01 / [facility] ENDOSCOPY    Anesthesia Start: 1125 Anesthesia Stop: 1151    Procedures:       ESOPHAGOGASTRODUODENOSCOPY (EGD)      FLEXIBLE SIGMOIDOSCOPY Diagnosis: (hiatal hernia, esophagitis, fryable esophagus, gastritis, fair prep, hemorrhoids)    Surgeons: Breana Barbosa MD Anesthesiologist: Matias Brooks MD    Anesthesia Type: MAC ASA Status: 4            Anesthesia Type: MAC    Vitals Value Taken Time   /52 11/09/24 1230   Temp  11/09/24 2125   Pulse 80 11/09/24 1233   Resp 25 11/09/24 1233   SpO2 91 % 11/09/24 1233   Vitals shown include unfiled device data.    [facility] AN Post Evaluation:   Patient Evaluated in PACU  Patient Participation: complete - patient participated  Level of Consciousness: awake  Pain Management: adequate  Airway Patency:patent  Yes    Nausea/Vomiting: none  Cardiovascular Status: acceptable  Respiratory Status: acceptable  Postoperative Hydration acceptable      Matias Brooks MD  11/9/2024 9:25 PM

## 2024-11-10 NOTE — PLAN OF CARE
Patient is lethargic overnight. Responds with physical stimulation or repeating her name. On 2L of O2. 4 episodes of emesis and aspiration. MD paged and respiratory called. NG placed overnight. Patient pulled out first NG-- restraints orderd and another Ng placed. ALISSA Ramon(son) updated on plan of care and agreeable. Patient unresponsive during NG placement. MD paged- see orders. Dr. Bryant came to evaluate patient overnight. Care giver at the bedside overnight. Call light within reach; safety precautions in place. Frequent rounding.

## 2024-11-11 ENCOUNTER — APPOINTMENT (OUTPATIENT)
Dept: GENERAL RADIOLOGY | Facility: HOSPITAL | Age: 89
End: 2024-11-11
Attending: STUDENT IN AN ORGANIZED HEALTH CARE EDUCATION/TRAINING PROGRAM
Payer: MEDICARE

## 2024-11-11 LAB
ANION GAP SERPL CALC-SCNC: 4 MMOL/L (ref 0–18)
BUN BLD-MCNC: 16 MG/DL (ref 9–23)
BUN/CREAT SERPL: 20.3 (ref 10–20)
CALCIUM BLD-MCNC: 8.5 MG/DL (ref 8.7–10.4)
CHLORIDE SERPL-SCNC: 105 MMOL/L (ref 98–112)
CO2 SERPL-SCNC: 32 MMOL/L (ref 21–32)
CREAT BLD-MCNC: 0.79 MG/DL
DEPRECATED RDW RBC AUTO: 52.2 FL (ref 35.1–46.3)
EGFRCR SERPLBLD CKD-EPI 2021: 67 ML/MIN/1.73M2 (ref 60–?)
ERYTHROCYTE [DISTWIDTH] IN BLOOD BY AUTOMATED COUNT: 15.1 % (ref 11–15)
GLUCOSE BLD-MCNC: 94 MG/DL (ref 70–99)
HCT VFR BLD AUTO: 38.5 %
HGB BLD-MCNC: 12.5 G/DL
MCH RBC QN AUTO: 30.3 PG (ref 26–34)
MCHC RBC AUTO-ENTMCNC: 32.5 G/DL (ref 31–37)
MCV RBC AUTO: 93.4 FL
OSMOLALITY SERPL CALC.SUM OF ELEC: 293 MOSM/KG (ref 275–295)
PLATELET # BLD AUTO: 318 10(3)UL (ref 150–450)
POTASSIUM SERPL-SCNC: 3.7 MMOL/L (ref 3.5–5.1)
RBC # BLD AUTO: 4.12 X10(6)UL
SODIUM SERPL-SCNC: 141 MMOL/L (ref 136–145)
WBC # BLD AUTO: 12.6 X10(3) UL (ref 4–11)

## 2024-11-11 PROCEDURE — 74250 X-RAY XM SM INT 1CNTRST STD: CPT | Performed by: STUDENT IN AN ORGANIZED HEALTH CARE EDUCATION/TRAINING PROGRAM

## 2024-11-11 PROCEDURE — 99232 SBSQ HOSP IP/OBS MODERATE 35: CPT | Performed by: REGISTERED NURSE

## 2024-11-11 PROCEDURE — 99231 SBSQ HOSP IP/OBS SF/LOW 25: CPT | Performed by: STUDENT IN AN ORGANIZED HEALTH CARE EDUCATION/TRAINING PROGRAM

## 2024-11-11 PROCEDURE — 99233 SBSQ HOSP IP/OBS HIGH 50: CPT | Performed by: HOSPITALIST

## 2024-11-11 NOTE — PLAN OF CARE
Patient is lethargic overnight. On 2L of O2. NG in; NPO. Voids via purewick; one BM overnight. Care giver at the bedside overnight. Call light within reach; safety precautions in place. Frequent rounding.   Problem: GASTROINTESTINAL - ADULT  Goal: Minimal or absence of nausea and vomiting  Description: INTERVENTIONS:  - Maintain adequate hydration with IV or PO as ordered and tolerated  - Nasogastric tube to low intermittent suction as ordered  - Evaluate effectiveness of ordered antiemetic medications  - Provide nonpharmacologic comfort measures as appropriate  - Advance diet as tolerated, if ordered  - Obtain nutritional consult as needed  - Evaluate fluid balance  Outcome: Progressing  Goal: Maintains or returns to baseline bowel function  Description: INTERVENTIONS:  - Assess bowel function  - Maintain adequate hydration with IV or PO as ordered and tolerated  - Evaluate effectiveness of GI medications  - Encourage mobilization and activity  - Obtain nutritional consult as needed  - Establish a toileting routine/schedule  - Consider collaborating with pharmacy to review patient's medication profile  Outcome: Progressing  Goal: Maintains adequate nutritional intake (undernourished)  Description: INTERVENTIONS:  - Monitor percentage of each meal consumed  - Identify factors contributing to decreased intake, treat as appropriate  - Assist with meals as needed  - Monitor I&O, WT and lab values  - Obtain nutritional consult as needed  - Optimize oral hygiene and moisture  - Encourage food from home; allow for food preferences  - Enhance eating environment  Outcome: Progressing     Problem: Delirium  Goal: Minimize duration of delirium  Description: Interventions:  - Encourage use of hearing aids, eye glasses  - Promote highest level of mobility daily  - Provide frequent reorientation  - Promote wakefulness i.e. lights on, blinds open  - Promote sleep, encourage patient's normal rest cycle i.e. lights off, TV off,  minimize noise and interruptions  - Encourage family to assist in orientation and promotion of home routines  Outcome: Progressing     Problem: Safety Risk - Non-Violent Restraints  Goal: Patient will remain free from self-harm  Description: INTERVENTIONS:  - Apply the least restrictive restraint to prevent harm  - Notify patient and family of reasons restraints applied  - Assess for any contributing factors to confusion (electrolyte disturbances, delirium, medications)  - Discontinue any unnecessary medical devices as soon as possible  - Assess the patient's physical comfort, circulation, skin condition, hydration, nutrition and elimination needs   - Reorient and redirection as needed  - Assess for the need to continue restraints  Outcome: Progressing

## 2024-11-11 NOTE — CM/SW NOTE
11/11/24 1100   CM/SW Referral Data   Referral Source    Reason for Referral Discharge planning;PHQ4/PHQ9   Informant Daughter   Medical Hx   Does patient have an established PCP? Yes  (Patrick Stafford)   Patient Info   Patient's Current Mental Status at Time of Assessment Confused or unable to complete assessment   Patient's Home Environment House   Number of Levels in Home 1   Patient lives with Alone   Patient Status Prior to Admission   Independent with ADLs and Mobility No   Pt. requires assistance with Housework;Driving;Meals;Bathing;Dressing;Medications;Toileting;Finances   Services in place prior to admission DME/Supplies at home  (24 hr CG)   Type of DME/Supplies Straight Cane;Standard Walker;Wheelchair;Commode;Grab Bars   Discharge Needs   Anticipated D/C needs Home health care;Medical equipment     Pt discussed during nursing rounds. Dx SBO, NG LIS, SBFT today, restrained after pulling out previous NG tube. Home w/24 hr CG, independent w/walker and WC prior to admission. Anticipated therapy need: Home with Home Healthcare. Pt's daughter would like HH w/RN and PT at [place]. HH referrals sent in AIDIN, F2F entered and sent in AIDIN. List of accepting agencies will be needed for choice.    Plan: Home w/24 hr CG with HH pending agency choice and medical clearance.    / to remain available for support and/or discharge planning.     TRAY Velasquez    [phone]

## 2024-11-11 NOTE — PROGRESS NOTES
[facility]     Gastroenterology Progress Note    Christy Lowry Patient Status:  Inpatient    1924 MRN [id number]   Location [facility] 4W/SW/SE Attending Gillian Connelly MD   Hosp Day # 6 PCP Patrick Stafford MD       Subjective:   Pt with several bowel movements overnight  States her belly is feeling better  No nausea or vomiting  Per nursing, pulled NGT out overnight.  Now in restraints.  Awaiting follow up imaging.  Objective:   Blood pressure 132/63, pulse 78, temperature 97.2 °F (36.2 °C), temperature source Axillary, resp. rate 18, height 5' 4\" (1.626 m), weight 152 lb (68.9 kg), SpO2 95%. Body mass index is 26.09 kg/m².    Gen: awake, alert patient, NAD  HEENT: EOMI, the sclera appears anicteric, oropharynx clear, mucus membranes appear moist  CV: RRR  Lung: no conversational dyspnea   Abdomen: soft NTND abdomen with NABS appreciated   Skin: dry, warm, no jaundice  Ext: no LE edema is evident  Neuro: Alert, oriented x2-3 and interactive  Psych: calm, cooperative    Assessment and Plan:   Christy Lowry is a 89 year old female w/ PMHx of BMI 26.09, actinic keratosis, OA, Osteoporosis, GERD, Hypothyroidism, Anxiety who presented to the ED with persistent ABD pain and loose stools.  Pt initially presented to ED 10/31 with similar symptoms with CT A/P at that time demonstrating severe infectious inflammatory enterocolitis but declined admission at that time.  She was discharged home on Augmentin.     #Altered bowel habits  #N/V  - Labs on admission with slight leukocytosis and elevated ANC, otherwise unremarkable  - CT A/P on 10/31/2024 demonstrated no evidence of bowel obstruction, HH, fluid filled loops of small bowel but nondilated compatible with moderate-severe infectious/inflammatory enterocolitis, fluid throughout the colon to the level of the rectum with long segment proximal colonic wall thickening and mild proximal pericolonic inflammation. Possible transition point  at the hepatic flexure. Not able to get to area on flex sig given stool.  - Last colonoscopy in the remote past  - Etiology initially thought to be infectious but given no resolution after negative EGD/flex, negative stool studies and now ongoing for 10 days concern about obstruction    -NGT in place.  Awaiting SBFT.  Surgery following.  Symptoms improved with decompression but Pt also with several brown bowel movements overnight which is reassuring.  Will await results for next steps.     Recommend:  - NGT clamped for SBFT, await results  - ok for ice chips and mouth swab  - Surgery following, appreciate recs    Case discussed with Tosha Valle MD and Tirso ANGULO.    Rosemarie Han DNP, FNP-[facility] Gastroenterology  11/11/2024      Results:     Lab Results   Component Value Date    WBC 12.6 (H) 11/11/2024    HGB 12.5 11/11/2024    HCT 38.5 11/11/2024    .0 11/11/2024    CREATSERUM 0.79 11/11/2024    BUN 16 11/11/2024     11/11/2024    K 3.7 11/11/2024     11/11/2024    CO2 32.0 11/11/2024    GLU 94 11/11/2024    CA 8.5 (L) 11/11/2024    ALB 3.1 (L) 11/10/2024    ALKPHO 78 11/10/2024    BILT 0.5 11/10/2024    TP 6.2 11/10/2024    AST 20 11/10/2024    ALT <7 (L) 11/10/2024    T4F 1.3 05/30/2023    TSH 5.400 (H) 05/30/2023    LIP 21 11/05/2024    MG 2.2 06/14/2023       XR CHEST AP PORTABLE  (CPT=71045)    Result Date: 11/10/2024  CONCLUSION:  1. NG tube tip in gastric antrum. 2. Right greater than left basilar pleural effusions with compressive atelectasis. 3. Borderline cardiomegaly with pulmonary venous congestion. 4. No significant change.    Dictated by (CST): Yfn Workman MD on 11/10/2024 at 10:05 AM     Finalized by (CST): Yfn Workman MD on 11/10/2024 at 10:06 AM          XR CHEST AP PORTABLE  (CPT=71045)    Result Date: 11/10/2024  CONCLUSION:  1. Mild fluid overload/CHF with right greater than left pleural effusions and compressive atelectasis.  Coexistent basilar pneumonia should  be excluded clinically.    Dictated by (CST): Yfn Workman MD on 11/10/2024 at 9:53 AM     Finalized by (CST): Yfn Workman MD on 11/10/2024 at 9:58 AM

## 2024-11-11 NOTE — PROGRESS NOTES
Patient in restraints. Going down for SBFT. Son at bedside. Restraint handoff to second RN Priya who will accompany patient down in Xray. Son aware of plan.

## 2024-11-11 NOTE — PROGRESS NOTES
Attempted follow up PT treatment. RN requesting PT hold this date secondary to pt in restraints and not following commands. Will reschedule.     Thank you,  Rosemarie Summers, PT, DPT

## 2024-11-11 NOTE — PROGRESS NOTES
[facility]  part of [facility]    Progress Note    Christy Lowry Patient Status:  Inpatient    1924 MRN [id number]   Location [facility] 4W/SW/SE Attending Gillian Connelly MD   Hosp Day # 6 PCP Patrick Stafford MD       Subjective:   Christy Lowry has restraints on. Resting in bed.     Objective:   Blood pressure 132/63, pulse 78, temperature 97.2 °F (36.2 °C), temperature source Axillary, resp. rate 18, height 5' 4\" (1.626 m), weight 152 lb (68.9 kg), SpO2 95%.    Physical Exam:    General: No acute distress.   Respiratory: Clear to auscultation bilaterally. No wheezes. No rhonchi.  Cardiovascular: S1, S2. Regular rate and rhythm. No murmurs, rubs or gallops.   Abdomen: no abdominal pain, soft nontender.   Neurologic: No focal neurological deficits.   Musculoskeletal: Moves all extremities.  Extremities: No edema.    Results:     Lab Results   Component Value Date    WBC 12.6 (H) 2024    HGB 12.5 2024    HCT 38.5 2024    .0 2024    CREATSERUM 0.79 2024    BUN 16 2024     2024    K 3.7 2024     2024    CO2 32.0 2024    GLU 94 2024    CA 8.5 (L) 2024    ALB 3.1 (L) 11/10/2024    ALKPHO 78 11/10/2024    BILT 0.5 11/10/2024    TP 6.2 11/10/2024    AST 20 11/10/2024    ALT <7 (L) 11/10/2024    T4F 1.3 2023    TSH 5.400 (H) 2023    LIP 21 2024    MG 2.2 2023       XR CHEST AP PORTABLE  (CPT=71045)    Result Date: 11/10/2024  CONCLUSION:  1. NG tube tip in gastric antrum. 2. Right greater than left basilar pleural effusions with compressive atelectasis. 3. Borderline cardiomegaly with pulmonary venous congestion. 4. No significant change.    Dictated by (CST): Yfn Workman MD on 11/10/2024 at 10:05 AM     Finalized by (CST): Yfn Workman MD on 11/10/2024 at 10:06 AM          XR CHEST AP PORTABLE  (CPT=71045)    Result Date: 11/10/2024  CONCLUSION:  1. Mild fluid  overload/CHF with right greater than left pleural effusions and compressive atelectasis.  Coexistent basilar pneumonia should be excluded clinically.    Dictated by (CST): Yfn Workman MD on 11/10/2024 at 9:53 AM     Finalized by (CST): Yfn Workman MD on 11/10/2024 at 9:58 AM              pantoprazole  40 mg Intravenous Q24H    piperacillin-tazobactam  3.375 g Intravenous Q8H    heparin  5,000 Units Subcutaneous 2 times per day       ipratropium-albuterol    benzocaine-menthol    bisacodyl    morphINE **OR** morphINE **OR** morphINE    ondansetron    metoclopramide      Assessment and Plan:      Acute enterocolitis  Acute intractable nausea and vomiting  Acute SBO   - found again on CT scan of the abdomen and pelvis 11/6  - IV zosyn  - NPO  - status post EGD and flex sig 11/9- showed hemorrhoids and bile in the stomach  - gentle IV hydration  - IV zofran for nausea  - pain control  - PT/OT eval   - discussed with family at bedside   - concern now for obstruction  - started D5 45 at 83 ml/hour  - Surgery consulted- discussed with Dr Garg  - continue NG tube with LIS  - Small bowel follow through done today- await Surgery recs         Quality:  DVT Prophylaxis: Heparin  CODE status: Full    MDM. High LASHAE HUMMEL MD  11/11/24

## 2024-11-12 LAB
ANION GAP SERPL CALC-SCNC: 5 MMOL/L (ref 0–18)
BUN BLD-MCNC: 9 MG/DL (ref 9–23)
BUN/CREAT SERPL: 12.2 (ref 10–20)
CALCIUM BLD-MCNC: 8.5 MG/DL (ref 8.7–10.4)
CHLORIDE SERPL-SCNC: 106 MMOL/L (ref 98–112)
CO2 SERPL-SCNC: 30 MMOL/L (ref 21–32)
CREAT BLD-MCNC: 0.74 MG/DL
DEPRECATED RDW RBC AUTO: 51.3 FL (ref 35.1–46.3)
EGFRCR SERPLBLD CKD-EPI 2021: 72 ML/MIN/1.73M2 (ref 60–?)
ERYTHROCYTE [DISTWIDTH] IN BLOOD BY AUTOMATED COUNT: 15 % (ref 11–15)
GLUCOSE BLD-MCNC: 94 MG/DL (ref 70–99)
HCT VFR BLD AUTO: 41.7 %
HGB BLD-MCNC: 13.5 G/DL
MCH RBC QN AUTO: 29.7 PG (ref 26–34)
MCHC RBC AUTO-ENTMCNC: 32.4 G/DL (ref 31–37)
MCV RBC AUTO: 91.9 FL
OSMOLALITY SERPL CALC.SUM OF ELEC: 290 MOSM/KG (ref 275–295)
PLATELET # BLD AUTO: 319 10(3)UL (ref 150–450)
POTASSIUM SERPL-SCNC: 2.9 MMOL/L (ref 3.5–5.1)
POTASSIUM SERPL-SCNC: 3.6 MMOL/L (ref 3.5–5.1)
RBC # BLD AUTO: 4.54 X10(6)UL
SODIUM SERPL-SCNC: 141 MMOL/L (ref 136–145)
WBC # BLD AUTO: 12.6 X10(3) UL (ref 4–11)

## 2024-11-12 PROCEDURE — 99233 SBSQ HOSP IP/OBS HIGH 50: CPT | Performed by: HOSPITALIST

## 2024-11-12 PROCEDURE — 99232 SBSQ HOSP IP/OBS MODERATE 35: CPT | Performed by: PHYSICIAN ASSISTANT

## 2024-11-12 RX ORDER — POTASSIUM CHLORIDE 14.9 MG/ML
20 INJECTION INTRAVENOUS ONCE
Status: COMPLETED | OUTPATIENT
Start: 2024-11-12 | End: 2024-11-12

## 2024-11-12 NOTE — SPIRITUAL CARE NOTE
Spiritual Care Visit Note    Patient Name: Christy Lowry Date of Spiritual Care Visit: 24   : 1924 Primary Dx: Nausea and vomiting in adult       Referred By: Referral From:     Spiritual Care Taxonomy:    Intended Effects: Demonstrate caring and concern    Methods: Collaborate with care team member;Encourage self care;Offer support    Interventions: Active listening;Ask guided questions;Silent prayer    Visit Type/Summary:     - Spiritual Care: Consulted with RN prior to visit. Offered empathic listening and emotional support. Patient and family expressed appreciation for  visit. Provided information regarding how to contact Spiritual Care and left a Spiritual Care information card. Family at bedside. No other need at this time.     KODY Conrad CAMII   [id number]     Spiritual Care support can be requested via an Epic consult. For urgent/immediate needs, please contact the On Call  at: [place]: ext [phone]

## 2024-11-12 NOTE — SLP NOTE
ADULT SWALLOWING EVALUATION    ASSESSMENT    ASSESSMENT/OVERALL IMPRESSION:    Per MD note:  \"Patient is a 89-year-old  female who was seen in the emergency room on October 31 for similar complaint of abdominal pain, loose bowel movements. CT scan of the abdomen at that time showed moderate to severe infectious inflammatory enterocolitis, with small amount of ascites, no free intraperitoneal air or well-defined drainable intra-abdominal collection. She declined admission at that time. Discharged home on Augmentin. Today came back with recurrent symptoms associated with nausea and vomiting. No diarrhea. CBC today showed white blood cell count of 12.41 with left shift. Chemistry and liver function were roughly unremarkable. GFR is 58 which is slightly below her baseline. Patient will be admitted to the hospital for further management.\"    SLP BSSE orders received and acknowledged. A swallow evaluation warranted as pt presents with new onset of coughing.  The pt was admitted with diagnosis of nausea and vomiting.  Chart reviewed and collaborated with RNTirso.  Swallowing evaluation approved and RN reports pt has been NPO with increased coughing.  Diet advanced to clear liquids and RN concerned of swallowing secondary to increased coughing at baseline.   Pt seen at bedside and agreeable to participate in BSSE.  Pt denies any pain.  Pt is alert O x 3, afebrile with clear vocal quality, tolerating room air with oxygen saturation at 95% prior to the start of po trials. The pt's caregiver was present at the time of testing.  Pt with no hx of dysphagia at [facility].     Pt positioned upright to 90 degrees in bed. Oral [facility] examination completed.  Face symmetrical at rest with adequate ROM/strength with labial and lingual movements. Rate of movements reduced.  Assessed pt with po trials of clear liquids: moderately thick liquids, mildly thick liquids, and thin liquids via open cup/straw. Pt with functional oral  acceptance and bilabial seal across all trials. Oral phase of swallow appears timely with adequate bolus control and propulsion. No significant oral stasis was present post the swallow.  Pharyngeal phase of the swallow appears slightly delayed with adequate hyolaryngeal elevation/excursion. Oxygen status remained >96% throughout the entire evaluation. Overt clinical signs of throat clearing on thin liquids via straw.  No overt clinical signs of aspiration observed with any consistency cup drinking with moderately thick, mildly thick, or thin liquids (no coughing, no throat clearing, and vocal quality remains dry).  Pt denies any globus sensation post the swallow.  The pt was left at bedside resting in bed with call light in reach.    At this time, pt presents with an adequate oral phase with clear liquids and probable pharyngeal dysfunction. Recommend a clear liquid diet with thin liquids/no straw with strict adherence to safe swallowing compensatory strategies. Pt reports preferred learning style of education is via verbal discussion.  Results and recommendations reviewed with pt, caregiver, and RN. Provided education via verbal discussion. The pt and caregiver acknowledged understanding of the education and was able to verbalize/demonstrate precautions with mild cues.  SLP collaborated with RN/MD for diet orders. Diet recommendations and swallowing precautions/strategies posted on white board at bedside.   FCM SCORE: 6/7     PLAN: Will follow up x2 to ensure safety with diet and educate pt on compensatory strategies/swallow precautions. Video swallow study to be completed if CXR declines, increase in clinical signs of aspiration, and/or MD desires.   Speech to assess solids when cleared by GI.         RECOMMENDATIONS   Diet Recommendations - Solids:  (Clear liquids)  Diet Recommendations - Liquids: Thin Liquids (Clear liquids)                        Compensatory Strategies Recommended: No straws;Slow  rate;Alternate consistencies;Small bites and sips;Multiple swallows  Aspiration Precautions: Upright position;Slow rate;Small bites and sips;No straw  Medication Administration Recommendations: One pill at a time  Treatment Plan/Recommendations: Aspiration precautions    HISTORY   MEDICAL HISTORY  Reason for Referral: R/O aspiration    Problem List  Principal Problem:    Nausea and vomiting in adult  Active Problems:    Persistent vomiting in adult patient    Enteritis      Past Medical History  Past Medical History:    Actinic keratosis    hypertrophic AK - left central forehead    Benzodiazepine withdrawal without complication (HCC)    Chronic GERD    Constipation    Diarrhea    Eustachian tube dysfunction    Heart palpitations    Loose stools    Occult blood in stools    SCC (squamous cell carcinoma)    left forehead, invasive, well-diff.     SCC (squamous cell carcinoma)    right temple    SCC (squamous cell carcinoma)    right neck    Skin cancer       Prior Living Situation: Home with support  Diet Prior to Admission: Regular;Thin liquids  Precautions: Aspiration;Reflux    Patient/Family Goals: to eat    SWALLOWING HISTORY  Current Diet Consistency: Thin liquids (Clear liquid diet)  Dysphagia History: No history of dysphagia  Imaging Results:   CXR  CONCLUSION:   1. NG tube tip in gastric antrum.   2. Right greater than left basilar pleural effusions with compressive atelectasis.   3. Borderline cardiomegaly with pulmonary venous congestion.   4. No significant change.     Dictated by (CST): Yfn Workman MD on 11/10/2024 at 10:05 AM       Finalized by (CST): Yfn Workman MD on 11/10/2024 at 10:06 AM       SUBJECTIVE   Pt alert and cooperative throughout evaluation.    OBJECTIVE   ORAL MOTOR EXAMINATION  Dentition: Functional;Lower partials  Symmetry: Within Functional Limits  Strength: Within Functional Limits  Tone: Within Functional Limits  Range of Motion: Within Functional Limits  Rate of Motion:  Reduced    Voice Quality: Clear  Respiratory Status: Unlabored  Consistencies Trialed: Thin liquids;Nectar thick liquids;Honey thick liquids (Clear liquid trials)  Method of Presentation: Self presentation;Staff/Clinician assistance;Spoon;Cup;Straw;Single sips;Consecutive swallows  Patient Positioning: Upright;Midline    Oral Phase of Swallow: Within Functional Limits  Bolus Retrieval: Intact  Bilabial Seal: Intact  Bolus Formation: Intact  Bolus Propulsion: Intact     Retention: Intact    Pharyngeal Phase of Swallow: Impaired  Laryngeal Elevation Timing: Appears impaired  Laryngeal Elevation Strength: Appears intact  Laryngeal Elevation Coordination: Appears intact  (Please note: Silent aspiration cannot be evaluated clinically. Videofluoroscopic Swallow Study is required to rule-out silent aspiration.)    Esophageal Phase of Swallow: No complaints consistent with possible esophageal involvement                GOALS  Goal #1 The patient will tolerate clear liquids consistency and thin liquids without overt signs or symptoms of aspiration with 100 % accuracy over 2 session(s).  In Progress   Goal #2 The patient/family/caregiver will demonstrate understanding and implementation of aspiration precautions and swallow strategies independently over 2 session(s).    In Progress   Goal #3 The patient will utilize compensatory strategies as outlined by  BSSE (clinical evaluation) including Slow rate, Small bites, Multiple swallows, No straws, Upright 90 degrees with mild assistance 90 % of the time across 2 sessions.  In Progress   Goal #4 The patient will tolerate trial upgrade of regular consistency and thin liquids without overt signs or symptoms of aspiration with 100 % accuracy over 2 session(s).    In Progress     FOLLOW UP  Treatment Plan/Recommendations: Aspiration precautions  Number of Visits to Meet Established Goals: 2  Follow Up Needed (Documentation Required): Yes (assess for diet advancement to solids when  cleared by GI)  SLP Follow-up Date: 11/13/24    Thank you for your referral.   If you have any questions, please contact     Maryan Kay MS/CCC-SLP  Speech Language Pathologist  [facility]  EXT. [id number]

## 2024-11-12 NOTE — PLAN OF CARE
Christy is alert and oriented x3, when asked questions pt knows her name, , place, and time, disoriented to situation, needs frequent reorientation. Currently in restraints to prevent pulling out NGT, completing Q2 checks, frequent roundings, assessing circulation and need, and updated pt and family on POC. On 2L O2 via NC, wean as rafaela, has productive cough, suction at bedside suctioning prn secretions. On hep subcutaneous. NPO. Has NGT in place but clamped for small bowel follow thru overnight, verified with radiology to keep NGT clamped as long as possible and last image is this morning at 7am, if need to hook up to suction to do so. No complaints of NV. Incontinent x2, purewick in place, hygiene care provided prn. Cream applied prn to groin and sacrum d/t redness. No complaints of pain. Getting zosyn abx. IVF infusing with D5.45 at 83 ml/hr. Caregiver at bedside for support. Family was present and updated on poc. Plan for home with 24hr caregiver and HH pend agency and when med clear, safety measures in place, call light within reach.     Problem: Patient Centered Care  Goal: Patient preferences are identified and integrated in the patient's plan of care  Description: Interventions:  - What would you like us to know as we care for you? I live with my caregiver   - Provide timely, complete, and accurate information to patient/family  - Incorporate patient and family knowledge, values, beliefs, and cultural backgrounds into the planning and delivery of care  - Encourage patient/family to participate in care and decision-making at the level they choose  - Honor patient and family perspectives and choices  Outcome: Progressing     Problem: Patient/Family Goals  Goal: Patient/Family Long Term Goal  Description: Patient's Long Term Goal: to go home    Interventions:  - Tolerate diet advancement   - Pain management   - See additional Care Plan goals for specific interventions  Outcome: Progressing  Goal: Patient/Family  Short Term Goal  Description: Patient's Short Term Goal: to be able to keep food down    Interventions:   - Antiemetics as needed   - Slow diet advancement   - See additional Care Plan goals for specific interventions  Outcome: Progressing     Problem: PAIN - ADULT  Goal: Verbalizes/displays adequate comfort level or patient's stated pain goal  Description: INTERVENTIONS:  - Encourage pt to monitor pain and request assistance  - Assess pain using appropriate pain scale  - Administer analgesics based on type and severity of pain and evaluate response  - Implement non-pharmacological measures as appropriate and evaluate response  - Consider cultural and social influences on pain and pain management  - Manage/alleviate anxiety  - Utilize distraction and/or relaxation techniques  - Monitor for opioid side effects  - Notify MD/LIP if interventions unsuccessful or patient reports new pain  - Anticipate increased pain with activity and pre-medicate as appropriate  Outcome: Progressing     Problem: RISK FOR INFECTION - ADULT  Goal: Absence of fever/infection during anticipated neutropenic period  Description: INTERVENTIONS  - Monitor WBC  - Administer growth factors as ordered  - Implement neutropenic guidelines  Outcome: Progressing     Problem: SAFETY ADULT - FALL  Goal: Free from fall injury  Description: INTERVENTIONS:  - Assess pt frequently for physical needs  - Identify cognitive and physical deficits and behaviors that affect risk of falls.  - [place] fall precautions as indicated by assessment.  - Educate pt/family on patient safety including physical limitations  - Instruct pt to call for assistance with activity based on assessment  - Modify environment to reduce risk of injury  - Provide assistive devices as appropriate  - Consider OT/PT consult to assist with strengthening/mobility  - Encourage toileting schedule  Outcome: Progressing     Problem: DISCHARGE PLANNING  Goal: Discharge to home or other facility  with appropriate resources  Description: INTERVENTIONS:  - Identify barriers to discharge w/pt and caregiver  - Include patient/family/discharge partner in discharge planning  - Arrange for needed discharge resources and transportation as appropriate  - Identify discharge learning needs (meds, wound care, etc)  - Arrange for interpreters to assist at discharge as needed  - Consider post-discharge preferences of patient/family/discharge partner  - Complete POLST form as appropriate  - Assess patient's ability to be responsible for managing their own health  - Refer to Case Management Department for coordinating discharge planning if the patient needs post-hospital services based on physician/LIP order or complex needs related to functional status, cognitive ability or social support system  Outcome: Progressing     Problem: GASTROINTESTINAL - ADULT  Goal: Minimal or absence of nausea and vomiting  Description: INTERVENTIONS:  - Maintain adequate hydration with IV or PO as ordered and tolerated  - Nasogastric tube to low intermittent suction as ordered  - Evaluate effectiveness of ordered antiemetic medications  - Provide nonpharmacologic comfort measures as appropriate  - Advance diet as tolerated, if ordered  - Obtain nutritional consult as needed  - Evaluate fluid balance  Outcome: Progressing  Goal: Maintains or returns to baseline bowel function  Description: INTERVENTIONS:  - Assess bowel function  - Maintain adequate hydration with IV or PO as ordered and tolerated  - Evaluate effectiveness of GI medications  - Encourage mobilization and activity  - Obtain nutritional consult as needed  - Establish a toileting routine/schedule  - Consider collaborating with pharmacy to review patient's medication profile  Outcome: Progressing  Goal: Maintains adequate nutritional intake (undernourished)  Description: INTERVENTIONS:  - Monitor percentage of each meal consumed  - Identify factors contributing to decreased intake,  treat as appropriate  - Assist with meals as needed  - Monitor I&O, WT and lab values  - Obtain nutritional consult as needed  - Optimize oral hygiene and moisture  - Encourage food from home; allow for food preferences  - Enhance eating environment  Outcome: Progressing     Problem: Delirium  Goal: Minimize duration of delirium  Description: Interventions:  - Encourage use of hearing aids, eye glasses  - Promote highest level of mobility daily  - Provide frequent reorientation  - Promote wakefulness i.e. lights on, blinds open  - Promote sleep, encourage patient's normal rest cycle i.e. lights off, TV off, minimize noise and interruptions  - Encourage family to assist in orientation and promotion of home routines  Outcome: Progressing     Problem: Safety Risk - Non-Violent Restraints  Goal: Patient will remain free from self-harm  Description: INTERVENTIONS:  - Apply the least restrictive restraint to prevent harm  - Notify patient and family of reasons restraints applied  - Assess for any contributing factors to confusion (electrolyte disturbances, delirium, medications)  - Discontinue any unnecessary medical devices as soon as possible  - Assess the patient's physical comfort, circulation, skin condition, hydration, nutrition and elimination needs   - Reorient and redirection as needed  - Assess for the need to continue restraints  11/12/2024 0123 by Michell Liu, RN  Outcome: Progressing  11/11/2024 2333 by Michell Liu, RN  Outcome: Progressing     Problem: RESPIRATORY - ADULT  Goal: Achieves optimal ventilation and oxygenation  Description: INTERVENTIONS:  - Assess for changes in respiratory status  - Assess for changes in mentation and behavior  - Position to facilitate oxygenation and minimize respiratory effort  - Oxygen supplementation based on oxygen saturation or ABGs  - Provide Smoking Cessation handout, if applicable  - Encourage broncho-pulmonary hygiene including cough, deep breathe, Incentive  Spirometry  - Assess the need for suctioning and perform as needed  - Assess and instruct to report SOB or any respiratory difficulty  - Respiratory Therapy support as indicated  - Manage/alleviate anxiety  - Monitor for signs/symptoms of CO2 retention  Outcome: Progressing     Problem: METABOLIC/FLUID AND ELECTROLYTES - ADULT  Goal: Electrolytes maintained within normal limits  Description: INTERVENTIONS:  - Monitor labs and rhythm and assess patient for signs and symptoms of electrolyte imbalances  - Administer electrolyte replacement as ordered  - Monitor response to electrolyte replacements, including rhythm and repeat lab results as appropriate  - Fluid restriction as ordered  - Instruct patient on fluid and nutrition restrictions as appropriate  Outcome: Progressing     Problem: SKIN/TISSUE INTEGRITY - ADULT  Goal: Skin integrity remains intact  Description: INTERVENTIONS  - Assess and document risk factors for pressure ulcer development  - Assess and document skin integrity  - Monitor for areas of redness and/or skin breakdown  - Initiate interventions, skin care algorithm/standards of care as needed  Outcome: Progressing     Problem: HEMATOLOGIC - ADULT  Goal: Maintains hematologic stability  Description: INTERVENTIONS  - Assess for signs and symptoms of bleeding or hemorrhage  - Monitor labs and vital signs for trends  - Administer supportive blood products/factors, fluids and medications as ordered and appropriate  - Administer supportive blood products/factors as ordered and appropriate  Outcome: Progressing  Goal: Free from bleeding injury  Description: (Example usage: patient with low platelets)  INTERVENTIONS:  - Avoid intramuscular injections, enemas and rectal medication administration  - Ensure safe mobilization of patient  - Hold pressure on venipuncture sites to achieve adequate hemostasis  - Assess for signs and symptoms of internal bleeding  - Monitor lab trends  - Patient is to report abnormal  signs of bleeding to staff  - Avoid use of toothpicks and dental floss  - Use electric shaver for shaving  - Use soft bristle tooth brush  - Limit straining and forceful nose blowing  Outcome: Progressing     Problem: MUSCULOSKELETAL - ADULT  Goal: Return mobility to safest level of function  Description: INTERVENTIONS:  - Assess patient stability and activity tolerance for standing, transferring and ambulating w/ or w/o assistive devices  - Assist with transfers and ambulation using safe patient handling equipment as needed  - Ensure adequate protection for wounds/incisions during mobilization  - Obtain PT/OT consults as needed  - Advance activity as appropriate  - Communicate ordered activity level and limitations with patient/family  Outcome: Progressing  Goal: Maintain proper alignment of affected body part  Description: INTERVENTIONS:  - Support and protect limb and body alignment per provider's orders  - Instruct and reinforce with patient and family use of appropriate assistive device and precautions (e.g. spinal or hip dislocation precautions)  Outcome: Progressing     Problem: NEUROLOGICAL - ADULT  Goal: Achieves stable or improved neurological status  Description: INTERVENTIONS  - Assess for and report changes in neurological status  - Initiate measures to prevent increased intracranial pressure  - Maintain blood pressure and fluid volume within ordered parameters to optimize cerebral perfusion and minimize risk of hemorrhage  - Monitor temperature, glucose, and sodium. Initiate appropriate interventions as ordered  Outcome: Progressing

## 2024-11-12 NOTE — PROGRESS NOTES
[facility]  part of [facility]    Progress Note    Christy Lowry Patient Status:  Inpatient    1924 MRN [id number]   Location [facility] 4W/SW/SE Attending Gillian Connelly MD   Hosp Day # 7 PCP Patrick Stafford MD       Subjective:   Christy Lowry is sitting up in the chair. Feeling great. NG out.     Objective:   Blood pressure 148/60, pulse 70, temperature 98.8 °F (37.1 °C), temperature source Oral, resp. rate 18, height 5' 4\" (1.626 m), weight 152 lb (68.9 kg), SpO2 95%.    Physical Exam:    General: No acute distress.   Respiratory: Clear to auscultation bilaterally. No wheezes. No rhonchi.  Cardiovascular: S1, S2. Regular rate and rhythm. No murmurs, rubs or gallops.   Abdomen: no abdominal pain, soft nontender.   Neurologic: No focal neurological deficits.   Musculoskeletal: Moves all extremities.  Extremities: No edema.    Results:     Lab Results   Component Value Date    WBC 12.6 (H) 2024    HGB 13.5 2024    HCT 41.7 2024    .0 2024    CREATSERUM 0.74 2024    BUN 9 2024     2024    K 2.9 (LL) 2024     2024    CO2 30.0 2024    GLU 94 2024    CA 8.5 (L) 2024    ALB 3.1 (L) 11/10/2024    ALKPHO 78 11/10/2024    BILT 0.5 11/10/2024    TP 6.2 11/10/2024    AST 20 11/10/2024    ALT <7 (L) 11/10/2024    T4F 1.3 2023    TSH 5.400 (H) 2023    LIP 21 2024    MG 2.2 2023       XR SMALL BOWEL SINGLE CONTRAST (CPT=74250)    Result Date: 2024  CONCLUSION:  1. Diffuse moderate dilatation of all of the small bowel extending to the level of the terminal ileum with diffuse thickening of small bowel folds suggesting more of a diffuse enteritis.  No definite bowel obstruction is seen.  See above.    Dictated by (CST): Clay Castañeda MD on 2024 at 8:26 AM     Finalized by (CST): Clay Castañeda MD on 2024 at 8:33 AM          XR CHEST AP PORTABLE   (CPT=71045)    Result Date: 11/10/2024  CONCLUSION:  1. NG tube tip in gastric antrum. 2. Right greater than left basilar pleural effusions with compressive atelectasis. 3. Borderline cardiomegaly with pulmonary venous congestion. 4. No significant change.    Dictated by (CST): Yfn Workman MD on 11/10/2024 at 10:05 AM     Finalized by (CST): Yfn Workman MD on 11/10/2024 at 10:06 AM          XR CHEST AP PORTABLE  (CPT=71045)    Result Date: 11/10/2024  CONCLUSION:  1. Mild fluid overload/CHF with right greater than left pleural effusions and compressive atelectasis.  Coexistent basilar pneumonia should be excluded clinically.    Dictated by (CST): Yfn Workman MD on 11/10/2024 at 9:53 AM     Finalized by (CST): Yfn Workman MD on 11/10/2024 at 9:58 AM              pantoprazole  40 mg Intravenous Q24H    heparin  5,000 Units Subcutaneous 2 times per day       ipratropium-albuterol    benzocaine-menthol    bisacodyl    morphINE **OR** morphINE **OR** morphINE    ondansetron    metoclopramide      Assessment and Plan:      Acute enterocolitis  Acute intractable nausea and vomiting  Acute SBO   - found again on CT scan of the abdomen and pelvis 11/6  - IV zosyn  - CLD  - status post EGD and flex sig 11/9- showed hemorrhoids and bile in the stomach  - gentle IV hydration  - IV zofran for nausea  - pain control  - PT/OT eval   - discussed with family at bedside   - heplock IV   - Surgery consulted- discussed with Dr Forest BULLOCK out   - Small bowel follow through done 11/11- no obstruction    Hypokalemia  - replete on protocol         Quality:  DVT Prophylaxis: Heparin  CODE status: Full    MDM. High LASHAE HUMMEL MD  11/12/24

## 2024-11-12 NOTE — PHYSICAL THERAPY NOTE
PHYSICAL THERAPY TREATMENT NOTE - INPATIENT     Room Number: 457/457-A       Presenting Problem: nausea and vomiting       Problem List  Principal Problem:    Nausea and vomiting in adult  Active Problems:    Persistent vomiting in adult patient    Enteritis      PHYSICAL THERAPY ASSESSMENT   Patient demonstrates good  progress this session, goals  remain in progress.      Patient is requiring contact guard assist and moderate assist as a result of the following impairments: decreased functional strength, decreased endurance/aerobic capacity, and pain.     Patient continues to function below baseline with bed mobility, transfers, and gait.  Next session anticipate patient to progress bed mobility, transfers, and gait.  Physical Therapy will continue to follow patient for duration of hospitalization.    Patient continues to benefit from continued skilled PT services: at discharge to promote prior level of function.  Anticipate patient will return home with home health PT.    PLAN DURING HOSPITALIZATION  Nursing Mobility Recommendation : 1 Assist  PT Device Recommendation: Rolling walker  PT Treatment Plan: Bed mobility;Body mechanics;Endurance;Gait training  Frequency (Obs): 3-5x/week     SUBJECTIVE  Pt reports being ready for PT RX    OBJECTIVE  Precautions: Bed/chair alarm    WEIGHT BEARING RESTRICTION       PAIN ASSESSMENT   Ratin          BALANCE  Static Sitting: Good  Dynamic Sitting: Fair +  Static Standing: Fair  Dynamic Standing: Fair -    ACTIVITY TOLERANCE                          O2 WALK       AM-PAC '6-Clicks' INPATIENT SHORT FORM - BASIC MOBILITY  How much difficulty does the patient currently have...  Patient Difficulty: Turning over in bed (including adjusting bedclothes, sheets and blankets)?: A Little   Patient Difficulty: Sitting down on and standing up from a chair with arms (e.g., wheelchair, bedside commode, etc.): A Little   Patient Difficulty: Moving from lying on back to sitting on the  side of the bed?: A Little   How much help from another person does the patient currently need...   Help from Another: Moving to and from a bed to a chair (including a wheelchair)?: A Little   Help from Another: Need to walk in hospital room?: A Little   Help from Another: Climbing 3-5 steps with a railing?: A Lot     AM-PAC Score:  Raw Score: 17   Approx Degree of Impairment: 50.57%   Standardized Score (AM-PAC Scale): 42.13   CMS Modifier (G-Code): CK    FUNCTIONAL ABILITY STATUS  Functional Mobility/Gait Assessment  Gait Assistance: Contact guard assist  Distance (ft): 25  Assistive Device: Rolling walker  Pattern: Shuffle  Rolling: moderate assist  Supine to Sit: moderate assist  Sit to Supine: moderate assist  Sit to Stand: moderate assist    Skilled Therapy Provided: Pt seen daily.Mod a for bed mobility and transfer;extra time provided to complete task.EOB sitting balance activity with emphasis on core stabilization.Pt educated on deep breathing,relaxation as well as energy conservation technique.Family present;family education;all questions and concerns addressed.There  ex.pt amb 2 x 25 ft with RW and CGa;provided cuing for gait pattern a swell as for postural awareness.    The patient's Approx Degree of Impairment: 50.57% has been calculated based on documentation in the [facility] '6 clicks' Inpatient Daily Activity Short Form.  Research supports that patients with this level of impairment may benefit from Home with Home Health PT.  Final disposition will be made by interdisciplinary medical team.    THERAPEUTIC EXERCISES  Lower Extremity Ankle pumps  Glut sets  Quad sets     Position Supine       Patient End of Session: In bed;Call light within reach;RN aware of session/findings;All patient questions and concerns addressed    CURRENT GOALS     Patient Goal Patient's self-stated goal is: go home   Goal #1 Patient is able to demonstrate supine - sit EOB @ level: supervision     Goal #1   Current Status Mod a    Goal #2 Patient is able to demonstrate transfers Sit to/from Stand at assistance level: supervision with walker - rolling     Goal #2  Current Status Mod a   Goal #3 Patient is able to ambulate 75 feet with assist device: walker - rolling at assistance level: supervision   Goal #3   Current Status Pt amb 2 x 25 ft with RW and CGa   Goal #4    Goal #4   Current Status    Goal #5 Patient to demonstrate independence with home activity/exercise instructions provided to patient in preparation for discharge.   Goal #5   Current Status In progress   Goal #6    Goal #6  Current Status      Gait Training:  minutes  Therapeutic Activity: 20 minutes  Neuromuscular Re-education:  minutes  Therapeutic Exercise: 10 minutes  [place] Repositioning:  minutes  Manual Therapy:  minutes  Can add/delete any of these

## 2024-11-12 NOTE — PLAN OF CARE
Problem: Safety Risk - Non-Violent Restraints  Goal: Patient will remain free from self-harm  Description: INTERVENTIONS:  - Apply the least restrictive restraint to prevent harm  - Notify patient and family of reasons restraints applied  - Assess for any contributing factors to confusion (electrolyte disturbances, delirium, medications)  - Discontinue any unnecessary medical devices as soon as possible  - Assess the patient's physical comfort, circulation, skin condition, hydration, nutrition and elimination needs   - Reorient and redirection as needed  - Assess for the need to continue restraints  -Frequent rounding being done  -Q2 hour checks completed  -Care person at bedside for support  Outcome: Progressing

## 2024-11-12 NOTE — PLAN OF CARE
Patient improving today. SBFT study resulted; orders to start CLD and remove NG received. Restraints discontinued as patient now able to follow medical care plan. Family and caregiver are at bedside during the day, assisting much with patient care. Tolerating CLD; SLP aubrey cleared, see note and orders. No N/V. Still with productive cough, suctioning as needed. Up in chair with PT today.     Problem: Patient Centered Care  Goal: Patient preferences are identified and integrated in the patient's plan of care  Description: Interventions:  - What would you like us to know as we care for you? I live with my caregiver   - Provide timely, complete, and accurate information to patient/family  - Incorporate patient and family knowledge, values, beliefs, and cultural backgrounds into the planning and delivery of care  - Encourage patient/family to participate in care and decision-making at the level they choose  - Honor patient and family perspectives and choices  Outcome: Progressing     Problem: Patient/Family Goals  Goal: Patient/Family Long Term Goal  Description: Patient's Long Term Goal: to go home    Interventions:  - Tolerate diet advancement   - Pain management   - See additional Care Plan goals for specific interventions  Outcome: Progressing  Goal: Patient/Family Short Term Goal  Description: Patient's Short Term Goal: to be able to keep food down    Interventions:   - Antiemetics as needed   - Slow diet advancement   - See additional Care Plan goals for specific interventions  Outcome: Progressing     Problem: PAIN - ADULT  Goal: Verbalizes/displays adequate comfort level or patient's stated pain goal  Description: INTERVENTIONS:  - Encourage pt to monitor pain and request assistance  - Assess pain using appropriate pain scale  - Administer analgesics based on type and severity of pain and evaluate response  - Implement non-pharmacological measures as appropriate and evaluate response  - Consider cultural and  social influences on pain and pain management  - Manage/alleviate anxiety  - Utilize distraction and/or relaxation techniques  - Monitor for opioid side effects  - Notify MD/LIP if interventions unsuccessful or patient reports new pain  - Anticipate increased pain with activity and pre-medicate as appropriate  Outcome: Progressing     Problem: RISK FOR INFECTION - ADULT  Goal: Absence of fever/infection during anticipated neutropenic period  Description: INTERVENTIONS  - Monitor WBC  - Administer growth factors as ordered  - Implement neutropenic guidelines  Outcome: Progressing     Problem: SAFETY ADULT - FALL  Goal: Free from fall injury  Description: INTERVENTIONS:  - Assess pt frequently for physical needs  - Identify cognitive and physical deficits and behaviors that affect risk of falls.  - [place] fall precautions as indicated by assessment.  - Educate pt/family on patient safety including physical limitations  - Instruct pt to call for assistance with activity based on assessment  - Modify environment to reduce risk of injury  - Provide assistive devices as appropriate  - Consider OT/PT consult to assist with strengthening/mobility  - Encourage toileting schedule  Outcome: Progressing     Problem: DISCHARGE PLANNING  Goal: Discharge to home or other facility with appropriate resources  Description: INTERVENTIONS:  - Identify barriers to discharge w/pt and caregiver  - Include patient/family/discharge partner in discharge planning  - Arrange for needed discharge resources and transportation as appropriate  - Identify discharge learning needs (meds, wound care, etc)  - Arrange for interpreters to assist at discharge as needed  - Consider post-discharge preferences of patient/family/discharge partner  - Complete POLST form as appropriate  - Assess patient's ability to be responsible for managing their own health  - Refer to Case Management Department for coordinating discharge planning if the patient needs  post-hospital services based on physician/LIP order or complex needs related to functional status, cognitive ability or social support system  Outcome: Progressing     Problem: GASTROINTESTINAL - ADULT  Goal: Minimal or absence of nausea and vomiting  Description: INTERVENTIONS:  - Maintain adequate hydration with IV or PO as ordered and tolerated  - Nasogastric tube to low intermittent suction as ordered  - Evaluate effectiveness of ordered antiemetic medications  - Provide nonpharmacologic comfort measures as appropriate  - Advance diet as tolerated, if ordered  - Obtain nutritional consult as needed  - Evaluate fluid balance  Outcome: Progressing  Goal: Maintains or returns to baseline bowel function  Description: INTERVENTIONS:  - Assess bowel function  - Maintain adequate hydration with IV or PO as ordered and tolerated  - Evaluate effectiveness of GI medications  - Encourage mobilization and activity  - Obtain nutritional consult as needed  - Establish a toileting routine/schedule  - Consider collaborating with pharmacy to review patient's medication profile  Outcome: Progressing  Goal: Maintains adequate nutritional intake (undernourished)  Description: INTERVENTIONS:  - Monitor percentage of each meal consumed  - Identify factors contributing to decreased intake, treat as appropriate  - Assist with meals as needed  - Monitor I&O, WT and lab values  - Obtain nutritional consult as needed  - Optimize oral hygiene and moisture  - Encourage food from home; allow for food preferences  - Enhance eating environment  Outcome: Progressing     Problem: Delirium  Goal: Minimize duration of delirium  Description: Interventions:  - Encourage use of hearing aids, eye glasses  - Promote highest level of mobility daily  - Provide frequent reorientation  - Promote wakefulness i.e. lights on, blinds open  - Promote sleep, encourage patient's normal rest cycle i.e. lights off, TV off, minimize noise and interruptions  -  Encourage family to assist in orientation and promotion of home routines  Outcome: Progressing     Problem: RESPIRATORY - ADULT  Goal: Achieves optimal ventilation and oxygenation  Description: INTERVENTIONS:  - Assess for changes in respiratory status  - Assess for changes in mentation and behavior  - Position to facilitate oxygenation and minimize respiratory effort  - Oxygen supplementation based on oxygen saturation or ABGs  - Provide Smoking Cessation handout, if applicable  - Encourage broncho-pulmonary hygiene including cough, deep breathe, Incentive Spirometry  - Assess the need for suctioning and perform as needed  - Assess and instruct to report SOB or any respiratory difficulty  - Respiratory Therapy support as indicated  - Manage/alleviate anxiety  - Monitor for signs/symptoms of CO2 retention  Outcome: Progressing     Problem: METABOLIC/FLUID AND ELECTROLYTES - ADULT  Goal: Electrolytes maintained within normal limits  Description: INTERVENTIONS:  - Monitor labs and rhythm and assess patient for signs and symptoms of electrolyte imbalances  - Administer electrolyte replacement as ordered  - Monitor response to electrolyte replacements, including rhythm and repeat lab results as appropriate  - Fluid restriction as ordered  - Instruct patient on fluid and nutrition restrictions as appropriate  Outcome: Progressing     Problem: SKIN/TISSUE INTEGRITY - ADULT  Goal: Skin integrity remains intact  Description: INTERVENTIONS  - Assess and document risk factors for pressure ulcer development  - Assess and document skin integrity  - Monitor for areas of redness and/or skin breakdown  - Initiate interventions, skin care algorithm/standards of care as needed  Outcome: Progressing     Problem: HEMATOLOGIC - ADULT  Goal: Maintains hematologic stability  Description: INTERVENTIONS  - Assess for signs and symptoms of bleeding or hemorrhage  - Monitor labs and vital signs for trends  - Administer supportive blood  products/factors, fluids and medications as ordered and appropriate  - Administer supportive blood products/factors as ordered and appropriate  Outcome: Progressing  Goal: Free from bleeding injury  Description: (Example usage: patient with low platelets)  INTERVENTIONS:  - Avoid intramuscular injections, enemas and rectal medication administration  - Ensure safe mobilization of patient  - Hold pressure on venipuncture sites to achieve adequate hemostasis  - Assess for signs and symptoms of internal bleeding  - Monitor lab trends  - Patient is to report abnormal signs of bleeding to staff  - Avoid use of toothpicks and dental floss  - Use electric shaver for shaving  - Use soft bristle tooth brush  - Limit straining and forceful nose blowing  Outcome: Progressing     Problem: MUSCULOSKELETAL - ADULT  Goal: Return mobility to safest level of function  Description: INTERVENTIONS:  - Assess patient stability and activity tolerance for standing, transferring and ambulating w/ or w/o assistive devices  - Assist with transfers and ambulation using safe patient handling equipment as needed  - Ensure adequate protection for wounds/incisions during mobilization  - Obtain PT/OT consults as needed  - Advance activity as appropriate  - Communicate ordered activity level and limitations with patient/family  Outcome: Progressing  Goal: Maintain proper alignment of affected body part  Description: INTERVENTIONS:  - Support and protect limb and body alignment per provider's orders  - Instruct and reinforce with patient and family use of appropriate assistive device and precautions (e.g. spinal or hip dislocation precautions)  Outcome: Progressing     Problem: NEUROLOGICAL - ADULT  Goal: Achieves stable or improved neurological status  Description: INTERVENTIONS  - Assess for and report changes in neurological status  - Initiate measures to prevent increased intracranial pressure  - Maintain blood pressure and fluid volume within  ordered parameters to optimize cerebral perfusion and minimize risk of hemorrhage  - Monitor temperature, glucose, and sodium. Initiate appropriate interventions as ordered  Outcome: Progressing

## 2024-11-12 NOTE — PLAN OF CARE
Orders for NGT to be removed. Patient oriented and no longer requiring restraints as previously needed for pulling at NGT. Dr. Connelly notified of patient's compliance with care plan and of restraint removal. Family updated.     Problem: Safety Risk - Non-Violent Restraints  Goal: Patient will remain free from self-harm  Description: INTERVENTIONS:  - Apply the least restrictive restraint to prevent harm  - Notify patient and family of reasons restraints applied  - Assess for any contributing factors to confusion (electrolyte disturbances, delirium, medications)  - Discontinue any unnecessary medical devices as soon as possible  - Assess the patient's physical comfort, circulation, skin condition, hydration, nutrition and elimination needs   - Reorient and redirection as needed  - Assess for the need to continue restraints  Outcome: Completed

## 2024-11-12 NOTE — PROGRESS NOTES
[facility]  Progress Note    Christy Lowry Patient Status:  Inpatient    1924 MRN [id number]   Location [facility] 4W/SW/SE Attending Gillian Connelly MD   Hosp Day # 7 PCP Patrick Stafford MD     Subjective:  Pt seen laying in bed. Reports feeling well, no acute events overnight.    Objective/Physical Exam:  General: Alert, orientated x3.  Cooperative.  No apparent distress.  Vital Signs:  Blood pressure 157/55, pulse 67, temperature 98.5 °F (36.9 °C), temperature source Axillary, resp. rate 20, height 5' 4\" (1.626 m), weight 152 lb (68.9 kg), SpO2 95%.  Wt Readings from Last 3 Encounters:   24 152 lb (68.9 kg)   10/31/24 128 lb 1.4 oz (58.1 kg)   23 128 lb (58.1 kg)     Lungs: No respiratory distress.  Cardiac: Regular rate and rhythm.   Abdomen:  Soft, non distended, non tender, with no rebound or guarding.  No peritoneal signs.   Extremities:  No lower extremity edema noted.      Intake/Output:    Intake/Output Summary (Last 24 hours) at 2024 1039  Last data filed at 2024 0622  Gross per 24 hour   Intake 2139.43 ml   Output 1140 ml   Net 999.43 ml     I/O last 3 completed shifts:  In: 2139.4 [I.V.:1939.4; IV PIGGYBACK:200]  Out: 1890 [Urine:1150; Emesis/NG output:740]  No intake/output data recorded.    Medications:    potassium chloride  40 mEq Intravenous Once    Followed by    potassium chloride  20 mEq Intravenous Once    pantoprazole  40 mg Intravenous Q24H    piperacillin-tazobactam  3.375 g Intravenous Q8H    heparin  5,000 Units Subcutaneous 2 times per day       Labs:  Lab Results   Component Value Date    WBC 12.6 2024    HGB 13.5 2024    HCT 41.7 2024    .0 2024     Lab Results   Component Value Date     2024    K 2.9 2024     2024    CO2 30.0 2024    BUN 9 2024    CREATSERUM 0.74 2024    GLU 94 2024    CA 8.5 2024     No results found for: \"PT\",  \"INR\"      Assessment  Patient Active Problem List   Diagnosis    Skin cancer    Moderate single current episode of major depressive disorder (HCC)    Acquired hypothyroidism    Arthralgia of right lower leg    Neoplasm of uncertain behavior of skin    Generalized anxiety disorder    Irritable bowel syndrome with diarrhea    Nausea and vomiting in adult    Persistent vomiting in adult patient    Enteritis       Enteritis    Plan:  SBFT without obstruction, okay to remove NGT and start clears. Slowly advance diet as tolerated  No acute surgical intervention indicated at this time  Ambulate as able  Appreciate SLP assessment  Ambulate and up to chair  DVT prophylaxis with heparin  GI prophylaxis with protonix    Quality:  DVT Mechanical Prophylaxis:        DVT Pharmacologic Prophylaxis   Medication    heparin (Porcine) 5000 UNIT/ML injection 5,000 Units                Code Status: Full Code  Severino: External urinary catheter in place  Severino Duration (in days):   Central line:    SERGIO: 11/14/2024        Ino Cast PA-C  11/12/2024  10:39 AM

## 2024-11-12 NOTE — PLAN OF CARE
Patient with SBFT all day today. Images still pending and ongoing - continued NG clamped per radiology. Surgical team aware that NGT remains clamped until SBFT resulted and/or directed by radiology department. No emesis noted. NPO. Restraints continued for safety and to continue with medical plan. Family at bedside. Caregiver also at bedside who assists with patient care.     Problem: Patient Centered Care  Goal: Patient preferences are identified and integrated in the patient's plan of care  Description: Interventions:  - What would you like us to know as we care for you? I live with my caregiver   - Provide timely, complete, and accurate information to patient/family  - Incorporate patient and family knowledge, values, beliefs, and cultural backgrounds into the planning and delivery of care  - Encourage patient/family to participate in care and decision-making at the level they choose  - Honor patient and family perspectives and choices  Outcome: Progressing     Problem: Patient/Family Goals  Goal: Patient/Family Long Term Goal  Description: Patient's Long Term Goal: to go home    Interventions:  - Tolerate diet advancement   - Pain management   - See additional Care Plan goals for specific interventions  Outcome: Progressing  Goal: Patient/Family Short Term Goal  Description: Patient's Short Term Goal: to be able to keep food down    Interventions:   - Antiemetics as needed   - Slow diet advancement   - See additional Care Plan goals for specific interventions  Outcome: Progressing     Problem: PAIN - ADULT  Goal: Verbalizes/displays adequate comfort level or patient's stated pain goal  Description: INTERVENTIONS:  - Encourage pt to monitor pain and request assistance  - Assess pain using appropriate pain scale  - Administer analgesics based on type and severity of pain and evaluate response  - Implement non-pharmacological measures as appropriate and evaluate response  - Consider cultural and social influences  on pain and pain management  - Manage/alleviate anxiety  - Utilize distraction and/or relaxation techniques  - Monitor for opioid side effects  - Notify MD/LIP if interventions unsuccessful or patient reports new pain  - Anticipate increased pain with activity and pre-medicate as appropriate  Outcome: Progressing     Problem: RISK FOR INFECTION - ADULT  Goal: Absence of fever/infection during anticipated neutropenic period  Description: INTERVENTIONS  - Monitor WBC  - Administer growth factors as ordered  - Implement neutropenic guidelines  Outcome: Progressing     Problem: SAFETY ADULT - FALL  Goal: Free from fall injury  Description: INTERVENTIONS:  - Assess pt frequently for physical needs  - Identify cognitive and physical deficits and behaviors that affect risk of falls.  - [place] fall precautions as indicated by assessment.  - Educate pt/family on patient safety including physical limitations  - Instruct pt to call for assistance with activity based on assessment  - Modify environment to reduce risk of injury  - Provide assistive devices as appropriate  - Consider OT/PT consult to assist with strengthening/mobility  - Encourage toileting schedule  Outcome: Progressing     Problem: DISCHARGE PLANNING  Goal: Discharge to home or other facility with appropriate resources  Description: INTERVENTIONS:  - Identify barriers to discharge w/pt and caregiver  - Include patient/family/discharge partner in discharge planning  - Arrange for needed discharge resources and transportation as appropriate  - Identify discharge learning needs (meds, wound care, etc)  - Arrange for interpreters to assist at discharge as needed  - Consider post-discharge preferences of patient/family/discharge partner  - Complete POLST form as appropriate  - Assess patient's ability to be responsible for managing their own health  - Refer to Case Management Department for coordinating discharge planning if the patient needs post-hospital  services based on physician/LIP order or complex needs related to functional status, cognitive ability or social support system  Outcome: Progressing     Problem: GASTROINTESTINAL - ADULT  Goal: Minimal or absence of nausea and vomiting  Description: INTERVENTIONS:  - Maintain adequate hydration with IV or PO as ordered and tolerated  - Nasogastric tube to low intermittent suction as ordered  - Evaluate effectiveness of ordered antiemetic medications  - Provide nonpharmacologic comfort measures as appropriate  - Advance diet as tolerated, if ordered  - Obtain nutritional consult as needed  - Evaluate fluid balance  Outcome: Progressing  Goal: Maintains or returns to baseline bowel function  Description: INTERVENTIONS:  - Assess bowel function  - Maintain adequate hydration with IV or PO as ordered and tolerated  - Evaluate effectiveness of GI medications  - Encourage mobilization and activity  - Obtain nutritional consult as needed  - Establish a toileting routine/schedule  - Consider collaborating with pharmacy to review patient's medication profile  Outcome: Progressing  Goal: Maintains adequate nutritional intake (undernourished)  Description: INTERVENTIONS:  - Monitor percentage of each meal consumed  - Identify factors contributing to decreased intake, treat as appropriate  - Assist with meals as needed  - Monitor I&O, WT and lab values  - Obtain nutritional consult as needed  - Optimize oral hygiene and moisture  - Encourage food from home; allow for food preferences  - Enhance eating environment  Outcome: Progressing     Problem: Delirium  Goal: Minimize duration of delirium  Description: Interventions:  - Encourage use of hearing aids, eye glasses  - Promote highest level of mobility daily  - Provide frequent reorientation  - Promote wakefulness i.e. lights on, blinds open  - Promote sleep, encourage patient's normal rest cycle i.e. lights off, TV off, minimize noise and interruptions  - Encourage family to  assist in orientation and promotion of home routines  Outcome: Progressing     Problem: Safety Risk - Non-Violent Restraints  Goal: Patient will remain free from self-harm  Description: INTERVENTIONS:  - Apply the least restrictive restraint to prevent harm  - Notify patient and family of reasons restraints applied  - Assess for any contributing factors to confusion (electrolyte disturbances, delirium, medications)  - Discontinue any unnecessary medical devices as soon as possible  - Assess the patient's physical comfort, circulation, skin condition, hydration, nutrition and elimination needs   - Reorient and redirection as needed  - Assess for the need to continue restraints  11/11/2024 1827 by Tirso Fraser, RN  Outcome: Progressing  11/11/2024 1806 by Tirso Fraser, RN  Outcome: Progressing

## 2024-11-12 NOTE — CM/SW NOTE
[facility] res'd for home services if family is agreeable and pt able to participate    1150  met w/ pt and son at bedside, pt said she doesn't think she wants HHC she walks with her cg 2 x / day and that is enough.     Ref canceled    / to remain available for support and/or discharge planning.     Haritha Marsh, RN    Ext [phone]

## 2024-11-12 NOTE — PROGRESS NOTES
[facility]     Gastroenterology Progress Note    Christy Lowry Patient Status:  Inpatient    1924 MRN [id number]   Location [facility] 4W/SW/SE Attending Gillian Connelly MD   Hosp Day # 7 PCP Patirck Stafford MD       Subjective:   Patient currently without abdominal pain    Denies vomiting  She is still in restraints  NG in place  Objective:   Blood pressure 157/55, pulse 67, temperature 98.5 °F (36.9 °C), temperature source Axillary, resp. rate 20, height 5' 4\" (1.626 m), weight 152 lb (68.9 kg), SpO2 95%. Body mass index is 26.09 kg/m².    Gen: awake, alert patient, NAD  HEENT: EOMI, the sclera appears anicteric, oropharynx clear, mucus membranes appear moist. NG in place.  CV: RRR  Lung: no conversational dyspnea   Abdomen: soft NTND abdomen with NABS appreciated   Skin: dry, warm, no jaundice  Ext: no LE edema is evident  Neuro: Alert and interactive  Psych: calm, cooperative    Assessment and Plan:   Christy Lowry is a 89 year old female w/ PMHx of BMI 26.09, actinic keratosis, OA, Osteoporosis, GERD, Hypothyroidism, Anxiety who presented to the ED with persistent ABD pain and loose stools.  Pt initially presented to ED 10/31 with similar symptoms with CT A/P at that time demonstrating severe infectious inflammatory enterocolitis but declined admission at that time.  She was discharged home on Augmentin.     #Altered bowel habits  #N/V  - Labs on admission with slight leukocytosis and elevated ANC, otherwise unremarkable  - CT A/P on 10/31/2024 demonstrated no evidence of bowel obstruction, HH, fluid filled loops of small bowel but nondilated compatible with moderate-severe infectious/inflammatory enterocolitis, fluid throughout the colon to the level of the rectum with long segment proximal colonic wall thickening and mild proximal pericolonic inflammation. Possible transition point at the hepatic flexure. Not able to get to area on flex sig given stool.  - Last  colonoscopy in the remote past  - Etiology initially thought to be infectious but given no resolution after negative EGD/flex, negative stool studies and now ongoing for 10 days concern about obstruction    -SBFT without obstruction, surgery planning for NG removal and trial of clear liquids     Recommend:  - Surgery following, nagelica john    Case discussed with Tosha Valle MD and Tirso ANGULO.    Paulette Kraft PA-C  [facility] Gastroenterology  11/12/2024      Results:     Lab Results   Component Value Date    WBC 12.6 (H) 11/12/2024    HGB 13.5 11/12/2024    HCT 41.7 11/12/2024    .0 11/12/2024    CREATSERUM 0.74 11/12/2024    BUN 9 11/12/2024     11/12/2024    K 2.9 (LL) 11/12/2024     11/12/2024    CO2 30.0 11/12/2024    GLU 94 11/12/2024    CA 8.5 (L) 11/12/2024    ALB 3.1 (L) 11/10/2024    ALKPHO 78 11/10/2024    BILT 0.5 11/10/2024    TP 6.2 11/10/2024    AST 20 11/10/2024    ALT <7 (L) 11/10/2024    T4F 1.3 05/30/2023    TSH 5.400 (H) 05/30/2023    LIP 21 11/05/2024    MG 2.2 06/14/2023       No results found.

## 2024-11-12 NOTE — PROGRESS NOTES
[facility]  part of [facility]    Progress Note    Christy Lowry Patient Status:  Inpatient    1924 MRN [id number]   Location [facility] 4W/SW/SE Attending Gillian Connelly MD   Hosp Day # 6 PCP Patrick Stafford MD     Assessment and Plan:     Christy is a 89 year old female with enteritis vs pSBO    - SBFT- ongoing, having multiple BM  - Anticipate NG removal tomorrow     Subjective:   No complaints. Had multiple loose BM since SBFT.     Objective:   Patient Vitals for the past 24 hrs:   BP Temp Temp src Pulse Resp SpO2   24 1307 141/61 97.8 °F (36.6 °C) Axillary 70 18 100 %   24 0828 -- -- -- 78 18 95 %   24 0448 132/63 97.2 °F (36.2 °C) Axillary 79 16 96 %   11/10/24 1954 133/64 99.1 °F (37.3 °C) Axillary 76 16 97 %       Intake/Output                   24 0700 - 11/10/24 0659 11/10/24 0700 - 24 0659 24 0700 - 24 0659       Intake    I.V.  --  207.5  --    Volume (mL) (dextrose 5%-sodium chloride 0.45% infusion) -- 207.5 --    NG/GT  300  --  --    Intake (mL) (NG/OG Tube Nasogastric 16 Fr. Left nostril) 300 -- --    IV PIGGYBACK  --  100  --    Volume (mL) (piperacillin-tazobactam (Zosyn) 3.375 g in dextrose 5% 100 mL IVPB-ADDV) -- 100 --    Total Intake 300 307.5 --       Output    Urine  --  50  400    Incontinent Urine Occurrence 1 x -- 4 x    Output (mL) (External Urinary Catheter) -- 50 400    Emesis/NG output  --  1000  200    Emesis Occurrence 4 x -- --    Output (mL) (NG/OG Tube Nasogastric 16 Fr. Left nostril) -- 1000 200    Stool  --  --  --    Stool Count Calculated for I/O 1 x 6 x 8 x    Total Output -- 1050 600       Net I/O     300 -742.5 -600            Exam: AVSS  General: No acute distress. Alert  HEENT: Moist mucous membranes. Anicteric.   Neck: Supple, No JVD.   Respiratory: Nonlabored resp.  Cardiovascular: Regular rate.   Abdomen: Soft, NT, no rebound tnd, no guarding, nondistended.   Neurologic: No focal neurological  deficits.  Musculoskeletal: Full range of motion of all extremities. No calf tenderness. No swelling noted.  Integument: No lesions. No erythema.  Psychiatric: Appropriate mood and affect.    Results:     Lab Results   Component Value Date    WBC 12.6 (H) 11/11/2024    HGB 12.5 11/11/2024    HCT 38.5 11/11/2024    .0 11/11/2024    CREATSERUM 0.79 11/11/2024    BUN 16 11/11/2024     11/11/2024    K 3.7 11/11/2024     11/11/2024    CO2 32.0 11/11/2024    GLU 94 11/11/2024    CA 8.5 (L) 11/11/2024    ALB 3.1 (L) 11/10/2024    ALKPHO 78 11/10/2024    BILT 0.5 11/10/2024    TP 6.2 11/10/2024    AST 20 11/10/2024    ALT <7 (L) 11/10/2024    T4F 1.3 05/30/2023    TSH 5.400 (H) 05/30/2023    LIP 21 11/05/2024    MG 2.2 06/14/2023       XR CHEST AP PORTABLE  (CPT=71045)    Result Date: 11/10/2024  CONCLUSION:  1. NG tube tip in gastric antrum. 2. Right greater than left basilar pleural effusions with compressive atelectasis. 3. Borderline cardiomegaly with pulmonary venous congestion. 4. No significant change.    Dictated by (CST): Yfn Workman MD on 11/10/2024 at 10:05 AM     Finalized by (CST): Yfn Workman MD on 11/10/2024 at 10:06 AM          XR CHEST AP PORTABLE  (CPT=71045)    Result Date: 11/10/2024  CONCLUSION:  1. Mild fluid overload/CHF with right greater than left pleural effusions and compressive atelectasis.  Coexistent basilar pneumonia should be excluded clinically.    Dictated by (CST): Yfn Workman MD on 11/10/2024 at 9:53 AM     Finalized by (CST): Yfn Workman MD on 11/10/2024 at 9:58 AM                   Mavis Garg MD  11/11/2024

## 2024-11-13 ENCOUNTER — TELEPHONE (OUTPATIENT)
Facility: CLINIC | Age: 89
End: 2024-11-13

## 2024-11-13 LAB
ANION GAP SERPL CALC-SCNC: 5 MMOL/L (ref 0–18)
BUN BLD-MCNC: 5 MG/DL (ref 9–23)
BUN/CREAT SERPL: 8.6 (ref 10–20)
CALCIUM BLD-MCNC: 8.1 MG/DL (ref 8.7–10.4)
CHLORIDE SERPL-SCNC: 107 MMOL/L (ref 98–112)
CO2 SERPL-SCNC: 27 MMOL/L (ref 21–32)
CREAT BLD-MCNC: 0.58 MG/DL
CRP SERPL-MCNC: 2 MG/DL (ref ?–1)
DEPRECATED RDW RBC AUTO: 50.9 FL (ref 35.1–46.3)
EGFRCR SERPLBLD CKD-EPI 2021: 81 ML/MIN/1.73M2 (ref 60–?)
ERYTHROCYTE [DISTWIDTH] IN BLOOD BY AUTOMATED COUNT: 14.9 % (ref 11–15)
GLUCOSE BLD-MCNC: 99 MG/DL (ref 70–99)
HCT VFR BLD AUTO: 38.8 %
HGB BLD-MCNC: 12.2 G/DL
MCH RBC QN AUTO: 29 PG (ref 26–34)
MCHC RBC AUTO-ENTMCNC: 31.4 G/DL (ref 31–37)
MCV RBC AUTO: 92.2 FL
OSMOLALITY SERPL CALC.SUM OF ELEC: 285 MOSM/KG (ref 275–295)
PLATELET # BLD AUTO: 330 10(3)UL (ref 150–450)
POTASSIUM SERPL-SCNC: 3.1 MMOL/L (ref 3.5–5.1)
RBC # BLD AUTO: 4.21 X10(6)UL
SODIUM SERPL-SCNC: 139 MMOL/L (ref 136–145)
WBC # BLD AUTO: 13.2 X10(3) UL (ref 4–11)

## 2024-11-13 PROCEDURE — 99232 SBSQ HOSP IP/OBS MODERATE 35: CPT | Performed by: REGISTERED NURSE

## 2024-11-13 PROCEDURE — 99233 SBSQ HOSP IP/OBS HIGH 50: CPT | Performed by: HOSPITALIST

## 2024-11-13 RX ORDER — POTASSIUM CHLORIDE 1.5 G/1.58G
40 POWDER, FOR SOLUTION ORAL ONCE
Status: COMPLETED | OUTPATIENT
Start: 2024-11-13 | End: 2024-11-13

## 2024-11-13 NOTE — PROGRESS NOTES
[facility]     Gastroenterology Progress Note    Christy Lowry Patient Status:  Inpatient    1924 MRN [id number]   Location [facility] 4W/SW/SE Attending Gillian Connelly MD   Hosp Day # 8 PCP Patrick Stafford MD       Subjective:   Pt sitting up in chair.  Family at bedside  No ABD pain, N/v with full liquids for breakfast  No fever, chills  Continues to have Bms, noted red streaks.  Pt reports hx of hemorrhoids.  Objective:   Blood pressure 123/53, pulse 69, temperature 97.5 °F (36.4 °C), temperature source Oral, resp. rate 18, height 5' 4\" (1.626 m), weight 152 lb (68.9 kg), SpO2 95%. Body mass index is 26.09 kg/m².    Gen: awake, alert patient, NAD  HEENT: EOMI, the sclera appears anicteric, oropharynx clear, mucus membranes appear moist  CV: RRR  Lung: no conversational dyspnea   Abdomen: soft NTND abdomen with NABS appreciated   Skin: dry, warm, no jaundice  Ext: no LE edema is evident  Neuro: Alert, oriented x3 and interactive  Psych: calm, cooperative    Assessment and Plan:   Christy Lowry is a 89 year old female w/ PMHx of BMI 26.09, actinic keratosis, OA, Osteoporosis, GERD, Hypothyroidism, Anxiety who presented to the ED with persistent ABD pain and loose stools.  Pt initially presented to ED 10/31 with similar symptoms with CT A/P at that time demonstrating severe infectious inflammatory enterocolitis but declined admission at that time.  She was discharged home on Augmentin.     #Altered bowel habits  #N/V  - Labs on admission with slight leukocytosis and elevated ANC, otherwise unremarkable  - CT A/P on 10/31/2024 demonstrated no evidence of bowel obstruction, HH, fluid filled loops of small bowel but nondilated compatible with moderate-severe infectious/inflammatory enterocolitis, fluid throughout the colon to the level of the rectum with long segment proximal colonic wall thickening and mild proximal pericolonic inflammation. Possible transition point at the  hepatic flexure.   - s/p EGD/Flex sig 11/9 negative for acute findings, Not able to get to area of possible transition point on flex sig given stool burden.  - Last colonoscopy in the remote past  - Etiology initially thought to be infectious but given no resolution after negative EGD/flex, negative stool studies and now ongoing for 10 days concern about obstruction    -SBFT without obstruction, surgery planning for NG removal and trial of clear liquids  -NGT removed 11/12 without recurrence of ABD pain or N/V on clear liquids.  Plan to advance diet as tolerated.  Loose brown stools with red streaks overnight .  Hgb stable.  Continue to monitor for now.    Flex sig pathology discussed at bedside.  Nonspecific finding in setting of improved symptoms.  Would not treat with antibiotics/steroids at this time.     Final Diagnosis:      A. 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  Associated epithelial atypia, favor reactive/regnerative.  Negative for intestinal metaplasia or dysplasia.  Diff-Quik stain (with appropriate control reactivity) is negative for H. pylori microorganisms.     B. Random colon; biopsy:  Colonic mucosa with increased intraepithelial lymphocytes, see comment.      Family concerned for return of diarrhea given hx and previous treatment with imodium but surgery advised against use.  Discussed holding bowel regimen at this time given improvement in symptoms but could consider starting bulking fiber.  Will have them follow up in GI clinic to check symptoms and discuss bowel regimen.      Recommend:  - Agree with advancing diet as tolerated  - Surgery following, appreciate recs  - Empiric PPI daily  - Monitor for overt GIB  - Follow up in GI clinic for symptom update and discussing bowel regimen    GI available as needed.  Please call with further questions or concerns.    Case discussed with Tosha Valle MD and Janeth ANGULO.    Rosemarie Han  DAIANA, [facility] Gastroenterology  11/13/2024      Results:     Lab Results   Component Value Date    WBC 13.2 (H) 11/13/2024    HGB 12.2 11/13/2024    HCT 38.8 11/13/2024    .0 11/13/2024    CREATSERUM 0.58 11/13/2024    BUN 5 (L) 11/13/2024     11/13/2024    K 3.1 (L) 11/13/2024     11/13/2024    CO2 27.0 11/13/2024    GLU 99 11/13/2024    CA 8.1 (L) 11/13/2024    ALB 3.1 (L) 11/10/2024    ALKPHO 78 11/10/2024    BILT 0.5 11/10/2024    TP 6.2 11/10/2024    AST 20 11/10/2024    ALT <7 (L) 11/10/2024    T4F 1.3 05/30/2023    TSH 5.400 (H) 05/30/2023    LIP 21 11/05/2024    MG 2.2 06/14/2023       XR SMALL BOWEL SINGLE CONTRAST (CPT=74250)    Result Date: 11/12/2024  CONCLUSION:  1. Diffuse moderate dilatation of all of the small bowel extending to the level of the terminal ileum with diffuse thickening of small bowel folds suggesting more of a diffuse enteritis.  No definite bowel obstruction is seen.  See above.    Dictated by (CST): Clay Castañeda MD on 11/12/2024 at 8:26 AM     Finalized by (CST): Clay Castañeda MD on 11/12/2024 at 8:33 AM

## 2024-11-13 NOTE — PROGRESS NOTES
11/13/24 1337   VISIT TYPE   SLP Inpatient Visit Type (Documentation Required) Attempted Treatment   FOLLOW UP/PLAN   Follow Up Needed (Documentation Required) Yes   SLP Follow-up Date 11/14/24     Attempted to see pt for swallowing therapy.  Chart reviewed and MD advanced diet to soft low fiber.  Collaborated with RN and swallowing therapy approved.  Pt seen with caregiver present and lunch at bedside.  The pt declined to take any po trials stating, \"I just ate.\"  Caregiver asking for speech to come back tomorrow.  Speech therapy to be rescheduled.      Maryan Kay MS/CCC-SLP  Speech Language Pathologist  [facility]  EXT. [phone]

## 2024-11-13 NOTE — PLAN OF CARE
Sitting up in chair. States she is more comfortable in chair. Family and caregiver at bedside throughout shift.   K+ replaced per protocol.   Advanced to low fiber soft diet. Denies nausea or vomiting.   Plan to discharge home with care giver and family tomorrow. Noreen (daughter) is agreeable for home health.   Call light within reach.     Problem: Patient Centered Care  Goal: Patient preferences are identified and integrated in the patient's plan of care  Description: Interventions:  - What would you like us to know as we care for you? I live with my caregiver   - Provide timely, complete, and accurate information to patient/family  - Incorporate patient and family knowledge, values, beliefs, and cultural backgrounds into the planning and delivery of care  - Encourage patient/family to participate in care and decision-making at the level they choose  - Honor patient and family perspectives and choices  Outcome: Progressing     Problem: Patient/Family Goals  Goal: Patient/Family Long Term Goal  Description: Patient's Long Term Goal: to go home    Interventions:  - Tolerate diet advancement   - Pain management   - See additional Care Plan goals for specific interventions  Outcome: Progressing  Goal: Patient/Family Short Term Goal  Description: Patient's Short Term Goal: to be able to keep food down    Interventions:   - Antiemetics as needed   - Slow diet advancement   - See additional Care Plan goals for specific interventions  Outcome: Progressing     Problem: PAIN - ADULT  Goal: Verbalizes/displays adequate comfort level or patient's stated pain goal  Description: INTERVENTIONS:  - Encourage pt to monitor pain and request assistance  - Assess pain using appropriate pain scale  - Administer analgesics based on type and severity of pain and evaluate response  - Implement non-pharmacological measures as appropriate and evaluate response  - Consider cultural and social influences on pain and pain management  -  Manage/alleviate anxiety  - Utilize distraction and/or relaxation techniques  - Monitor for opioid side effects  - Notify MD/LIP if interventions unsuccessful or patient reports new pain  - Anticipate increased pain with activity and pre-medicate as appropriate  Outcome: Progressing     Problem: RISK FOR INFECTION - ADULT  Goal: Absence of fever/infection during anticipated neutropenic period  Description: INTERVENTIONS  - Monitor WBC  - Administer growth factors as ordered  - Implement neutropenic guidelines  Outcome: Progressing     Problem: SAFETY ADULT - FALL  Goal: Free from fall injury  Description: INTERVENTIONS:  - Assess pt frequently for physical needs  - Identify cognitive and physical deficits and behaviors that affect risk of falls.  - Dayhoit fall precautions as indicated by assessment.  - Educate pt/family on patient safety including physical limitations  - Instruct pt to call for assistance with activity based on assessment  - Modify environment to reduce risk of injury  - Provide assistive devices as appropriate  - Consider OT/PT consult to assist with strengthening/mobility  - Encourage toileting schedule  Outcome: Progressing     Problem: DISCHARGE PLANNING  Goal: Discharge to home or other facility with appropriate resources  Description: INTERVENTIONS:  - Identify barriers to discharge w/pt and caregiver  - Include patient/family/discharge partner in discharge planning  - Arrange for needed discharge resources and transportation as appropriate  - Identify discharge learning needs (meds, wound care, etc)  - Arrange for interpreters to assist at discharge as needed  - Consider post-discharge preferences of patient/family/discharge partner  - Complete POLST form as appropriate  - Assess patient's ability to be responsible for managing their own health  - Refer to Case Management Department for coordinating discharge planning if the patient needs post-hospital services based on physician/LIP order  or complex needs related to functional status, cognitive ability or social support system  Outcome: Progressing     Problem: GASTROINTESTINAL - ADULT  Goal: Minimal or absence of nausea and vomiting  Description: INTERVENTIONS:  - Maintain adequate hydration with IV or PO as ordered and tolerated  - Nasogastric tube to low intermittent suction as ordered  - Evaluate effectiveness of ordered antiemetic medications  - Provide nonpharmacologic comfort measures as appropriate  - Advance diet as tolerated, if ordered  - Obtain nutritional consult as needed  - Evaluate fluid balance  Outcome: Progressing  Goal: Maintains or returns to baseline bowel function  Description: INTERVENTIONS:  - Assess bowel function  - Maintain adequate hydration with IV or PO as ordered and tolerated  - Evaluate effectiveness of GI medications  - Encourage mobilization and activity  - Obtain nutritional consult as needed  - Establish a toileting routine/schedule  - Consider collaborating with pharmacy to review patient's medication profile  Outcome: Progressing  Goal: Maintains adequate nutritional intake (undernourished)  Description: INTERVENTIONS:  - Monitor percentage of each meal consumed  - Identify factors contributing to decreased intake, treat as appropriate  - Assist with meals as needed  - Monitor I&O, WT and lab values  - Obtain nutritional consult as needed  - Optimize oral hygiene and moisture  - Encourage food from home; allow for food preferences  - Enhance eating environment  Outcome: Progressing     Problem: Delirium  Goal: Minimize duration of delirium  Description: Interventions:  - Encourage use of hearing aids, eye glasses  - Promote highest level of mobility daily  - Provide frequent reorientation  - Promote wakefulness i.e. lights on, blinds open  - Promote sleep, encourage patient's normal rest cycle i.e. lights off, TV off, minimize noise and interruptions  - Encourage family to assist in orientation and promotion of  home routines  Outcome: Progressing     Problem: RESPIRATORY - ADULT  Goal: Achieves optimal ventilation and oxygenation  Description: INTERVENTIONS:  - Assess for changes in respiratory status  - Assess for changes in mentation and behavior  - Position to facilitate oxygenation and minimize respiratory effort  - Oxygen supplementation based on oxygen saturation or ABGs  - Provide Smoking Cessation handout, if applicable  - Encourage broncho-pulmonary hygiene including cough, deep breathe, Incentive Spirometry  - Assess the need for suctioning and perform as needed  - Assess and instruct to report SOB or any respiratory difficulty  - Respiratory Therapy support as indicated  - Manage/alleviate anxiety  - Monitor for signs/symptoms of CO2 retention  Outcome: Progressing     Problem: METABOLIC/FLUID AND ELECTROLYTES - ADULT  Goal: Electrolytes maintained within normal limits  Description: INTERVENTIONS:  - Monitor labs and rhythm and assess patient for signs and symptoms of electrolyte imbalances  - Administer electrolyte replacement as ordered  - Monitor response to electrolyte replacements, including rhythm and repeat lab results as appropriate  - Fluid restriction as ordered  - Instruct patient on fluid and nutrition restrictions as appropriate  Outcome: Progressing     Problem: SKIN/TISSUE INTEGRITY - ADULT  Goal: Skin integrity remains intact  Description: INTERVENTIONS  - Assess and document risk factors for pressure ulcer development  - Assess and document skin integrity  - Monitor for areas of redness and/or skin breakdown  - Initiate interventions, skin care algorithm/standards of care as needed  Outcome: Progressing     Problem: HEMATOLOGIC - ADULT  Goal: Maintains hematologic stability  Description: INTERVENTIONS  - Assess for signs and symptoms of bleeding or hemorrhage  - Monitor labs and vital signs for trends  - Administer supportive blood products/factors, fluids and medications as ordered and  appropriate  - Administer supportive blood products/factors as ordered and appropriate  Outcome: Progressing  Goal: Free from bleeding injury  Description: (Example usage: patient with low platelets)  INTERVENTIONS:  - Avoid intramuscular injections, enemas and rectal medication administration  - Ensure safe mobilization of patient  - Hold pressure on venipuncture sites to achieve adequate hemostasis  - Assess for signs and symptoms of internal bleeding  - Monitor lab trends  - Patient is to report abnormal signs of bleeding to staff  - Avoid use of toothpicks and dental floss  - Use electric shaver for shaving  - Use soft bristle tooth brush  - Limit straining and forceful nose blowing  Outcome: Progressing     Problem: MUSCULOSKELETAL - ADULT  Goal: Return mobility to safest level of function  Description: INTERVENTIONS:  - Assess patient stability and activity tolerance for standing, transferring and ambulating w/ or w/o assistive devices  - Assist with transfers and ambulation using safe patient handling equipment as needed  - Ensure adequate protection for wounds/incisions during mobilization  - Obtain PT/OT consults as needed  - Advance activity as appropriate  - Communicate ordered activity level and limitations with patient/family  Outcome: Progressing  Goal: Maintain proper alignment of affected body part  Description: INTERVENTIONS:  - Support and protect limb and body alignment per provider's orders  - Instruct and reinforce with patient and family use of appropriate assistive device and precautions (e.g. spinal or hip dislocation precautions)  Outcome: Progressing     Problem: NEUROLOGICAL - ADULT  Goal: Achieves stable or improved neurological status  Description: INTERVENTIONS  - Assess for and report changes in neurological status  - Initiate measures to prevent increased intracranial pressure  - Maintain blood pressure and fluid volume within ordered parameters to optimize cerebral perfusion and  minimize risk of hemorrhage  - Monitor temperature, glucose, and sodium. Initiate appropriate interventions as ordered  Outcome: Progressing

## 2024-11-13 NOTE — PROGRESS NOTES
[facility]  Progress Note    Christy Lowry Patient Status:  Inpatient    1924 MRN [id number]   Location [facility] 4W/SW/SE Attending Gillian Connelly MD   Hosp Day # 8 PCP Patrick Stafford MD     Subjective:  Patient resting in bed. NGT removed yesterday. She denies abdominal pain. She had an episode of incontinence this morning. She tolerated clear liquids well.    Objective/Physical Exam:  General: Alert, orientated x3.  Cooperative.  No apparent distress.  Vital Signs:  Blood pressure 123/53, pulse 69, temperature 97.5 °F (36.4 °C), temperature source Oral, resp. rate 18, height 5' 4\" (1.626 m), weight 152 lb (68.9 kg), SpO2 95%.  Wt Readings from Last 3 Encounters:   24 152 lb (68.9 kg)   10/31/24 128 lb 1.4 oz (58.1 kg)   23 128 lb (58.1 kg)     Lungs: No respiratory distress.  Cardiac: Regular rate and rhythm.   Abdomen:  Soft, not distended, not tender, with no rebound or guarding.  No peritoneal signs.   Extremities:  No lower extremity edema noted.        Intake/Output:    Intake/Output Summary (Last 24 hours) at 2024 0934  Last data filed at 2024 0600  Gross per 24 hour   Intake 1153 ml   Output 450 ml   Net 703 ml     I/O last 3 completed shifts:  In: 2279.4 [P.O.:240; I.V.:1939.4; IV PIGGYBACK:100]  Out: 1190 [Urine:1150; Emesis/NG output:40]  No intake/output data recorded.    Medications:    potassium chloride  40 mEq Intravenous Once    pantoprazole  40 mg Intravenous Q24H    heparin  5,000 Units Subcutaneous 2 times per day       Labs:  Lab Results   Component Value Date    WBC 13.2 2024    HGB 12.2 2024    HCT 38.8 2024    .0 2024     Lab Results   Component Value Date     2024    K 3.1 2024     2024    CO2 27.0 2024    BUN 5 2024    CREATSERUM 0.58 2024    GLU 99 2024    CA 8.1 2024     No results found for: \"PT\", \"INR\"      Assessment  Patient Active  Problem List   Diagnosis    Skin cancer    Moderate single current episode of major depressive disorder (HCC)    Acquired hypothyroidism    Arthralgia of right lower leg    Neoplasm of uncertain behavior of skin    Generalized anxiety disorder    Irritable bowel syndrome with diarrhea    Nausea and vomiting in adult    Persistent vomiting in adult patient    Enteritis     Enteritis       Plan:  SLP recommending thin liquids with no straw. Advance to full liquid diet as tolerated.  Ambulate and up to chair  DVT prophylaxis with heparin  GI prophylaxis with protonix     Quality:  DVT Mechanical Prophylaxis:        DVT Pharmacologic Prophylaxis   Medication    heparin (Porcine) 5000 UNIT/ML injection 5,000 Units                Code Status: Full Code  Severino: External urinary catheter in place  Severino Duration (in days):   Central line:    SERGIO: 11/14/2024        SOPHY Baca  11/13/2024  9:34 AM      The patient was independently examined. Agree with the PA's assessment and plan. Advance diet as tolerated. Please reach out with any questions/concerns.       Mavis Garg MD  [facility] - General Surgery   [address] [place] [address]  p [phone]

## 2024-11-13 NOTE — PHYSICAL THERAPY NOTE
PHYSICAL THERAPY TREATMENT NOTE - INPATIENT     Room Number: 457/457-A       Presenting Problem: nausea and vomiting       Problem List  Principal Problem:    Nausea and vomiting in adult  Active Problems:    Persistent vomiting in adult patient    Enteritis      PHYSICAL THERAPY ASSESSMENT   Patient demonstrates good  progress this session, goals  remain in progress.      Patient is requiring contact guard assist and moderate assist as a result of the following impairments: decreased functional strength, decreased endurance/aerobic capacity, and pain.     Patient continues to function below baseline with bed mobility, transfers, and gait.  Next session anticipate patient to progress bed mobility, transfers, and gait.  Physical Therapy will continue to follow patient for duration of hospitalization.    Patient continues to benefit from continued skilled PT services: at discharge to promote prior level of function.  Anticipate patient will return home with home health PT.    PLAN DURING HOSPITALIZATION  Nursing Mobility Recommendation : 1 Assist  PT Device Recommendation: Rolling walker  PT Treatment Plan: Bed mobility;Body mechanics  Frequency (Obs): 3-5x/week     SUBJECTIVE  Pt reports being ready for PT RX    OBJECTIVE  Precautions: Bed/chair alarm    WEIGHT BEARING RESTRICTION       PAIN ASSESSMENT   Ratin          BALANCE  Static Sitting: Good  Dynamic Sitting: Fair +  Static Standing: Fair  Dynamic Standing: Fair -    ACTIVITY TOLERANCE                          O2 WALK       AM-PAC '6-Clicks' INPATIENT SHORT FORM - BASIC MOBILITY  How much difficulty does the patient currently have...  Patient Difficulty: Turning over in bed (including adjusting bedclothes, sheets and blankets)?: A Little   Patient Difficulty: Sitting down on and standing up from a chair with arms (e.g., wheelchair, bedside commode, etc.): A Little   Patient Difficulty: Moving from lying on back to sitting on the side of the bed?: A Little    How much help from another person does the patient currently need...   Help from Another: Moving to and from a bed to a chair (including a wheelchair)?: A Little   Help from Another: Need to walk in hospital room?: A Little   Help from Another: Climbing 3-5 steps with a railing?: A Lot     AM-PAC Score:  Raw Score: 17   Approx Degree of Impairment: 50.57%   Standardized Score (AM-PAC Scale): 42.13   CMS Modifier (G-Code): CK    FUNCTIONAL ABILITY STATUS  Functional Mobility/Gait Assessment  Gait Assistance: Contact guard assist  Distance (ft): 30  Assistive Device: Rolling walker  Pattern: Shuffle  Rolling: moderate assist  Supine to Sit: moderate assist  Sit to Supine: moderate assist  Sit to Stand: moderate assist    Skilled Therapy Provided: Pt seen daily.Mod a for bed mobility and transfer;extra time provided to complete task.EOB sitting balance activity with emphasis on core stabilization.Pt educated on deep breathing,relaxation as well as energy conservation technique.Family present;family education;all questions and concerns addressed.There  ex.pt amb 30 ft with RW and CGa;provided cuing for gait pattern as well as for postural awareness.Limited amb distance due to BM.    The patient's Approx Degree of Impairment: 50.57% has been calculated based on documentation in the [facility] '6 clicks' Inpatient Daily Activity Short Form.  Research supports that patients with this level of impairment may benefit from Home with Home Health PT.  Final disposition will be made by interdisciplinary medical team.    THERAPEUTIC EXERCISES  Lower Extremity Ankle pumps  Glut sets  Quad sets     Position Supine       Patient End of Session: In bed;Call light within reach;RN aware of session/findings;All patient questions and concerns addressed    CURRENT GOALS     Patient Goal Patient's self-stated goal is: go home   Goal #1 Patient is able to demonstrate supine - sit EOB @ level: supervision     Goal #1   Current Status Mod a    Goal #2 Patient is able to demonstrate transfers Sit to/from Stand at assistance level: supervision with walker - rolling     Goal #2  Current Status Mod a   Goal #3 Patient is able to ambulate 75 feet with assist device: walker - rolling at assistance level: supervision   Goal #3   Current Status Pt amb 30 ft with RW and CGa   Goal #4    Goal #4   Current Status    Goal #5 Patient to demonstrate independence with home activity/exercise instructions provided to patient in preparation for discharge.   Goal #5   Current Status In progress   Goal #6    Goal #6  Current Status      Gait Training:  minutes  Therapeutic Activity: 20 minutes  Neuromuscular Re-education:  minutes  Therapeutic Exercise: 10 minutes  Canalith Repositioning:  minutes  Manual Therapy:  minutes  Can add/delete any of these

## 2024-11-13 NOTE — PROGRESS NOTES
[facility]  part of [facility]    Progress Note    Christy Lowry Patient Status:  Inpatient    1924 MRN [id number]   Location [facility] 4W/SW/SE Attending Gillian Connelly MD   Hosp Day # 8 PCP Patrick Stafford MD       Subjective:   Christy Lowry is feeling well. Afebrile. Slight bloody stool overnight.     Objective:   Blood pressure 123/53, pulse 69, temperature 97.5 °F (36.4 °C), temperature source Oral, resp. rate 18, height 5' 4\" (1.626 m), weight 152 lb (68.9 kg), SpO2 95%.    Physical Exam:    General: No acute distress.   Respiratory: Clear to auscultation bilaterally. No wheezes. No rhonchi.  Cardiovascular: S1, S2. Regular rate and rhythm. No murmurs, rubs or gallops.   Abdomen: no abdominal pain, soft nontender.   Neurologic: No focal neurological deficits.   Musculoskeletal: Moves all extremities.  Extremities: No edema.    Results:     Lab Results   Component Value Date    WBC 13.2 (H) 2024    HGB 12.2 2024    HCT 38.8 2024    .0 2024    CREATSERUM 0.58 2024    BUN 5 (L) 2024     2024    K 3.1 (L) 2024     2024    CO2 27.0 2024    GLU 99 2024    CA 8.1 (L) 2024    ALB 3.1 (L) 11/10/2024    ALKPHO 78 11/10/2024    BILT 0.5 11/10/2024    TP 6.2 11/10/2024    AST 20 11/10/2024    ALT <7 (L) 11/10/2024    T4F 1.3 2023    TSH 5.400 (H) 2023    LIP 21 2024    CRP 2.00 (H) 2024    MG 2.2 2023       XR SMALL BOWEL SINGLE CONTRAST (CPT=74250)    Result Date: 2024  CONCLUSION:  1. Diffuse moderate dilatation of all of the small bowel extending to the level of the terminal ileum with diffuse thickening of small bowel folds suggesting more of a diffuse enteritis.  No definite bowel obstruction is seen.  See above.    Dictated by (CST): Clay Castañeda MD on 2024 at 8:26 AM     Finalized by (CST): Clay Castañeda MD on 2024 at 8:33 AM               potassium chloride  40 mEq Oral Once    pantoprazole  40 mg Intravenous Q24H    heparin  5,000 Units Subcutaneous 2 times per day       ipratropium-albuterol    benzocaine-menthol    bisacodyl    morphINE **OR** morphINE **OR** morphINE    ondansetron    metoclopramide      Assessment and Plan:      Acute enterocolitis  Acute intractable nausea and vomiting  Acute SBO   - found again on CT scan of the abdomen and pelvis 11/6  - IV zosyn  - Advance to soft diet   - status post EGD and flex sig 11/9- showed hemorrhoids and bile in the stomach  - gentle IV hydration  - IV zofran for nausea  - pain control  - PT/OT eval   - discussed with family at bedside   - heplock IV   - Surgery consulted- discussed with Dr Forest BULLOCK out   - Small bowel follow through done 11/11- no obstruction  - home tomorrow if electrolytes are ok and tolerating a soft diet     Hypokalemia  - replete on protocol         Quality:  DVT Prophylaxis: Heparin  CODE status: Full    MDM. High      LASHAE HUMMEL MD  11/13/24

## 2024-11-13 NOTE — PLAN OF CARE
Repositioning as tolerating. And hygiene care, no complaints of nausea or vomiting.   Problem: Patient Centered Care  Goal: Patient preferences are identified and integrated in the patient's plan of care  Description: Interventions:  - What would you like us to know as we care for you? I live with my caregiver   - Provide timely, complete, and accurate information to patient/family  - Incorporate patient and family knowledge, values, beliefs, and cultural backgrounds into the planning and delivery of care  - Encourage patient/family to participate in care and decision-making at the level they choose  - Honor patient and family perspectives and choices  Outcome: Progressing     Problem: Patient/Family Goals  Goal: Patient/Family Long Term Goal  Description: Patient's Long Term Goal: to go home    Interventions:  - Tolerate diet advancement   - Pain management   - See additional Care Plan goals for specific interventions  Outcome: Progressing  Goal: Patient/Family Short Term Goal  Description: Patient's Short Term Goal: to be able to keep food down    Interventions:   - Antiemetics as needed   - Slow diet advancement   - See additional Care Plan goals for specific interventions  Outcome: Progressing     Problem: PAIN - ADULT  Goal: Verbalizes/displays adequate comfort level or patient's stated pain goal  Description: INTERVENTIONS:  - Encourage pt to monitor pain and request assistance  - Assess pain using appropriate pain scale  - Administer analgesics based on type and severity of pain and evaluate response  - Implement non-pharmacological measures as appropriate and evaluate response  - Consider cultural and social influences on pain and pain management  - Manage/alleviate anxiety  - Utilize distraction and/or relaxation techniques  - Monitor for opioid side effects  - Notify MD/LIP if interventions unsuccessful or patient reports new pain  - Anticipate increased pain with activity and pre-medicate as  appropriate  Outcome: Progressing     Problem: RISK FOR INFECTION - ADULT  Goal: Absence of fever/infection during anticipated neutropenic period  Description: INTERVENTIONS  - Monitor WBC  - Administer growth factors as ordered  - Implement neutropenic guidelines  Outcome: Progressing     Problem: SAFETY ADULT - FALL  Goal: Free from fall injury  Description: INTERVENTIONS:  - Assess pt frequently for physical needs  - Identify cognitive and physical deficits and behaviors that affect risk of falls.  - [place] fall precautions as indicated by assessment.  - Educate pt/family on patient safety including physical limitations  - Instruct pt to call for assistance with activity based on assessment  - Modify environment to reduce risk of injury  - Provide assistive devices as appropriate  - Consider OT/PT consult to assist with strengthening/mobility  - Encourage toileting schedule  Outcome: Progressing     Problem: DISCHARGE PLANNING  Goal: Discharge to home or other facility with appropriate resources  Description: INTERVENTIONS:  - Identify barriers to discharge w/pt and caregiver  - Include patient/family/discharge partner in discharge planning  - Arrange for needed discharge resources and transportation as appropriate  - Identify discharge learning needs (meds, wound care, etc)  - Arrange for interpreters to assist at discharge as needed  - Consider post-discharge preferences of patient/family/discharge partner  - Complete POLST form as appropriate  - Assess patient's ability to be responsible for managing their own health  - Refer to Case Management Department for coordinating discharge planning if the patient needs post-hospital services based on physician/LIP order or complex needs related to functional status, cognitive ability or social support system  Outcome: Progressing     Problem: GASTROINTESTINAL - ADULT  Goal: Minimal or absence of nausea and vomiting  Description: INTERVENTIONS:  - Maintain adequate  hydration with IV or PO as ordered and tolerated  - Nasogastric tube to low intermittent suction as ordered  - Evaluate effectiveness of ordered antiemetic medications  - Provide nonpharmacologic comfort measures as appropriate  - Advance diet as tolerated, if ordered  - Obtain nutritional consult as needed  - Evaluate fluid balance  Outcome: Progressing  Goal: Maintains or returns to baseline bowel function  Description: INTERVENTIONS:  - Assess bowel function  - Maintain adequate hydration with IV or PO as ordered and tolerated  - Evaluate effectiveness of GI medications  - Encourage mobilization and activity  - Obtain nutritional consult as needed  - Establish a toileting routine/schedule  - Consider collaborating with pharmacy to review patient's medication profile  Outcome: Progressing  Goal: Maintains adequate nutritional intake (undernourished)  Description: INTERVENTIONS:  - Monitor percentage of each meal consumed  - Identify factors contributing to decreased intake, treat as appropriate  - Assist with meals as needed  - Monitor I&O, WT and lab values  - Obtain nutritional consult as needed  - Optimize oral hygiene and moisture  - Encourage food from home; allow for food preferences  - Enhance eating environment  Outcome: Progressing     Problem: Delirium  Goal: Minimize duration of delirium  Description: Interventions:  - Encourage use of hearing aids, eye glasses  - Promote highest level of mobility daily  - Provide frequent reorientation  - Promote wakefulness i.e. lights on, blinds open  - Promote sleep, encourage patient's normal rest cycle i.e. lights off, TV off, minimize noise and interruptions  - Encourage family to assist in orientation and promotion of home routines  Outcome: Progressing     Problem: RESPIRATORY - ADULT  Goal: Achieves optimal ventilation and oxygenation  Description: INTERVENTIONS:  - Assess for changes in respiratory status  - Assess for changes in mentation and behavior  -  Position to facilitate oxygenation and minimize respiratory effort  - Oxygen supplementation based on oxygen saturation or ABGs  - Provide Smoking Cessation handout, if applicable  - Encourage broncho-pulmonary hygiene including cough, deep breathe, Incentive Spirometry  - Assess the need for suctioning and perform as needed  - Assess and instruct to report SOB or any respiratory difficulty  - Respiratory Therapy support as indicated  - Manage/alleviate anxiety  - Monitor for signs/symptoms of CO2 retention  Outcome: Progressing     Problem: METABOLIC/FLUID AND ELECTROLYTES - ADULT  Goal: Electrolytes maintained within normal limits  Description: INTERVENTIONS:  - Monitor labs and rhythm and assess patient for signs and symptoms of electrolyte imbalances  - Administer electrolyte replacement as ordered  - Monitor response to electrolyte replacements, including rhythm and repeat lab results as appropriate  - Fluid restriction as ordered  - Instruct patient on fluid and nutrition restrictions as appropriate  Outcome: Progressing     Problem: SKIN/TISSUE INTEGRITY - ADULT  Goal: Skin integrity remains intact  Description: INTERVENTIONS  - Assess and document risk factors for pressure ulcer development  - Assess and document skin integrity  - Monitor for areas of redness and/or skin breakdown  - Initiate interventions, skin care algorithm/standards of care as needed  Outcome: Progressing     Problem: HEMATOLOGIC - ADULT  Goal: Maintains hematologic stability  Description: INTERVENTIONS  - Assess for signs and symptoms of bleeding or hemorrhage  - Monitor labs and vital signs for trends  - Administer supportive blood products/factors, fluids and medications as ordered and appropriate  - Administer supportive blood products/factors as ordered and appropriate  Outcome: Progressing  Goal: Free from bleeding injury  Description: (Example usage: patient with low platelets)  INTERVENTIONS:  - Avoid intramuscular injections,  enemas and rectal medication administration  - Ensure safe mobilization of patient  - Hold pressure on venipuncture sites to achieve adequate hemostasis  - Assess for signs and symptoms of internal bleeding  - Monitor lab trends  - Patient is to report abnormal signs of bleeding to staff  - Avoid use of toothpicks and dental floss  - Use electric shaver for shaving  - Use soft bristle tooth brush  - Limit straining and forceful nose blowing  Outcome: Progressing     Problem: MUSCULOSKELETAL - ADULT  Goal: Return mobility to safest level of function  Description: INTERVENTIONS:  - Assess patient stability and activity tolerance for standing, transferring and ambulating w/ or w/o assistive devices  - Assist with transfers and ambulation using safe patient handling equipment as needed  - Ensure adequate protection for wounds/incisions during mobilization  - Obtain PT/OT consults as needed  - Advance activity as appropriate  - Communicate ordered activity level and limitations with patient/family  Outcome: Progressing  Goal: Maintain proper alignment of affected body part  Description: INTERVENTIONS:  - Support and protect limb and body alignment per provider's orders  - Instruct and reinforce with patient and family use of appropriate assistive device and precautions (e.g. spinal or hip dislocation precautions)  Outcome: Progressing     Problem: NEUROLOGICAL - ADULT  Goal: Achieves stable or improved neurological status  Description: INTERVENTIONS  - Assess for and report changes in neurological status  - Initiate measures to prevent increased intracranial pressure  - Maintain blood pressure and fluid volume within ordered parameters to optimize cerebral perfusion and minimize risk of hemorrhage  - Monitor temperature, glucose, and sodium. Initiate appropriate interventions as ordered  Outcome: Progressing

## 2024-11-14 VITALS
BODY MASS INDEX: 25.95 KG/M2 | DIASTOLIC BLOOD PRESSURE: 55 MMHG | TEMPERATURE: 98 F | OXYGEN SATURATION: 93 % | RESPIRATION RATE: 18 BRPM | HEIGHT: 64 IN | HEART RATE: 77 BPM | SYSTOLIC BLOOD PRESSURE: 123 MMHG | WEIGHT: 152 LBS

## 2024-11-14 LAB
ANION GAP SERPL CALC-SCNC: 6 MMOL/L (ref 0–18)
BUN BLD-MCNC: 8 MG/DL (ref 9–23)
BUN/CREAT SERPL: 11.6 (ref 10–20)
CALCIUM BLD-MCNC: 8.8 MG/DL (ref 8.7–10.4)
CHLORIDE SERPL-SCNC: 108 MMOL/L (ref 98–112)
CO2 SERPL-SCNC: 27 MMOL/L (ref 21–32)
CREAT BLD-MCNC: 0.69 MG/DL
DEPRECATED RDW RBC AUTO: 52.5 FL (ref 35.1–46.3)
EGFRCR SERPLBLD CKD-EPI 2021: 77 ML/MIN/1.73M2 (ref 60–?)
ERYTHROCYTE [DISTWIDTH] IN BLOOD BY AUTOMATED COUNT: 15.1 % (ref 11–15)
GLUCOSE BLD-MCNC: 93 MG/DL (ref 70–99)
HCT VFR BLD AUTO: 41.4 %
HGB BLD-MCNC: 13.1 G/DL
MCH RBC QN AUTO: 29.4 PG (ref 26–34)
MCHC RBC AUTO-ENTMCNC: 31.6 G/DL (ref 31–37)
MCV RBC AUTO: 93 FL
OSMOLALITY SERPL CALC.SUM OF ELEC: 290 MOSM/KG (ref 275–295)
PLATELET # BLD AUTO: 322 10(3)UL (ref 150–450)
POTASSIUM SERPL-SCNC: 3.3 MMOL/L (ref 3.5–5.1)
POTASSIUM SERPL-SCNC: 3.3 MMOL/L (ref 3.5–5.1)
RBC # BLD AUTO: 4.45 X10(6)UL
SODIUM SERPL-SCNC: 141 MMOL/L (ref 136–145)
WBC # BLD AUTO: 13 X10(3) UL (ref 4–11)

## 2024-11-14 PROCEDURE — 99239 HOSP IP/OBS DSCHRG MGMT >30: CPT | Performed by: HOSPITALIST

## 2024-11-14 RX ORDER — PANTOPRAZOLE SODIUM 40 MG/1
40 TABLET, DELAYED RELEASE ORAL DAILY
Qty: 30 TABLET | Refills: 0 | Status: SHIPPED | OUTPATIENT
Start: 2024-11-14 | End: 2024-11-23

## 2024-11-14 RX ORDER — POTASSIUM CHLORIDE 1.5 G/1.58G
40 POWDER, FOR SOLUTION ORAL ONCE
Status: COMPLETED | OUTPATIENT
Start: 2024-11-14 | End: 2024-11-14

## 2024-11-14 RX ORDER — ONDANSETRON 4 MG/1
4 TABLET, FILM COATED ORAL EVERY 8 HOURS PRN
Qty: 60 TABLET | Refills: 0 | Status: SHIPPED | OUTPATIENT
Start: 2024-11-14

## 2024-11-14 NOTE — DISCHARGE SUMMARY
[facility]  part of [facility]    Discharge Summary    Christy Lowry Patient Status:  Inpatient    1924 MRN [id number]   Location [facility] 4W/SW/SE Attending Andria Sadler MD   Hosp Day # 9 PCP Patrick Stafford MD     Date of Admission: 2024      Date of Discharge: 24      Admitting Diagnosis: Persistent vomiting in adult patient [R11.15]  Nausea and vomiting in adult [R11.2]    Hospital Discharge Diagnoses:  SBO    Lace+ Score: 51  59-90 High Risk  29-58 Medium Risk  0-28   Low Risk.    TCM Follow-Up Recommendation:  LACE 29-58: Moderate Risk of readmission after discharge from the hospital.          Problem List:   Patient Active Problem List   Diagnosis    Skin cancer    Moderate single current episode of major depressive disorder (HCC)    Acquired hypothyroidism    Arthralgia of right lower leg    Neoplasm of uncertain behavior of skin    Generalized anxiety disorder    Irritable bowel syndrome with diarrhea    Nausea and vomiting in adult    Persistent vomiting in adult patient    Enteritis         Physical Exam:     Gen: No acute distress  Pulm: Lungs clear, normal respiratory effort  CV: Heart with regular rate and rhythm  Abd: Abdomen soft, nontender, nondistended, bowel sounds present  Neuro: No acute focal deficits  MSK: moves extremities  Skin: Warm and dry  Psych: Normal affect  Ext: no c/c/e      History of Present Illness: Per Dr Dangelo    Patient is a 89-year-old  female who was seen in the emergency room on  for similar complaint of abdominal pain, loose bowel movements. CT scan of the abdomen at that time showed moderate to severe infectious inflammatory enterocolitis, with small amount of ascites, no free intraperitoneal air or well-defined drainable intra-abdominal collection. She declined admission at that time. Discharged home on Augmentin. Today came back with recurrent symptoms associated with nausea and vomiting.  No diarrhea. CBC today showed white blood cell count of 12.41 with left shift. Chemistry and liver function were roughly unremarkable. GFR is 58 which is slightly below her baseline. Patient will be admitted to the hospital for further management.     Hospital Course:     Acute enterocolitis  Acute intractable nausea and vomiting  Acute SBO   - found again on CT scan of the abdomen and pelvis 11/6  - IV zosyn - stopped now  - NGT placed - now removed  - Small bowel follow through done 11/11- no obstruction  - Advance to soft diet  - tolerating  - status post EGD and flex sig 11/9- showed hemorrhoids and bile in the stomach  - Surgery consulted- no surgical intervention at this time    Discharge Condition: Stable    Discharge Medications:      Discharge Medications        START taking these medications        Instructions Prescription details   pantoprazole 40 MG Tbec  Commonly known as: Protonix      Take 1 tablet (40 mg total) by mouth daily.   Quantity: 30 tablet  Refills: 0            CHANGE how you take these medications        Instructions Prescription details   dicyclomine 10 MG Caps  Commonly known as: Bentyl  What changed: Another medication with the same name was removed. Continue taking this medication, and follow the directions you see here.      Take 1 capsule (10 mg total) by mouth every 12 (twelve) hours as needed.   Quantity: 180 capsule  Refills: 3            CONTINUE taking these medications        Instructions Prescription details   escitalopram 10 MG Tabs  Commonly known as: Lexapro      Take 0.5 tablets (5 mg total) by mouth daily.   Quantity: 45 tablet  Refills: 3     levothyroxine 50 MCG Tabs  Commonly known as: Synthroid      Take 1 tablet (50 mcg total) by mouth before breakfast.   Quantity: 90 tablet  Refills: 3     loperamide 1 MG/7.5ML Soln  Commonly known as: Imodium      Take 15 mL (2 mg total) by mouth 3 (three) times daily as needed for Diarrhea.   Refills: 0     ondansetron 4 mg  tablet  Commonly known as: Zofran      Take 1 tablet (4 mg total) by mouth every 8 (eight) hours as needed for Nausea.   Quantity: 30 tablet  Refills: 0     potassium chloride 20 MEQ Pack  Commonly known as: Klor-Con      Take 20 mEq by mouth daily.   Quantity: 90 each  Refills: 0            STOP taking these medications      amoxicillin clavulanate 875-125 MG Tabs  Commonly known as: Augmentin        cephALEXin 500 MG Caps  Commonly known as: Keflex        ondansetron 8 MG Tbdp  Commonly known as: Zofran-ODT                  Where to Get Your Medications        These medications were sent to [id number] [place], [place] - [address] AT [facility] [address], [phone], [phone] [address], [facility] [address]      Phone: [phone]   pantoprazole 40 MG Tbec             Andria Sadler MD  11/14/2024  12:08 PM    Greater than 30 minutes spent on preparation and coordination of this discharge

## 2024-11-14 NOTE — PLAN OF CARE
Discharge planning today    Problem: Patient Centered Care  Goal: Patient preferences are identified and integrated in the patient's plan of care  Description: Interventions:  - What would you like us to know as we care for you? I live with my caregiver   - Provide timely, complete, and accurate information to patient/family  - Incorporate patient and family knowledge, values, beliefs, and cultural backgrounds into the planning and delivery of care  - Encourage patient/family to participate in care and decision-making at the level they choose  - Honor patient and family perspectives and choices  Outcome: Adequate for Discharge     Problem: Patient/Family Goals  Goal: Patient/Family Long Term Goal  Description: Patient's Long Term Goal: to go home    Interventions:  - Tolerate diet advancement   - Pain management   - See additional Care Plan goals for specific interventions  Outcome: Adequate for Discharge  Goal: Patient/Family Short Term Goal  Description: Patient's Short Term Goal: to be able to keep food down    Interventions:   - Antiemetics as needed   - Slow diet advancement   - See additional Care Plan goals for specific interventions  Outcome: Adequate for Discharge     Problem: PAIN - ADULT  Goal: Verbalizes/displays adequate comfort level or patient's stated pain goal  Description: INTERVENTIONS:  - Encourage pt to monitor pain and request assistance  - Assess pain using appropriate pain scale  - Administer analgesics based on type and severity of pain and evaluate response  - Implement non-pharmacological measures as appropriate and evaluate response  - Consider cultural and social influences on pain and pain management  - Manage/alleviate anxiety  - Utilize distraction and/or relaxation techniques  - Monitor for opioid side effects  - Notify MD/LIP if interventions unsuccessful or patient reports new pain  - Anticipate increased pain with activity and pre-medicate as appropriate  Outcome: Adequate for  Discharge     Problem: RISK FOR INFECTION - ADULT  Goal: Absence of fever/infection during anticipated neutropenic period  Description: INTERVENTIONS  - Monitor WBC  - Administer growth factors as ordered  - Implement neutropenic guidelines  Outcome: Adequate for Discharge     Problem: SAFETY ADULT - FALL  Goal: Free from fall injury  Description: INTERVENTIONS:  - Assess pt frequently for physical needs  - Identify cognitive and physical deficits and behaviors that affect risk of falls.  - [place]um fall precautions as indicated by assessment.  - Educate pt/family on patient safety including physical limitations  - Instruct pt to call for assistance with activity based on assessment  - Modify environment to reduce risk of injury  - Provide assistive devices as appropriate  - Consider OT/PT consult to assist with strengthening/mobility  - Encourage toileting schedule  Outcome: Adequate for Discharge     Problem: DISCHARGE PLANNING  Goal: Discharge to home or other facility with appropriate resources  Description: INTERVENTIONS:  - Identify barriers to discharge w/pt and caregiver  - Include patient/family/discharge partner in discharge planning  - Arrange for needed discharge resources and transportation as appropriate  - Identify discharge learning needs (meds, wound care, etc)  - Arrange for interpreters to assist at discharge as needed  - Consider post-discharge preferences of patient/family/discharge partner  - Complete POLST form as appropriate  - Assess patient's ability to be responsible for managing their own health  - Refer to Case Management Department for coordinating discharge planning if the patient needs post-hospital services based on physician/LIP order or complex needs related to functional status, cognitive ability or social support system  Outcome: Adequate for Discharge     Problem: GASTROINTESTINAL - ADULT  Goal: Minimal or absence of nausea and vomiting  Description: INTERVENTIONS:  - Maintain  adequate hydration with IV or PO as ordered and tolerated  - Nasogastric tube to low intermittent suction as ordered  - Evaluate effectiveness of ordered antiemetic medications  - Provide nonpharmacologic comfort measures as appropriate  - Advance diet as tolerated, if ordered  - Obtain nutritional consult as needed  - Evaluate fluid balance  Outcome: Adequate for Discharge  Goal: Maintains or returns to baseline bowel function  Description: INTERVENTIONS:  - Assess bowel function  - Maintain adequate hydration with IV or PO as ordered and tolerated  - Evaluate effectiveness of GI medications  - Encourage mobilization and activity  - Obtain nutritional consult as needed  - Establish a toileting routine/schedule  - Consider collaborating with pharmacy to review patient's medication profile  Outcome: Adequate for Discharge  Goal: Maintains adequate nutritional intake (undernourished)  Description: INTERVENTIONS:  - Monitor percentage of each meal consumed  - Identify factors contributing to decreased intake, treat as appropriate  - Assist with meals as needed  - Monitor I&O, WT and lab values  - Obtain nutritional consult as needed  - Optimize oral hygiene and moisture  - Encourage food from home; allow for food preferences  - Enhance eating environment  Outcome: Adequate for Discharge     Problem: Delirium  Goal: Minimize duration of delirium  Description: Interventions:  - Encourage use of hearing aids, eye glasses  - Promote highest level of mobility daily  - Provide frequent reorientation  - Promote wakefulness i.e. lights on, blinds open  - Promote sleep, encourage patient's normal rest cycle i.e. lights off, TV off, minimize noise and interruptions  - Encourage family to assist in orientation and promotion of home routines  Outcome: Adequate for Discharge     Problem: RESPIRATORY - ADULT  Goal: Achieves optimal ventilation and oxygenation  Description: INTERVENTIONS:  - Assess for changes in respiratory  status  - Assess for changes in mentation and behavior  - Position to facilitate oxygenation and minimize respiratory effort  - Oxygen supplementation based on oxygen saturation or ABGs  - Provide Smoking Cessation handout, if applicable  - Encourage broncho-pulmonary hygiene including cough, deep breathe, Incentive Spirometry  - Assess the need for suctioning and perform as needed  - Assess and instruct to report SOB or any respiratory difficulty  - Respiratory Therapy support as indicated  - Manage/alleviate anxiety  - Monitor for signs/symptoms of CO2 retention  Outcome: Adequate for Discharge     Problem: METABOLIC/FLUID AND ELECTROLYTES - ADULT  Goal: Electrolytes maintained within normal limits  Description: INTERVENTIONS:  - Monitor labs and rhythm and assess patient for signs and symptoms of electrolyte imbalances  - Administer electrolyte replacement as ordered  - Monitor response to electrolyte replacements, including rhythm and repeat lab results as appropriate  - Fluid restriction as ordered  - Instruct patient on fluid and nutrition restrictions as appropriate  Outcome: Adequate for Discharge     Problem: SKIN/TISSUE INTEGRITY - ADULT  Goal: Skin integrity remains intact  Description: INTERVENTIONS  - Assess and document risk factors for pressure ulcer development  - Assess and document skin integrity  - Monitor for areas of redness and/or skin breakdown  - Initiate interventions, skin care algorithm/standards of care as needed  Outcome: Adequate for Discharge     Problem: HEMATOLOGIC - ADULT  Goal: Maintains hematologic stability  Description: INTERVENTIONS  - Assess for signs and symptoms of bleeding or hemorrhage  - Monitor labs and vital signs for trends  - Administer supportive blood products/factors, fluids and medications as ordered and appropriate  - Administer supportive blood products/factors as ordered and appropriate  Outcome: Adequate for Discharge  Goal: Free from bleeding  injury  Description: (Example usage: patient with low platelets)  INTERVENTIONS:  - Avoid intramuscular injections, enemas and rectal medication administration  - Ensure safe mobilization of patient  - Hold pressure on venipuncture sites to achieve adequate hemostasis  - Assess for signs and symptoms of internal bleeding  - Monitor lab trends  - Patient is to report abnormal signs of bleeding to staff  - Avoid use of toothpicks and dental floss  - Use electric shaver for shaving  - Use soft bristle tooth brush  - Limit straining and forceful nose blowing  Outcome: Adequate for Discharge     Problem: MUSCULOSKELETAL - ADULT  Goal: Return mobility to safest level of function  Description: INTERVENTIONS:  - Assess patient stability and activity tolerance for standing, transferring and ambulating w/ or w/o assistive devices  - Assist with transfers and ambulation using safe patient handling equipment as needed  - Ensure adequate protection for wounds/incisions during mobilization  - Obtain PT/OT consults as needed  - Advance activity as appropriate  - Communicate ordered activity level and limitations with patient/family  Outcome: Adequate for Discharge  Goal: Maintain proper alignment of affected body part  Description: INTERVENTIONS:  - Support and protect limb and body alignment per provider's orders  - Instruct and reinforce with patient and family use of appropriate assistive device and precautions (e.g. spinal or hip dislocation precautions)  Outcome: Adequate for Discharge     Problem: NEUROLOGICAL - ADULT  Goal: Achieves stable or improved neurological status  Description: INTERVENTIONS  - Assess for and report changes in neurological status  - Initiate measures to prevent increased intracranial pressure  - Maintain blood pressure and fluid volume within ordered parameters to optimize cerebral perfusion and minimize risk of hemorrhage  - Monitor temperature, glucose, and sodium. Initiate appropriate  interventions as ordered  Outcome: Adequate for Discharge

## 2024-11-14 NOTE — DIETARY NOTE
ADULT NUTRITION REASSESSMENT    Pt is at moderate nutrition risk.  Pt does not meet malnutrition criteria.      RECOMMENDATIONS TO MD:   ALYSSA added ONS to promote improved PO intake.   See Nutrition Intervention for diet and ONS specifics     ADMITTING DIAGNOSIS:  Persistent vomiting in adult patient [R11.15]  Nausea and vomiting in adult [R11.2]  PERTINENT PAST MEDICAL HISTORY:   Past Medical History:    Actinic keratosis    hypertrophic AK - left central forehead    Benzodiazepine withdrawal without complication (HCC)    Chronic GERD    Constipation    Diarrhea    Eustachian tube dysfunction    Heart palpitations    Loose stools    Occult blood in stools    SCC (squamous cell carcinoma)    left forehead, invasive, well-diff.     SCC (squamous cell carcinoma)    right temple    SCC (squamous cell carcinoma)    right neck    Skin cancer     PATIENT STATUS:   11/08/24 INITIAL VISIT: Pt assessed due to RN consult for pt \"requesting protein supplement\". Pt presented to hospital with complaints of abdominal pain and and N/V - acute enterocolitis. PMH as above noted. Pt sitting up in chair with son and caretaker at bedside who all participated in diet hx. Pt reports poor appetite/intake since the onset of her symptoms ~1 wk ago. Initially presented with abdominal pain and diarrhea. Now c/o nausea, reporting pt recently vomited. Pt reports good appetite/intake prior to onset of acute illness ~1 wk ago. Typically consumes ~2 meals  daily, occasionally consuming an ONS. Diet advanced to full liquids now. Plan for NPO past midnight for tentative EGD and flexible sigmoidoscopy in AM.      Update 11/14/24: Reassessment per protocol. Chart reviewed. GI symptoms improved last emeses 11/09 per flowsheets.Po improved, consuming 70-90% of meals. Tolerating soft diet. Currently on Low fiber diet. Discussed with RN, who stated he provided family with Low Fiber handout for review. Pt discharging today per discharge summary  note.      FOOD/NUTRITION RELATED HISTORY:  Appetite: Good and Improved x1 week  Intake: ~70-90% x3 meals documented since last visit  Intake Meeting Needs: No, but oral nutrition supplements (ONS) to maximize  Percent Meals Eaten (last 6 days)       Date/Time Percent Meals Eaten (%)    11/08/24 1400 75 %     Percent Meals Eaten (%): of ensure shake but vomited after at 11/08/24 1400    11/12/24 1800 75 %    11/13/24 1807 90 %          Food Allergies: No Known Food Allergies (NKFA)  Cultural/Ethnic/Taoist Preferences: Not Obtained    GASTROINTESTINAL: +BM 11/14 large loose tan color, and nausea    MEDICATIONS: reviewed; Noted noncardiac electrolyte replacement protocol ordered   dextrose 5%-sodium chloride 0.45% Stopped (11/14/24 0700)      pantoprazole  40 mg Intravenous Q24H    heparin  5,000 Units Subcutaneous 2 times per day     LABS: reviewed  Recent Labs     11/12/24  0548 11/12/24  1702 11/13/24  0553 11/14/24  0640   GLU 94  --  99 93   BUN 9  --  5* 8*   CREATSERUM 0.74  --  0.58 0.69   CA 8.5*  --  8.1* 8.8     --  139 141   K 2.9* 3.6 3.1* 3.3*  3.3*     --  107 108   CO2 30.0  --  27.0 27.0   OSMOCALC 290  --  285 290       NUTRITION RELATED PHYSICAL FINDINGS:  - Nutrition Focused Physical Exam (NFPE): mild depletion muscle mass temple region, sarcopenia c/w advanced age noted.   - Fluid Accumulation: none  see RN documentation for details  - Skin Integrity: at risk and intact see RN documentation for details    ANTHROPOMETRICS:  HT: 162.6 cm (5' 4\")  WT: 68.9 kg (152 lb) (up 19% (24 lbs) from recent ER visit ~ 1 wk ago) - requested reweigh  DOSING WT: 58 kg (128 lbs - ER wt from 10/31/24) - wt utilized for anthropometric assessment  BMI: Body mass index is 21.95 kg/m².  BMI CLASSIFICATION: <22 considered underweight for advanced age (>65)  IBW: 120 lbs        107% IBW  Usual Body Wt: 137 lbs (per EMR 1.5 yrs ago)      93% UBW    WEIGHT HISTORY:  Patient Weight(s) for the past 336 hrs:    Weight   11/05/24 2111 68.9 kg (152 lb)   11/05/24 1617 58 kg (127 lb 13.9 oz)     Wt Readings from Last 10 Encounters:   11/05/24 68.9 kg (152 lb)   10/31/24 58.1 kg (128 lb 1.4 oz)   07/18/23 58.1 kg (128 lb)   07/08/23 62.1 kg (136 lb 14.5 oz)   06/14/23 62.1 kg (136 lb 14.5 oz)   05/30/23 62.1 kg (137 lb)   10/19/20 50.8 kg (112 lb)   02/20/20 55.8 kg (123 lb)   02/04/20 55.3 kg (122 lb)   10/02/19 57.6 kg (127 lb)     NUTRITION DIAGNOSIS/PROBLEM:   Inadequate protein energy intake related to Decreased ability to consume sufficient energy in the setting of GI illness as evidenced by diet hx reflecting poor appetite/intake x ~1 wk.    NUTRITION DIAGNOSIS PROGRESS:  Improvement (unresolved) - improved po, 70-90%    NUTRITION INTERVENTION:     NUTRITION PRESCRIPTION:   Estimated Nutrition needs: --dosing wt of 58 kg - wt taken on 10/31/24  Calories: 7609-9373 calories/day (22-25 calories per kg Dosing wt)  Protein: 58-70 g protein/day (1-1.2 g protein/kg Dosing wt)  Fluid Needs: 1450 ml/day (chronological age method)    - Diet:       Procedures    Low Fiber/Soft diet Low Fiber/Soft; Is Patient on Accuchecks? No      - RD Care Plan: Encouraged small frequent meals with emphasis on high calorie/high protein and Initiated ONS (oral nutritional supplements)  - Meals and snacks: Encouraged small frequent meals and Encouraged increased PO intake  - Medical Food Supplements: RD added Ensure Compact (220 calories/ 9 g protein each) TID Chocolate  - Vitamin and mineral supplements: none  - Feeding assistance: meal set up  - Nutrition education: Discussed importance of adequate energy and protein intake   - Coordination of nutrition care: collaboration with other providers - discussed with RN on unit and via secure message  - Discharge and transfer of nutrition care to new setting or provider: monitor plans - from home with caretaker    MONITOR AND EVALUATE/NUTRITION GOALS:  - Food and Nutrient Intake:      Monitor: adequacy  of PO intake, tolerance of PO intake, adequacy of supplement intake, tolerance of supplement intake, and for PO diet advancement  - Food and Nutrient Administration:      Monitor: need for temporary nutrition support and goc/family wishes regarding nutrition  - Anthropometric Measurement:    Monitor weight  - Nutrition Goals:      PO and supplement greater than 75% of needs, prevent skin breakdown, maintain true wt within 5%, and improved GI status    DIETITIAN FOLLOW UP: RD to follow and monitor nutrition status    Deidra Moreland RD, LDN  Clinical Dietitian  Available via Epic securechat  Ext. [phone]

## 2024-11-14 NOTE — PLAN OF CARE
Pt developed nausea overnight, no emesis, Zofran given to manage, morphine given for acute stomach pain. Hygiene provided as needed. Caregiver remains at bedside for support. Plan top discharge home with caregiver and family, daughter is agreeable to home health.

## 2024-11-14 NOTE — PROGRESS NOTES
Orders received for patient discharge. Patient transported home via family/daughter. All discharge instructions were discussed with daughter, Christy Betancourt, at bedside. Patient and daughter verbalizes understanding and agreeable to plan of care & follow-up plan as outlined in discharge summary / AVS and had no further questions regarding discharge instruction. All patient belongings were transported with patient/family at time of discharge.

## 2024-11-14 NOTE — HOME CARE LIAISON
[facility]. Spoke w/ patient, daughter and son who is agreeable with [facility]. Contact information placed on AVS.

## 2024-11-14 NOTE — DISCHARGE INSTRUCTIONS
Sometimes managing your health at home requires assistance.  The [facility] team has recognized your preference to use [facility].  They can be reached by phone at [phone].  The fax number for your reference is [phone].  A representative from the home health agency will contact you or your family to schedule your first visit.      [facility] will be calling to schedule to establish visiting md service [phone]

## 2024-11-14 NOTE — CM/SW NOTE
Cm rec'd call from dtr Jennifer requesting C. Per dtr pt is now agreeable.    Ref sent and [facility] liaison notified of above.    Plan  Home w/ [facility]     / to remain available for support and/or discharge planning.     Haritha Marsh, RN    Ext [phone]

## 2024-11-15 ENCOUNTER — APPOINTMENT (OUTPATIENT)
Dept: GENERAL RADIOLOGY | Facility: HOSPITAL | Age: 89
End: 2024-11-15
Attending: INTERNAL MEDICINE
Payer: MEDICARE

## 2024-11-15 ENCOUNTER — APPOINTMENT (OUTPATIENT)
Dept: GENERAL RADIOLOGY | Facility: HOSPITAL | Age: 89
DRG: 392 | End: 2024-11-15
Attending: INTERNAL MEDICINE
Payer: MEDICARE

## 2024-11-15 ENCOUNTER — HOSPITAL ENCOUNTER (INPATIENT)
Facility: HOSPITAL | Age: 89
LOS: 8 days | Discharge: HOME HEALTH | End: 2024-11-23
Attending: EMERGENCY MEDICINE | Admitting: HOSPITALIST
Payer: MEDICARE

## 2024-11-15 ENCOUNTER — TELEPHONE (OUTPATIENT)
Dept: INTERNAL MEDICINE CLINIC | Facility: CLINIC | Age: 89
End: 2024-11-15

## 2024-11-15 ENCOUNTER — HOSPITAL ENCOUNTER (INPATIENT)
Facility: HOSPITAL | Age: 89
LOS: 8 days | Discharge: HOME HEALTH | DRG: 392 | End: 2024-11-23
Attending: EMERGENCY MEDICINE | Admitting: HOSPITALIST
Payer: MEDICARE

## 2024-11-15 ENCOUNTER — PATIENT OUTREACH (OUTPATIENT)
Dept: CASE MANAGEMENT | Age: 89
End: 2024-11-15

## 2024-11-15 ENCOUNTER — APPOINTMENT (OUTPATIENT)
Dept: CT IMAGING | Facility: HOSPITAL | Age: 89
End: 2024-11-15
Attending: EMERGENCY MEDICINE
Payer: MEDICARE

## 2024-11-15 ENCOUNTER — APPOINTMENT (OUTPATIENT)
Dept: CT IMAGING | Facility: HOSPITAL | Age: 89
DRG: 392 | End: 2024-11-15
Attending: EMERGENCY MEDICINE
Payer: MEDICARE

## 2024-11-15 DIAGNOSIS — Z02.9 ENCOUNTERS FOR UNSPECIFIED ADMINISTRATIVE PURPOSE: Primary | ICD-10-CM

## 2024-11-15 DIAGNOSIS — R11.10 VOMITING, UNSPECIFIED VOMITING TYPE, UNSPECIFIED WHETHER NAUSEA PRESENT: Primary | ICD-10-CM

## 2024-11-15 PROBLEM — K56.7 ILEUS (HCC): Status: ACTIVE | Noted: 2024-11-15

## 2024-11-15 LAB
ADENOVIRUS F 40/41 PCR: NEGATIVE
ALBUMIN SERPL-MCNC: 3.4 G/DL (ref 3.2–4.8)
ALP LIVER SERPL-CCNC: 111 U/L
ALT SERPL-CCNC: 16 U/L
ANION GAP SERPL CALC-SCNC: 4 MMOL/L (ref 0–18)
AST SERPL-CCNC: 35 U/L (ref ?–34)
ASTROVIRUS PCR: NEGATIVE
ATRIAL RATE: 97 BPM
BASOPHILS # BLD AUTO: 0.06 X10(3) UL (ref 0–0.2)
BASOPHILS NFR BLD AUTO: 0.4 %
BILIRUB DIRECT SERPL-MCNC: 0.1 MG/DL (ref ?–0.3)
BILIRUB SERPL-MCNC: 0.3 MG/DL (ref 0.2–0.9)
BUN BLD-MCNC: 11 MG/DL (ref 9–23)
BUN/CREAT SERPL: 15.9 (ref 10–20)
C CAYETANENSIS DNA SPEC QL NAA+PROBE: NEGATIVE
C DIFF TOX B STL QL: NEGATIVE
CALCIUM BLD-MCNC: 9.2 MG/DL (ref 8.7–10.4)
CAMPY SP DNA.DIARRHEA STL QL NAA+PROBE: NEGATIVE
CHLORIDE SERPL-SCNC: 104 MMOL/L (ref 98–112)
CO2 SERPL-SCNC: 30 MMOL/L (ref 21–32)
CREAT BLD-MCNC: 0.69 MG/DL
CRP SERPL-MCNC: 1.2 MG/DL (ref ?–1)
CRYPTOSP DNA SPEC QL NAA+PROBE: NEGATIVE
DEPRECATED RDW RBC AUTO: 50.1 FL (ref 35.1–46.3)
EAEC PAA PLAS AGGR+AATA ST NAA+NON-PRB: NEGATIVE
EC STX1+STX2 + H7 FLIC SPEC NAA+PROBE: NEGATIVE
EGFRCR SERPLBLD CKD-EPI 2021: 77 ML/MIN/1.73M2 (ref 60–?)
ENTAMOEBA HISTOLYTICA PCR: NEGATIVE
EOSINOPHIL # BLD AUTO: 0.06 X10(3) UL (ref 0–0.7)
EOSINOPHIL NFR BLD AUTO: 0.4 %
EPEC EAE GENE STL QL NAA+NON-PROBE: NEGATIVE
ERYTHROCYTE [DISTWIDTH] IN BLOOD BY AUTOMATED COUNT: 15.3 % (ref 11–15)
ETEC LTA+ST1A+ST1B TOX ST NAA+NON-PROBE: NEGATIVE
GIARDIA LAMBLIA PCR: NEGATIVE
GLUCOSE BLD-MCNC: 103 MG/DL (ref 70–99)
HCT VFR BLD AUTO: 41.2 %
HGB BLD-MCNC: 13.8 G/DL
IMM GRANULOCYTES # BLD AUTO: 0.21 X10(3) UL (ref 0–1)
IMM GRANULOCYTES NFR BLD: 1.3 %
LIPASE SERPL-CCNC: 25 U/L (ref 12–53)
LYMPHOCYTES # BLD AUTO: 2.61 X10(3) UL (ref 1–4)
LYMPHOCYTES NFR BLD AUTO: 16.6 %
MAGNESIUM SERPL-MCNC: 2 MG/DL (ref 1.6–2.6)
MCH RBC QN AUTO: 30 PG (ref 26–34)
MCHC RBC AUTO-ENTMCNC: 33.5 G/DL (ref 31–37)
MCV RBC AUTO: 89.6 FL
MONOCYTES # BLD AUTO: 1.1 X10(3) UL (ref 0.1–1)
MONOCYTES NFR BLD AUTO: 7 %
NEUTROPHILS # BLD AUTO: 11.66 X10 (3) UL (ref 1.5–7.7)
NEUTROPHILS # BLD AUTO: 11.66 X10(3) UL (ref 1.5–7.7)
NEUTROPHILS NFR BLD AUTO: 74.3 %
NOROVIRUS GI/GII PCR: NEGATIVE
OSMOLALITY SERPL CALC.SUM OF ELEC: 286 MOSM/KG (ref 275–295)
P AXIS: 51 DEGREES
P SHIGELLOIDES DNA STL QL NAA+PROBE: NEGATIVE
P-R INTERVAL: 164 MS
PLATELET # BLD AUTO: 367 10(3)UL (ref 150–450)
POTASSIUM SERPL-SCNC: 4 MMOL/L (ref 3.5–5.1)
PROT SERPL-MCNC: 6.9 G/DL (ref 5.7–8.2)
Q-T INTERVAL: 364 MS
QRS DURATION: 74 MS
QTC CALCULATION (BEZET): 462 MS
R AXIS: -8 DEGREES
RBC # BLD AUTO: 4.6 X10(6)UL
ROTAVIRUS A PCR: NEGATIVE
SALMONELLA DNA SPEC QL NAA+PROBE: NEGATIVE
SAPOVIRUS PCR: NEGATIVE
SHIGELLA SP+EIEC IPAH ST NAA+NON-PROBE: NEGATIVE
SODIUM SERPL-SCNC: 138 MMOL/L (ref 136–145)
T AXIS: 142 DEGREES
TROPONIN I SERPL HS-MCNC: 11 NG/L
V CHOLERAE DNA SPEC QL NAA+PROBE: NEGATIVE
VENTRICULAR RATE: 97 BPM
VIBRIO DNA SPEC NAA+PROBE: NEGATIVE
WBC # BLD AUTO: 15.7 X10(3) UL (ref 4–11)
YERSINIA DNA SPEC NAA+PROBE: NEGATIVE

## 2024-11-15 PROCEDURE — 99223 1ST HOSP IP/OBS HIGH 75: CPT | Performed by: INTERNAL MEDICINE

## 2024-11-15 PROCEDURE — 71045 X-RAY EXAM CHEST 1 VIEW: CPT | Performed by: INTERNAL MEDICINE

## 2024-11-15 PROCEDURE — 74177 CT ABD & PELVIS W/CONTRAST: CPT | Performed by: EMERGENCY MEDICINE

## 2024-11-15 PROCEDURE — 99223 1ST HOSP IP/OBS HIGH 75: CPT | Performed by: HOSPITALIST

## 2024-11-15 RX ORDER — MORPHINE SULFATE 2 MG/ML
2 INJECTION, SOLUTION INTRAMUSCULAR; INTRAVENOUS EVERY 2 HOUR PRN
Status: DISCONTINUED | OUTPATIENT
Start: 2024-11-15 | End: 2024-11-23

## 2024-11-15 RX ORDER — DEXTROSE MONOHYDRATE, SODIUM CHLORIDE, AND POTASSIUM CHLORIDE 50; 1.49; 4.5 G/1000ML; G/1000ML; G/1000ML
INJECTION, SOLUTION INTRAVENOUS CONTINUOUS
Status: DISCONTINUED | OUTPATIENT
Start: 2024-11-15 | End: 2024-11-17

## 2024-11-15 RX ORDER — METOCLOPRAMIDE HYDROCHLORIDE 5 MG/ML
10 INJECTION INTRAMUSCULAR; INTRAVENOUS EVERY 6 HOURS
Status: DISCONTINUED | OUTPATIENT
Start: 2024-11-15 | End: 2024-11-15

## 2024-11-15 RX ORDER — MORPHINE SULFATE 4 MG/ML
4 INJECTION, SOLUTION INTRAMUSCULAR; INTRAVENOUS EVERY 2 HOUR PRN
Status: DISCONTINUED | OUTPATIENT
Start: 2024-11-15 | End: 2024-11-16

## 2024-11-15 RX ORDER — METHYLPREDNISOLONE SODIUM SUCCINATE 40 MG/ML
20 INJECTION INTRAMUSCULAR; INTRAVENOUS EVERY 8 HOURS
Status: DISCONTINUED | OUTPATIENT
Start: 2024-11-15 | End: 2024-11-22

## 2024-11-15 RX ORDER — ONDANSETRON 2 MG/ML
4 INJECTION INTRAMUSCULAR; INTRAVENOUS ONCE
Status: COMPLETED | OUTPATIENT
Start: 2024-11-15 | End: 2024-11-15

## 2024-11-15 RX ORDER — METOCLOPRAMIDE HYDROCHLORIDE 5 MG/ML
5 INJECTION INTRAMUSCULAR; INTRAVENOUS EVERY 8 HOURS PRN
Status: DISCONTINUED | OUTPATIENT
Start: 2024-11-15 | End: 2024-11-17 | Stop reason: ALTCHOICE

## 2024-11-15 RX ORDER — METOCLOPRAMIDE HYDROCHLORIDE 5 MG/ML
5 INJECTION INTRAMUSCULAR; INTRAVENOUS EVERY 6 HOURS
Status: DISCONTINUED | OUTPATIENT
Start: 2024-11-15 | End: 2024-11-22

## 2024-11-15 RX ORDER — ONDANSETRON 2 MG/ML
4 INJECTION INTRAMUSCULAR; INTRAVENOUS EVERY 6 HOURS
Status: DISCONTINUED | OUTPATIENT
Start: 2024-11-15 | End: 2024-11-22

## 2024-11-15 RX ORDER — CARBOXYMETHYLCELLULOSE SODIUM 10 MG/ML
1-2 GEL OPHTHALMIC AS NEEDED
COMMUNITY

## 2024-11-15 RX ORDER — ONDANSETRON 2 MG/ML
4 INJECTION INTRAMUSCULAR; INTRAVENOUS EVERY 6 HOURS PRN
Status: DISCONTINUED | OUTPATIENT
Start: 2024-11-15 | End: 2024-11-23

## 2024-11-15 RX ORDER — MORPHINE SULFATE 2 MG/ML
1 INJECTION, SOLUTION INTRAMUSCULAR; INTRAVENOUS EVERY 2 HOUR PRN
Status: DISCONTINUED | OUTPATIENT
Start: 2024-11-15 | End: 2024-11-23

## 2024-11-15 RX ORDER — FAMOTIDINE 10 MG/ML
20 INJECTION, SOLUTION INTRAVENOUS ONCE
Status: COMPLETED | OUTPATIENT
Start: 2024-11-15 | End: 2024-11-15

## 2024-11-15 NOTE — H&P
[facility]    PATIENT'S NAME: DIEGO CASTRO   ATTENDING PHYSICIAN: Chelly Dangelo MD   PATIENT ACCOUNT#:   [id number]    LOCATION:  [address] [place] [address]  MEDICAL RECORD #:   [id number]       YOB: 1924  ADMISSION DATE:       11/15/2024    HISTORY AND PHYSICAL EXAMINATION    CHIEF COMPLAINT:  Severe ileus and enteritis.    HISTORY OF PRESENT ILLNESS:  Patient is a 89-year-old  female who was hospitalized with a similar presentation and discharged yesterday.  During her recent hospitalization, she had infectious workup including GI PCR panel and C difficile that was negative.  She had upper endoscopy and flexible sigmoidoscopy.  Biopsies were positive for possible lymphocytic colitis.  She had small bowel followthrough which was negative for obstruction.  She was able to tolerate diet and discharged home yesterday.  Last night, started having recurrent nausea, vomiting, and loose bowel movements today with abdominal pain.  Family brought her back for evaluation.  CBC showed white blood cell count of 15.7 with left shift, with chemistry and liver function tests unremarkable.  Troponin was negative.  CT scan of the abdomen and pelvis showed diffuse nonspecific enteritis and right-sided colitis, persistent nodular thickening of the ascending colon.  Findings are probably related to infection and less likely inflammatory bowel disease.  Bibasilar pleural effusions with compression, atelectasis, probable partially visualized pseudomass related to loculated fluid in the left lower lung.  Patient will be admitted to the hospital for further evaluation by Gastroenterology.     PAST MEDICAL HISTORY:  Actinic keratosis, osteoarthritis, osteoporosis, gastroesophageal reflux disease, hypothyroidism, anxiety disorder.    PAST SURGICAL HISTORY:  Multiple squamous cell skin lesion resections, tonsillectomy.     MEDICATIONS:  Please see medication reconciliation list.     ALLERGIES:  No known drug  allergies.     FAMILY HISTORY:  Mother had colon cancer.     SOCIAL HISTORY:  No tobacco, alcohol, or drug use.  Lives with a caregiver.  Requires assistance in her basic activities of daily living.     REVIEW OF SYSTEMS:  Recurrent abdominal pain associated nausea and vomiting since last night, inability to tolerate oral intake with loose bowel movement earlier today.  No fever or chills.  Symptoms have been ongoing for the last 2 to 3 weeks.  Other 12-point review of systems is negative.       PHYSICAL EXAMINATION:    GENERAL:  Alert and oriented, very drowsy.  Moderate distress.   VITAL SIGNS:  Temperature 97.1, pulse 90, respiratory rate 30, blood pressure 133/71, pulse ox 93% on room air.  HEENT:  Atraumatic.  Oropharynx clear.  Dry mucous membranes.  Ears and nose normal.  Eyes:  Anicteric sclerae.   NECK:  Supple.  No lymphadenopathy.  Trachea midline.  Full range of motion.   LUNGS:  Clear to auscultation bilaterally.  Normal respiratory effort.    HEART:  Regular rate and rhythm.  S1 and S2 auscultated.  No murmur.    ABDOMEN:  Soft, discomfort to palpation.   EXTREMITIES:  Trace edema.  No clubbing or cyanosis.   NEUROLOGIC:  Motor and sensory intact.  Cranial nerves II to XII are intact.      ASSESSMENT:    1.   Severe enteritis, ileus, nausea, vomiting.  Recent biopsy suggestive of possible lymphocytic colitis or enteritis.  2.   Dehydration.    PLAN:  Patient will be admitted to general medical floor.  Gastroenterology evaluation.  NG suction.  IV Solu-Medrol.  N.p.o.  Pain and nausea control.  Possible repeat upper endoscopy and push enteroscopy tomorrow morning for small bowel biopsies.  Monitor her clinical status closely.  Further recommendations to follow.     Dictated By Chelly Dangelo MD  d: 11/15/2024 15:42:28  t: 11/15/2024 16:34:10  [facility] [id number]  /

## 2024-11-15 NOTE — PLAN OF CARE
Patient to floor from ED, vitals stable, alert and oriented per baseline. Assessments completed and NG tube inserted. Awaiting chest xray results and MD. Family updated on plan of care.     Problem: Patient Centered Care  Goal: Patient preferences are identified and integrated in the patient's plan of care  Description: Interventions:  - What would you like us to know as we care for you? From home with 24 hour caregiver  - Provide timely, complete, and accurate information to patient/family  - Incorporate patient and family knowledge, values, beliefs, and cultural backgrounds into the planning and delivery of care  - Encourage patient/family to participate in care and decision-making at the level they choose  - Honor patient and family perspectives and choices  Outcome: Progressing     Problem: Patient/Family Goals  Goal: Patient/Family Long Term Goal  Description: Patient's Long Term Goal: GI stability    Interventions:  - diet, medications, fluids  - See additional Care Plan goals for specific interventions  Outcome: Progressing  Goal: Patient/Family Short Term Goal  Description: Patient's Short Term Goal: control of n/v    Interventions:   - NG and NPO, IVF  - See additional Care Plan goals for specific interventions  Outcome: Progressing     Problem: Safety Risk - Non-Violent Restraints  Goal: Patient will remain free from self-harm  Description: INTERVENTIONS:  - Apply the least restrictive restraint to prevent harm  - Notify patient and family of reasons restraints applied  - Assess for any contributing factors to confusion (electrolyte disturbances, delirium, medications)  - Discontinue any unnecessary medical devices as soon as possible  - Assess the patient's physical comfort, circulation, skin condition, hydration, nutrition and elimination needs   - Reorient and redirection as needed  - Assess for the need to continue restraints  Outcome: Progressing

## 2024-11-15 NOTE — CONSULTS
[facility]   Gastroenterology Consultation Note    Christy Lowry  Patient Status:    Inpatient  Date of Admission:         11/15/2024, Hospital day #0  Attending:   Chelly Dangelo MD  PCP:     Patrick Stafford MD    Reason for Consultation:  N/v/d, abdominal pain    History of Present Illness:  Christy Lowry is a a(n) 89 year old female w/ a history of OA, GERD, squamous cell carcinoma, who presents with ongoing n/v/d, abdominal pain. Symptoms initially started 10/30 with intractable nausea and emesis along with generalized abdominal cramping and loose stools. No blood in stools. No fever or chills. CT showed diffuse enterocolitis, initially felt to be infectious. SBFT negative for bowel obstruction. C diff and GI stool PCR negative. She felt better with supportive care and was discharged yesterday. Now represents with similar symptoms.     History:  Past Medical History:    Actinic keratosis    hypertrophic AK - left central forehead    Benzodiazepine withdrawal without complication (HCC)    Chronic GERD    Constipation    Diarrhea    Eustachian tube dysfunction    Heart palpitations    Loose stools    Occult blood in stools    SCC (squamous cell carcinoma)    left forehead, invasive, well-diff.     SCC (squamous cell carcinoma)    right temple    SCC (squamous cell carcinoma)    right neck    Skin cancer     Past Surgical History:   Procedure Laterality Date    Tonsillectomy       Family History   Problem Relation Age of Onset    Colon Cancer Mother     Colon Cancer Sister       reports that she has quit smoking. She has quit using smokeless tobacco. She reports that she does not currently use alcohol. She reports that she does not use drugs.    Allergies:  Allergies[1]    Medications:  No current facility-administered medications for this encounter.  Prescriptions Prior to Admission[2]    Review of Systems:  CONSTITUTIONAL:  negative for fevers, rigors  EYES:  negative for diplopia    RESPIRATORY:  negative for severe shortness of breath  CARDIOVASCULAR:  negative for crushing sub-sternal chest pain  GASTROINTESTINAL:  see HPI  GENITOURINARY:  negative for dysuria or gross hematuria  INTEGUMENT/BREAST:  SKIN:  negative for jaundice   ALLERGIC/IMMUNOLOGIC:  negative for hay fever  ENDOCRINE:  negative for cold intolerance and heat intolerance  MUSCULOSKELETAL:  negative for joint effusion/severe erythema  NEURO: negative for dizziness or loss of conscious or paresthesias  BEHAVIOR/PSYCH:  negative for psychotic behavior    Physical Exam:    Blood pressure 136/63, pulse 96, temperature 99.1 °F (37.3 °C), temperature source Axillary, resp. rate 16, weight 140 lb 9.6 oz (63.8 kg), SpO2 94%. Body mass index is 24.13 kg/m².    General: awake, alert and oriented, no acute distress  HEENT: moist mucus membranes  PULM: no conversational dyspnea  CARDIOVASCULAR: regular rate and rhythm, the extremities are warm and well perfused  GI: soft, mild generalized tenderness, non-distended, + BS, no rebound/guarding   EXTREMITIES: no edema, moving all extremities  SKIN: no visible rash  NEURO: appropriate and interactive    Laboratory Data:  Lab Results   Component Value Date    WBC 15.7 11/15/2024    HGB 13.8 11/15/2024    HCT 41.2 11/15/2024    .0 11/15/2024    CREATSERUM 0.69 11/15/2024    BUN 11 11/15/2024     11/15/2024    K 4.0 11/15/2024     11/15/2024    CO2 30.0 11/15/2024     11/15/2024    CA 9.2 11/15/2024    ALB 3.4 11/15/2024    ALKPHO 111 11/15/2024    BILT 0.3 11/15/2024    TP 6.9 11/15/2024    AST 35 11/15/2024    ALT 16 11/15/2024    LIP 25 11/15/2024    MG 2.0 11/15/2024       Imaging:  CT ABDOMEN+PELVIS(CONTRAST ONLY)(CPT=74177)    Result Date: 11/15/2024  CONCLUSION:  1. Diffuse nonspecific enteritis and right-sided colitis.  Persistent nodular thickening of the ascending colon.  Findings are probably secondary to infection.  Less likely inflammatory bowel disease or  other etiology. 2. Bibasilar pleural effusions with compressive atelectasis.  Probable partially visualized pseudomass related to loculated fluid in the left lower lung. 3. A 4.4 cm cyst-like lesion anterior to the right common iliac artery probably represents a lymphocele, pseudocyst, or a developmental cyst. 4. Small pancreatic cyst. 5. Diffuse atherosclerosis including coronary artery calcification.     Dictated by (CST): Yfn Workman MD on 11/15/2024 at 2:45 PM     Finalized by (CST): Yfn Workman MD on 11/15/2024 at 2:55 PM           Assessment & Plan   Chrisyt Lowry is a a(n) 89 year old female w/ a history of OA, GERD, squamous cell carcinoma, who presents with ongoing n/v/d, abdominal pain now near 2 weeks. CT showed diffuse enterocolitis, initially felt to be infectious. SBFT negative for bowel obstruction. C diff and GI stool PCR negative. Now with ongoing symptoms. Repeat CT a/p shows again diffuse enteritis and colitis, no obstruction. She underwent EGD and flex sig last week, colonic biopsies with increased IELs. CRP 2. At this point, unlikely infectious, will empiric treat with an inflammatory enterocolitis and monitor clinical response.     Recommend:  - scheduled antiemetics  - PPI BID  - repeat C diff and GI stool PCR   - start solumedrol 20mg IV TID  - she is having severe retching and mod stomach distention, family requesting NGT which is reasonable for symptom relief; place NGT to JAMMIE Givens Ma, MD  [facility]  11/15/2024    This note was partially prepared using Dragon Medical voice recognition dictation software. As a result, errors may occur. When identified, these errors have been corrected. While every attempt is made to correct errors during dictation, discrepancies may still exist.          [1] No Known Allergies  [2]   Medications Prior to Admission   Medication Sig Dispense Refill Last Dose/Taking    carboxymethylcellulose 1 % Ophthalmic Gel Place 1-2 drops  into both eyes as needed.   Past Week    pantoprazole 40 MG Oral Tab EC Take 1 tablet (40 mg total) by mouth daily. 30 tablet 0 11/15/2024 at  7:00 AM    ondansetron (ZOFRAN) 4 mg tablet Take 1 tablet (4 mg total) by mouth every 8 (eight) hours as needed for Nausea. 60 tablet 0 11/15/2024 at  7:00 AM    levothyroxine 50 MCG Oral Tab Take 1 tablet (50 mcg total) by mouth before breakfast. 90 tablet 3 11/15/2024 at  6:30 AM    dicyclomine 10 MG Oral Cap Take 1 capsule (10 mg total) by mouth every 12 (twelve) hours as needed. 180 capsule 3 11/15/2024 at  7:00 AM    escitalopram 10 MG Oral Tab Take 0.5 tablets (5 mg total) by mouth daily. 45 tablet 3 Unknown    Loperamide HCl 1 MG/7.5ML Oral Liquid Take 15 mL (2 mg total) by mouth 3 (three) times daily as needed for Diarrhea. (Patient not taking: Reported on 11/15/2024)   Not Taking

## 2024-11-15 NOTE — TELEPHONE ENCOUNTER
Just an FYI-    Contacted pt for TCM, s/w son Pancho per HIPAA. Pt is not doing well today therefore is going back to the ED for an assessment. Pt is rating her abdominal pain a 10/10, has nausea, vomiting, loose stool, feels very weak and is dizzy. Thank you!

## 2024-11-15 NOTE — PROGRESS NOTES
[facility] called back and stated there may be a nurse that can come to the pt if needed today. Informed [facility] pt is going back to the ED for an assessment. [facility] is going to check on a visit for tomorrow if pt is sent home.

## 2024-11-15 NOTE — CONSULTS
[facility]  Report of Consultation  Is this a shared or split note between Advanced Practice Provider and Physician? Yes   Christy Lowry Patient Status:  Emergency    1924 MRN [id number]   Location [facility] EMERGENCY DEPARTMENT Attending Edilma Leonard MD   Hosp Day # 0 PCP Patrick Stafford MD     Reason for Consultation:  Colitis     Chief Complaint:  Abdominal pain, vomiting    History of Present Illness:  Christy Lowry is a 89 year old female with PMH of GERD, OA, osteoporosis, hypothyroidism, anxiety who presents to [facility] on 11/15/24 for abdominal pain and vomiting. Patient well known to service, recently admitted for nausea and vomiting , CT with acute enterocolitis and SBO, negative GI panel at the time and flex sig  unremarkable, NGT placed and clinical improvement. She was discharged . She returns with worsening abdominal pain, nausea, and vomiting. Her family is bedside, provide most of history. She complains of diffuse abdominal pain similar to previous hospitalizations. She had multiple episodes of nonbloody emesis. She denies fever or chills. She has chronic issues with loose stools for which she takes OTC medications for. She denies chest pain, SOB, or dysuria.    Upon presentation to the hospital, patient is afebrile and hemodynamically stable. WBCs 15.7.  .  AST 35, all other LFTs WNL.  CTAP 11/15 with findings of diffuse nonspecific enteritis and right-sided colitis.  Persistent nodular thickening of ascending colon, findings probably secondary to infection, less likely inflammatory bowel disease or other etiology.  CT  with fluid-filled loops of small and large bowel and extensive mucosal thickening/enhancement possibly reflecting underlying infectious/inflammatory enterocolitis or malabsorption.    Family history is significant for colon cancer in her mother and sister.  Remote history of colonoscopy over 10 years ago,  unremarkable as per patient.  Flex sig from 11/9/2024 with findings of stomach full of bile, hiatal hernia, gastric erythema, hemorrhoids.    Patient denies taking any blood thinners    History:  Past Medical History:    Actinic keratosis    hypertrophic AK - left central forehead    Benzodiazepine withdrawal without complication (HCC)    Chronic GERD    Constipation    Diarrhea    Eustachian tube dysfunction    Heart palpitations    Loose stools    Occult blood in stools    SCC (squamous cell carcinoma)    left forehead, invasive, well-diff.     SCC (squamous cell carcinoma)    right temple    SCC (squamous cell carcinoma)    right neck    Skin cancer     Past Surgical History:   Procedure Laterality Date    Tonsillectomy       Family History   Problem Relation Age of Onset    Colon Cancer Mother     Colon Cancer Sister       reports that she has quit smoking. She has quit using smokeless tobacco. She reports that she does not currently use alcohol. She reports that she does not use drugs.    Allergies:  Allergies[1]    Medications:  (Not in a hospital admission)      No current facility-administered medications for this encounter.    Review of Systems:  Review of Systems   Constitutional:  Negative for chills, fatigue and fever.   Respiratory:  Negative for shortness of breath and wheezing.    Cardiovascular:  Negative for chest pain and leg swelling.   Gastrointestinal:  Positive for nausea, vomiting, abdominal pain and diarrhea. Negative for constipation.   Genitourinary:  Negative for dysuria, hematuria and flank pain.   Neurological:  Negative for dizziness and weakness.       Physical Exam:  /71   Pulse 90   Temp 97.1 °F (36.2 °C) (Temporal)   Resp (!) 30   SpO2 93%   Physical Exam  Constitutional:       General: She is not in acute distress.     Appearance: She is not ill-appearing.   HENT:      Head: Normocephalic and atraumatic.      Mouth/Throat:      Mouth: Mucous membranes are moist.       Pharynx: Oropharynx is clear.   Eyes:      Conjunctiva/sclera: Conjunctivae normal.   Cardiovascular:      Rate and Rhythm: Normal rate and regular rhythm.      Pulses: Normal pulses.      Heart sounds: Normal heart sounds.   Pulmonary:      Effort: Pulmonary effort is normal.      Breath sounds: Normal breath sounds.   Abdominal:      General: There is distension.      Tenderness: There is abdominal tenderness. There is no guarding or rebound.      Comments: Tenderness to palpation in right abdomen, distended.   Neurological:      Mental Status: She is alert.         Laboratory Data:  Recent Labs   Lab 11/10/24  0346 11/11/24  0551 11/13/24  0553 11/14/24  0640 11/15/24  1235   RBC 4.26   < > 4.21 4.45 4.60   HGB 12.5   < > 12.2 13.1 13.8   HCT 39.2   < > 38.8 41.4 41.2   MCV 92.0   < > 92.2 93.0 89.6   MCH 29.3   < > 29.0 29.4 30.0   MCHC 31.9   < > 31.4 31.6 33.5   RDW 15.3*   < > 14.9 15.1* 15.3*   NEPRELIM 8.70*  --   --   --  11.66*   WBC 11.6*   < > 13.2* 13.0* 15.7*   .0   < > 330.0 322.0 367.0    < > = values in this interval not displayed.       Recent Labs   Lab 11/10/24  0346 11/11/24  0551 11/13/24  0553 11/14/24  0640 11/15/24  1235   *  106*   < > 99 93 103*   BUN 14  14   < > 5* 8* 11   CREATSERUM 0.87  0.87   < > 0.58 0.69 0.69   CA 8.7  8.7   < > 8.1* 8.8 9.2   ALB 3.1*  --   --   --  3.4     138   < > 139 141 138   K 4.1  4.1   < > 3.1* 3.3*  3.3* 4.0     103   < > 107 108 104   CO2 29.0  29.0   < > 27.0 27.0 30.0   ALKPHO 78  --   --   --  111   AST 20  --   --   --  35*   ALT <7*  --   --   --  16   BILT 0.5  --   --   --  0.3   TP 6.2  --   --   --  6.9    < > = values in this interval not displayed.         No results for input(s): \"PTP\", \"INR\", \"PTT\" in the last 168 hours.      CT ABDOMEN+PELVIS(CONTRAST ONLY)(CPT=74177)    Result Date: 11/15/2024  CONCLUSION:  1. Diffuse nonspecific enteritis and right-sided colitis.  Persistent nodular thickening of the  ascending colon.  Findings are probably secondary to infection.  Less likely inflammatory bowel disease or other etiology. 2. Bibasilar pleural effusions with compressive atelectasis.  Probable partially visualized pseudomass related to loculated fluid in the left lower lung. 3. A 4.4 cm cyst-like lesion anterior to the right common iliac artery probably represents a lymphocele, pseudocyst, or a developmental cyst. 4. Small pancreatic cyst. 5. Diffuse atherosclerosis including coronary artery calcification.     Dictated by (CST): Yfn Workman MD on 11/15/2024 at 2:45 PM     Finalized by (CST): Yfn Workman MD on 11/15/2024 at 2:55 PM                     Medical Decision Making         Impression:  Patient Active Problem List   Diagnosis    Skin cancer    Moderate single current episode of major depressive disorder (HCC)    Acquired hypothyroidism    Arthralgia of right lower leg    Neoplasm of uncertain behavior of skin    Generalized anxiety disorder    Irritable bowel syndrome with diarrhea    Nausea and vomiting in adult    Persistent vomiting in adult patient    Enteritis    Vomiting       Diffuse enteritis, right sided colitis      Plan:  No acute surgical intervention planned at this time. Serial imaging as clinically indicated.   Insert NG tube, connected to low-intermittent suction  Keep NPO  Continue to trend WBCs  Continue IVF  GI consulted, C. Diff PCR ordered  Dr. Banda to evaluate and provide further surgical recommendations.     Thank you for allowing me to participate in the care of the patient.  SOPHY Baca  11/15/2024  3:31 PM      Agree with above assessment and plan  No acute surgical issues  NG tube for comfort per GI and family  Please call us with any concerns, surgery will sign off    Zeus Banda MD  Complex General Surgical Oncology  [facility]  [email or web address]            [1] No Known Allergies

## 2024-11-15 NOTE — ED INITIAL ASSESSMENT (HPI)
Christy arrived with [facility] from home and accompanied by her Son for c/o recurrent n/v. Recently hospitalized for same from 11/5-11/14.

## 2024-11-15 NOTE — PROGRESS NOTES
Transitional Care Management   Discharge Date: 24  Contact Date: 11/15/2024    Assessment:  TCM Initial Assessment    General:  Assessment completed with: Family (Pancho, son per HIPAA)  Patient Subjective: S/w son, Pancho per HIPAA. Pt is not doing well today, has nauseous and is vomiting. Pt was tired when she came home and is very weak. Pt does have loose stool, had an episode after coming home last night, once overnight and then again this morning. Pt has abdominal pain she rates a 10/10. Pt did vomit three times while on the call. Pt was able to eat earlier today, some oatmeal and eggs however has been consistently nauseous. Pt is taking Zofran however it is not helping. Pt denies a fever, chest pain, shortness of breath, but does feel very weak and is dizzy. Requested to check pt BP while on the call, currently it is at 125/70 HR: 96. Pulse ox was also checked, currently stating at 86%. Pt is urinating okay. Son denies blood in the stool, urine at this time. Family felt pt was confused last night but seems better today. Pt does not want to go to the ED at this time however encouraged ED for an assessment due to symptoms and pt declined. Son stated pt has an appt with MD at home next Friday. Son is not sure if [facility] has called yet. [facility] to call [facility] to see if an assessment can be arranged for today.  Chief Complaint: Acute enterocolitis  Acute intractable nausea and vomiting  Acute SBO  Verify patient name and  with patient/ caregiver: Yes    Hospital Stay/Discharge:  Tell me what you understand of why you were in the hospital or emergency department: vomiting nausea  Prior to leaving the hospital were your Discharge Instructions reviewed with you?: Yes  Did you receive a copy of your written Discharge Instructions?: Yes  What questions do you have about your Discharge Instructions?: none at this time  Do you feel better or worse since you left the hospital or emergency department?:  Worse    Follow - Up Appointment:  Do you have a follow-up appointment?: No  Are there any barriers to getting to your follow-up appointment?: No    Home Health/DME:  Prior to leaving the hospital was Home Health (HH) arranged for you?: Yes  Has  been out or set up a visit to see you?: No ([facility] to call [facility] to )     Prior to leaving the hospital or emergency department was Durable Medical Equipment (DME), medical supplies, or infusions arranged for you?: No  Are DME/medical supply/infusions needs identified by staff during this assessment?: No     Medications/Diet:  Did any of your medications change, during or after your hospital stay or ED visit?: Yes  Do you have your new or updated medications?: Yes  Do you understand what your medications are for and possible side effects?: Yes  Are there any reasons that keep you from taking your medication as prescribed?: No  Any concerns about medication refills?:  (N/A)    Were you given a different diet per your Discharge Instructions?: Yes  Diet Type: Advance as tolerated  Are there any barriers to following that diet?: No     Questions/Concerns:  Do you have any questions or concerns that have not been discussed?: No       Nursing Interventions: All d/c instructions reviewed with the pt. Reviewed when to call MD vs when to call 911 or go the ED.  Educated pt on the importance of taking all meds as prescribed as well as close f/u with PCP/specialists. Pt verbalized understanding and will contact the office with any further questions or concerns.     Called [facility] to see if a visit can be set up for today as pt is not feeling well and declining to go to the ED at this time. VM was left for [facility] to call [facility] back.    Called son back after calling [facility] and he stated they are taking the pt back to the ED due to her symptoms and pain.     Medication Review:   Current Outpatient Medications   Medication Sig Dispense Refill    pantoprazole 40 MG Oral Tab EC Take 1 tablet  (40 mg total) by mouth daily. 30 tablet 0    ondansetron (ZOFRAN) 4 mg tablet Take 1 tablet (4 mg total) by mouth every 8 (eight) hours as needed for Nausea. 60 tablet 0    escitalopram 10 MG Oral Tab Take 0.5 tablets (5 mg total) by mouth daily. 45 tablet 3    levothyroxine 50 MCG Oral Tab Take 1 tablet (50 mcg total) by mouth before breakfast. 90 tablet 3    potassium chloride (KLOR-CON) 20 MEQ Oral Powd Pack Take 20 mEq by mouth daily. 90 each 0    dicyclomine 10 MG Oral Cap Take 1 capsule (10 mg total) by mouth every 12 (twelve) hours as needed. 180 capsule 3    Loperamide HCl 1 MG/7.5ML Oral Liquid Take 15 mL (2 mg total) by mouth 3 (three) times daily as needed for Diarrhea.       Did patient review medications using current pill bottles and not just a medication list?  No  Discharge medications reviewed/discussed/and reconciled against outpatient medications with patient.  Any changes or updates to medications sent to primary care provider.  Patient Declined    SDOH:   Transportation Needs: No Transportation Needs (11/15/2024)    Transportation Needs     Lack of Transportation: No     Car Seat: Not on file       Financial Resource Strain: Low Risk  (11/15/2024)    Financial Resource Strain     Difficulty of Paying Living Expenses: Not very hard     Med Affordability: No       Follow-up Appointments: Reviewed recommended follow up appointments. Pt is set to have an MD come to the home next Friday.       Transitional Care Clinic  Was TCC Ordered: No    Primary Care Provider (If no TCC appointment)  Does patient already have a PCP appointment scheduled? No  Nurse Care Manager Attempted to schedule PCP office TCM appointment with patient   -If no appointment scheduled: Explain: Declined, pt plans to see MD at home next week.     Specialist  Does the patient have any other follow-up appointment(s) that need to be scheduled? Yes   -If yes: Nurse Care Manager reviewed upcoming specialist appointments with patient:  Yes   -Does the patient need assistance scheduling appointment(s): No    CCM referral placed:  No    Book By Date: 11/28/24

## 2024-11-15 NOTE — ED PROVIDER NOTES
Patient Seen in: [facility]         EMERGENCY DEPARTMENT NOTE    Dictated. Voice Transcription software has been utilized for this dictation (the reader should be aware that typographical errors are possible with voice transcription software and to please contact the dictating physician if there are questions.)         History     Chief Complaint   Patient presents with    Nausea/Vomiting/Diarrhea       There may be discrepancies from triage note.     HPI    History provided by patient and patient's family.  89-year-old female who denies abdominal surgeries complaining of vomiting that started again this morning.  Per family at bedside, patient was just discharged for small bowel obstruction which appeared to be managed conservatively.  Patient reports mild diffuse abdominal pain.  Having bowel movements which are normal for her.  No fever.    No fevers, chills, nausea, diarrhea, constipation, cough, cold symptoms, urinary complaints.  No chest pain, shortness of breath  No headache, neck pain, neck stiffness, incontinence.  No changes in mentation, no changes in vision, no total/new extremity weakness, no total/new extremity paresthesia, no difficulty speaking.  No alleviating or exacerbating factors.  Denies orthopnea, pnd, change in exercise tolerance limited by chest pain/sob , lower extremity edema/asymmetry.         History reviewed.   Past Medical History:    Actinic keratosis    hypertrophic AK - left central forehead    Benzodiazepine withdrawal without complication (HCC)    Chronic GERD    Constipation    Diarrhea    Eustachian tube dysfunction    Heart palpitations    Loose stools    Occult blood in stools    SCC (squamous cell carcinoma)    left forehead, invasive, well-diff.     SCC (squamous cell carcinoma)    right temple    SCC (squamous cell carcinoma)    right neck    Skin cancer       History reviewed.   Past Surgical History:   Procedure Laterality Date    Tonsillectomy            Medications :  Prescriptions Prior to Admission[1]     Family History   Problem Relation Age of Onset    Colon Cancer Mother     Colon Cancer Sister        Smoking Status:   Social History     Socioeconomic History    Marital status:    Tobacco Use    Smoking status: Former    Smokeless tobacco: Former   Vaping Use    Vaping status: Never Used   Substance and Sexual Activity    Alcohol use: Not Currently    Drug use: No   Other Topics Concern    Reaction to local anesthetic No       Review of Systems   Constitutional: Negative.    HENT: Negative.     Eyes: Negative.    Respiratory: Negative.     Cardiovascular: Negative.    Gastrointestinal:  Positive for abdominal pain and vomiting.   Genitourinary: Negative.    Musculoskeletal: Negative.    Skin: Negative.    Neurological: Negative.    Endo/Heme/Allergies: Negative.    Psychiatric/Behavioral: Negative.     All other systems reviewed and are negative.    Pertinent positives as listed.  All other organ systems are reviewed and are negative.    Constitutional and vital signs reviewed.      Social History and Family History elements reviewed from today, pertinent positives to the presenting problem noted.    Physical Exam     ED Triage Vitals [11/15/24 1225]   /64   Pulse 94   Resp (!) 29   Temp 97.1 °F (36.2 °C)   Temp src Temporal   SpO2 92 %   O2 Device None (Room air)       All measures to prevent infection transmission during my interaction with the patient were taken. The patient was already wearing a droplet mask on my arrival to the room. Personal protective equipment including droplet mask, eye protection, and gloves were worn throughout the duration of the exam.  Handwashing was performed prior to and after the exam.  Stethoscope and any equipment used during my examination was cleaned with super sani-cloth germicidal wipes following the exam.     Physical Exam  Vitals and nursing note reviewed.   Constitutional:       Appearance: She is  not ill-appearing or toxic-appearing.      Comments: Actively vomiting, does not appear comfortable   Cardiovascular:      Rate and Rhythm: Normal rate and regular rhythm.      Comments: Dorsalis pedis pulses 2+ bilaterally    Pulmonary:      Effort: Pulmonary effort is normal. No respiratory distress.   Abdominal:      General: There is no distension.      Palpations: Abdomen is soft.      Tenderness: There is no right CVA tenderness, left CVA tenderness, guarding or rebound.      Comments: Mild diffuse tenderness   Musculoskeletal:      Right lower leg: No edema.      Left lower leg: No edema.   Skin:     Capillary Refill: Capillary refill takes less than 2 seconds.   Neurological:      General: No focal deficit present.      Mental Status: She is alert and oriented to person, place, and time.      Comments: Gross motor and sensory function intact symmetrically and bilaterally to upper extremities and lower extremities.   Psychiatric:         Mood and Affect: Mood normal.         Behavior: Behavior normal.           Review of prior notes in Care everywhere/Epic performed by myself:  Pt was admitted 11/5-11/14 for SBO . Managed with NG tube .   -Hemoglobin 11's, patient had an x-ray of her intestine revealing no definite bowel obstruction      ED Course     If labs obtained, they are personally reviewed by myself:     Labs Reviewed   BASIC METABOLIC PANEL (8) - Abnormal; Notable for the following components:       Result Value    Glucose 103 (*)     All other components within normal limits   HEPATIC FUNCTION PANEL (7) - Abnormal; Notable for the following components:    AST 35 (*)     All other components within normal limits   CBC WITH DIFFERENTIAL WITH PLATELET - Abnormal; Notable for the following components:    WBC 15.7 (*)     RDW-SD 50.1 (*)     RDW 15.3 (*)     Neutrophil Absolute Prelim 11.66 (*)     Neutrophil Absolute 11.66 (*)     Monocyte Absolute 1.10 (*)     All other components within normal limits    LIPASE - Normal   TROPONIN I HIGH SENSITIVITY - Normal   MAGNESIUM - Normal   RAINBOW DRAW LAVENDER   RAINBOW DRAW BLUE   RAINBOW DRAW GOLD       If radiologic studies ordered during today's ER visit, my independent interpretation are seen directly below.  This is awaiting the radiologist's final interpretation.  CT abdomen/pelvis, independent interpretation completed by myself, awaiting final radiology interpretation: No perforation      Imaging Results read by radiology in ED: CT ABDOMEN+PELVIS(CONTRAST ONLY)(CPT=74177)    Result Date: 11/15/2024  CONCLUSION:  1. Diffuse nonspecific enteritis and right-sided colitis.  Persistent nodular thickening of the ascending colon.  Findings are probably secondary to infection.  Less likely inflammatory bowel disease or other etiology. 2. Bibasilar pleural effusions with compressive atelectasis.  Probable partially visualized pseudomass related to loculated fluid in the left lower lung. 3. A 4.4 cm cyst-like lesion anterior to the right common iliac artery probably represents a lymphocele, pseudocyst, or a developmental cyst. 4. Small pancreatic cyst. 5. Diffuse atherosclerosis including coronary artery calcification.     Dictated by (CST): Yfn Workman MD on 11/15/2024 at 2:45 PM     Finalized by (CST): Yfn Workman MD on 11/15/2024 at 2:55 PM               ED Medications Administered:   Medications   ondansetron (Zofran) 4 MG/2ML injection 4 mg (4 mg Intravenous Given 11/15/24 1318)   famotidine (Pepcid) 20 mg/2mL injection 20 mg (20 mg Intravenous Given 11/15/24 1341)   iopamidol 76% (ISOVUE-370) injection for power injector (80 mL Intravenous Given 11/15/24 1419)           Vitals:    11/15/24 1225 11/15/24 1330 11/15/24 1445   BP: 137/64 135/74 133/71   Pulse: 94 93 90   Resp: (!) 29 26 (!) 30   Temp: 97.1 °F (36.2 °C)     TempSrc: Temporal     SpO2: 92% 92% 93%     *I personally reviewed and interpreted all ED vitals.    Pulse Ox interpretation by myself: 92%,  Room air, Normal     Monitor Interpretation by myself:   normal sinus rhythm    If Ekg obtained during today's visit, it is independently interpreted by myself directly below:  EKG    Rate, intervals and axes as noted on EKG Report.  Rate: 97  Rhythm: Sinus Rhythm  Reading: no st elevation, no st depression T wave flattening noted in inferior lateral leads               Medical Record Review: I personally reviewed available prior medical records for any recent pertinent discharge summaries, testing, and procedures and reviewed those reports.      [facility]     Medical decision making/ED Course:   89-year-old female with recent discharge yesterday for bowel obstruction managed conservatively presents to the ER for diffuse abdominal discomfort and vomiting.  Actively vomiting during interview, no hematemesis.  CT abdomen/pelvis negative for bowel obstruction.  Colitis noted.  Leukocytosis of almost 16,000 noted.  Hemoglobin normal.  BMP normal.  LFTs appear stable.  Magnesium level normal.  Troponin negative.  Patient has no chest pain, shortness of breath.  Lipase normal.  Case discussed with GI who will start IV steroids and who has personally managed.  Will kindly follow.  Case discussed with hospitalist who will kindly admit.      Cholecystitis, AAA, dissection, pancreatitis, mesenteric ischemia, volvulus, bowel obstruction, appendicitis, among other life-threatening medical conditions considered and seems unlikely given patient's history, exam, and appearance.    Pt agrees and is aware of plan.       Differential Diagnosis:  as listed above in medical decision making.   *Please note that in the presenting to the emergency department, illness/injury that poses a threat to life or function is considered during this patient's initial evaluation.    The complexity of this visit is therefore inherently more complex given the need to consider life threatening pathology prior to any other etiology for this patient's visit.     The differential diagnosis and medical decision above exemplify this rationale.       Medical Decision Making  Problems Addressed:  Vomiting, unspecified vomiting type, unspecified whether nausea present: acute illness or injury    Amount and/or Complexity of Data Reviewed  External Data Reviewed: labs, radiology and notes.  Labs: ordered. Decision-making details documented in ED Course.  Radiology: ordered and independent interpretation performed. Decision-making details documented in ED Course.  ECG/medicine tests: ordered and independent interpretation performed. Decision-making details documented in ED Course.  Discussion of management or test interpretation with external provider(s):   Gastroenterologist  Surgeon, Dr. Schulz who will evaluate CT scan given recent admission    Risk  Decision regarding hospitalization.               Vitals:    11/15/24 1225 11/15/24 1330 11/15/24 1445   BP: 137/64 135/74 133/71   Pulse: 94 93 90   Resp: (!) 29 26 (!) 30   Temp: 97.1 °F (36.2 °C)     TempSrc: Temporal     SpO2: 92% 92% 93%             Complicating Factors: Significant medical problems that contribute to the complexity of this emergency room evaluation is listed above.    Condition upon leaving the department: Stable    Disposition and Plan     Clinical Impression:  No diagnosis found.    Disposition:  Admit    Medications Prescribed:  Current Discharge Medication List            Hospital Problems       Present on Admission  Date Reviewed: 3/15/2024            ICD-10-CM Noted POA    Vomiting R11.10 11/15/2024 Unknown                     [1] (Not in a hospital admission)

## 2024-11-16 ENCOUNTER — APPOINTMENT (OUTPATIENT)
Dept: CV DIAGNOSTICS | Facility: HOSPITAL | Age: 89
End: 2024-11-16
Attending: STUDENT IN AN ORGANIZED HEALTH CARE EDUCATION/TRAINING PROGRAM
Payer: MEDICARE

## 2024-11-16 ENCOUNTER — APPOINTMENT (OUTPATIENT)
Dept: CV DIAGNOSTICS | Facility: HOSPITAL | Age: 89
DRG: 392 | End: 2024-11-16
Attending: STUDENT IN AN ORGANIZED HEALTH CARE EDUCATION/TRAINING PROGRAM
Payer: MEDICARE

## 2024-11-16 PROBLEM — R10.84 DIFFUSE ABDOMINAL PAIN: Status: ACTIVE | Noted: 2024-11-16

## 2024-11-16 PROBLEM — R93.3 ABNORMAL CT SCAN, GASTROINTESTINAL TRACT: Status: ACTIVE | Noted: 2024-11-16

## 2024-11-16 PROBLEM — R19.7 NAUSEA VOMITING AND DIARRHEA: Status: ACTIVE | Noted: 2024-11-16

## 2024-11-16 PROBLEM — R11.2 NAUSEA VOMITING AND DIARRHEA: Status: ACTIVE | Noted: 2024-11-16

## 2024-11-16 LAB
ANION GAP SERPL CALC-SCNC: 4 MMOL/L (ref 0–18)
BASOPHILS # BLD AUTO: 0.03 X10(3) UL (ref 0–0.2)
BASOPHILS NFR BLD AUTO: 0.3 %
BUN BLD-MCNC: 12 MG/DL (ref 9–23)
BUN/CREAT SERPL: 16.7 (ref 10–20)
CALCIUM BLD-MCNC: 8.5 MG/DL (ref 8.7–10.4)
CHLORIDE SERPL-SCNC: 106 MMOL/L (ref 98–112)
CO2 SERPL-SCNC: 29 MMOL/L (ref 21–32)
CREAT BLD-MCNC: 0.72 MG/DL
DEPRECATED RDW RBC AUTO: 51.4 FL (ref 35.1–46.3)
EGFRCR SERPLBLD CKD-EPI 2021: 75 ML/MIN/1.73M2 (ref 60–?)
EOSINOPHIL # BLD AUTO: 0.01 X10(3) UL (ref 0–0.7)
EOSINOPHIL NFR BLD AUTO: 0.1 %
ERYTHROCYTE [DISTWIDTH] IN BLOOD BY AUTOMATED COUNT: 15.5 % (ref 11–15)
GLUCOSE BLD-MCNC: 150 MG/DL (ref 70–99)
HCT VFR BLD AUTO: 38.8 %
HGB BLD-MCNC: 12.5 G/DL
IMM GRANULOCYTES # BLD AUTO: 0.1 X10(3) UL (ref 0–1)
IMM GRANULOCYTES NFR BLD: 1 %
LYMPHOCYTES # BLD AUTO: 1.47 X10(3) UL (ref 1–4)
LYMPHOCYTES NFR BLD AUTO: 15.3 %
MCH RBC QN AUTO: 29.2 PG (ref 26–34)
MCHC RBC AUTO-ENTMCNC: 32.2 G/DL (ref 31–37)
MCV RBC AUTO: 90.7 FL
MONOCYTES # BLD AUTO: 0.15 X10(3) UL (ref 0.1–1)
MONOCYTES NFR BLD AUTO: 1.6 %
NEUTROPHILS # BLD AUTO: 7.82 X10 (3) UL (ref 1.5–7.7)
NEUTROPHILS # BLD AUTO: 7.82 X10(3) UL (ref 1.5–7.7)
NEUTROPHILS NFR BLD AUTO: 81.7 %
OSMOLALITY SERPL CALC.SUM OF ELEC: 291 MOSM/KG (ref 275–295)
PLATELET # BLD AUTO: 325 10(3)UL (ref 150–450)
POTASSIUM SERPL-SCNC: 3.9 MMOL/L (ref 3.5–5.1)
RBC # BLD AUTO: 4.28 X10(6)UL
SODIUM SERPL-SCNC: 139 MMOL/L (ref 136–145)
WBC # BLD AUTO: 9.6 X10(3) UL (ref 4–11)

## 2024-11-16 PROCEDURE — 93306 TTE W/DOPPLER COMPLETE: CPT | Performed by: STUDENT IN AN ORGANIZED HEALTH CARE EDUCATION/TRAINING PROGRAM

## 2024-11-16 PROCEDURE — 99233 SBSQ HOSP IP/OBS HIGH 50: CPT | Performed by: STUDENT IN AN ORGANIZED HEALTH CARE EDUCATION/TRAINING PROGRAM

## 2024-11-16 PROCEDURE — 99232 SBSQ HOSP IP/OBS MODERATE 35: CPT | Performed by: INTERNAL MEDICINE

## 2024-11-16 PROCEDURE — 99223 1ST HOSP IP/OBS HIGH 75: CPT | Performed by: SURGERY

## 2024-11-16 RX ORDER — FUROSEMIDE 10 MG/ML
20 INJECTION INTRAMUSCULAR; INTRAVENOUS ONCE
Status: COMPLETED | OUTPATIENT
Start: 2024-11-16 | End: 2024-11-16

## 2024-11-16 RX ORDER — SCOLOPAMINE TRANSDERMAL SYSTEM 1 MG/1
1 PATCH, EXTENDED RELEASE TRANSDERMAL ONCE
Status: COMPLETED | OUTPATIENT
Start: 2024-11-16 | End: 2024-11-19

## 2024-11-16 NOTE — CM/SW NOTE
Per care everywhere, patient was arranged with [facility]. SW reached out to [facility] liaison who confirmed that patient is pending with [facility]. SW placed f2f, [facility] liaison aware of f2f placed.       ROSITA/VAISHALI to remain available for support and/or discharge planning.     Angie Cobb, MSW, LSW   x [phone]

## 2024-11-16 NOTE — PHYSICAL THERAPY NOTE
PHYSICAL THERAPY EVALUATION - INPATIENT     Room Number: 565/565-A  Evaluation Date: 11/16/2024  Type of Evaluation: Initial   Physician Order: PT Eval and Treat    Presenting Problem: enteris, ileus  Co-Morbidities : p  Reason for Therapy: Mobility Dysfunction and Discharge Planning    PHYSICAL THERAPY ASSESSMENT   Patient is a 89 year old female admitted 11/15/2024 for enteritis, ileus.  Prior to admission, patient's baseline is min/mod A.  Patient is currently functioning below baseline with bed mobility, transfers, and gait.  Patient is requiring maximum assist and dependent as a result of the following impairments: decreased functional strength, impaired motor planning, decreased muscular endurance, and medical status.  Physical Therapy will continue to follow for duration of hospitalization.    Patient will benefit from continued skilled PT Services at discharge to promote prior level of function.  Anticipate patient will return home with home health PT.    PLAN DURING HOSPITALIZATION  Nursing Mobility Recommendation : Lift Equipment  PT Device Recommendation: Mechanical lift;Hospital bed  PT Treatment Plan: Bed mobility;Patient education;Family education  Rehab Potential : Fair  Frequency (Obs): 3-5x/week     PHYSICAL THERAPY MEDICAL/SOCIAL HISTORY   History related to current admission: ER with emesis.  Recent admit 11/5 with enteroclolitis.       Problem List  Principal Problem:    Vomiting, unspecified vomiting type, unspecified whether nausea present  Active Problems:    Vomiting    Ileus (HCC)      HOME SITUATION  Type of Home: House                        Lives With: Caregiver 24 hours;Family    Drives: Yes   Patient Regularly Uses: Wheelchair     Prior Level of [place]: Lives at home with 24 hr CG, family nearby.  Has WC.  Has mechanical lift without sling.  Considering hospital bed.  Last 2 weeks unable to ambulate and using WC only.  Prior was using RW with CGA    SUBJECTIVE  I need to scratch  my nose    PHYSICAL THERAPY EXAMINATION   OBJECTIVE  Precautions: NG suction;Restraints  Fall Risk: High fall risk    WEIGHT BEARING RESTRICTION       PAIN ASSESSMENT  Ratin          COGNITION  Overall Cognitive Status:  Impaired A&O 1/3    RANGE OF MOTION AND STRENGTH ASSESSMENT  Upper extremity ROM and strength are within functional limits except for the following:  BUE  Lower extremity ROM is within functional limits except for the following: BLE  Lower extremity strength is within functional limits except for the following:  BLE    BALANCE  Static Sitting: Fair -  Dynamic Sitting: Poor +  Static Standing: Not tested       ADDITIONAL TESTS                                    NEUROLOGICAL FINDINGS                      ACTIVITY TOLERANCE                         O2 WALK       AM-PAC '6-Clicks' INPATIENT SHORT FORM - BASIC MOBILITY  How much difficulty does the patient currently have...  Patient Difficulty: Turning over in bed (including adjusting bedclothes, sheets and blankets)?: A Lot   Patient Difficulty: Sitting down on and standing up from a chair with arms (e.g., wheelchair, bedside commode, etc.): Unable   Patient Difficulty: Moving from lying on back to sitting on the side of the bed?: A Lot   How much help from another person does the patient currently need...   Help from Another: Moving to and from a bed to a chair (including a wheelchair)?: Total   Help from Another: Need to walk in hospital room?: Total   Help from Another: Climbing 3-5 steps with a railing?: Total     AM-PAC Score:  Raw Score: 8   Approx Degree of Impairment: 86.62%   Standardized Score (AM-PAC Scale): 28.58   CMS Modifier (G-Code): CM    FUNCTIONAL ABILITY STATUS  Functional Mobility/Gait Assessment  Gait Assistance: Not tested  Rolling: maximum assist  Supine to Sit: maximum assist  Sit to Supine: dependent  Sit to Stand:  NT    Exercise/Education Provided:  Bed mobility  Body mechanics  Energy conservation  Functional activity  tolerated  ROM    Skilled Therapy Provided: Chart reviewed.  Patient up in bed with CG and family present.  Patient on restraints to avoid pulling NG tube out.  Mild confusion but willing to participate in PT.  Performed LE AROM.  Max A to get from supien to EOB.  Sat at EOB with LE AROM for 5 min.  Max, total A to return to supine, slide up bed.  Restraints reset and all needs left within reach.  RN aware.      The patient's Approx Degree of Impairment: 86.62% has been calculated based on documentation in the [facility] '6 clicks' Inpatient Basic Mobility Short Form.  Research supports that patients with this level of impairment may benefit from HH with hospital bed and sling for mechancial lift.  Patient has 24 hr CG but would benefit from HH to ensure home safety and train for ADL training.  .  Final disposition will be made by interdisciplinary medical team.    Patient End of Session: In bed;Needs met;Call light within reach;RN aware of session/findings;All patient questions and concerns addressed;Alarm set;Family present;Restraints    CURRENT GOALS  Goals to be met by: 11/25/2024  Patient Goal Patient's self-stated goal is: to go home   Goal #1 Patient is able to demonstrate supine - sit EOB @ level: minimum assistance     Goal #1   Current Status    Goal #2 Patient is able to demonstrate transfers EOB to/from [facility] at assistance level: minimum assistance with walker - rolling     Goal #2  Current Status    Goal #3 Patient is able to ambulate 10 feet with assist device: walker - rolling at assistance level: minimum assistance   Goal #3   Current Status    Goal #4 Patient will negotiate 0 stairs/one curb w/ assistive device and supervision   Goal #4   Current Status    Goal #5 Patient to demonstrate independence with home activity/exercise instructions provided to patient in preparation for discharge.   Goal #5   Current Status    Goal #6    Goal #6  Current Status      Patient Evaluation Complexity Level:  History  Moderate - 1 or 2 personal factors and/or co-morbidities   Examination of body systems Moderate - addressing a total of 3 or more elements   Clinical Presentation  Moderate - Evolving   Clinical Decision Making  Moderate Complexity     Gait Trainin minutes  Therapeutic Activity:  25 minutes  Neuromuscular Re-education: 5 minutes  Therapeutic Exercise: 0 minutes  Canalith Repositionin minutes  Manual Therapy: 0 minutes  Can add/delete any of these

## 2024-11-16 NOTE — PROGRESS NOTES
Progress Note     Christy Lowry Patient Status:  Inpatient    1924 MRN [id number]   Location [facility] 5SW/SE Attending Lexii Meade MD   Hosp Day # 1 PCP Patrick Stafford MD     Subjective:   S: Patient still with lots of oral secretions, dry retching   CXR from yesterday reviewed, showing some worsening pleural effusions/CHF.  Multiple water Bms.   Caregiver and son at bedside.      Review of Systems:   10 point ROS completed and was negative, except for pertinent positive and negatives stated in subjective.    Objective:   Vital signs:  Temp:  [97.7 °F (36.5 °C)-99.5 °F (37.5 °C)] 97.7 °F (36.5 °C)  Pulse:  [74-96] 74  Resp:  [16-30] 18  BP: (126-136)/(61-75) 132/61  SpO2:  [92 %-94 %] 92 %    Wt Readings from Last 6 Encounters:   11/15/24 140 lb 9.6 oz (63.8 kg)   24 152 lb (68.9 kg)   10/31/24 128 lb 1.4 oz (58.1 kg)   23 128 lb (58.1 kg)   23 136 lb 14.5 oz (62.1 kg)   23 136 lb 14.5 oz (62.1 kg)         Physical Exam:    General: No acute distress. Alert ,         Respiratory: Clear to auscultation bilaterally. No wheezes. No rhonchi.  Cardiovascular: S1, S2. Regular rate and rhythm. No murmurs, rubs or gallops.   Abdomen: Soft, nontender, nondistended.  Positive bowel sounds. No rebound or guarding.  Neurologic: No focal neurological deficits.   Musculoskeletal: Moves all extremities.  Extremities: No edema.    Results:   Diagnostic Data:      Labs:    Labs Last 24 Hours:   BMP     CBC    Other     Na 139 Cl 106 BUN 12 Glu 150   Hb 12.5   PTT - Procal -   K 3.9 CO2 29.0 Cr 0.72   WBC 9.6 >< .0  INR - CRP -   Renal Lytes Endo    Hct 38.8   Trop - D dim -   eGFR - Ca 8.5 POC Gluc  -    LFT   pBNP - Lactic -   eGFR AA - PO4 - A1c -   AST - APk - Prot -  LDL -     Mg - TSH -   ALT - T bright - Alb -        COVID-19 Lab Results    COVID-19  Lab Results   Component Value Date    COVID19 Not Detected 10/31/2024       Pro-Calcitonin  No results for input(s): \"PCT\" in  the last 168 hours.    Cardiac  No results for input(s): \"TROP\", \"PBNP\" in the last 168 hours.    Creatinine Kinase  No results for input(s): \"CK\" in the last 168 hours.    Inflammatory Markers  Recent Labs   Lab 11/13/24  0553 11/15/24  1235   CRP 2.00* 1.20*       Imaging: Imaging data reviewed in Epic.    Medications:    pantoprazole  40 mg Intravenous Q12H    ondansetron  4 mg Intravenous q6h    methylPREDNISolone  20 mg Intravenous Q8H    metoclopramide  5 mg Intravenous Q6H       Assessment & Plan:   ASSESSMENT / PLAN:     #Severe enteritis, ileus  - pt presenting for recurrent nausea, vomiting (recent hospital discharge for same)  - Recent EGD w/biopsy suggestive of possible lymphocytic colitis or enteritis.  - repeat cdiff and stool culture negative  - Surgery consulted, no plans for emergent intervention for now  - cont IV Abx with ceftriaxone and   - cont IV solumedrol TID  - GI consulted  - NGT requested per family for symptom support      #Pleural Effusions  - CXR 11/15 revealed worsening pleural effusions, R>L concern for CHF  - no prior CHF history noted  - 1x IV lasix ordered  - strict I/Os  - ECHO added   - wean fluids as per clinical course above     # Left lower lung loculated fluid   - seen on admission imaging, current no urgent respiratory compromise noted   - consider pulm consult for eval      Dispo: Pending clinical course, PT/OT      MDM: High, acute illness/severe exacerbation of chronic illness posing threat to life.  IV medications requiring close inpatient monitoring    Lexii Meade MD    Supplementary Documentation:         **Certification      PHYSICIAN Certification of Need for Inpatient Hospitalization - Initial Certification    Patient will require inpatient services that will reasonably be expected to span two midnight's based on the clinical documentation in H+P.   Based on patients current state of illness, I anticipate that, after discharge, patient will require TBD.

## 2024-11-16 NOTE — PROGRESS NOTES
[facility]  Progress Note    Christy Lowry Patient Status:  Inpatient    1924 MRN [id number]   Location [facility] 5SW/SE Attending Lexii Meade MD   Hosp Day # 1 PCP Patrick Stafford MD     Subjective:  The patient is resting comfortably in bed.  NG tube is in place.  Patient with multiple bowel movements overnight.  160 mL output from NG tube since insertion yesterday.    Objective/Physical Exam:  General:  No apparent distress.  Vital Signs:  Blood pressure 126/75, pulse 78, temperature 97.8 °F (36.6 °C), temperature source Axillary, resp. rate 16, weight 140 lb 9.6 oz (63.8 kg), SpO2 93%.  Wt Readings from Last 3 Encounters:   11/15/24 140 lb 9.6 oz (63.8 kg)   24 152 lb (68.9 kg)   10/31/24 128 lb 1.4 oz (58.1 kg)     Lungs: No respiratory distress.  Cardiac: Regular rate and rhythm.   Abdomen:  Soft, non distended, non tender, with no rebound or guarding.  No peritoneal signs.   Extremities:  No lower extremity edema noted.        Intake/Output:    Intake/Output Summary (Last 24 hours) at 2024 0913  Last data filed at 2024 0600  Gross per 24 hour   Intake 1223.33 ml   Output 160 ml   Net 1063.33 ml     I/O last 3 completed shifts:  In: 1223.3 [I.V.:1193.3; NG/GT:30]  Out: 160 [Emesis/NG output:160]  No intake/output data recorded.    Medications:    pantoprazole  40 mg Intravenous Q12H    ondansetron  4 mg Intravenous q6h    methylPREDNISolone  20 mg Intravenous Q8H    metoclopramide  5 mg Intravenous Q6H       Labs:  Lab Results   Component Value Date    WBC 9.6 2024    HGB 12.5 2024    HCT 38.8 2024    .0 2024     Lab Results   Component Value Date     2024    K 3.9 2024     2024    CO2 29.0 2024    BUN 12 2024    CREATSERUM 0.72 2024     2024    CA 8.5 2024    ALKPHO 111 11/15/2024    ALT 16 11/15/2024    AST 35 11/15/2024    BILT 0.3 11/15/2024    ALB 3.4  11/15/2024    TP 6.9 11/15/2024     No results found for: \"PT\", \"INR\"      Assessment  Patient Active Problem List   Diagnosis    Skin cancer    Moderate single current episode of major depressive disorder (HCC)    Acquired hypothyroidism    Arthralgia of right lower leg    Neoplasm of uncertain behavior of skin    Generalized anxiety disorder    Irritable bowel syndrome with diarrhea    Nausea and vomiting in adult    Persistent vomiting in adult patient    Enteritis    Vomiting    Vomiting, unspecified vomiting type, unspecified whether nausea present    Ileus (HCC)       Intractable nausea and vomiting  Enterocolitis    Plan:  No plan for urgent or emergent surgical intervention.  Dr. Banda updated the patient's son via telephone.  He discussed removal of NG tube today but the patient send declined at this time and requested for the NG tube to remain.  Further management of enterocolitis as per GI/hospitalist.  General surgery will sign off.  Please call again if needed.    The patient was seen and examined with Dr. Banda.  He is in agreement with the above assessment and plan.      Quality:  DVT Mechanical Prophylaxis:   SCDs,    DVT Pharmacologic Prophylaxis   Medication   None                Code Status: Full Code  Severino: External urinary catheter in place  Severino Duration (in days):   Central line:    SERGIO:         Anisha Nielson PA-C  11/16/2024  9:13 AM      Agree with above assessment and plan  Surgery will sign off    Zeus Banda MD  Complex General Surgical Oncology  [facility]  [email or web address]

## 2024-11-16 NOTE — PLAN OF CARE
Problem: Patient Centered Care  Goal: Patient preferences are identified and integrated in the patient's plan of care  Description: Interventions:  - What would you like us to know as we care for you? I have wonderful caregivers.   - Provide timely, complete, and accurate information to patient/family  - Incorporate patient and family knowledge, values, beliefs, and cultural backgrounds into the planning and delivery of care  - Encourage patient/family to participate in care and decision-making at the level they choose  - Honor patient and family perspectives and choices  11/16/2024 0126 by Francy Rodriguez RN  Outcome: Progressing  11/16/2024 0124 by Francy Rodriguez RN  Outcome: Progressing     Problem: Patient/Family Goals  Goal: Patient/Family Long Term Goal  Description: Patient's Long Term Goal: discharge home     Interventions:  - nasogastric tube care  -oral suction  -monitor labs  -meds  -intravenous fluids  -skin care  - See additional Care Plan goals for specific interventions  11/16/2024 0126 by Francy Rodriguez RN  Outcome: Progressing  11/16/2024 0124 by Francy Rodriguez RN  Outcome: Progressing  Goal: Patient/Family Short Term Goal  Description: Patient's Short Term Goal: sleep     Interventions:   - decrease stimuli  -blankets  -cluster care  - See additional Care Plan goals for specific interventions  11/16/2024 0126 by Francy Rodriguez RN  Outcome: Progressing  11/16/2024 0124 by Francy Rodriguez RN  Outcome: Progressing     Problem: Safety Risk - Non-Violent Restraints  Goal: Patient will remain free from self-harm  Description: INTERVENTIONS:  - Apply the least restrictive restraint to prevent harm  - Notify patient and family of reasons restraints applied  - Assess for any contributing factors to confusion (electrolyte disturbances, delirium, medications)  - Discontinue any unnecessary medical devices as soon as possible  - Assess the patient's physical comfort, circulation, skin condition, hydration,  nutrition and elimination needs   - Reorient and redirection as needed  - Assess for the need to continue restraints  11/16/2024 0126 by Francy Rodriguez, RN  Outcome: Progressing  11/16/2024 0124 by Francy Rodriguez, RN  Outcome: Progressing     Problem: GASTROINTESTINAL - ADULT  Goal: Minimal or absence of nausea and vomiting  Description: INTERVENTIONS:  - Maintain adequate hydration with IV or PO as ordered and tolerated  - Nasogastric tube to low intermittent suction as ordered  - Evaluate effectiveness of ordered antiemetic medications  - Provide nonpharmacologic comfort measures as appropriate  - Advance diet as tolerated, if ordered  - Obtain nutritional consult as needed  - Evaluate fluid balance  Outcome: Progressing  Goal: Maintains or returns to baseline bowel function  Description: INTERVENTIONS:  - Assess bowel function  - Maintain adequate hydration with IV or PO as ordered and tolerated  - Evaluate effectiveness of GI medications  - Encourage mobilization and activity  - Obtain nutritional consult as needed  - Establish a toileting routine/schedule  - Consider collaborating with pharmacy to review patient's medication profile  Outcome: Progressing  Goal: Maintains adequate nutritional intake (undernourished)  Description: INTERVENTIONS:  - Monitor percentage of each meal consumed  - Identify factors contributing to decreased intake, treat as appropriate  - Assist with meals as needed  - Monitor I&O, WT and lab values  - Obtain nutritional consult as needed  - Optimize oral hygiene and moisture  - Encourage food from home; allow for food preferences  - Enhance eating environment  Outcome: Progressing     Problem: GENITOURINARY - ADULT  Goal: Absence of urinary retention  Description: INTERVENTIONS:  - Assess patient’s ability to void and empty bladder  - Monitor intake/output and perform bladder scan as needed  - Follow urinary retention protocol/standard of care  - Consider collaborating with pharmacy to  review patient's medication profile  - Implement strategies to promote bladder emptying  Outcome: Progressing     Problem: SKIN/TISSUE INTEGRITY - ADULT  Goal: Skin integrity remains intact  Description: INTERVENTIONS  - Assess and document risk factors for pressure ulcer development  - Assess and document skin integrity  - Monitor for areas of redness and/or skin breakdown  - Initiate interventions, skin care algorithm/standards of care as needed  Outcome: Progressing     Problem: MUSCULOSKELETAL - ADULT  Goal: Return mobility to safest level of function  Description: INTERVENTIONS:  - Assess patient stability and activity tolerance for standing, transferring and ambulating w/ or w/o assistive devices  - Assist with transfers and ambulation using safe patient handling equipment as needed  - Ensure adequate protection for wounds/incisions during mobilization  - Obtain PT/OT consults as needed  - Advance activity as appropriate  - Communicate ordered activity level and limitations with patient/family  Outcome: Progressing     Problem: NEUROLOGICAL - ADULT  Goal: Achieves stable or improved neurological status  Description: INTERVENTIONS  - Assess for and report changes in neurological status  - Initiate measures to prevent increased intracranial pressure  - Maintain blood pressure and fluid volume within ordered parameters to optimize cerebral perfusion and minimize risk of hemorrhage  - Monitor temperature, glucose, and sodium. Initiate appropriate interventions as ordered  Outcome: Progressing  Goal: Achieves maximal functionality and self care  Description: INTERVENTIONS  - Monitor swallowing and airway patency with patient fatigue and changes in neurological status  - Encourage and assist patient to increase activity and self care with guidance from PT/OT  - Encourage visually impaired, hearing impaired and aphasic patients to use assistive/communication devices  Outcome: Progressing     Problem: Impaired  Functional Mobility  Goal: Achieve highest/safest level of mobility/gait  Description: Interventions:  - Assess patient's functional ability and stability  - Promote increasing activity/tolerance for mobility and gait  - Educate and engage patient/family in tolerated activity level and precautions  - Recommend patient transfer to bedside chair toward strongest side  - When transferring patient, block weaker knee for safety  - Recommend use of chair position in bed 3 times per day  Outcome: Progressing     Problem: Impaired Activities of Daily Living  Goal: Achieve highest/safest level of independence in self care  Description: Interventions:  - Assess ability and encourage patient to participate in ADLs to maximize function  - Promote sitting position while performing ADLs such as feeding, grooming, and bathing  - Educate and encourage patient/family in tolerated functional activity level and precautions during self-care  Outcome: Progressing

## 2024-11-16 NOTE — PROGRESS NOTES
[facility]     Gastroenterology Progress Note    Christy Lowry Patient Status:  Inpatient    1924 MRN [id number]   Location [facility] 5SW/SE Attending Lexii Meade MD   Hosp Day # 1 PCP Patrick Stafford MD       Subjective:   KEYLA overnight. Has NGT in place with mild output the past shift.  Denies abdominal pain, n/v.  Just had a loose BM.        Objective:   Blood pressure 128/55, pulse 75, temperature 97.5 °F (36.4 °C), temperature source Oral, resp. rate 18, weight 140 lb 9.6 oz (63.8 kg), SpO2 94%. Body mass index is 24.13 kg/m².    GEN - Patient appears comfortable and in no acute discomfort  ENT - MMM, NGT in place  EYES - the sclera appears anicteric  CV - no edema  RESP -  No increased work of breathing  ABDOMEN - soft, non-tender exam in all quadrants without rigidity or guarding, non-distended, no abnormal bowel sounds noted, no masses are palpated  SKIN - No jaundice  NEURO - Alert and appropriate, and gross movements of extremities normal  PSYCH - normal affect, non-agitated      Assessment and Plan:   Christy Lowry is a a(n) 89 year old female w/ a history of OA, GERD, squamous cell carcinoma, who presents with ongoing n/v/d, abdominal pain now near 2 weeks. CT showed diffuse enterocolitis, initially felt to be infectious. SBFT negative for bowel obstruction. C diff and GI stool PCR negative. She was readmitted for ongoing symptoms.     Nausea, vomiting, diarrhea  Diffuse abdominal pain  Repeat CT A/P again shows diffuse enterocolitis, no obstruction. Repeat stool culture and C.diff were negative. She underwent EGD and flex sig last week and colonic biopsies showed increased IELs c/w lymphocytic colitis. CRP 2. Could be slowly resolving infectious process vs inflammatory process in the setting of newly diagnosed lymphocytic colitis.  Empiric IV steroids were initiated yesterday, and her symptoms resolved after NGT placement and supportive care.       Recommend  - Recommend NGT clamp trial, then advancing to liquids, then removing the NGT if she remains asymptomatic and continuing to ADAT slowly   - Continue scheduled antiemetics for now  - PPI BID  - Continue solumedrol 20 mg IV TID - would discharge on budesonide for lymphocytic colitis   - May need a full colonoscopy vs repeat CT imaging in 4 weeks given CT findings of R colon nodular thickening if symptoms persist     SOB, evidence of volume overload on imaging, left lower lung loculated fluid - per primary.       Negar Muniz MD  [facility] Gastroenterology      Results:     Lab Results   Component Value Date    WBC 9.6 11/16/2024    HGB 12.5 11/16/2024    HCT 38.8 11/16/2024    .0 11/16/2024    CREATSERUM 0.72 11/16/2024    BUN 12 11/16/2024     11/16/2024    K 3.9 11/16/2024     11/16/2024    CO2 29.0 11/16/2024     (H) 11/16/2024    CA 8.5 (L) 11/16/2024    ALB 3.4 11/15/2024    ALKPHO 111 11/15/2024    BILT 0.3 11/15/2024    TP 6.9 11/15/2024    AST 35 (H) 11/15/2024    ALT 16 11/15/2024    T4F 1.3 05/30/2023    TSH 5.400 (H) 05/30/2023    LIP 25 11/15/2024    CRP 1.20 (H) 11/15/2024    MG 2.0 11/15/2024       XR CHEST AP PORTABLE  (CPT=71045)    Result Date: 11/15/2024  CONCLUSION:  1. NG tube in gastric body about 8.5 cm below the GE junction. 2. CHF/fluid overload with right greater than left basilar pleural effusions.  A 3 cm masslike opacity in the left lower lobe probably representing a pseudomass from loculated fluid.    Dictated by (CST): Yfn Workman MD on 11/15/2024 at 5:51 PM     Finalized by (CST): Yfn Workman MD on 11/15/2024 at 5:54 PM          CT ABDOMEN+PELVIS(CONTRAST ONLY)(CPT=74177)    Result Date: 11/15/2024  CONCLUSION:  1. Diffuse nonspecific enteritis and right-sided colitis.  Persistent nodular thickening of the ascending colon.  Findings are probably secondary to infection.  Less likely inflammatory bowel disease or other etiology. 2.  Bibasilar pleural effusions with compressive atelectasis.  Probable partially visualized pseudomass related to loculated fluid in the left lower lung. 3. A 4.4 cm cyst-like lesion anterior to the right common iliac artery probably represents a lymphocele, pseudocyst, or a developmental cyst. 4. Small pancreatic cyst. 5. Diffuse atherosclerosis including coronary artery calcification.     Dictated by (CST): Yfn Workman MD on 11/15/2024 at 2:45 PM     Finalized by (CST): Yfn Workman MD on 11/15/2024 at 2:55 PM         EKG    Result Date: 11/15/2024  Sinus rhythm with Premature atrial complexes Inferior infarct (cited on or before 31-OCT-2024) Cannot rule out Anterior infarct (cited on or before 31-OCT-2024) Abnormal ECG When compared with ECG of 31-OCT-2024 19:12, Premature atrial complexes are now Present Inverted T waves have replaced nonspecific T wave abnormality in Lateral leads Confirmed by CINTHIA CHACKO, SUDHIR ([id number]) on 11/15/2024 2:37:20 PM     \

## 2024-11-17 PROBLEM — R93.3 ABNORMAL CT SCAN, COLON: Status: ACTIVE | Noted: 2024-11-17

## 2024-11-17 PROBLEM — K52.832 LYMPHOCYTIC COLITIS: Status: ACTIVE | Noted: 2024-11-17

## 2024-11-17 LAB
ANION GAP SERPL CALC-SCNC: 5 MMOL/L (ref 0–18)
BASOPHILS # BLD AUTO: 0.03 X10(3) UL (ref 0–0.2)
BASOPHILS NFR BLD AUTO: 0.2 %
BUN BLD-MCNC: 7 MG/DL (ref 9–23)
BUN/CREAT SERPL: 10.3 (ref 10–20)
CALCIUM BLD-MCNC: 8.7 MG/DL (ref 8.7–10.4)
CHLORIDE SERPL-SCNC: 108 MMOL/L (ref 98–112)
CO2 SERPL-SCNC: 27 MMOL/L (ref 21–32)
CREAT BLD-MCNC: 0.68 MG/DL
DEPRECATED RDW RBC AUTO: 53.7 FL (ref 35.1–46.3)
EGFRCR SERPLBLD CKD-EPI 2021: 78 ML/MIN/1.73M2 (ref 60–?)
EOSINOPHIL # BLD AUTO: 0 X10(3) UL (ref 0–0.7)
EOSINOPHIL NFR BLD AUTO: 0 %
ERYTHROCYTE [DISTWIDTH] IN BLOOD BY AUTOMATED COUNT: 15.4 % (ref 11–15)
GLUCOSE BLD-MCNC: 124 MG/DL (ref 70–99)
HCT VFR BLD AUTO: 39.7 %
HGB BLD-MCNC: 12.4 G/DL
IMM GRANULOCYTES # BLD AUTO: 0.21 X10(3) UL (ref 0–1)
IMM GRANULOCYTES NFR BLD: 1.2 %
LYMPHOCYTES # BLD AUTO: 2.65 X10(3) UL (ref 1–4)
LYMPHOCYTES NFR BLD AUTO: 14.8 %
MCH RBC QN AUTO: 29.4 PG (ref 26–34)
MCHC RBC AUTO-ENTMCNC: 31.2 G/DL (ref 31–37)
MCV RBC AUTO: 94.1 FL
MONOCYTES # BLD AUTO: 0.58 X10(3) UL (ref 0.1–1)
MONOCYTES NFR BLD AUTO: 3.2 %
NEUTROPHILS # BLD AUTO: 14.48 X10 (3) UL (ref 1.5–7.7)
NEUTROPHILS # BLD AUTO: 14.48 X10(3) UL (ref 1.5–7.7)
NEUTROPHILS NFR BLD AUTO: 80.6 %
OSMOLALITY SERPL CALC.SUM OF ELEC: 289 MOSM/KG (ref 275–295)
PLATELET # BLD AUTO: 328 10(3)UL (ref 150–450)
POTASSIUM SERPL-SCNC: 4.5 MMOL/L (ref 3.5–5.1)
RBC # BLD AUTO: 4.22 X10(6)UL
SODIUM SERPL-SCNC: 140 MMOL/L (ref 136–145)
WBC # BLD AUTO: 18 X10(3) UL (ref 4–11)

## 2024-11-17 PROCEDURE — 99233 SBSQ HOSP IP/OBS HIGH 50: CPT | Performed by: STUDENT IN AN ORGANIZED HEALTH CARE EDUCATION/TRAINING PROGRAM

## 2024-11-17 PROCEDURE — 99223 1ST HOSP IP/OBS HIGH 75: CPT | Performed by: INTERNAL MEDICINE

## 2024-11-17 PROCEDURE — 99232 SBSQ HOSP IP/OBS MODERATE 35: CPT | Performed by: INTERNAL MEDICINE

## 2024-11-17 NOTE — PLAN OF CARE
Problem: Patient Centered Care  Goal: Patient preferences are identified and integrated in the patient's plan of care  Description: Interventions:  - What would you like us to know as we care for you? \" I have been having a hard time sleeping\"  - Provide timely, complete, and accurate information to patient/family  - Incorporate patient and family knowledge, values, beliefs, and cultural backgrounds into the planning and delivery of care  - Encourage patient/family to participate in care and decision-making at the level they choose  - Honor patient and family perspectives and choices  Outcome: Progressing     Problem: Safety Risk - Non-Violent Restraints  Goal: Patient will remain free from self-harm  Description: INTERVENTIONS:  - Apply the least restrictive restraint to prevent harm  - Notify patient and family of reasons restraints applied  - Assess for any contributing factors to confusion (electrolyte disturbances, delirium, medications)  - Discontinue any unnecessary medical devices as soon as possible  - Assess the patient's physical comfort, circulation, skin condition, hydration, nutrition and elimination needs   - Reorient and redirection as needed  - Assess for the need to continue restraints  Outcome: Progressing     Problem: GASTROINTESTINAL - ADULT  Goal: Minimal or absence of nausea and vomiting  Description: INTERVENTIONS:  - Maintain adequate hydration with IV or PO as ordered and tolerated  - Nasogastric tube to low intermittent suction as ordered  - Evaluate effectiveness of ordered antiemetic medications  - Provide nonpharmacologic comfort measures as appropriate  - Advance diet as tolerated, if ordered  - Obtain nutritional consult as needed  - Evaluate fluid balance  Outcome: Progressing  Goal: Maintains or returns to baseline bowel function  Description: INTERVENTIONS:  - Assess bowel function  - Maintain adequate hydration with IV or PO as ordered and tolerated  - Evaluate effectiveness of  GI medications  - Encourage mobilization and activity  - Obtain nutritional consult as needed  - Establish a toileting routine/schedule  - Consider collaborating with pharmacy to review patient's medication profile  Outcome: Progressing  Goal: Maintains adequate nutritional intake (undernourished)  Description: INTERVENTIONS:  - Monitor percentage of each meal consumed  - Identify factors contributing to decreased intake, treat as appropriate  - Assist with meals as needed  - Monitor I&O, WT and lab values  - Obtain nutritional consult as needed  - Optimize oral hygiene and moisture  - Encourage food from home; allow for food preferences  - Enhance eating environment  Outcome: Progressing     Problem: GENITOURINARY - ADULT  Goal: Absence of urinary retention  Description: INTERVENTIONS:  - Assess patient’s ability to void and empty bladder  - Monitor intake/output and perform bladder scan as needed  - Follow urinary retention protocol/standard of care  - Consider collaborating with pharmacy to review patient's medication profile  - Implement strategies to promote bladder emptying  Outcome: Progressing     Problem: SKIN/TISSUE INTEGRITY - ADULT  Goal: Skin integrity remains intact  Description: INTERVENTIONS  - Assess and document risk factors for pressure ulcer development  - Assess and document skin integrity  - Monitor for areas of redness and/or skin breakdown  - Initiate interventions, skin care algorithm/standards of care as needed  Outcome: Progressing     Problem: MUSCULOSKELETAL - ADULT  Goal: Return mobility to safest level of function  Description: INTERVENTIONS:  - Assess patient stability and activity tolerance for standing, transferring and ambulating w/ or w/o assistive devices  - Assist with transfers and ambulation using safe patient handling equipment as needed  - Ensure adequate protection for wounds/incisions during mobilization  - Obtain PT/OT consults as needed  - Advance activity as  appropriate  - Communicate ordered activity level and limitations with patient/family  Outcome: Progressing     Problem: NEUROLOGICAL - ADULT  Goal: Achieves stable or improved neurological status  Description: INTERVENTIONS  - Assess for and report changes in neurological status  - Initiate measures to prevent increased intracranial pressure  - Maintain blood pressure and fluid volume within ordered parameters to optimize cerebral perfusion and minimize risk of hemorrhage  - Monitor temperature, glucose, and sodium. Initiate appropriate interventions as ordered  Outcome: Progressing  Goal: Achieves maximal functionality and self care  Description: INTERVENTIONS  - Monitor swallowing and airway patency with patient fatigue and changes in neurological status  - Encourage and assist patient to increase activity and self care with guidance from PT/OT  - Encourage visually impaired, hearing impaired and aphasic patients to use assistive/communication devices  Outcome: Progressing     Problem: Impaired Functional Mobility  Goal: Achieve highest/safest level of mobility/gait  Description: Interventions:  - Assess patient's functional ability and stability  - Promote increasing activity/tolerance for mobility and gait  - Educate and engage patient/family in tolerated activity level and precautions  - Recommend use of  RW for transfers and ambulation  Outcome: Progressing     Problem: Impaired Activities of Daily Living  Goal: Achieve highest/safest level of independence in self care  Description: Interventions:  - Assess ability and encourage patient to participate in ADLs to maximize function  - Promote sitting position while performing ADLs such as feeding, grooming, and bathing  - Educate and encourage patient/family in tolerated functional activity level and precautions during self-care  Outcome: Progressing   No acute changes, patient tolerating clear liquid diet well, no nausea, denies abdominal pain. Safety  precautions in place

## 2024-11-17 NOTE — PLAN OF CARE
Problem: Patient Centered Care  Goal: Patient preferences are identified and integrated in the patient's plan of care  Description: Interventions:  - What would you like us to know as we care for you?   - Provide timely, complete, and accurate information to patient/family  - Incorporate patient and family knowledge, values, beliefs, and cultural backgrounds into the planning and delivery of care  - Encourage patient/family to participate in care and decision-making at the level they choose  - Honor patient and family perspectives and choices  Outcome: Progressing     Problem: Patient/Family Goals  Goal: Patient/Family Long Term Goal  Description: Patient's Long Term Goal: Tolerate diet!    Interventions:  - Patient currently on full liquid diet.  - Intake and output is monitored.  - Tolerates diet well.  - See additional Care Plan goals for specific interventions  Outcome: Progressing  Goal: Patient/Family Short Term Goal  Description: Patient's Short Term Goal: Safe and free from falls!    Interventions:   - Fall risk precautions are followed at all times.  - Non-skid socks are worn at all times.  - Caregiver stays with patient at all times.  - Frequent rounds are done to ensure safety.  - Call light within reach at all times.   - See additional Care Plan goals for specific interventions  Outcome: Progressing     Problem: Safety Risk - Non-Violent Restraints  Goal: Patient will remain free from self-harm  Description: INTERVENTIONS:  - Apply the least restrictive restraint to prevent harm  - Notify patient and family of reasons restraints applied  - Assess for any contributing factors to confusion (electrolyte disturbances, delirium, medications)  - Discontinue any unnecessary medical devices as soon as possible  - Assess the patient's physical comfort, circulation, skin condition, hydration, nutrition and elimination needs   - Reorient and redirection as needed  - Assess for the need to continue  restraints  Outcome: Progressing     Problem: GASTROINTESTINAL - ADULT  Goal: Minimal or absence of nausea and vomiting  Description: INTERVENTIONS:  - Maintain adequate hydration with IV or PO as ordered and tolerated  - Nasogastric tube to low intermittent suction as ordered  - Evaluate effectiveness of ordered antiemetic medications  - Provide nonpharmacologic comfort measures as appropriate  - Advance diet as tolerated, if ordered  - Obtain nutritional consult as needed  - Evaluate fluid balance  Outcome: Progressing  Goal: Maintains or returns to baseline bowel function  Description: INTERVENTIONS:  - Assess bowel function  - Maintain adequate hydration with IV or PO as ordered and tolerated  - Evaluate effectiveness of GI medications  - Encourage mobilization and activity  - Obtain nutritional consult as needed  - Establish a toileting routine/schedule  - Consider collaborating with pharmacy to review patient's medication profile  Outcome: Progressing  Goal: Maintains adequate nutritional intake (undernourished)  Description: INTERVENTIONS:  - Monitor percentage of each meal consumed  - Identify factors contributing to decreased intake, treat as appropriate  - Assist with meals as needed  - Monitor I&O, WT and lab values  - Obtain nutritional consult as needed  - Optimize oral hygiene and moisture  - Encourage food from home; allow for food preferences  - Enhance eating environment  Outcome: Progressing     Problem: GENITOURINARY - ADULT  Goal: Absence of urinary retention  Description: INTERVENTIONS:  - Assess patient’s ability to void and empty bladder  - Monitor intake/output and perform bladder scan as needed  - Follow urinary retention protocol/standard of care  - Consider collaborating with pharmacy to review patient's medication profile  - Implement strategies to promote bladder emptying  Outcome: Progressing     Problem: SKIN/TISSUE INTEGRITY - ADULT  Goal: Skin integrity remains intact  Description:  INTERVENTIONS  - Assess and document risk factors for pressure ulcer development  - Assess and document skin integrity  - Monitor for areas of redness and/or skin breakdown  - Initiate interventions, skin care algorithm/standards of care as needed  Outcome: Progressing     Problem: MUSCULOSKELETAL - ADULT  Goal: Return mobility to safest level of function  Description: INTERVENTIONS:  - Assess patient stability and activity tolerance for standing, transferring and ambulating w/ or w/o assistive devices  - Assist with transfers and ambulation using safe patient handling equipment as needed  - Ensure adequate protection for wounds/incisions during mobilization  - Obtain PT/OT consults as needed  - Advance activity as appropriate  - Communicate ordered activity level and limitations with patient/family  Outcome: Progressing     Problem: NEUROLOGICAL - ADULT  Goal: Achieves stable or improved neurological status  Description: INTERVENTIONS  - Assess for and report changes in neurological status  - Initiate measures to prevent increased intracranial pressure  - Maintain blood pressure and fluid volume within ordered parameters to optimize cerebral perfusion and minimize risk of hemorrhage  - Monitor temperature, glucose, and sodium. Initiate appropriate interventions as ordered  Outcome: Progressing  Goal: Achieves maximal functionality and self care  Description: INTERVENTIONS  - Monitor swallowing and airway patency with patient fatigue and changes in neurological status  - Encourage and assist patient to increase activity and self care with guidance from PT/OT  - Encourage visually impaired, hearing impaired and aphasic patients to use assistive/communication devices  Outcome: Progressing     Problem: Impaired Functional Mobility  Goal: Achieve highest/safest level of mobility/gait  Description: Interventions:  - Assess patient's functional ability and stability  - Promote increasing activity/tolerance for mobility  and gait  - Educate and engage patient/family in tolerated activity level and precautions  - Recommend patient transfer to bedside chair toward strongest side  - When transferring patient, block weaker knee for safety  - Recommend use of chair position in bed 3 times per day  Outcome: Progressing     Problem: Impaired Activities of Daily Living  Goal: Achieve highest/safest level of independence in self care  Description: Interventions:  - Assess ability and encourage patient to participate in ADLs to maximize function  - Promote sitting position while performing ADLs such as feeding, grooming, and bathing  - Educate and encourage patient/family in tolerated functional activity level and precautions during self-care  - Provide support under elbow of weak side to prevent shoulder subluxation  - Encourage patient to incorporate impaired side during daily activities to promote function  Outcome: Progressing     Patient is presently sitting up in recliner chair with Caregiver at side. Alert x 4. Vital signs taken and stable. Fall risk precautions are followed at all times. Patient tolerates full liquid diet well. Intravenous fluids infusing and tolerated. No complaints of pain or discomfort. GI on consult. Pulmonary on consult. No noted shortness of breath or difficulty breathing. Patient remains calm and cooperative. Purewick in place to suction. Oral suctioning is done as needed to prevent excessive oral secretions. Prompt care is given to incontinence. Patient kept clean, dry, and intact. Call light within reach at all times.

## 2024-11-17 NOTE — PLAN OF CARE
Problem: Patient Centered Care  Goal: Patient preferences are identified and integrated in the patient's plan of care  Description: Interventions:  - What would you like us to know as we care for you?   - Provide timely, complete, and accurate information to patient/family  - Incorporate patient and family knowledge, values, beliefs, and cultural backgrounds into the planning and delivery of care  - Encourage patient/family to participate in care and decision-making at the level they choose  - Honor patient and family perspectives and choices  Outcome: Progressing     Problem: Patient/Family Goals  Goal: Patient/Family Long Term Goal  Description: Patient's Long Term Goal: Tolerate diet!    Interventions:  - Patient tolerate clear liquids.  - Intake and output is monitored.  - See additional Care Plan goals for specific interventions  Outcome: Progressing  Goal: Patient/Family Short Term Goal  Description: Patient's Short Term Goal: Remain safe and free of falls.    Interventions:   - Fall risk precautions are followed at all times.  - Call light within reach at all times.   - See additional Care Plan goals for specific interventions  Outcome: Progressing     Problem: Safety Risk - Non-Violent Restraints  Goal: Patient will remain free from self-harm  Description: INTERVENTIONS:  - Apply the least restrictive restraint to prevent harm  - Notify patient and family of reasons restraints applied  - Assess for any contributing factors to confusion (electrolyte disturbances, delirium, medications)  - Discontinue any unnecessary medical devices as soon as possible  - Assess the patient's physical comfort, circulation, skin condition, hydration, nutrition and elimination needs   - Reorient and redirection as needed  - Assess for the need to continue restraints  Outcome: Progressing     Problem: GASTROINTESTINAL - ADULT  Goal: Minimal or absence of nausea and vomiting  Description: INTERVENTIONS:  - Maintain adequate  hydration with IV or PO as ordered and tolerated  - Nasogastric tube to low intermittent suction as ordered  - Evaluate effectiveness of ordered antiemetic medications  - Provide nonpharmacologic comfort measures as appropriate  - Advance diet as tolerated, if ordered  - Obtain nutritional consult as needed  - Evaluate fluid balance  Outcome: Progressing  Goal: Maintains or returns to baseline bowel function  Description: INTERVENTIONS:  - Assess bowel function  - Maintain adequate hydration with IV or PO as ordered and tolerated  - Evaluate effectiveness of GI medications  - Encourage mobilization and activity  - Obtain nutritional consult as needed  - Establish a toileting routine/schedule  - Consider collaborating with pharmacy to review patient's medication profile  Outcome: Progressing  Goal: Maintains adequate nutritional intake (undernourished)  Description: INTERVENTIONS:  - Monitor percentage of each meal consumed  - Identify factors contributing to decreased intake, treat as appropriate  - Assist with meals as needed  - Monitor I&O, WT and lab values  - Obtain nutritional consult as needed  - Optimize oral hygiene and moisture  - Encourage food from home; allow for food preferences  - Enhance eating environment  Outcome: Progressing     Problem: GENITOURINARY - ADULT  Goal: Absence of urinary retention  Description: INTERVENTIONS:  - Assess patient’s ability to void and empty bladder  - Monitor intake/output and perform bladder scan as needed  - Follow urinary retention protocol/standard of care  - Consider collaborating with pharmacy to review patient's medication profile  - Implement strategies to promote bladder emptying  Outcome: Progressing     Problem: SKIN/TISSUE INTEGRITY - ADULT  Goal: Skin integrity remains intact  Description: INTERVENTIONS  - Assess and document risk factors for pressure ulcer development  - Assess and document skin integrity  - Monitor for areas of redness and/or skin  breakdown  - Initiate interventions, skin care algorithm/standards of care as needed  Outcome: Progressing     Problem: MUSCULOSKELETAL - ADULT  Goal: Return mobility to safest level of function  Description: INTERVENTIONS:  - Assess patient stability and activity tolerance for standing, transferring and ambulating w/ or w/o assistive devices  - Assist with transfers and ambulation using safe patient handling equipment as needed  - Ensure adequate protection for wounds/incisions during mobilization  - Obtain PT/OT consults as needed  - Advance activity as appropriate  - Communicate ordered activity level and limitations with patient/family  Outcome: Progressing     Problem: NEUROLOGICAL - ADULT  Goal: Achieves stable or improved neurological status  Description: INTERVENTIONS  - Assess for and report changes in neurological status  - Initiate measures to prevent increased intracranial pressure  - Maintain blood pressure and fluid volume within ordered parameters to optimize cerebral perfusion and minimize risk of hemorrhage  - Monitor temperature, glucose, and sodium. Initiate appropriate interventions as ordered  Outcome: Progressing  Goal: Achieves maximal functionality and self care  Description: INTERVENTIONS  - Monitor swallowing and airway patency with patient fatigue and changes in neurological status  - Encourage and assist patient to increase activity and self care with guidance from PT/OT  - Encourage visually impaired, hearing impaired and aphasic patients to use assistive/communication devices  Outcome: Progressing     Problem: Impaired Functional Mobility  Goal: Achieve highest/safest level of mobility/gait  Description: Interventions:  - Assess patient's functional ability and stability  - Promote increasing activity/tolerance for mobility and gait  - Educate and engage patient/family in tolerated activity level and precautions  - Recommend patient transfer to bedside chair toward strongest side  -  When transferring patient, block weaker knee for safety  - Recommend use of chair position in bed 3 times per day  Outcome: Progressing     Problem: Impaired Activities of Daily Living  Goal: Achieve highest/safest level of independence in self care  Description: Interventions:  - Assess ability and encourage patient to participate in ADLs to maximize function  - Promote sitting position while performing ADLs such as feeding, grooming, and bathing  - Educate and encourage patient/family in tolerated functional activity level and precautions during self-care  - Provide support under elbow of weak side to prevent shoulder subluxation  - Encourage patient to incorporate impaired side during daily activities to promote function  Outcome: Progressing     Patient is presently resting in the bed. Alert x 3. Vital signs taken and stable. Fall risk precautions are followed at all times. Patient NG tube was removed per order. Patient tolerating clear liquid diet well. Bilateral soft wrist restraints were discontinued per protocol. Patient remains calm and cooperative. Intravenous fluids infusing and tolerated. Purewick in place to suction. Call light within reach at all times. Patient will have full liquid diet in am.

## 2024-11-17 NOTE — CONSULTS
[facility]  part of [facility]    Report of Consultation    Christy Lowry Patient Status:  Inpatient    1924 MRN [id number]   Location [facility] 5SW/SE Attending Lexii Meade MD   Hosp Day # 2 PCP Patrick Stafford MD     Date of Admission:  11/15/2024  Date of Consult: 2024    Reason for Consultation:   Consults  Pleural effusion     History provided by:patient  HPI:     Chief Complaint   Patient presents with    Nausea/Vomiting/Diarrhea     HPI    89 year old female who was admitted on 11/15/2024 with diffuse enterocolitis and diagnosed with lymphocytic enterocolitis and started on steroid  Seems better and now tolerating p.o. diet   patient has been on IV fluids since admission and now I's and O's positive around 3 L  Increased lower extremity edema with mild edema on chest x-ray with a trace basilar effusion  Minimal occasional cough and dyspnea upon exertion but comfortable at rest and she is on room air  Denied active cough or sputum  Denied chest pain  No fever    History     Past Medical History:    Actinic keratosis    hypertrophic AK - left central forehead    Benzodiazepine withdrawal without complication (HCC)    Chronic GERD    Constipation    Diarrhea    Eustachian tube dysfunction    Heart palpitations    Loose stools    Occult blood in stools    SCC (squamous cell carcinoma)    left forehead, invasive, well-diff.     SCC (squamous cell carcinoma)    right temple    SCC (squamous cell carcinoma)    right neck    Skin cancer     Past Surgical History:   Procedure Laterality Date    Tonsillectomy       Family History   Problem Relation Age of Onset    Colon Cancer Mother     Colon Cancer Sister      Social History:  Social History     Socioeconomic History    Marital status:    Tobacco Use    Smoking status: Former    Smokeless tobacco: Former   Vaping Use    Vaping status: Never Used   Substance and Sexual Activity    Alcohol use: Not Currently     Drug use: No   Other Topics Concern    Reaction to local anesthetic No     Social Drivers of Health     Financial Resource Strain: Low Risk  (11/15/2024)    Financial Resource Strain     Difficulty of Paying Living Expenses: Not very hard     Med Affordability: No   Food Insecurity: No Food Insecurity (11/15/2024)    Food Insecurity     Food Insecurity: Never true   Transportation Needs: No Transportation Needs (11/15/2024)    Transportation Needs     Lack of Transportation: No   Housing Stability: Low Risk  (11/15/2024)    Housing Stability     Housing Instability: No     Allergies/Medications:   Allergies: Allergies[1]  Medications Prior to Admission   Medication Sig    carboxymethylcellulose 1 % Ophthalmic Gel Place 1-2 drops into both eyes as needed.    pantoprazole 40 MG Oral Tab EC Take 1 tablet (40 mg total) by mouth daily.    ondansetron (ZOFRAN) 4 mg tablet Take 1 tablet (4 mg total) by mouth every 8 (eight) hours as needed for Nausea.    levothyroxine 50 MCG Oral Tab Take 1 tablet (50 mcg total) by mouth before breakfast.    dicyclomine 10 MG Oral Cap Take 1 capsule (10 mg total) by mouth every 12 (twelve) hours as needed.    escitalopram 10 MG Oral Tab Take 0.5 tablets (5 mg total) by mouth daily.    Loperamide HCl 1 MG/7.5ML Oral Liquid Take 15 mL (2 mg total) by mouth 3 (three) times daily as needed for Diarrhea. (Patient not taking: Reported on 11/15/2024)       Review of Systems:     Constitutional:  Positive for fatigue. Negative for fever.   HENT:  Negative for congestion.    Respiratory:  Positive for cough. Negative for wheezing.         No dyspnea at rest   minimal dyspnea with activity   Gastrointestinal:  Positive for nausea and diarrhea. Negative for abdominal distention.   Neurological:  Negative for seizures.   Hematological:  Negative for adenopathy.   Psychiatric/Behavioral:  Negative for agitation.        Physical Exam:   Vital Signs:   weight is 140 lb 9.6 oz (63.8 kg). Her oral  temperature is 98 °F (36.7 °C). Her blood pressure is 119/44 and her pulse is 79. Her respiration is 18 and oxygen saturation is 93%.   Physical Exam  Constitutional:       General: She is not in acute distress.     Appearance: She is ill-appearing.   HENT:      Head: Atraumatic.      Mouth/Throat:      Mouth: Mucous membranes are moist.   Eyes:      General: No scleral icterus.  Cardiovascular:      Rate and Rhythm: Normal rate.      Heart sounds: Murmur heard.      No gallop.   Pulmonary:      Effort: No respiratory distress.      Breath sounds: No stridor. Rales present. No wheezing or rhonchi.      Comments: Good air exchange to both lung fields  Minimal basilar rales  Abdominal:      General: Abdomen is flat. Bowel sounds are normal.      Palpations: Abdomen is soft.      Tenderness: There is no guarding.   Musculoskeletal:      Cervical back: Normal range of motion.      Right lower leg: Edema present.      Left lower leg: Edema present.   Skin:     General: Skin is dry.   Neurological:      Mental Status: She is oriented to person, place, and time.         Results:     Lab Results   Component Value Date    WBC 18.0 (H) 11/17/2024    HGB 12.4 11/17/2024    HCT 39.7 11/17/2024    .0 11/17/2024    CREATSERUM 0.68 11/17/2024    BUN 7 (L) 11/17/2024     11/17/2024    K 4.5 11/17/2024     11/17/2024    CO2 27.0 11/17/2024     (H) 11/17/2024    CA 8.7 11/17/2024    ALB 3.4 11/15/2024    ALKPHO 111 11/15/2024    BILT 0.3 11/15/2024    TP 6.9 11/15/2024    AST 35 (H) 11/15/2024    ALT 16 11/15/2024    T4F 1.3 05/30/2023    TSH 5.400 (H) 05/30/2023    LIP 25 11/15/2024    CRP 1.20 (H) 11/15/2024    MG 2.0 11/15/2024    TROPHS 11 11/15/2024     XR CHEST AP PORTABLE  (CPT=71045)    Result Date: 11/15/2024  CONCLUSION:  1. NG tube in gastric body about 8.5 cm below the GE junction. 2. CHF/fluid overload with right greater than left basilar pleural effusions.  A 3 cm masslike opacity in the left  lower lobe probably representing a pseudomass from loculated fluid.    Dictated by (CST): Yfn Workman MD on 11/15/2024 at 5:51 PM     Finalized by (CST): Yfn Workman MD on 11/15/2024 at 5:54 PM               Impression:     1- admitted 11/15/24 with diffuse enterocolitis /lymphocytic colitis  GI following and now on steroid  Seems better now tolerating diet    2-mild pulmonary edema with trace basilar effusion /left base round atelectasis  No history of smoking  Comfortable on room air  Will discontinue IV fluid since I's and O's +3 L and patient tolerated diet  No further intervention  Follow-up labs and BNP in a.m.    3- DVT prophylaxis   SCD         D/w pt and staff           Christian Aguilar MD  11/17/2024         [1] No Known Allergies

## 2024-11-17 NOTE — OCCUPATIONAL THERAPY NOTE
OCCUPATIONAL THERAPY EVALUATION - INPATIENT     Room Number: 565/565-A  Evaluation Date: 11/17/2024  Type of Evaluation: Initial  Presenting Problem: 89 year old female w/ a history of OA, GERD, squamous cell carcinoma, who presents with ongoing n/v/d, abdominal pain now near 2 weeks. CT showed diffuse enterocolitis, initially felt to be infectious. SBFT negative for bowel obstruction. C diff and GI stool PCR negative. She was readmitted for ongoing symptoms.    Physician Order: IP Consult to Occupational Therapy  Reason for Therapy: ADL/IADL Dysfunction and Discharge Planning    OCCUPATIONAL THERAPY ASSESSMENT   Patient is a 89 year old female admitted 11/15/2024 for illus .  Prior to admission, patient's baseline is min A for all self care tasks .  Patient is currently functioning near baseline with  basic self care tasks .  Patient is requiring moderate assist as a result of the following impairments: decreased functional strength and decreased muscular endurance. Occupational Therapy will continue to follow for duration of hospitalization.    Patient will benefit from continued skilled OT Services at discharge to promote prior level of function.  Anticipate patient will return home with home health OT.    PLAN DURING HOSPITALIZATION     OT Treatment Plan: ADL training;Functional transfer training;Patient/Family education;Patient/Family training     OCCUPATIONAL THERAPY MEDICAL/SOCIAL HISTORY   Problem List  Principal Problem:    Vomiting, unspecified vomiting type, unspecified whether nausea present  Active Problems:    Vomiting    Ileus (HCC)    Diffuse abdominal pain    Abnormal CT scan, gastrointestinal tract    Nausea vomiting and diarrhea    HOME SITUATION  Type of Home: House  Lives With: Caregiver 24 hours  Toilet and Equipment: 3-in-1 commode  Shower/Tub and Equipment: Shower chair  Drives: No  Patient Regularly Uses: Wheelchair; Mechanical lift; Rolling walker    Stairs in Home: 0  Use of Assistive  Device(s): miller lift     Prior Level of [place]:  min A with all self care tasks     SUBJECTIVE  No comments     OCCUPATIONAL THERAPY EXAMINATION      OBJECTIVE  Precautions: NG suction; Restraints  Fall Risk: High fall risk      PAIN ASSESSMENT  Ratin      ACTIVITY TOLERANCE                           Behavioral/Emotional/Social: able to communicate all basic wants and needs    RANGE OF MOTION   Upper extremity ROM is within functional limits     STRENGTH ASSESSMENT  Upper extremity strength is within functional limits     COORDINATION  Gross Motor: WFL   Fine Motor: WFL     ACTIVITIES OF DAILY LIVING ASSESSMENT  AM-PAC ‘6-Clicks’ Inpatient Daily Activity Short Form  How much help from another person does the patient currently need…  -   Putting on and taking off regular lower body clothing?: A Little  -   Bathing (including washing, rinsing, drying)?: A Little  -   Toileting, which includes using toilet, bedpan or urinal? : A Little  -   Putting on and taking off regular upper body clothing?: A Little  -   Taking care of personal grooming such as brushing teeth?: A Little  -   Eating meals?: A Little    AM-PAC Score:  Score: 18  Approx Degree of Impairment: 46.65%  Standardized Score (AM-PAC Scale): 38.66  CMS Modifier (G-Code): CK    FUNCTIONAL TRANSFER ASSESSMENT  Sit to Stand: Chair  Chair: Moderate Assist    BED MOBILITY     BALANCE ASSESSMENT     FUNCTIONAL ADL ASSESSMENT     THERAPEUTIC EXERCISE     Skilled Therapy Provided: Role of OT, Delirium prevention, transfer safety and goals for acute care     EDUCATION PROVIDED  Patient Education : Role of Occupational Therapy; Discharge Recommendations; Plan of Care; Functional Transfer Techniques; Fall Prevention  Patient's Response to Education: Verbalized Understanding  Family/Caregiver Education : Role of Occupational Therapy; Discharge Recommendations; Plan of Care; Fall Prevention; DME Recommendations; Functional Transfer  Techniques  Family/Caregiver's Response to Education: Verbalized Understanding; Returned Demonstration    The patient's Approx Degree of Impairment: 46.65% has been calculated based on documentation in the [facility] '6 clicks' Inpatient Daily Activity Short Form.  Research supports that patients with this level of impairment may benefit from home  CG and HHOT to follow.  .  Final disposition will be made by interdisciplinary medical team.     Patient End of Session: Up in chair;Needs met;Call light within reach;RN aware of session/findings;All patient questions and concerns addressed;Hospital anti-slip socks;Family present    OT Goals  Patients self stated goal is: increase strength      Patient will complete functional transfer with supervision  Comment:     Patient will complete toileting with supervision  Comment:     Patient will tolerate standing for 5 minutes in prep for adls with supervision   Comment:    Patient will complete item retrieval with supervision  Comment:          Goals  on: 24  Frequency: 3x a week     Patient Evaluation Complexity Level:   Occupational Profile/Medical History LOW - Brief history including review of medical or therapy records    Specific performance deficits impacting engagement in ADL/IADL LOW  1 - 3 performance deficits    Client Assessment/Performance Deficits LOW - No comorbidities nor modifications of tasks    Clinical Decision Making LOW - Analysis of occupational profile, problem-focused assessments, limited treatment options    Overall Complexity LOW     OT Session Time: 10 minutes

## 2024-11-17 NOTE — PROGRESS NOTES
[facility]     Gastroenterology Progress Note    Christy Lowry Patient Status:  Inpatient    1924 MRN [id number]   Location [facility] 5SW/SE Attending Lexii Meade MD   Hosp Day # 2 PCP Patrick Stafford MD       Subjective:   KEYLA overnight. NGT able to be removed without issue and pt tolerating liquids. Pt requesting some chicken and mashed potatoes. Denies abdominal pain, n/v, or diarrhea since yesterday. She feels more energetic as well.       Objective:   Blood pressure 136/61, pulse 76, temperature 98.5 °F (36.9 °C), temperature source Oral, resp. rate 18, weight 140 lb 9.6 oz (63.8 kg), SpO2 94%. Body mass index is 24.13 kg/m².    GEN - Patient appears comfortable and in no acute discomfort  ENT - MMM  EYES - the sclera appears anicteric  CV - no edema  RESP -  No increased work of breathing  ABDOMEN - soft, non-tender exam in all quadrants without rigidity or guarding, non-distended, no abnormal bowel sounds noted, no masses are palpated  SKIN - No jaundice  NEURO - Alert and appropriate, and gross movements of extremities normal  PSYCH - normal affect, non-agitated      Assessment and Plan:   Christy Lowry is a a(n) 89 year old female w/ a history of OA, GERD, squamous cell carcinoma, who presents with ongoing n/v/d, abdominal pain now near 2 weeks. CT showed diffuse enterocolitis, initially felt to be infectious. SBFT negative for bowel obstruction. C diff and GI stool PCR negative. She was readmitted for ongoing symptoms.     Nausea, vomiting, diarrhea  Diffuse abdominal pain  Lymphocytic colitis   Repeat CT A/P showed diffuse enterocolitis, no obstruction. Repeat stool culture and C.diff were negative. She underwent EGD and flex sig last week and colonic biopsies showed increased IELs c/w lymphocytic colitis. CRP 2. Could be slowly resolving infectious process vs inflammatory process in the setting of newly diagnosed lymphocytic colitis.  Empiric IV steroids were  initiated 11/15/24, and her symptoms resolved. NGT discontinued yesterday and pt's energy and appetite have recovered with the steroids.      Recommend  - Recommend ADAT   - Continue scheduled antiemetics for now  - PPI BID  - Continue solumedrol 20 mg IV TID - would discharge on budesonide for lymphocytic colitis   - May need a full colonoscopy vs repeat CT imaging in 4 weeks given CT findings of R colon nodular thickening on most recent CT    SOB, evidence of volume overload on imaging, left lower lung loculated fluid - per primary. SOB improved today with diuresis.      Negar Muniz MD  [facility] Gastroenterology      Results:     Lab Results   Component Value Date    WBC 18.0 (H) 11/17/2024    HGB 12.4 11/17/2024    HCT 39.7 11/17/2024    .0 11/17/2024    CREATSERUM 0.68 11/17/2024    BUN 7 (L) 11/17/2024     11/17/2024    K 4.5 11/17/2024     11/17/2024    CO2 27.0 11/17/2024     (H) 11/17/2024    CA 8.7 11/17/2024    ALB 3.4 11/15/2024    ALKPHO 111 11/15/2024    BILT 0.3 11/15/2024    TP 6.9 11/15/2024    AST 35 (H) 11/15/2024    ALT 16 11/15/2024    T4F 1.3 05/30/2023    TSH 5.400 (H) 05/30/2023    LIP 25 11/15/2024    CRP 1.20 (H) 11/15/2024    MG 2.0 11/15/2024       XR CHEST AP PORTABLE  (CPT=71045)    Result Date: 11/15/2024  CONCLUSION:  1. NG tube in gastric body about 8.5 cm below the GE junction. 2. CHF/fluid overload with right greater than left basilar pleural effusions.  A 3 cm masslike opacity in the left lower lobe probably representing a pseudomass from loculated fluid.    Dictated by (CST): Yfn Workman MD on 11/15/2024 at 5:51 PM     Finalized by (CST): Yfn Workman MD on 11/15/2024 at 5:54 PM          CT ABDOMEN+PELVIS(CONTRAST ONLY)(CPT=74177)    Result Date: 11/15/2024  CONCLUSION:  1. Diffuse nonspecific enteritis and right-sided colitis.  Persistent nodular thickening of the ascending colon.  Findings are probably secondary to infection.  Less likely  inflammatory bowel disease or other etiology. 2. Bibasilar pleural effusions with compressive atelectasis.  Probable partially visualized pseudomass related to loculated fluid in the left lower lung. 3. A 4.4 cm cyst-like lesion anterior to the right common iliac artery probably represents a lymphocele, pseudocyst, or a developmental cyst. 4. Small pancreatic cyst. 5. Diffuse atherosclerosis including coronary artery calcification.     Dictated by (CST): Yfn Workman MD on 11/15/2024 at 2:45 PM     Finalized by (CST): Yfn Workman MD on 11/15/2024 at 2:55 PM               \

## 2024-11-17 NOTE — PLAN OF CARE
Patient NG tube was removed by this RN with Caregiver present at bedside. Patient tolerated NG tube removal well.

## 2024-11-17 NOTE — PROGRESS NOTES
Progress Note     Christy Lowry Patient Status:  Inpatient    1924 MRN [id number]   Location [facility] 5SW/SE Attending Lexii Meade MD   Hosp Day # 2 PCP Patrick Stafford MD     Subjective:   S: Patient sitting up in chair, feeling much better. Breathing and secretions markedly improved.  Eager to eat and tolerating full liquid diet.   Denied Abd pain.     Review of Systems:   10 point ROS completed and was negative, except for pertinent positive and negatives stated in subjective.    Objective:   Vital signs:  Temp:  [98.2 °F (36.8 °C)-98.5 °F (36.9 °C)] 98.5 °F (36.9 °C)  Pulse:  [76-85] 76  Resp:  [18] 18  BP: (134-136)/(55-61) 136/61  SpO2:  [94 %-96 %] 94 %    Wt Readings from Last 6 Encounters:   11/15/24 140 lb 9.6 oz (63.8 kg)   24 152 lb (68.9 kg)   10/31/24 128 lb 1.4 oz (58.1 kg)   23 128 lb (58.1 kg)   23 136 lb 14.5 oz (62.1 kg)   23 136 lb 14.5 oz (62.1 kg)         Physical Exam:    General: Elderly female, NAD  Respiratory: Clear to auscultation bilaterally. No wheezes. No rhonchi.  Cardiovascular: S1, S2. Regular rate and rhythm. No murmurs, rubs or gallops.   Abdomen: Soft, nontender, nondistended.  Positive bowel sounds. No rebound or guarding.  Neurologic: No focal neurological deficits.   Musculoskeletal: Moves all extremities.  Extremities: No edema.    Results:   Diagnostic Data:      Labs:    Labs Last 24 Hours:   BMP     CBC    Other     Na 140 Cl 108 BUN 7 Glu 124   Hb 12.4   PTT - Procal -   K 4.5 CO2 27.0 Cr 0.68   WBC 18.0 >< .0  INR - CRP -   Renal Lytes Endo    Hct 39.7   Trop - D dim -   eGFR - Ca 8.7 POC Gluc  -    LFT   pBNP - Lactic -   eGFR AA - PO4 - A1c -   AST - APk - Prot -  LDL -     Mg - TSH -   ALT - T bright - Alb -        COVID-19 Lab Results    COVID-19  Lab Results   Component Value Date    COVID19 Not Detected 10/31/2024       Pro-Calcitonin  No results for input(s): \"PCT\" in the last 168 hours.    Cardiac  No results  for input(s): \"TROP\", \"PBNP\" in the last 168 hours.    Creatinine Kinase  No results for input(s): \"CK\" in the last 168 hours.    Inflammatory Markers  Recent Labs   Lab 11/13/24  0553 11/15/24  1235   CRP 2.00* 1.20*       Imaging: Imaging data reviewed in Epic.    Medications:    scopolamine  1 patch Transdermal Once    pantoprazole  40 mg Intravenous Q12H    ondansetron  4 mg Intravenous q6h    methylPREDNISolone  20 mg Intravenous Q8H    metoclopramide  5 mg Intravenous Q6H       Assessment & Plan:   ASSESSMENT / PLAN:     #Severe enteritis, ileus  - pt presenting for recurrent nausea, vomiting (recent hospital discharge for same)  - Recent EGD w/biopsy suggestive of possible lymphocytic colitis or enteritis.  - repeat cdiff and stool culture negative  - Surgery consulted, no plans for emergent intervention for now  - cont IV Abx with ceftriaxone  - GI consulted  -  s/p NGT, diet advanced  - cont PPI BID  - cont IV solumedrol TID- on discharge budesonide for lymphocytic colitis.     #Pleural Effusions  - Patient with shortness of breath, CXR 11/15 revealed worsening pleural effusions, R>L concern for CHF  - no prior CHF history noted  - s/p 1x IV lasix  - scoplolamine patch 1x for excessive secretions   - strict I/Os  - ECHO showed EF 65-70%.  No WMA.   - wean fluids as per clinical course above     # Left lower lung loculated fluid   - seen on admission imaging, current no urgent respiratory compromise noted   - consider pulm consult for eval      Dispo: Pending clinical course, PT/OT        MDM: High, acute illness/severe exacerbation of chronic illness posing threat to life.  IV medications requiring close inpatient monitoring    Lexii Meade MD    Supplementary Documentation:         **Certification      PHYSICIAN Certification of Need for Inpatient Hospitalization - Initial Certification    Patient will require inpatient services that will reasonably be expected to span two midnight's based on the  clinical documentation in H+P.   Based on patients current state of illness, I anticipate that, after discharge, patient will require TBD.

## 2024-11-18 LAB
ANION GAP SERPL CALC-SCNC: 5 MMOL/L (ref 0–18)
BASOPHILS # BLD AUTO: 0.04 X10(3) UL (ref 0–0.2)
BASOPHILS NFR BLD AUTO: 0.2 %
BNP SERPL-MCNC: 98 PG/ML (ref ?–100)
BUN BLD-MCNC: 11 MG/DL (ref 9–23)
BUN/CREAT SERPL: 15.9 (ref 10–20)
CALCIUM BLD-MCNC: 8.8 MG/DL (ref 8.7–10.4)
CHLORIDE SERPL-SCNC: 108 MMOL/L (ref 98–112)
CO2 SERPL-SCNC: 29 MMOL/L (ref 21–32)
CREAT BLD-MCNC: 0.69 MG/DL
DEPRECATED RDW RBC AUTO: 52.8 FL (ref 35.1–46.3)
EGFRCR SERPLBLD CKD-EPI 2021: 77 ML/MIN/1.73M2 (ref 60–?)
EOSINOPHIL # BLD AUTO: 0 X10(3) UL (ref 0–0.7)
EOSINOPHIL NFR BLD AUTO: 0 %
ERYTHROCYTE [DISTWIDTH] IN BLOOD BY AUTOMATED COUNT: 15.4 % (ref 11–15)
GLUCOSE BLD-MCNC: 97 MG/DL (ref 70–99)
HCT VFR BLD AUTO: 37 %
HGB BLD-MCNC: 12.1 G/DL
IMM GRANULOCYTES # BLD AUTO: 0.36 X10(3) UL (ref 0–1)
IMM GRANULOCYTES NFR BLD: 1.8 %
LYMPHOCYTES # BLD AUTO: 1.89 X10(3) UL (ref 1–4)
LYMPHOCYTES NFR BLD AUTO: 9.2 %
MCH RBC QN AUTO: 30.4 PG (ref 26–34)
MCHC RBC AUTO-ENTMCNC: 32.7 G/DL (ref 31–37)
MCV RBC AUTO: 93 FL
MONOCYTES # BLD AUTO: 0.62 X10(3) UL (ref 0.1–1)
MONOCYTES NFR BLD AUTO: 3 %
NEUTROPHILS # BLD AUTO: 17.64 X10 (3) UL (ref 1.5–7.7)
NEUTROPHILS # BLD AUTO: 17.64 X10(3) UL (ref 1.5–7.7)
NEUTROPHILS NFR BLD AUTO: 85.8 %
OSMOLALITY SERPL CALC.SUM OF ELEC: 293 MOSM/KG (ref 275–295)
PLATELET # BLD AUTO: 296 10(3)UL (ref 150–450)
POTASSIUM SERPL-SCNC: 4.3 MMOL/L (ref 3.5–5.1)
RBC # BLD AUTO: 3.98 X10(6)UL
SODIUM SERPL-SCNC: 142 MMOL/L (ref 136–145)
WBC # BLD AUTO: 20.6 X10(3) UL (ref 4–11)

## 2024-11-18 PROCEDURE — 99232 SBSQ HOSP IP/OBS MODERATE 35: CPT | Performed by: INTERNAL MEDICINE

## 2024-11-18 PROCEDURE — 99233 SBSQ HOSP IP/OBS HIGH 50: CPT | Performed by: HOSPITALIST

## 2024-11-18 PROCEDURE — 99233 SBSQ HOSP IP/OBS HIGH 50: CPT | Performed by: INTERNAL MEDICINE

## 2024-11-18 NOTE — PLAN OF CARE
Pt is up in chair, has caregiver at bedside. Call light within reach  Problem: Patient Centered Care  Goal: Patient preferences are identified and integrated in the patient's plan of care  Description: Interventions:  - What would you like us to know as we care for you?   - Provide timely, complete, and accurate information to patient/family  - Incorporate patient and family knowledge, values, beliefs, and cultural backgrounds into the planning and delivery of care  - Encourage patient/family to participate in care and decision-making at the level they choose  - Honor patient and family perspectives and choices  Outcome: Progressing      Problem: Safety Risk - Non-Violent Restraints  Goal: Patient will remain free from self-harm  Description: INTERVENTIONS:  - Apply the least restrictive restraint to prevent harm  - Notify patient and family of reasons restraints applied  - Assess for any contributing factors to confusion (electrolyte disturbances, delirium, medications)  - Discontinue any unnecessary medical devices as soon as possible  - Assess the patient's physical comfort, circulation, skin condition, hydration, nutrition and elimination needs   - Reorient and redirection as needed  - Assess for the need to continue restraints  Outcome: Progressing     Problem: GASTROINTESTINAL - ADULT  Goal: Minimal or absence of nausea and vomiting  Description: INTERVENTIONS:  - Maintain adequate hydration with IV or PO as ordered and tolerated  - Nasogastric tube to low intermittent suction as ordered  - Evaluate effectiveness of ordered antiemetic medications  - Provide nonpharmacologic comfort measures as appropriate  - Advance diet as tolerated, if ordered  - Obtain nutritional consult as needed  - Evaluate fluid balance  Outcome: Progressing  Goal: Maintains or returns to baseline bowel function  Description: INTERVENTIONS:  - Assess bowel function  - Maintain adequate hydration with IV or PO as ordered and tolerated  -  Evaluate effectiveness of GI medications  - Encourage mobilization and activity  - Obtain nutritional consult as needed  - Establish a toileting routine/schedule  - Consider collaborating with pharmacy to review patient's medication profile  Outcome: Progressing  Goal: Maintains adequate nutritional intake (undernourished)  Description: INTERVENTIONS:  - Monitor percentage of each meal consumed  - Identify factors contributing to decreased intake, treat as appropriate  - Assist with meals as needed  - Monitor I&O, WT and lab values  - Obtain nutritional consult as needed  - Optimize oral hygiene and moisture  - Encourage food from home; allow for food preferences  - Enhance eating environment  Outcome: Progressing     Problem: GENITOURINARY - ADULT  Goal: Absence of urinary retention  Description: INTERVENTIONS:  - Assess patient’s ability to void and empty bladder  - Monitor intake/output and perform bladder scan as needed  - Follow urinary retention protocol/standard of care  - Consider collaborating with pharmacy to review patient's medication profile  - Implement strategies to promote bladder emptying  Outcome: Progressing     Problem: SKIN/TISSUE INTEGRITY - ADULT  Goal: Skin integrity remains intact  Description: INTERVENTIONS  - Assess and document risk factors for pressure ulcer development  - Assess and document skin integrity  - Monitor for areas of redness and/or skin breakdown  - Initiate interventions, skin care algorithm/standards of care as needed  Outcome: Progressing     Problem: Impaired Functional Mobility  Goal: Achieve highest/safest level of mobility/gait  Description: Interventions:  - Assess patient's functional ability and stability  - Promote increasing activity/tolerance for mobility and gait  - Educate and engage patient/family in tolerated activity level and precautions    Outcome: Progressing     Problem: Impaired Activities of Daily Living  Goal: Achieve highest/safest level of  independence in self care  Description: Interventions:  - Assess ability and encourage patient to participate in ADLs to maximize function  - Promote sitting position while performing ADLs such as feeding, grooming, and bathing  - Educate and encourage patient/family in tolerated functional activity level and precautions during self-care    Outcome: Progressing

## 2024-11-18 NOTE — OCCUPATIONAL THERAPY NOTE
OCCUPATIONAL THERAPY TREATMENT NOTE - INPATIENT        Room Number: 565/565-A     Presenting Problem: 89 year old female w/ a history of OA, GERD, squamous cell carcinoma, who presents with ongoing n/v/d, abdominal pain now near 2 weeks. CT showed diffuse enterocolitis, initially felt to be infectious. SBFT negative for bowel obstruction. C diff and GI stool PCR negative. She was readmitted for ongoing symptoms.    Problem List  Principal Problem:    Vomiting, unspecified vomiting type, unspecified whether nausea present  Active Problems:    Vomiting    Ileus (HCC)    Diffuse abdominal pain    Abnormal CT scan, gastrointestinal tract    Nausea vomiting and diarrhea    Lymphocytic colitis    Abnormal CT scan, colon      OCCUPATIONAL THERAPY ASSESSMENT   Patient demonstrates good  progress this session, goals remain in progress.    Patient is requiring contact guard assist and minimal assist as a result of the following impairments: fatigue.    Patient continues to function near baseline with ADLs and functional mobility.  Next session anticipate patient to progress LE dressing, toilet transfer, grooming.  Occupational Therapy will continue to follow patient for duration of hospitalization.    Patient continues to benefit from continued skilled OT services: at discharge to promote prior level of function and safety with additional support and return home with home health OT.     PLAN DURING HOSPITALIZATION     OT Treatment Plan: ADL training;Functional transfer training;Patient/Family education;Patient/Family training     SUBJECTIVE  \"I just want to go home\"     OBJECTIVE  Precautions: NG suction;Restraints    WEIGHT BEARING RESTRICTION     PAIN ASSESSMENT  [place]atin    ACTIVITY TOLERANCE  Room air    ACTIVITIES OF DAILY LIVING ASSESSMENT  AM-PAC ‘6-Clicks’ Inpatient Daily Activity Short Form  How much help from another person does the patient currently need…  -   Putting on and taking off regular lower body  clothing?: A Little  -   Bathing (including washing, rinsing, drying)?: A Little  -   Toileting, which includes using toilet, bedpan or urinal? : A Little  -   Putting on and taking off regular upper body clothing?: A Little  -   Taking care of personal grooming such as brushing teeth?: A Little  -   Eating meals?: A Little    AM-PAC Score:  Score: 18  Approx Degree of Impairment: 46.65%  Standardized Score (AM-PAC Scale): 38.66  CMS Modifier (G-Code): CK    FUNCTIONAL TRANSFER ASSESSMENT  Sit to Stand: Chair  Chair: Minimal Assist    FUNCTIONAL ADL ASSESSMENT  Grooming Seated: Supervision  LB Dressing Seated: Moderate Assist    Skilled Therapy Provided: RN cleared pt for participation in occupational therapy session. Upon arrival, pt was seated in bedside chair and agreeable to activity. Pt's caregiver intermittently present during session. Pt benefited from additional time, verbal cues to maximize participation.    Pt completed simulated toilet transfer with Min A and RW , demonstrated distance required to walk to and from the washroom with CGA and RW. Pt with good progress towards IP OT goals this session. Pt is on track for dc home with continued caregiver support.     EDUCATION PROVIDED  Patient Education : Role of Occupational Therapy; Discharge Recommendations; Plan of Care; Functional Transfer Techniques; Fall Prevention  Patient's Response to Education: Verbalized Understanding  Family/Caregiver Education : Role of Occupational Therapy; Discharge Recommendations; Plan of Care; Fall Prevention; DME Recommendations; Functional Transfer Techniques  Family/Caregiver's Response to Education: Verbalized Understanding; Returned Demonstration    The patient's Approx Degree of Impairment: 46.65% has been calculated based on documentation in the [facility] '6 clicks' Inpatient Daily Activity Short Form.  Research supports that patients with this level of impairment may benefit from HHOT.  Final disposition will be made by  interdisciplinary medical team.    Patient End of Session: Up in chair;Needs met;Call light within reach;RN aware of session/findings    OT Goals:  Patients self stated goal is: increase strength       Patient will complete functional transfer with supervision  Comment: ongoing     Patient will complete toileting with supervision  Comment: ongoing     Patient will tolerate standing for 5 minutes in prep for adls with supervision   Comment: ongoing     Patient will complete item retrieval with supervision  Comment: ongoing             Goals  on: 24  Frequency: 3x a week        OT Session Time: 10 minutes  Self-Care Home Management: 10 minutes

## 2024-11-18 NOTE — PHYSICAL THERAPY NOTE
PHYSICAL THERAPY TREATMENT NOTE - INPATIENT     Room Number: 565/565-A       Presenting Problem: enteris, ileus  Co-Morbidities : p    Problem List  Principal Problem:    Vomiting, unspecified vomiting type, unspecified whether nausea present  Active Problems:    Vomiting    Ileus (HCC)    Diffuse abdominal pain    Abnormal CT scan, gastrointestinal tract    Nausea vomiting and diarrhea    Lymphocytic colitis    Abnormal CT scan, colon      PHYSICAL THERAPY ASSESSMENT   Patient demonstrates good  progress this session, goals  remain in progress.      Patient is requiring minimal assist as a result of the following impairments: decreased functional strength and decreased muscular endurance.     Patient continues to function below baseline with transfers, gait, and standing prolonged periods.  Next session anticipate patient to progress transfers, gait, and standing prolonged periods.  Physical Therapy will continue to follow patient for duration of hospitalization.    Patient continues to benefit from continued skilled PT services: at discharge to promote prior level of function.  Anticipate patient will return home with home health PT.    PLAN DURING HOSPITALIZATION  Nursing Mobility Recommendation : 1 Assist  PT Device Recommendation: Mechanical lift;Hospital bed  PT Treatment Plan: Bed mobility;Body mechanics;Endurance;Energy conservation;Patient education;Family education;Gait training;Strengthening;Balance training;Transfer training  Frequency (Obs): 5x/week     SUBJECTIVE  \"I walk with that.\" [Indicating RW]    \"I want to go home.\"    OBJECTIVE  Precautions: Hard of hearing    WEIGHT BEARING RESTRICTION       PAIN ASSESSMENT   Ratin  Location: denies       BALANCE  Static Sitting: Fair  Dynamic Sitting: Fair -  Static Standing: Fair -  Dynamic Standing: Fair -    ACTIVITY TOLERANCE                          O2 WALK       AM-PAC '6-Clicks' INPATIENT SHORT FORM - BASIC MOBILITY  How much difficulty does the  patient currently have...  Patient Difficulty: Turning over in bed (including adjusting bedclothes, sheets and blankets)?: A Little   Patient Difficulty: Sitting down on and standing up from a chair with arms (e.g., wheelchair, bedside commode, etc.): A Little   Patient Difficulty: Moving from lying on back to sitting on the side of the bed?: A Little   How much help from another person does the patient currently need...   Help from Another: Moving to and from a bed to a chair (including a wheelchair)?: A Little   Help from Another: Need to walk in hospital room?: A Little   Help from Another: Climbing 3-5 steps with a railing?: A Lot     AM-PAC Score:  Raw Score: 17   Approx Degree of Impairment: 50.57%   Standardized Score (AM-PAC Scale): 42.13   CMS Modifier (G-Code): CK    FUNCTIONAL ABILITY STATUS  Functional Mobility/Gait Assessment  Gait Assistance: Minimum assistance  Distance (ft): 30  Assistive Device: Rolling walker  Pattern:  (forward flexed, chair follow)    Sit to Stand: minimal assist    Skilled Therapy Provided: Pt ok to be seen per KEV Serrano. Pt motivated to participate in therapy session. Pt amb with Soraida at RW to prevent RW being too far out in front of pt while amb. Pt with chair follow. Pt amb with fwd flexed posture. Pt endorsed that it felt good to amb, and pt also endorsed feeling fatigued.    The patient's Approx Degree of Impairment: 50.57% has been calculated based on documentation in the [facility] '6 clicks' Inpatient Daily Activity Short Form.  Research supports that patients with this level of impairment may benefit from home with HHPT vs GR; as pt has caregiver at home, anticipate home with HHPT.  Final disposition will be made by interdisciplinary medical team.      Patient End of Session: Up in chair;Needs met;Call light within reach;RN aware of session/findings;Hospital anti-slip socks;All patient questions and concerns addressed    CURRENT GOALS   Goals to be met by:  11/25/2024  Patient Goal Patient's self-stated goal is: to go home   Goal #1 Patient is able to demonstrate supine - sit EOB @ level: minimum assistance      Goal #1   Current Status  NT pt received up in chair   Goal #2 Patient is able to demonstrate transfers EOB to/from BSC at assistance level: minimum assistance with walker - rolling      Goal #2  Current Status  sit to stand Soraida with RW   Goal #3 Patient is able to ambulate 10 feet with assist device: walker - rolling at assistance level: minimum assistance   Goal #3   Current Status MET x30 feet with RW and Soraida   Goal #4    Goal #4   Current Status     Goal #5 Patient to demonstrate independence with home activity/exercise instructions provided to patient in preparation for discharge.   Goal #5   Current Status  in progress   Goal #6     Goal #6  Current Status       Gait Training: 15 minutes

## 2024-11-18 NOTE — DIETARY NOTE
Nutrition Note      Patient (pt) screened at  nutrition risk by RN upon admission for poor po and unintentional wt loss.     Admitting diagnosis: Vomiting, unspecified vomiting type, unspecified whether nausea present [R11.10]   Past Medical History.  See H&P    Patient status: 11/18 :  Pt sitting up in the chair upon visit. Care giver at bedside. Pt is 89 y/o and appears coherent, engaging in conversation well. Observe lunch meal intake ate 1/2 of Pasta Entree, and 1/2 carrot dish. Tolerating diet well. Good po intake (documented with % of last 4 meals on Soft/Low fiber diet). Pt and care giver denies N/V post prandial today. Still of loose-watery stool: Diet history: pt eats well at home in 2-3 meals/day prior to the on set of acute GI illness. Also, Care giver reports encouraging pt to drink Ensure supplement daily.     Wt changes is difficult to evaluate given wt discrepancies:   Pt reports usual wt of 127#, consistent with past nutrition assessment 11/8/2024.   In 2016 to 2023: wt stable at 122-131#.   On 11/5/2024, wt were 127# & 152#.  This admit wt: 140#.   Requested Nursing to obtain new wt.   Nutrition Focus Physical Exam ( NFPE) completed,  with mild temporal muscle depletion consistent with advancing age.       Pt is identified at No nutrition risk at this time pending outcome of re-weigh.     RD encouraged use of Oral Nutrition Supplement ( ONS ) Ensure Clear to maximize po intake. ( May resume Regular Ensure at home as needed ).     Will continue to monitor po intake.   Will follow up at length of stay ( LOS) per protocol. Otherwise, please consult RD if patient Nutrition status change or nutrition issues arise.       Diet:        Procedures    Low Fiber/Soft diet Low Fiber/Soft; Is Patient on Accuchecks? No     Po Intake:  adequate nutrition intake.   Percent Meals Eaten (last 6 days)       Date/Time Percent Meals Eaten (%)    11/15/24 1730 0 %     Percent Meals Eaten (%): NPO at 11/15/24  1730    11/16/24 1300 40 %    11/16/24 1755 100 %    11/17/24 0930 100 %    11/17/24 1243 100 %    11/17/24 1500 80 %    11/17/24 2015 100 %    11/18/24 0927 95 %            Height:  5'4\"  Weight: 63.8 kg (140 lb 9.6 oz)  BMI: Body mass index is 24.13 kg/m².  BMI CLASSIFICATION:  await re-weigh  IBW: 120 lbs        Await re-weigh ---   ?% IBW  Usual Body Wt: 127 lbs consistent with past assessment and wt hx from 2016 to 2023 per EMR reference.       Pending re-weigh  % UBW    WEIGHT HISTORY:    Patient Weight(s) for the past 336 hrs:   Weight   11/15/24 1611 63.8 kg (140 lb 9.6 oz)   11/15/24 1608 63.8 kg (140 lb 9.6 oz)     Wt Readings from Last 6 Encounters:   11/15/24 63.8 kg (140 lb 9.6 oz)   11/05/24 68.9 kg (152 lb)   10/31/24 58.1 kg (128 lb 1.4 oz)   07/18/23 58.1 kg (128 lb)   07/08/23 62.1 kg (136 lb 14.5 oz)   06/14/23 62.1 kg (136 lb 14.5 oz)         Gema Bynum, RD, LDN, [facility]  Clinical Dietitian  [phone]

## 2024-11-18 NOTE — PROGRESS NOTES
[facility]     Gastroenterology Progress Note    Christy Lowry Patient Status:  Inpatient    1924 MRN [id number]   Location [facility] 5SW/SE Attending Lexii Meade MD   Hosp Day # 3 PCP Patrick Stafford MD       Subjective:   Ate lunch without post prandial n/v. No nausea or emesis. Is having some abdominal pain after eating however. No bowel movement      Objective:   Blood pressure 137/63, pulse 81, temperature 98.7 °F (37.1 °C), temperature source Oral, resp. rate 18, weight 140 lb 9.6 oz (63.8 kg), SpO2 95%. Body mass index is 24.13 kg/m².    GEN - Patient appears comfortable and in no acute discomfort  ENT - MMM  EYES - the sclera appears anicteric  CV - no edema  RESP -  No increased work of breathing  ABDOMEN - soft, non-tender exam in all quadrants without rigidity or guarding, non-distended, no abnormal bowel sounds noted, no masses are palpated  SKIN - No jaundice  NEURO - Alert and appropriate, and gross movements of extremities normal  PSYCH - normal affect, non-agitated      Assessment and Plan:   Christy Lowry is a a(n) 89 year old female w/ a history of OA, GERD, squamous cell carcinoma, who presents with ongoing n/v/d, abdominal pain now near 2 weeks. CT showed diffuse enterocolitis, initially felt to be infectious. SBFT negative for bowel obstruction. C diff and GI stool PCR negative. She was readmitted for ongoing symptoms.     Nausea, vomiting, diarrhea  Diffuse abdominal pain  Lymphocytic colitis   Repeat CT A/P showed diffuse enterocolitis, no obstruction. Repeat stool culture and C.diff were negative. She underwent EGD and flex sig last week and colonic biopsies showed increased IELs c/w lymphocytic colitis. CRP 2. Could be slowly resolving infectious process vs inflammatory process in the setting of newly diagnosed lymphocytic colitis.  Empiric IV steroids were initiated 11/15/24, and her symptoms resolved. NGT discontinued yesterday and pt's energy  and appetite have recovered with the steroids.      Recommend  - Recommend ADAT   - Continue scheduled antiemetics for now  - PPI BID  - Continue solumedrol 20 mg IV TID - would discharge on budesonide for lymphocytic colitis   - May need a full colonoscopy vs repeat CT imaging in 4 weeks given CT findings of R colon nodular thickening on most recent CT    SOB, evidence of volume overload on imaging, left lower lung loculated fluid - per primary. SOB improved today with diuresis.      Tosha Valle MD  [facility]      Results:     Lab Results   Component Value Date    WBC 20.6 (H) 11/18/2024    HGB 12.1 11/18/2024    HCT 37.0 11/18/2024    .0 11/18/2024    CREATSERUM 0.69 11/18/2024    BUN 11 11/18/2024     11/18/2024    K 4.3 11/18/2024     11/18/2024    CO2 29.0 11/18/2024    GLU 97 11/18/2024    CA 8.8 11/18/2024    ALB 3.4 11/15/2024    ALKPHO 111 11/15/2024    BILT 0.3 11/15/2024    TP 6.9 11/15/2024    AST 35 (H) 11/15/2024    ALT 16 11/15/2024    T4F 1.3 05/30/2023    TSH 5.400 (H) 05/30/2023    LIP 25 11/15/2024    CRP 1.20 (H) 11/15/2024    MG 2.0 11/15/2024       No results found.        \

## 2024-11-18 NOTE — PROGRESS NOTES
[facility]  part of [facility]    Progress Note    Christy Lowry Patient Status:  Inpatient    1924 MRN [id number]   Location [facility] 5SW/SE Attending Gillian Connelly MD   Hosp Day # 3 PCP Patrick Stafford MD       Subjective:     Respiratory: Negative.     Cardiovascular: Negative.      Up to the chair and comfortable on room air  Denied any chest pain or shortness of breath or cough  Better appetite with no abdominal pain  Objective:   Blood pressure 125/68, pulse 80, temperature 98.3 °F (36.8 °C), temperature source Oral, resp. rate 16, weight 140 lb 9.6 oz (63.8 kg), SpO2 95%.  Physical Exam  Constitutional:       General: She is not in acute distress.  HENT:      Head: Atraumatic.   Eyes:      General: No scleral icterus.  Cardiovascular:      Rate and Rhythm: Normal rate.      Heart sounds:      No gallop.   Pulmonary:      Effort: No respiratory distress.      Breath sounds: No stridor. No wheezing, rhonchi or rales.   Chest:      Chest wall: No tenderness.   Abdominal:      Palpations: Abdomen is soft.   Musculoskeletal:      Cervical back: Normal range of motion.   Neurological:      Mental Status: Mental status is at baseline.         Results:   Lab Results   Component Value Date    WBC 20.6 (H) 2024    HGB 12.1 2024    HCT 37.0 2024    .0 2024    CREATSERUM 0.69 2024    BUN 11 2024     2024    K 4.3 2024     2024    CO2 29.0 2024    GLU 97 2024    CA 8.8 2024    ALB 3.4 11/15/2024    ALKPHO 111 11/15/2024    BILT 0.3 11/15/2024    TP 6.9 11/15/2024    AST 35 (H) 11/15/2024    ALT 16 11/15/2024    T4F 1.3 2023    TSH 5.400 (H) 2023    LIP 25 11/15/2024    CRP 1.20 (H) 11/15/2024    MG 2.0 11/15/2024    TROPHS 11 11/15/2024           Assessment & Plan:      1- admitted 11/15/24 with diffuse enterocolitis /lymphocytic colitis  GI following and now on steroid  Seems  better now tolerating diet     2-mild pulmonary edema with trace basilar effusion /left base round atelectasis  No history of smoking  Comfortable on room air  No further intervention    3- DVT prophylaxis   SCD     Pulmonary status stable on room air  Will sign off                     Christian Aguilar MD  11/18/2024

## 2024-11-18 NOTE — PLAN OF CARE
Problem: Patient Centered Care  Goal: Patient preferences are identified and integrated in the patient's plan of care  Description: Interventions:  - What would you like us to know as we care for you?   - Provide timely, complete, and accurate information to patient/family  - Incorporate patient and family knowledge, values, beliefs, and cultural backgrounds into the planning and delivery of care  - Encourage patient/family to participate in care and decision-making at the level they choose  - Honor patient and family perspectives and choices  Outcome: Progressing     Problem: Safety Risk - Non-Violent Restraints  Goal: Patient will remain free from self-harm  Description: INTERVENTIONS:  - Apply the least restrictive restraint to prevent harm  - Notify patient and family of reasons restraints applied  - Assess for any contributing factors to confusion (electrolyte disturbances, delirium, medications)  - Discontinue any unnecessary medical devices as soon as possible  - Assess the patient's physical comfort, circulation, skin condition, hydration, nutrition and elimination needs   - Reorient and redirection as needed  - Assess for the need to continue restraints  Outcome: Progressing     Problem: GASTROINTESTINAL - ADULT  Goal: Minimal or absence of nausea and vomiting  Description: INTERVENTIONS:  - Maintain adequate hydration with IV or PO as ordered and tolerated  - Nasogastric tube to low intermittent suction as ordered  - Evaluate effectiveness of ordered antiemetic medications  - Provide nonpharmacologic comfort measures as appropriate  - Advance diet as tolerated, if ordered  - Obtain nutritional consult as needed  - Evaluate fluid balance  Outcome: Progressing  Goal: Maintains or returns to baseline bowel function  Description: INTERVENTIONS:  - Assess bowel function  - Maintain adequate hydration with IV or PO as ordered and tolerated  - Evaluate effectiveness of GI medications  - Encourage mobilization  and activity  - Obtain nutritional consult as needed  - Establish a toileting routine/schedule  - Consider collaborating with pharmacy to review patient's medication profile  Outcome: Progressing  Goal: Maintains adequate nutritional intake (undernourished)  Description: INTERVENTIONS:  - Monitor percentage of each meal consumed  - Identify factors contributing to decreased intake, treat as appropriate  - Assist with meals as needed  - Monitor I&O, WT and lab values  - Obtain nutritional consult as needed  - Optimize oral hygiene and moisture  - Encourage food from home; allow for food preferences  - Enhance eating environment  Outcome: Progressing     Problem: GENITOURINARY - ADULT  Goal: Absence of urinary retention  Description: INTERVENTIONS:  - Assess patient’s ability to void and empty bladder  - Monitor intake/output and perform bladder scan as needed  - Follow urinary retention protocol/standard of care  - Consider collaborating with pharmacy to review patient's medication profile  - Implement strategies to promote bladder emptying  Outcome: Progressing     Problem: SKIN/TISSUE INTEGRITY - ADULT  Goal: Skin integrity remains intact  Description: INTERVENTIONS  - Assess and document risk factors for pressure ulcer development  - Assess and document skin integrity  - Monitor for areas of redness and/or skin breakdown  - Initiate interventions, skin care algorithm/standards of care as needed  Outcome: Progressing     Problem: MUSCULOSKELETAL - ADULT  Goal: Return mobility to safest level of function  Description: INTERVENTIONS:  - Assess patient stability and activity tolerance for standing, transferring and ambulating w/ or w/o assistive devices  - Assist with transfers and ambulation using safe patient handling equipment as needed  - Ensure adequate protection for wounds/incisions during mobilization  - Obtain PT/OT consults as needed  - Advance activity as appropriate  - Communicate ordered activity level  and limitations with patient/family  Outcome: Progressing     Problem: NEUROLOGICAL - ADULT  Goal: Achieves stable or improved neurological status  Description: INTERVENTIONS  - Assess for and report changes in neurological status  - Initiate measures to prevent increased intracranial pressure  - Maintain blood pressure and fluid volume within ordered parameters to optimize cerebral perfusion and minimize risk of hemorrhage  - Monitor temperature, glucose, and sodium. Initiate appropriate interventions as ordered  Outcome: Progressing  Goal: Achieves maximal functionality and self care  Description: INTERVENTIONS  - Monitor swallowing and airway patency with patient fatigue and changes in neurological status  - Encourage and assist patient to increase activity and self care with guidance from PT/OT  - Encourage visually impaired, hearing impaired and aphasic patients to use assistive/communication devices  Outcome: Progressing     Problem: Impaired Functional Mobility  Goal: Achieve highest/safest level of mobility/gait  Description: Interventions:  - Assess patient's functional ability and stability  - Promote increasing activity/tolerance for mobility and gait  - Educate and engage patient/family in tolerated activity level and precautions  - Recommend use of sit-stand lift for transfers  Outcome: Progressing     Problem: Impaired Activities of Daily Living  Goal: Achieve highest/safest level of independence in self care  Description: Interventions:  - Assess ability and encourage patient to participate in ADLs to maximize function  - Promote sitting position while performing ADLs such as feeding, grooming, and bathing  - Educate and encourage patient/family in tolerated functional activity level and precautions during self-care  Outcome: Progressing

## 2024-11-18 NOTE — PROGRESS NOTES
Progress Note     Christy Lowry Patient Status:  Inpatient    1924 MRN [id number]   Location [facility] 5SW/SE Attending Lexii Meade MD   Hosp Day # 3 PCP Patrick Stafford MD     Subjective:   S: Patient sitting up in chair, feeling much better. Breathing and secretions markedly improved.  Sitting up in the chair with no complaints.     Review of Systems:   10 point ROS completed and was negative, except for pertinent positive and negatives stated in subjective.    Objective:   Vital signs:  Temp:  [98 °F (36.7 °C)-98.4 °F (36.9 °C)] 98.3 °F (36.8 °C)  Pulse:  [77-88] 80  Resp:  [16-18] 16  BP: (119-151)/(44-70) 125/68  SpO2:  [93 %-95 %] 95 %    Wt Readings from Last 6 Encounters:   11/15/24 140 lb 9.6 oz (63.8 kg)   24 152 lb (68.9 kg)   10/31/24 128 lb 1.4 oz (58.1 kg)   23 128 lb (58.1 kg)   23 136 lb 14.5 oz (62.1 kg)   23 136 lb 14.5 oz (62.1 kg)         Physical Exam:    General: Elderly female, NAD  Respiratory: Clear to auscultation bilaterally. No wheezes. No rhonchi.  Cardiovascular: S1, S2. Regular rate and rhythm. No murmurs, rubs or gallops.   Abdomen: Soft, nontender, nondistended.  Positive bowel sounds. No rebound or guarding.  Neurologic: No focal neurological deficits.   Musculoskeletal: Moves all extremities.  Extremities: No edema.    Results:   Diagnostic Data:      Labs:    Labs Last 24 Hours:   BMP     CBC    Other     Na 142 Cl 108 BUN 11 Glu 97   Hb 12.1   PTT - Procal -   K 4.3 CO2 29.0 Cr 0.69   WBC 20.6 >< .0  INR - CRP -   Renal Lytes Endo    Hct 37.0   Trop - D dim -   eGFR - Ca 8.8 POC Gluc  -    LFT   pBNP - Lactic -   eGFR AA - PO4 - A1c -   AST - APk - Prot -  LDL -     Mg - TSH -   ALT - T bright - Alb -        COVID-19 Lab Results    COVID-19  Lab Results   Component Value Date    COVID19 Not Detected 10/31/2024       Pro-Calcitonin  No results for input(s): \"PCT\" in the last 168 hours.    Cardiac  No results for input(s): \"TROP\",  \"PBNP\" in the last 168 hours.    Creatinine Kinase  No results for input(s): \"CK\" in the last 168 hours.    Inflammatory Markers  Recent Labs   Lab 11/13/24  0553 11/15/24  1235   CRP 2.00* 1.20*       Imaging: Imaging data reviewed in Epic.    Medications:    melatonin  5 mg Per NG Tube Nightly    scopolamine  1 patch Transdermal Once    pantoprazole  40 mg Intravenous Q12H    ondansetron  4 mg Intravenous q6h    methylPREDNISolone  20 mg Intravenous Q8H    metoclopramide  5 mg Intravenous Q6H       Assessment & Plan:   ASSESSMENT / PLAN:     #Severe enteritis, ileus  - pt presenting for recurrent nausea, vomiting (recent hospital discharge for same)  - Recent EGD w/biopsy suggestive of possible lymphocytic colitis or enteritis.  - repeat cdiff and stool culture negative  - Surgery consulted, no plans for emergent intervention for now  - cont IV Abx with IVceftriaxone  - GI consulted  -  s/p NGT, diet advanced to low fiber soft  - cont PPI BID  - cont IV solumedrol TID- on discharge budesonide for lymphocytic colitis.     #Pleural Effusions  - Patient with shortness of breath, CXR 11/15 revealed worsening pleural effusions, R>L concern for CHF  - no prior CHF history noted  - s/p 1x IV lasix  - scoplolamine patch 1x for excessive secretions   - strict I/Os  - ECHO showed EF 65-70%.  No WMA.   - wean fluids as per clinical course above     # Left lower lung loculated fluid   - seen on admission imaging, current no urgent respiratory compromise noted   - Pulm consulted  - monitor for now       Dispo: Pending clinical course, PT/OT        MDM: High, acute illness/severe exacerbation of chronic illness posing threat to life.  IV medications requiring close inpatient monitoring    Gillian Connelly MD    Supplementary Documentation:         **Certification      PHYSICIAN Certification of Need for Inpatient Hospitalization - Initial Certification    Patient will require inpatient services that will reasonably be expected to  span two midnight's based on the clinical documentation in H+P.   Based on patients current state of illness, I anticipate that, after discharge, patient will require TBD.

## 2024-11-19 ENCOUNTER — APPOINTMENT (OUTPATIENT)
Dept: GENERAL RADIOLOGY | Facility: HOSPITAL | Age: 89
DRG: 392 | End: 2024-11-19
Attending: STUDENT IN AN ORGANIZED HEALTH CARE EDUCATION/TRAINING PROGRAM
Payer: MEDICARE

## 2024-11-19 ENCOUNTER — APPOINTMENT (OUTPATIENT)
Dept: GENERAL RADIOLOGY | Facility: HOSPITAL | Age: 89
End: 2024-11-19
Attending: STUDENT IN AN ORGANIZED HEALTH CARE EDUCATION/TRAINING PROGRAM
Payer: MEDICARE

## 2024-11-19 LAB
ANION GAP SERPL CALC-SCNC: 7 MMOL/L (ref 0–18)
BASOPHILS # BLD AUTO: 0.03 X10(3) UL (ref 0–0.2)
BASOPHILS NFR BLD AUTO: 0.1 %
BUN BLD-MCNC: 18 MG/DL (ref 9–23)
BUN/CREAT SERPL: 25 (ref 10–20)
CALCIUM BLD-MCNC: 8.7 MG/DL (ref 8.7–10.4)
CHLORIDE SERPL-SCNC: 103 MMOL/L (ref 98–112)
CO2 SERPL-SCNC: 28 MMOL/L (ref 21–32)
CREAT BLD-MCNC: 0.72 MG/DL
CRP SERPL-MCNC: <0.4 MG/DL (ref ?–1)
DEPRECATED RDW RBC AUTO: 50.4 FL (ref 35.1–46.3)
EGFRCR SERPLBLD CKD-EPI 2021: 75 ML/MIN/1.73M2 (ref 60–?)
EOSINOPHIL # BLD AUTO: 0.04 X10(3) UL (ref 0–0.7)
EOSINOPHIL NFR BLD AUTO: 0.2 %
ERYTHROCYTE [DISTWIDTH] IN BLOOD BY AUTOMATED COUNT: 15.3 % (ref 11–15)
GLUCOSE BLD-MCNC: 120 MG/DL (ref 70–99)
HCT VFR BLD AUTO: 39.5 %
HGB BLD-MCNC: 13.1 G/DL
IMM GRANULOCYTES # BLD AUTO: 0.3 X10(3) UL (ref 0–1)
IMM GRANULOCYTES NFR BLD: 1.3 %
LYMPHOCYTES # BLD AUTO: 1.87 X10(3) UL (ref 1–4)
LYMPHOCYTES NFR BLD AUTO: 8.1 %
MCH RBC QN AUTO: 29.9 PG (ref 26–34)
MCHC RBC AUTO-ENTMCNC: 33.2 G/DL (ref 31–37)
MCV RBC AUTO: 90.2 FL
MONOCYTES # BLD AUTO: 1.15 X10(3) UL (ref 0.1–1)
MONOCYTES NFR BLD AUTO: 5 %
NEUTROPHILS # BLD AUTO: 19.58 X10 (3) UL (ref 1.5–7.7)
NEUTROPHILS # BLD AUTO: 19.58 X10(3) UL (ref 1.5–7.7)
NEUTROPHILS NFR BLD AUTO: 85.3 %
OSMOLALITY SERPL CALC.SUM OF ELEC: 289 MOSM/KG (ref 275–295)
PLATELET # BLD AUTO: 330 10(3)UL (ref 150–450)
POTASSIUM SERPL-SCNC: 4.2 MMOL/L (ref 3.5–5.1)
RBC # BLD AUTO: 4.38 X10(6)UL
SODIUM SERPL-SCNC: 138 MMOL/L (ref 136–145)
WBC # BLD AUTO: 23 X10(3) UL (ref 4–11)

## 2024-11-19 PROCEDURE — 99233 SBSQ HOSP IP/OBS HIGH 50: CPT | Performed by: HOSPITALIST

## 2024-11-19 PROCEDURE — 74018 RADEX ABDOMEN 1 VIEW: CPT | Performed by: STUDENT IN AN ORGANIZED HEALTH CARE EDUCATION/TRAINING PROGRAM

## 2024-11-19 PROCEDURE — 99233 SBSQ HOSP IP/OBS HIGH 50: CPT | Performed by: INTERNAL MEDICINE

## 2024-11-19 NOTE — CM/SW NOTE
11/18/24 1800   CM/SW Referral Data   Referral Source    Reason for Referral Discharge planning;Readmission   Informant Other  (24 hr CG)   Readmission Assessment   Factors that patient feels contributed to this readmission Acute/Chronic Clinical Presentation   Pt's living situation prior to admission? Home with family   Pt's level of independence at discharge? Total assist (max)   Pt. received education on diagnoses at time of discharge? Yes   Did you know who and how to call someone if you felt worse? Yes   Did any new symptoms or issues develop after you were discharged? Yes   ----Post D/C symptoms: Symptoms/issue related to previous hospitalization Yes   Did you understand your discharge instructions? Yes   Were medications taken as indicated on discharge instructions? Yes   Was full assessment completed by SW/CM on prior admission? Yes   Was the recommended discharge plan achieved? Yes   Was pt. discharged w/out services? No   Medical Hx   Does patient have an established PCP? Yes  (Patrick Stafford)   Patient Info   Patient's Current Mental Status at Time of Assessment Confused or unable to complete assessment   Patient's Home Environment House   Number of Levels in Home 2   Number of Stair in Home Stays on 1st floor   Patient lives with Other  (24/7 CG)   Patient Status Prior to Admission   Independent with ADLs and Mobility No   Pt. requires assistance with Housework;Driving;Meals;Bathing;Ambulating;Dressing;Medications;Toileting;Feeding;Finances   Services in place prior to admission Home Health Care;DME/Supplies at home   Home Health Provider Info [facility]  (RN, PT, and OT)   Type of DME/Supplies Wheeled Walker;Standard Walker;Wheelchair;Shower Chair;Raised Toilet Seat;Grab Bars;Commode;Hospital Bed   Discharge Needs   Anticipated D/C needs No anticipated discharge needs;Medical equipment     Pt discussed during nursing rounds. Dx N/V, NG dc'd, now on PO diet. Home w/24 hr CG,  independent w/walker in doors and WC out of the house. Has all required DME in place at home.. [facility] reserved in [facility] as they were pending w/patient prior to readmission. Pt will return home with 24 hr CG, DME at home, and [facility] once medically stable.    Plan: Home w/24 hr CG and [facility] pending medical clearance.    / to remain available for support and/or discharge planning.     CARON VelasquezN    [phone]

## 2024-11-19 NOTE — PLAN OF CARE
Problem: Patient Centered Care  Goal: Patient preferences are identified and integrated in the patient's plan of care  Description: Interventions:  - What would you like us to know as we care for you?   - Provide timely, complete, and accurate information to patient/family  - Incorporate patient and family knowledge, values, beliefs, and cultural backgrounds into the planning and delivery of care  - Encourage patient/family to participate in care and decision-making at the level they choose  - Honor patient and family perspectives and choices  Outcome: Progressing     Problem: Patient/Family Goals  Goal: Patient/Family Long Term Goal  Description: Patient's Long Term Goal:     Interventions:  -   - See additional Care Plan goals for specific interventions  Outcome: Progressing  Goal: Patient/Family Short Term Goal  Description: Patient's Short Term Goal:     Interventions:   -   - See additional Care Plan goals for specific interventions  Outcome: Progressing     Problem: Safety Risk - Non-Violent Restraints  Goal: Patient will remain free from self-harm  Description: INTERVENTIONS:  - Apply the least restrictive restraint to prevent harm  - Notify patient and family of reasons restraints applied  - Assess for any contributing factors to confusion (electrolyte disturbances, delirium, medications)  - Discontinue any unnecessary medical devices as soon as possible  - Assess the patient's physical comfort, circulation, skin condition, hydration, nutrition and elimination needs   - Reorient and redirection as needed  - Assess for the need to continue restraints  Outcome: Progressing     Problem: GASTROINTESTINAL - ADULT  Goal: Minimal or absence of nausea and vomiting  Description: INTERVENTIONS:  - Maintain adequate hydration with IV or PO as ordered and tolerated  - Nasogastric tube to low intermittent suction as ordered  - Evaluate effectiveness of ordered antiemetic medications  - Provide nonpharmacologic comfort  measures as appropriate  - Advance diet as tolerated, if ordered  - Obtain nutritional consult as needed  - Evaluate fluid balance  Outcome: Progressing  Goal: Maintains or returns to baseline bowel function  Description: INTERVENTIONS:  - Assess bowel function  - Maintain adequate hydration with IV or PO as ordered and tolerated  - Evaluate effectiveness of GI medications  - Encourage mobilization and activity  - Obtain nutritional consult as needed  - Establish a toileting routine/schedule  - Consider collaborating with pharmacy to review patient's medication profile  Outcome: Progressing  Goal: Maintains adequate nutritional intake (undernourished)  Description: INTERVENTIONS:  - Monitor percentage of each meal consumed  - Identify factors contributing to decreased intake, treat as appropriate  - Assist with meals as needed  - Monitor I&O, WT and lab values  - Obtain nutritional consult as needed  - Optimize oral hygiene and moisture  - Encourage food from home; allow for food preferences  - Enhance eating environment  Outcome: Progressing     Problem: GENITOURINARY - ADULT  Goal: Absence of urinary retention  Description: INTERVENTIONS:  - Assess patient’s ability to void and empty bladder  - Monitor intake/output and perform bladder scan as needed  - Follow urinary retention protocol/standard of care  - Consider collaborating with pharmacy to review patient's medication profile  - Implement strategies to promote bladder emptying  Outcome: Progressing     Problem: SKIN/TISSUE INTEGRITY - ADULT  Goal: Skin integrity remains intact  Description: INTERVENTIONS  - Assess and document risk factors for pressure ulcer development  - Assess and document skin integrity  - Monitor for areas of redness and/or skin breakdown  - Initiate interventions, skin care algorithm/standards of care as needed  Outcome: Progressing     Problem: MUSCULOSKELETAL - ADULT  Goal: Return mobility to safest level of function  Description:  INTERVENTIONS:  - Assess patient stability and activity tolerance for standing, transferring and ambulating w/ or w/o assistive devices  - Assist with transfers and ambulation using safe patient handling equipment as needed  - Ensure adequate protection for wounds/incisions during mobilization  - Obtain PT/OT consults as needed  - Advance activity as appropriate  - Communicate ordered activity level and limitations with patient/family  Outcome: Progressing     Problem: NEUROLOGICAL - ADULT  Goal: Achieves stable or improved neurological status  Description: INTERVENTIONS  - Assess for and report changes in neurological status  - Initiate measures to prevent increased intracranial pressure  - Maintain blood pressure and fluid volume within ordered parameters to optimize cerebral perfusion and minimize risk of hemorrhage  - Monitor temperature, glucose, and sodium. Initiate appropriate interventions as ordered  Outcome: Progressing  Goal: Achieves maximal functionality and self care  Description: INTERVENTIONS  - Monitor swallowing and airway patency with patient fatigue and changes in neurological status  - Encourage and assist patient to increase activity and self care with guidance from PT/OT  - Encourage visually impaired, hearing impaired and aphasic patients to use assistive/communication devices  Outcome: Progressing     Problem: Impaired Functional Mobility  Goal: Achieve highest/safest level of mobility/gait  Description: Interventions:  - Assess patient's functional ability and stability  - Promote increasing activity/tolerance for mobility and gait  - Educate and engage patient/family in tolerated activity level and precautions    Outcome: Progressing     Problem: Impaired Activities of Daily Living  Goal: Achieve highest/safest level of independence in self care  Description: Interventions:  - Assess ability and encourage patient to participate in ADLs to maximize function  - Promote sitting position while  performing ADLs such as feeding, grooming, and bathing  - Educate and encourage patient/family in tolerated functional activity level and precautions during self-care    Outcome: Progressing

## 2024-11-19 NOTE — PROGRESS NOTES
[facility]  part of [facility]    Progress Note    Christy oLwry Patient Status:  Inpatient    1924 MRN [id number]   Location [facility] 5SW/SE Attending Rebecca Montelongo MD   Hosp Day # 4 PCP Patrick Stafford MD     Chief Complaint:     Vomiting    Subjective:   Subjective:    Patient seen and examined this morning  Endorsing abdominal pain and nausea      Objective:   Blood pressure 143/66, pulse 74, temperature 97.5 °F (36.4 °C), temperature source Oral, resp. rate 20, weight 140 lb 6.4 oz (63.7 kg), SpO2 94%.  Physical Exam    General: Patient is alert and oriented x3  HEENT: EOMI PERRLA, atraumatic normocephalic  Cardiac: S1-S2 appreciated  Lungs: Good air entry bilaterally clear to auscultation  Abdomen: Soft nontender nondistended positive bowel sounds  Ext: Peripheral pulses are positive  Neuro: No focal deficits noted  Psych: Normal mood  Skin: No rashes noted  MSK: Full range of motion intact      Results:   Lab Results   Component Value Date    WBC 23.0 (H) 2024    HGB 13.1 2024    HCT 39.5 2024    .0 2024    CREATSERUM 0.72 2024    BUN 18 2024     2024    K 4.2 2024     2024    CO2 28.0 2024     (H) 2024    CA 8.7 2024    ALB 3.4 11/15/2024    ALKPHO 111 11/15/2024    BILT 0.3 11/15/2024    TP 6.9 11/15/2024    AST 35 (H) 11/15/2024    ALT 16 11/15/2024    T4F 1.3 2023    TSH 5.400 (H) 2023    LIP 25 11/15/2024    CRP 1.20 (H) 11/15/2024    MG 2.0 11/15/2024    TROPHS 11 11/15/2024       No results found.        Assessment & Plan:      Severe enteritis, ileus  - pt presenting for recurrent nausea, vomiting (recent hospital discharge for same)  - Recent EGD w/biopsy suggestive of possible lymphocytic colitis or enteritis.  - repeat cdiff and stool culture negative  - Surgery consulted, no plans for emergent intervention for now  - cont IV Abx with  IVceftriaxone  - GI consulted  -  s/p NGT, diet advanced to low fiber soft  - cont PPI BID  - cont IV solumedrol TID- on discharge budesonide for lymphocytic colitis.      Pleural Effusions  - Patient with shortness of breath, CXR 11/15 revealed worsening pleural effusions, R>L concern for CHF  - no prior CHF history noted  - s/p 1x IV lasix  - scoplolamine patch 1x for excessive secretions   - strict I/Os  - ECHO showed EF 65-70%.  No WMA.   - wean fluids as per clinical course above      Left lower lung loculated fluid   - seen on admission imaging, current no urgent respiratory compromise noted   - Pulm consulted  - monitor for now         Dispo: Pending clinical course, PT/OT    Global A/P  -still endorsing pain and nausea - appreciate GI recs  -Echo reviewed  -leukocytosis uptrending- on IV solumedrol.  -DC planning.  -Reviewed previous consultant notes  -Reviewed CBC, BMP, Mag, and Phos  -Reviewed tests ordered  -Repeat labs in am  -MDM: High, severe exacerbation of chronic illness posing a threat to life. IV medications requiring close inpatient monitoring.           Rebecca Montelongo MD

## 2024-11-19 NOTE — PROGRESS NOTES
[facility]     Gastroenterology Progress Note    Christy Lowry Patient Status:  Inpatient    1924 MRN [id number]   Location [facility] 5SW/SE Attending Lexii Meade MD   Hosp Day # 4 PCP Patrick Stafford MD       Subjective:   Eating small amount but still with abdominal pain after eating. No nausea or emesis. Having solid stools per caregiver, no diarrhea      Objective:   Blood pressure 143/66, pulse 74, temperature 97.5 °F (36.4 °C), temperature source Oral, resp. rate 20, weight 140 lb 6.4 oz (63.7 kg), SpO2 94%. Body mass index is 24.1 kg/m².    GEN - Patient appears comfortable and in no acute discomfort  ENT - MMM  EYES - the sclera appears anicteric  CV - no edema  RESP -  No increased work of breathing  ABDOMEN - soft, mild tenderness generalized, without rigidity or guarding, non-distended, no abnormal bowel sounds noted, no masses are palpated  SKIN - No jaundice  NEURO - Alert and appropriate, and gross movements of extremities normal  PSYCH - normal affect, non-agitated      Assessment and Plan:   Christy Lowry is a a(n) 89 year old female w/ a history of OA, GERD, squamous cell carcinoma, who presents with ongoing n/v/d, abdominal pain now near 2 weeks. CT showed diffuse enterocolitis, initially felt to be infectious. SBFT negative for bowel obstruction. C diff and GI stool PCR negative. She was readmitted for ongoing symptoms.     Nausea, vomiting, diarrhea  Diffuse abdominal pain  Lymphocytic colitis   Repeat CT A/P showed diffuse enterocolitis, no obstruction. Repeat stool culture and C.diff were negative. She underwent EGD and flex sig last week and colonic biopsies showed increased IELs c/w lymphocytic colitis. CRP 2. Could be slowly resolving infectious process vs inflammatory process in the setting of newly diagnosed lymphocytic colitis.  Empiric IV steroids were initiated 11/15/24, and her symptoms are improving.       Recommend  - Recommend ADAT   -  Continue scheduled antiemetics for now  - PPI BID  - Continue solumedrol 20 mg IV TID - would discharge on budesonide for lymphocytic colitis   - obtain CT enterography given ongoing abd pain       Tosha Valle MD  [facility]      Results:     Lab Results   Component Value Date    WBC 23.0 (H) 11/19/2024    HGB 13.1 11/19/2024    HCT 39.5 11/19/2024    .0 11/19/2024    CREATSERUM 0.72 11/19/2024    BUN 18 11/19/2024     11/19/2024    K 4.2 11/19/2024     11/19/2024    CO2 28.0 11/19/2024     (H) 11/19/2024    CA 8.7 11/19/2024    ALB 3.4 11/15/2024    ALKPHO 111 11/15/2024    BILT 0.3 11/15/2024    TP 6.9 11/15/2024    AST 35 (H) 11/15/2024    ALT 16 11/15/2024    T4F 1.3 05/30/2023    TSH 5.400 (H) 05/30/2023    LIP 25 11/15/2024    CRP 1.20 (H) 11/15/2024    MG 2.0 11/15/2024       No results found.        \

## 2024-11-20 LAB
ANION GAP SERPL CALC-SCNC: 3 MMOL/L (ref 0–18)
BASOPHILS # BLD AUTO: 0.03 X10(3) UL (ref 0–0.2)
BASOPHILS NFR BLD AUTO: 0.2 %
BUN BLD-MCNC: 23 MG/DL (ref 9–23)
BUN/CREAT SERPL: 30.3 (ref 10–20)
CALCIUM BLD-MCNC: 8.3 MG/DL (ref 8.7–10.4)
CHLORIDE SERPL-SCNC: 106 MMOL/L (ref 98–112)
CO2 SERPL-SCNC: 31 MMOL/L (ref 21–32)
CREAT BLD-MCNC: 0.76 MG/DL
DEPRECATED RDW RBC AUTO: 50.7 FL (ref 35.1–46.3)
EGFRCR SERPLBLD CKD-EPI 2021: 70 ML/MIN/1.73M2 (ref 60–?)
EOSINOPHIL # BLD AUTO: 0 X10(3) UL (ref 0–0.7)
EOSINOPHIL NFR BLD AUTO: 0 %
ERYTHROCYTE [DISTWIDTH] IN BLOOD BY AUTOMATED COUNT: 15.6 % (ref 11–15)
GLUCOSE BLD-MCNC: 106 MG/DL (ref 70–99)
HCT VFR BLD AUTO: 38.7 %
HGB BLD-MCNC: 12.8 G/DL
IMM GRANULOCYTES # BLD AUTO: 0.13 X10(3) UL (ref 0–1)
IMM GRANULOCYTES NFR BLD: 0.8 %
LYMPHOCYTES # BLD AUTO: 1.5 X10(3) UL (ref 1–4)
LYMPHOCYTES NFR BLD AUTO: 8.8 %
MAGNESIUM SERPL-MCNC: 2.1 MG/DL (ref 1.6–2.6)
MCH RBC QN AUTO: 29.9 PG (ref 26–34)
MCHC RBC AUTO-ENTMCNC: 33.1 G/DL (ref 31–37)
MCV RBC AUTO: 90.4 FL
MONOCYTES # BLD AUTO: 0.6 X10(3) UL (ref 0.1–1)
MONOCYTES NFR BLD AUTO: 3.5 %
NEUTROPHILS # BLD AUTO: 14.75 X10 (3) UL (ref 1.5–7.7)
NEUTROPHILS # BLD AUTO: 14.75 X10(3) UL (ref 1.5–7.7)
NEUTROPHILS NFR BLD AUTO: 86.7 %
OSMOLALITY SERPL CALC.SUM OF ELEC: 294 MOSM/KG (ref 275–295)
PHOSPHATE SERPL-MCNC: 3.6 MG/DL (ref 2.4–5.1)
PLATELET # BLD AUTO: 298 10(3)UL (ref 150–450)
POTASSIUM SERPL-SCNC: 4.2 MMOL/L (ref 3.5–5.1)
RBC # BLD AUTO: 4.28 X10(6)UL
SODIUM SERPL-SCNC: 140 MMOL/L (ref 136–145)
WBC # BLD AUTO: 17 X10(3) UL (ref 4–11)

## 2024-11-20 PROCEDURE — 99233 SBSQ HOSP IP/OBS HIGH 50: CPT | Performed by: HOSPITALIST

## 2024-11-20 PROCEDURE — 99232 SBSQ HOSP IP/OBS MODERATE 35: CPT | Performed by: REGISTERED NURSE

## 2024-11-20 NOTE — PLAN OF CARE
Problem: Patient Centered Care  Goal: Patient preferences are identified and integrated in the patient's plan of care  Description: Interventions:  - Provide timely, complete, and accurate information to patient/family  - Incorporate patient and family knowledge, values, beliefs, and cultural backgrounds into the planning and delivery of care  - Encourage patient/family to participate in care and decision-making at the level they choose  - Honor patient and family perspectives and choices  Outcome: Progressing     Problem: GASTROINTESTINAL - ADULT  Goal: Minimal or absence of nausea and vomiting  Description: INTERVENTIONS:  - Maintain adequate hydration with IV or PO as ordered and tolerated  - Evaluate effectiveness of ordered antiemetic medications  - Provide nonpharmacologic comfort measures as appropriate  - Advance diet as tolerated, if ordered  - Obtain nutritional consult as needed  - Evaluate fluid balance  Outcome: Progressing  Goal: Maintains or returns to baseline bowel function  Description: INTERVENTIONS:  - Assess bowel function  - Maintain adequate hydration with IV or PO as ordered and tolerated  - Evaluate effectiveness of GI medications  - Encourage mobilization and activity  - Obtain nutritional consult as needed  - Establish a toileting routine/schedule  - Consider collaborating with pharmacy to review patient's medication profile  Outcome: Progressing  Goal: Maintains adequate nutritional intake (undernourished)  Description: INTERVENTIONS:  - Monitor percentage of each meal consumed  - Identify factors contributing to decreased intake, treat as appropriate  - Assist with meals as needed  - Monitor I&O, WT and lab values  - Obtain nutritional consult as needed  - Optimize oral hygiene and moisture  - Encourage food from home; allow for food preferences  - Enhance eating environment  Outcome: Progressing     Problem: GENITOURINARY - ADULT  Goal: Absence of urinary retention  Description:  INTERVENTIONS:  - Assess patient’s ability to void and empty bladder  - Monitor intake/output and perform bladder scan as needed  - Follow urinary retention protocol/standard of care  - Consider collaborating with pharmacy to review patient's medication profile  - Implement strategies to promote bladder emptying  Outcome: Progressing     Problem: SKIN/TISSUE INTEGRITY - ADULT  Goal: Skin integrity remains intact  Description: INTERVENTIONS  - Assess and document risk factors for pressure ulcer development  - Assess and document skin integrity  - Monitor for areas of redness and/or skin breakdown  - Initiate interventions, skin care algorithm/standards of care as needed  Outcome: Progressing     Problem: MUSCULOSKELETAL - ADULT  Goal: Return mobility to safest level of function  Description: INTERVENTIONS:  - Assess patient stability and activity tolerance for standing, transferring and ambulating w/ or w/o assistive devices  - Assist with transfers and ambulation using safe patient handling equipment as needed  - Ensure adequate protection for wounds/incisions during mobilization  - Obtain PT/OT consults as needed  - Advance activity as appropriate  - Communicate ordered activity level and limitations with patient/family  Outcome: Progressing     Problem: NEUROLOGICAL - ADULT  Goal: Achieves stable or improved neurological status  Description: INTERVENTIONS  - Assess for and report changes in neurological status  - Initiate measures to prevent increased intracranial pressure  - Maintain blood pressure and fluid volume within ordered parameters to optimize cerebral perfusion and minimize risk of hemorrhage  - Monitor temperature, glucose, and sodium. Initiate appropriate interventions as ordered  Outcome: Progressing  Goal: Achieves maximal functionality and self care  Description: INTERVENTIONS  - Monitor swallowing and airway patency with patient fatigue and changes in neurological status  - Encourage and assist  patient to increase activity and self care with guidance from PT/OT  - Encourage visually impaired, hearing impaired and aphasic patients to use assistive/communication devices  Outcome: Progressing     Problem: Impaired Functional Mobility  Goal: Achieve highest/safest level of mobility/gait  Description: Interventions:  - Assess patient's functional ability and stability  - Promote increasing activity/tolerance for mobility and gait  - Educate and engage patient/family in tolerated activity level and precautions  - Recommend use of  RW for transfers and ambulation  Outcome: Progressing     Problem: Impaired Activities of Daily Living  Goal: Achieve highest/safest level of independence in self care  Description: Interventions:  - Assess ability and encourage patient to participate in ADLs to maximize function  - Promote sitting position while performing ADLs such as feeding, grooming, and bathing  - Educate and encourage patient/family in tolerated functional activity level and precautions during self-care  Outcome: Progressing

## 2024-11-20 NOTE — OCCUPATIONAL THERAPY NOTE
Attempted to see patient for follow up OT session this afternoon. Patient declining stating she is too hungry and NPO for a procedure. Will continue to follow and follow up as able and appropriate.    Farzaneh Cat, OTR/L  [facility]  [phone]

## 2024-11-20 NOTE — PHYSICAL THERAPY NOTE
Attempted follow up treatment this afternoon, pt declining therapy. Will re-attempt at later time as schedule allows.

## 2024-11-20 NOTE — PROGRESS NOTES
[facility]     Gastroenterology Progress Note    Christy Lowry Patient Status:  Inpatient    1924 MRN [id number]   Location [facility] 5SW/SE Attending Rebecca Montelongo MD   Hosp Day # 5 PCP Patrick Stafford MD       Subjective:   Pt sitting up in chair  No complaints other than feeling hungry and wanting to eat  No ABD pain, N/V  1 BM overnight, loose and brown  No fever, chills  Objective:   Blood pressure 129/57, pulse 72, temperature 97.5 °F (36.4 °C), temperature source Axillary, resp. rate 18, weight 140 lb 6.4 oz (63.7 kg), SpO2 95%. Body mass index is 24.1 kg/m².    Gen: awake, alert patient, NAD  HEENT: EOMI, the sclera appears anicteric, oropharynx clear, mucus membranes appear moist  CV: RRR  Lung: no conversational dyspnea   Abdomen: soft NTND abdomen with NABS appreciated   Skin: dry, warm, no jaundice  Ext: no LE edema is evident  Neuro: Alert, oriented x4 and interactive  Psych: calm, cooperative    Assessment and Plan:   Christy Lowry is a a(n) 89 year old female w/ a history of OA, GERD, squamous cell carcinoma, who presents with ongoing n/v/d, abdominal pain now near 2 weeks. CT showed diffuse enterocolitis, initially felt to be infectious. SBFT negative for bowel obstruction. C diff and GI stool PCR negative. She was readmitted for ongoing symptoms.      Nausea, vomiting, diarrhea  Diffuse abdominal pain  Lymphocytic colitis   Repeat CT A/P showed diffuse enterocolitis, no obstruction. Repeat stool culture and C.diff were negative. She underwent EGD and flex sig last week and colonic biopsies showed increased IELs c/w lymphocytic colitis. CRP 2. Could be slowly resolving infectious process vs inflammatory process in the setting of newly diagnosed lymphocytic colitis.  Empiric IV steroids were initiated 11/15/24, with initial improvement in symptoms though recurrence of ABD cramping, nausea and vomiting with contrast for CTE.  -Made NPO overnight with again  improvement in symptoms.  Discussed repeat EGD with push enteroscopy to obtain small bowel biopsies to further differentiate infectious vs inflammatory cause of symptoms.  Possible ileus related to underlying cause.  Pt currently deferring procedure.  Discussed with son, Pancho, who will discuss with his sister.  Will restart clear liquids and provide supportive care.     Recommend  - CLD, ok to ADAT  - Continue scheduled antiemetics for now  - PPI BID  - Continue solumedrol 20 mg IV TID - would discharge on budesonide for lymphocytic colitis   - EGD with push enteroscopy pending Pt/family discussion    Case discussed with Tosha Valle MD and Gloria ANGULO.    Rosemarie Han DNP, FNP-BC  [facility]enter[facility]  11/20/2024      Results:     Lab Results   Component Value Date    WBC 17.0 (H) 11/20/2024    HGB 12.8 11/20/2024    HCT 38.7 11/20/2024    .0 11/20/2024    CREATSERUM 0.76 11/20/2024    BUN 23 11/20/2024     11/20/2024    K 4.2 11/20/2024     11/20/2024    CO2 31.0 11/20/2024     (H) 11/20/2024    CA 8.3 (L) 11/20/2024    ALB 3.4 11/15/2024    ALKPHO 111 11/15/2024    BILT 0.3 11/15/2024    TP 6.9 11/15/2024    AST 35 (H) 11/15/2024    ALT 16 11/15/2024    T4F 1.3 05/30/2023    TSH 5.400 (H) 05/30/2023    LIP 25 11/15/2024    CRP <0.40 11/19/2024    MG 2.1 11/20/2024    PHOS 3.6 11/20/2024       XR ABDOMEN (1 VIEW) (CPT=74018)    Result Date: 11/19/2024  PROCEDURE: XR ABDOMEN (1 VIEW) (CPT=74018)  COMPARISON: None.  INDICATIONS: Vomiting and abdominal distension today.  TECHNIQUE:   Single view.   FINDINGS/IMPRESSION:   There are moderately distended gas-filled loops of small bowel throughout the abdomen and pelvis measuring up to 3.5 cm. The findings could represent a partial or early total small-bowel obstruction, or an ileus.  These dilated loops of bowel appear to be slightly increased when compared CT dated 11/15/2024.    Dictated by (CST): Lauri Fortune MD on 11/19/2024 at  6:24 PM     Finalized by (CST): Lauri Fortune MD on 11/19/2024 at 6:26 PM

## 2024-11-20 NOTE — TELEPHONE ENCOUNTER
I spoke to the patient's daughter Christy (OK per VICKEY)    I called to set up a discharge clinic follow up for the patient, but was made aware that the patient was readmitted to the hospital    I explained that we will follow up and schedule an appointment for the patient when she is discharged from the hospital    Patient's daughter verbalized understanding and has no further questions at this time

## 2024-11-20 NOTE — PLAN OF CARE
Problem: Patient Centered Care  Goal: Patient preferences are identified and integrated in the patient's plan of care  Description: Interventions:  - What would you like us to know as we care for you?   - Provide timely, complete, and accurate information to patient/family  - Incorporate patient and family knowledge, values, beliefs, and cultural backgrounds into the planning and delivery of care  - Encourage patient/family to participate in care and decision-making at the level they choose  - Honor patient and family perspectives and choices  Outcome: Progressing     Problem: GASTROINTESTINAL - ADULT  Goal: Minimal or absence of nausea and vomiting  Description: INTERVENTIONS:  - Maintain adequate hydration with IV or PO as ordered and tolerated  - Nasogastric tube to low intermittent suction as ordered  - Evaluate effectiveness of ordered antiemetic medications  - Provide nonpharmacologic comfort measures as appropriate  - Advance diet as tolerated, if ordered  - Obtain nutritional consult as needed  - Evaluate fluid balance  Outcome: Progressing  Goal: Maintains or returns to baseline bowel function  Description: INTERVENTIONS:  - Assess bowel function  - Maintain adequate hydration with IV or PO as ordered and tolerated  - Evaluate effectiveness of GI medications  - Encourage mobilization and activity  - Obtain nutritional consult as needed  - Establish a toileting routine/schedule  - Consider collaborating with pharmacy to review patient's medication profile  Outcome: Progressing  Goal: Maintains adequate nutritional intake (undernourished)  Description: INTERVENTIONS:  - Monitor percentage of each meal consumed  - Identify factors contributing to decreased intake, treat as appropriate  - Assist with meals as needed  - Monitor I&O, WT and lab values  - Obtain nutritional consult as needed  - Optimize oral hygiene and moisture  - Encourage food from home; allow for food preferences  - Enhance eating  environment  Outcome: Progressing     Problem: GENITOURINARY - ADULT  Goal: Absence of urinary retention  Description: INTERVENTIONS:  - Assess patient’s ability to void and empty bladder  - Monitor intake/output and perform bladder scan as needed  - Follow urinary retention protocol/standard of care  - Consider collaborating with pharmacy to review patient's medication profile  - Implement strategies to promote bladder emptying  Outcome: Progressing     Problem: SKIN/TISSUE INTEGRITY - ADULT  Goal: Skin integrity remains intact  Description: INTERVENTIONS  - Assess and document risk factors for pressure ulcer development  - Assess and document skin integrity  - Monitor for areas of redness and/or skin breakdown  - Initiate interventions, skin care algorithm/standards of care as needed  Outcome: Progressing     Problem: MUSCULOSKELETAL - ADULT  Goal: Return mobility to safest level of function  Description: INTERVENTIONS:  - Assess patient stability and activity tolerance for standing, transferring and ambulating w/ or w/o assistive devices  - Assist with transfers and ambulation using safe patient handling equipment as needed  - Ensure adequate protection for wounds/incisions during mobilization  - Obtain PT/OT consults as needed  - Advance activity as appropriate  - Communicate ordered activity level and limitations with patient/family  Outcome: Progressing     Problem: NEUROLOGICAL - ADULT  Goal: Achieves stable or improved neurological status  Description: INTERVENTIONS  - Assess for and report changes in neurological status  - Initiate measures to prevent increased intracranial pressure  - Maintain blood pressure and fluid volume within ordered parameters to optimize cerebral perfusion and minimize risk of hemorrhage  - Monitor temperature, glucose, and sodium. Initiate appropriate interventions as ordered  Outcome: Progressing  Goal: Achieves maximal functionality and self care  Description: INTERVENTIONS  -  Monitor swallowing and airway patency with patient fatigue and changes in neurological status  - Encourage and assist patient to increase activity and self care with guidance from PT/OT  - Encourage visually impaired, hearing impaired and aphasic patients to use assistive/communication devices  Outcome: Progressing     Problem: Impaired Functional Mobility  Goal: Achieve highest/safest level of mobility/gait  Description: Interventions:  - Assess patient's functional ability and stability  - Promote increasing activity/tolerance for mobility and gait  - Educate and engage patient/family in tolerated activity level and precautions    Outcome: Progressing     Problem: Impaired Activities of Daily Living  Goal: Achieve highest/safest level of independence in self care  Description: Interventions:  - Assess ability and encourage patient to participate in ADLs to maximize function  - Promote sitting position while performing ADLs such as feeding, grooming, and bathing  - Educate and encourage patient/family in tolerated functional activity level and precautions during self-care    Outcome: Progressing       Patient A/Ox3, up in chair, safety  use of walker, caregiver at bedside. Pain manage per protocol. Fall precaution. Call light within.

## 2024-11-20 NOTE — PROGRESS NOTES
[facility]  part of [facility]    Progress Note    Christy Lowry Patient Status:  Inpatient    1924 MRN [id number]   Location [facility] 5SW/SE Attending Rebecca Montelongo MD   Hosp Day # 5 PCP Patrick Stafford MD     Chief Complaint:     Vomiting    Subjective:   Subjective:    Patient seen and examined this morning  Endorsing abdominal pain and nausea    This morning states pain is controlled, she has not eaten anything  Caregiver at bedside.     Objective:   Blood pressure 129/57, pulse 72, temperature 97.5 °F (36.4 °C), temperature source Axillary, resp. rate 18, weight 140 lb 6.4 oz (63.7 kg), SpO2 95%.  Physical Exam    General: Patient is alert and oriented x3  HEENT: EOMI PERRLA, atraumatic normocephalic  Cardiac: S1-S2 appreciated  Lungs: Good air entry bilaterally clear to auscultation  Abdomen: Soft nontender nondistended positive bowel sounds  Ext: Peripheral pulses are positive  Neuro: No focal deficits noted  Psych: Normal mood  Skin: No rashes noted  MSK: Full range of motion intact      Results:   Lab Results   Component Value Date    WBC 17.0 (H) 2024    HGB 12.8 2024    HCT 38.7 2024    .0 2024    CREATSERUM 0.76 2024    BUN 23 2024     2024    K 4.2 2024     2024    CO2 31.0 2024     (H) 2024    CA 8.3 (L) 2024    ALB 3.4 11/15/2024    ALKPHO 111 11/15/2024    BILT 0.3 11/15/2024    TP 6.9 11/15/2024    AST 35 (H) 11/15/2024    ALT 16 11/15/2024    T4F 1.3 2023    TSH 5.400 (H) 2023    LIP 25 11/15/2024    CRP <0.40 2024    MG 2.1 2024    PHOS 3.6 2024    TROPHS 11 11/15/2024       XR ABDOMEN (1 VIEW) (CPT=74018)    Result Date: 2024  PROCEDURE: XR ABDOMEN (1 VIEW) (CPT=74018)  COMPARISON: None.  INDICATIONS: Vomiting and abdominal distension today.  TECHNIQUE:   Single view.   FINDINGS/IMPRESSION:   There are moderately  distended gas-filled loops of small bowel throughout the abdomen and pelvis measuring up to 3.5 cm. The findings could represent a partial or early total small-bowel obstruction, or an ileus.  These dilated loops of bowel appear to be slightly increased when compared CT dated 11/15/2024.    Dictated by (CST): Lauri Fortune MD on 11/19/2024 at 6:24 PM     Finalized by (CST): Lauri Fortune MD on 11/19/2024 at 6:26 PM               Assessment & Plan:      Severe enteritis, ileus  - pt presenting for recurrent nausea, vomiting (recent hospital discharge for same)  - Recent EGD w/biopsy suggestive of possible lymphocytic colitis or enteritis.  - repeat cdiff and stool culture negative  - Surgery consulted, no plans for emergent intervention for now  - cont IV Abx with IVceftriaxone  - GI consulted  -  s/p NGT, diet advanced to low fiber soft  - cont PPI BID  - cont IV solumedrol TID- on discharge budesonide for lymphocytic colitis.      Pleural Effusions  - Patient with shortness of breath, CXR 11/15 revealed worsening pleural effusions, R>L concern for CHF  - no prior CHF history noted  - s/p 1x IV lasix  - scoplolamine patch 1x for excessive secretions   - strict I/Os  - ECHO showed EF 65-70%.  No WMA.   - wean fluids as per clinical course above      Left lower lung loculated fluid   - seen on admission imaging, current no urgent respiratory compromise noted   - Pulm consulted  - monitor for now         Dispo: Pending clinical course, PT/OT    Global A/P  -still endorsing pain and nausea - appreciate GI recs  -Echo reviewed  -leukocytosis  -plan for CT enterography?  -Abdominal Xray - findings c/w possible sbo.   -DC planning.  -Reviewed previous consultant notes  -Reviewed CBC, BMP, Mag, and Phos  -Reviewed tests ordered  -Repeat labs in am  -MDM: High, severe exacerbation of chronic illness posing a threat to life. IV medications requiring close inpatient monitoring.           Rebecca Montelongo,  MD

## 2024-11-21 LAB
ANION GAP SERPL CALC-SCNC: 5 MMOL/L (ref 0–18)
BASOPHILS # BLD AUTO: 0.02 X10(3) UL (ref 0–0.2)
BASOPHILS NFR BLD AUTO: 0.1 %
BUN BLD-MCNC: 16 MG/DL (ref 9–23)
BUN/CREAT SERPL: 24.2 (ref 10–20)
CALCIUM BLD-MCNC: 8.4 MG/DL (ref 8.7–10.4)
CHLORIDE SERPL-SCNC: 106 MMOL/L (ref 98–112)
CO2 SERPL-SCNC: 28 MMOL/L (ref 21–32)
CREAT BLD-MCNC: 0.66 MG/DL
DEPRECATED RDW RBC AUTO: 52.9 FL (ref 35.1–46.3)
EGFRCR SERPLBLD CKD-EPI 2021: 78 ML/MIN/1.73M2 (ref 60–?)
EOSINOPHIL # BLD AUTO: 0 X10(3) UL (ref 0–0.7)
EOSINOPHIL NFR BLD AUTO: 0 %
ERYTHROCYTE [DISTWIDTH] IN BLOOD BY AUTOMATED COUNT: 15.6 % (ref 11–15)
GLUCOSE BLD-MCNC: 97 MG/DL (ref 70–99)
HCT VFR BLD AUTO: 39.9 %
HGB BLD-MCNC: 12.7 G/DL
IMM GRANULOCYTES # BLD AUTO: 0.1 X10(3) UL (ref 0–1)
IMM GRANULOCYTES NFR BLD: 0.7 %
LYMPHOCYTES # BLD AUTO: 1.71 X10(3) UL (ref 1–4)
LYMPHOCYTES NFR BLD AUTO: 12.1 %
MCH RBC QN AUTO: 29.5 PG (ref 26–34)
MCHC RBC AUTO-ENTMCNC: 31.8 G/DL (ref 31–37)
MCV RBC AUTO: 92.8 FL
MONOCYTES # BLD AUTO: 0.53 X10(3) UL (ref 0.1–1)
MONOCYTES NFR BLD AUTO: 3.7 %
NEUTROPHILS # BLD AUTO: 11.79 X10 (3) UL (ref 1.5–7.7)
NEUTROPHILS # BLD AUTO: 11.79 X10(3) UL (ref 1.5–7.7)
NEUTROPHILS NFR BLD AUTO: 83.4 %
OSMOLALITY SERPL CALC.SUM OF ELEC: 289 MOSM/KG (ref 275–295)
PHOSPHATE SERPL-MCNC: 3.3 MG/DL (ref 2.4–5.1)
PLATELET # BLD AUTO: 254 10(3)UL (ref 150–450)
POTASSIUM SERPL-SCNC: 4.1 MMOL/L (ref 3.5–5.1)
RBC # BLD AUTO: 4.3 X10(6)UL
SODIUM SERPL-SCNC: 139 MMOL/L (ref 136–145)
WBC # BLD AUTO: 14.2 X10(3) UL (ref 4–11)

## 2024-11-21 PROCEDURE — 99232 SBSQ HOSP IP/OBS MODERATE 35: CPT | Performed by: PHYSICIAN ASSISTANT

## 2024-11-21 PROCEDURE — 99233 SBSQ HOSP IP/OBS HIGH 50: CPT | Performed by: HOSPITALIST

## 2024-11-21 NOTE — PLAN OF CARE
Per GI, okay to start on clears...advance as tolerated. No other acute changes during shift    Problem: Patient Centered Care  Goal: Patient preferences are identified and integrated in the patient's plan of care  Description: Interventions:  - What would you like us to know as we care for you?   - Provide timely, complete, and accurate information to patient/family  - Incorporate patient and family knowledge, values, beliefs, and cultural backgrounds into the planning and delivery of care  - Encourage patient/family to participate in care and decision-making at the level they choose  - Honor patient and family perspectives and choices  Outcome: Progressing     Problem: Safety Risk - Non-Violent Restraints  Goal: Patient will remain free from self-harm  Description: INTERVENTIONS:  - Apply the least restrictive restraint to prevent harm  - Notify patient and family of reasons restraints applied  - Assess for any contributing factors to confusion (electrolyte disturbances, delirium, medications)  - Discontinue any unnecessary medical devices as soon as possible  - Assess the patient's physical comfort, circulation, skin condition, hydration, nutrition and elimination needs   - Reorient and redirection as needed  - Assess for the need to continue restraints  Outcome: Progressing     Problem: GASTROINTESTINAL - ADULT  Goal: Minimal or absence of nausea and vomiting  Description: INTERVENTIONS:  - Maintain adequate hydration with IV or PO as ordered and tolerated  - Nasogastric tube to low intermittent suction as ordered  - Evaluate effectiveness of ordered antiemetic medications  - Provide nonpharmacologic comfort measures as appropriate  - Advance diet as tolerated, if ordered  - Obtain nutritional consult as needed  - Evaluate fluid balance  Outcome: Progressing  Goal: Maintains or returns to baseline bowel function  Description: INTERVENTIONS:  - Assess bowel function  - Maintain adequate hydration with IV or PO as  ordered and tolerated  - Evaluate effectiveness of GI medications  - Encourage mobilization and activity  - Obtain nutritional consult as needed  - Establish a toileting routine/schedule  - Consider collaborating with pharmacy to review patient's medication profile  Outcome: Progressing  Goal: Maintains adequate nutritional intake (undernourished)  Description: INTERVENTIONS:  - Monitor percentage of each meal consumed  - Identify factors contributing to decreased intake, treat as appropriate  - Assist with meals as needed  - Monitor I&O, WT and lab values  - Obtain nutritional consult as needed  - Optimize oral hygiene and moisture  - Encourage food from home; allow for food preferences  - Enhance eating environment  Outcome: Progressing     Problem: GENITOURINARY - ADULT  Goal: Absence of urinary retention  Description: INTERVENTIONS:  - Assess patient’s ability to void and empty bladder  - Monitor intake/output and perform bladder scan as needed  - Follow urinary retention protocol/standard of care  - Consider collaborating with pharmacy to review patient's medication profile  - Implement strategies to promote bladder emptying  Outcome: Progressing     Problem: SKIN/TISSUE INTEGRITY - ADULT  Goal: Skin integrity remains intact  Description: INTERVENTIONS  - Assess and document risk factors for pressure ulcer development  - Assess and document skin integrity  - Monitor for areas of redness and/or skin breakdown  - Initiate interventions, skin care algorithm/standards of care as needed  Outcome: Progressing     Problem: MUSCULOSKELETAL - ADULT  Goal: Return mobility to safest level of function  Description: INTERVENTIONS:  - Assess patient stability and activity tolerance for standing, transferring and ambulating w/ or w/o assistive devices  - Assist with transfers and ambulation using safe patient handling equipment as needed  - Ensure adequate protection for wounds/incisions during mobilization  - Obtain PT/OT  consults as needed  - Advance activity as appropriate  - Communicate ordered activity level and limitations with patient/family  Outcome: Progressing     Problem: NEUROLOGICAL - ADULT  Goal: Achieves stable or improved neurological status  Description: INTERVENTIONS  - Assess for and report changes in neurological status  - Initiate measures to prevent increased intracranial pressure  - Maintain blood pressure and fluid volume within ordered parameters to optimize cerebral perfusion and minimize risk of hemorrhage  - Monitor temperature, glucose, and sodium. Initiate appropriate interventions as ordered  Outcome: Progressing  Goal: Achieves maximal functionality and self care  Description: INTERVENTIONS  - Monitor swallowing and airway patency with patient fatigue and changes in neurological status  - Encourage and assist patient to increase activity and self care with guidance from PT/OT  - Encourage visually impaired, hearing impaired and aphasic patients to use assistive/communication devices  Outcome: Progressing     Problem: Impaired Functional Mobility  Goal: Achieve highest/safest level of mobility/gait  Description: Interventions:  - Assess patient's functional ability and stability  - Promote increasing activity/tolerance for mobility and gait  - Educate and engage patient/family in tolerated activity level and precautions  Outcome: Progressing     Problem: Impaired Activities of Daily Living  Goal: Achieve highest/safest level of independence in self care  Description: Interventions:  - Assess ability and encourage patient to participate in ADLs to maximize function  - Promote sitting position while performing ADLs such as feeding, grooming, and bathing  - Educate and encourage patient/family in tolerated functional activity level and precautions during self-care  Outcome: Progressing

## 2024-11-21 NOTE — DIETARY NOTE
ADULT NUTRITION INITIAL ASSESSMENT      RECOMMENDATIONS TO MD: Advance diet as medically safe and or Nutrition Support if unable to advance diet beyond cl liq ( for extended days).     Pt is at low to moderate nutrition risk.  Pt does not meet malnutrition criteria.       ADMITTING DIAGNOSIS:  Vomiting, unspecified vomiting type, unspecified whether nausea present [R11.10]     PERTINENT PAST MEDICAL HISTORY:    Past Medical History:    Actinic keratosis    hypertrophic AK - left central forehead    Benzodiazepine withdrawal without complication (HCC)    Chronic GERD    Constipation    Diarrhea    Eustachian tube dysfunction    Heart palpitations    Loose stools    Occult blood in stools    SCC (squamous cell carcinoma)    left forehead, invasive, well-diff.     SCC (squamous cell carcinoma)    right temple    SCC (squamous cell carcinoma)    right neck    Skin cancer    has a past surgical history that includes tonsillectomy.     PATIENT STATUS: Initial 11/18/24:    Pt sitting up in the chair upon visit. Care giver at bedside. Pt is 89 y/o and appears coherent, engaging in conversation well. Observe lunch meal intake ate 1/2 of Pasta Entree, and 1/2 carrot dish. Tolerating diet well. Good po intake (documented with % of last 4 meals on Soft/Low fiber diet). Pt and care giver denies N/V post prandial today. Still of loose-watery stool: Diet history: pt eats well at home in 2-3 meals/day prior to the on set of acute GI illness. Also, Care giver reports encouraging pt to drink Ensure supplement daily.                  Wt changes is difficult to evaluate given wt discrepancies: Pt reports usual wt of 127#, consistent with past nutrition assessment 11/8/2024. In 2016 to 2023: wt stable at 122-131#.   On 11/5/2024, wt were 127# & 152#.  This admit wt: 140#. Requested Nursing to obtain new wt.  RD encouraged use of Oral Nutrition Supplement ( ONS ) Ensure Clear to maximize po intake. ( May resume Regular Ensure at  home as needed ).   Pt is identified at No nutrition risk at this time pending outcome of re-weigh.        11/21/2024 Update:  Diet advanced to cl liq yesterday. Took 100% at dinner.  Per GI/APN may advance diet as tolerated. At re-visit, son ( Tristen) at bedside inquiring about scheduled tests for today, referred to RN for update.   Per MD, last week dx with Lymphocytic colitis, steroid initiated, symptoms are improving. Abd xray on 11/19: findings consistent with possible SBO, hence placed on NPO and now cl liq. Denies abd pain today. ( But had small emesis after working with OT/PT. Small Bm this am--per chart/GI.)    Prior to NPO, pt ate well with average meals intake of 77% in 3 meals/day x 3 days. Currently, son reports pt is hungry and wants to eat. Explained to son diet advancement dependent on pt's tolerance to liquid diet.  Oral Nutrition Supplement ( ONS ) in place ( Ensure clear), will increase from daily to BID.     Recommend: Advance diet as medically safe and or Nutrition Support if unable to advance diet beyond cl liq (for extended days).      FOOD/NUTRITION INTAKE ANALYSIS:    Current Appetite: Fair  Current Intake:  100% intake of cl liq last night.   Prior to NPO/Cl liq, pt ate well with average meals intake of 77% in 3 meals/day x 3 days.   Current Intake Meeting Needs: No, but oral nutrition supplements (ONS) to maximize  Percent Meals Eaten (last 6 days)       Date/Time Percent Meals Eaten (%)    11/15/24 1730 0 %     Percent Meals Eaten (%): NPO at 11/15/24 1730    11/16/24 1300 40 %    11/16/24 1755 100 %    11/17/24 0930 100 %    11/17/24 1243 100 %    11/17/24 1500 80 %    11/17/24 2015 100 %    11/18/24 0927 95 %    11/18/24 1700 75 %    11/18/24 1857 25 %    11/19/24 0815 15 %    11/19/24 1332 100 %    11/19/24 1700 0 %     Percent Meals Eaten (%): pt will not eat dinner at 11/19/24 1700    11/20/24 1730 100 %           Food Allergies: No Known Food Allergies  (NKFA)  Cultural/Ethnic/[place] Preferences: None    GASTROINTESTINAL: +BM small bm this am per GI/APN, denies abd pain and possible SBO per Abd xray.    Missing teeth/poor dentition.     MEDICATIONS: reviewed reglan and zofran q 6 hrs. On solumedrol.       melatonin  5 mg Oral Nightly    pantoprazole  40 mg Intravenous Q12H    ondansetron  4 mg Intravenous q6h    methylPREDNISolone  20 mg Intravenous Q8H    metoclopramide  5 mg Intravenous Q6H       LABS: reviewed   Recent Labs     11/19/24  0602 11/20/24  0540 11/21/24  0527   * 106* 97   BUN 18 23 16   CREATSERUM 0.72 0.76 0.66   CA 8.7 8.3* 8.4*   MG  --  2.1  --     140 139   K 4.2 4.2 4.1    106 106   CO2 28.0 31.0 28.0   PHOS  --  3.6 3.3   OSMOCALC 289 294 289       NUTRITION RELATED PHYSICAL FINDINGS:  - Nutrition Focused Physical Exam (NFPE):  mild temporal muscle depletion consistent with advancing age.     - Fluid Accumulation: generalized edema see RN documentation for details  - Skin Integrity: at risk see RN documentation for details    ANTHROPOMETRICS:  HT:  5'4\"  WT: 63.7 kg (140 lb 6.4 oz) --11/19  Admit wt : 140# on 11/15  BMI: Body mass index is 24.1 kg/m².  BMI CLASSIFICATION: 19-24.9 kg/m2 - WNL  IBW: 120 #        117% IBW  Usual Body Wt: 127#     per pt from previous Nutrition assessment in 11/8/24      ?% UBW  WEIGHT HISTORY:    Patient Weight(s) for the past 336 hrs:   Weight   11/19/24 0631 63.7 kg (140 lb 6.4 oz)   11/15/24 1611 63.8 kg (140 lb 9.6 oz)   11/15/24 1608 63.8 kg (140 lb 9.6 oz)     Wt Readings from Last 10 Encounters:   11/19/24 63.7 kg (140 lb 6.4 oz)   11/05/24 68.9 kg (152 lb)   10/31/24 58.1 kg (128 lb 1.4 oz)   07/18/23 58.1 kg (128 lb)   07/08/23 62.1 kg (136 lb 14.5 oz)   06/14/23 62.1 kg (136 lb 14.5 oz)   05/30/23 62.1 kg (137 lb)   10/19/20 50.8 kg (112 lb)   02/20/20 55.8 kg (123 lb)   02/04/20 55.3 kg (122 lb)       NUTRITION DIAGNOSIS/PROBLEM:    Altered GI function related to changes in GI  tract motility in the setting of possible SBO ( per MD)  as evidenced by cl liq diet, non substantial nutrition intake.     NUTRITION INTERVENTION:            NUTRITION PRESCRIPTION:         Estimated Nutrition needs: Dosing wt of 63.7 kg --wt taken on 11/19 .        Calories: 1508-9910 calories/day (18-22 calories per kg Dosing wt) per Older Adult needs        Protein: 64 g protein/day (1.0 g protein/kg  Dosing wt)    - Diet:       Procedures    Clear liquid diet Is Patient on Accuchecks? No; Misc Restriction: Low Fiber/Soft   - Meals and snacks: NPO/CLD, Encouraged small frequent meals, and Encouraged increased PO intake  - Medical Food Supplements- Ensure Clear (240 calories/ 8 g protein each) BID Apple-  - Vitamin and mineral supplements: none  - Feeding assistance: meal set up  - Nutrition education: Discussed importance of adequate energy and protein intake   - Coordination of nutrition care: collaboration with other providers  - Discharge and transfer of nutrition care to new setting or provider: monitor plans      MONITOR AND EVALUATE/NUTRITION GOALS:   - Food and Nutrient Intake:    Monitor: adequacy of PO intake, tolerance of PO intake, adequacy of supplement intake, tolerance of supplement intake, and for PO diet advancement  - Food and Nutrient Administration:    Monitor: need for temporary nutrition support and goc/family wishes regarding nutrition if unable to advance diet beyond cl liq.   - Anthropometric Measurement:    Monitor weight  - Nutrition Goals:    maintain wt within 5%, PO and supplement greater than 75% of needs, labs within acceptable limits, prevent skin breakdown, support body systems, and improved GI status      RD will follow up.    Gema Bynum RD, LDN, [facility]  Clinical Dietitian  [phone]

## 2024-11-21 NOTE — PROGRESS NOTES
[facility]     Gastroenterology Progress Note    Christy Lowry Patient Status:  Inpatient    1924 MRN [id number]   Location [facility] 5SW/SE Attending Rebecca Montelongo MD   Hosp Day # 6 PCP Patrick Stafford MD       Subjective:   Patient sitting in bed  She notes she does not have abdominal pain  Small bm this am    Episode of vomiting after working PT  Objective:   Blood pressure 140/69, pulse 71, temperature 97.3 °F (36.3 °C), temperature source Axillary, resp. rate 20, weight 140 lb 6.4 oz (63.7 kg), SpO2 94%. Body mass index is 24.1 kg/m².    Gen: awake, alert patient  HEENT: EOMI, the sclera appears anicteric, oropharynx clear, mucus membranes appear moist  CV: RRR  Lung: no conversational dyspnea   Abdomen: soft NTND abdomen with NABS appreciated   Skin: dry, warm, no jaundice  Ext: no LE edema is evident  Neuro: Alert and interactive  Psych: calm, cooperative    Assessment and Plan:   Christy Lowry is a a(n) 89 year old female w/ a history of OA, GERD, squamous cell carcinoma, who presents with ongoing n/v/d, abdominal pain now near 2 weeks. CT showed diffuse enterocolitis, initially felt to be infectious. SBFT negative for bowel obstruction. C diff and GI stool PCR negative. She was readmitted for ongoing symptoms.      Nausea, vomiting, diarrhea  Diffuse abdominal pain  Lymphocytic colitis   Repeat CT A/P showed diffuse enterocolitis, no obstruction. Repeat stool culture and C.diff were negative. She underwent EGD and flex sig last week and colonic biopsies showed increased IELs c/w lymphocytic colitis. CRP 2. Could be slowly resolving infectious process vs inflammatory process in the setting of newly diagnosed lymphocytic colitis.  Empiric IV steroids were initiated 11/15/24, with initial improvement in symptoms though recurrence of ABD cramping, nausea and vomiting with contrast for CTE.  -long discussion today with family, patient was feeling well this am, no  abdominal pain, nausea or vomiting  -small episode of emesis after working with OT/PT  -will plan for trial of clears to see how patient tolerates and then revisit next steps of plan     Recommend  - CLD  - Continue scheduled antiemetics for now  - PPI BID  - Continue solumedrol 20 mg IV TID - would discharge on budesonide for lymphocytic colitis     Case discussed with Tosha Valle MD and Radhika ANGULO.    Paulette Kraft PA-C  [facility]  11/21/2024      Results:     Lab Results   Component Value Date    WBC 14.2 (H) 11/21/2024    HGB 12.7 11/21/2024    HCT 39.9 11/21/2024    .0 11/21/2024    CREATSERUM 0.66 11/21/2024    BUN 16 11/21/2024     11/21/2024    K 4.1 11/21/2024     11/21/2024    CO2 28.0 11/21/2024    GLU 97 11/21/2024    CA 8.4 (L) 11/21/2024    ALB 3.4 11/15/2024    ALKPHO 111 11/15/2024    BILT 0.3 11/15/2024    TP 6.9 11/15/2024    AST 35 (H) 11/15/2024    ALT 16 11/15/2024    T4F 1.3 05/30/2023    TSH 5.400 (H) 05/30/2023    LIP 25 11/15/2024    CRP <0.40 11/19/2024    MG 2.1 11/20/2024    PHOS 3.3 11/21/2024       XR ABDOMEN (1 VIEW) (CPT=74018)    Result Date: 11/19/2024  PROCEDURE: XR ABDOMEN (1 VIEW) (CPT=74018)  COMPARISON: None.  INDICATIONS: Vomiting and abdominal distension today.  TECHNIQUE:   Single view.   FINDINGS/IMPRESSION:   There are moderately distended gas-filled loops of small bowel throughout the abdomen and pelvis measuring up to 3.5 cm. The findings could represent a partial or early total small-bowel obstruction, or an ileus.  These dilated loops of bowel appear to be slightly increased when compared CT dated 11/15/2024.    Dictated by (CST): Lauri Fortune MD on 11/19/2024 at 6:24 PM     Finalized by (CST): Lauri Fortune MD on 11/19/2024 at 6:26 PM

## 2024-11-21 NOTE — OCCUPATIONAL THERAPY NOTE
OCCUPATIONAL THERAPY TREATMENT NOTE - INPATIENT        Room Number: 565/565-A     Presenting Problem: 89 year old female w/ a history of OA, GERD, squamous cell carcinoma, who presents with ongoing n/v/d, abdominal pain now near 2 weeks. CT showed diffuse enterocolitis, initially felt to be infectious. SBFT negative for bowel obstruction. C diff and GI stool PCR negative. She was readmitted for ongoing symptoms.    Problem List  Principal Problem:    Vomiting, unspecified vomiting type, unspecified whether nausea present  Active Problems:    Vomiting    Ileus (HCC)    Diffuse abdominal pain    Abnormal CT scan, gastrointestinal tract    Nausea vomiting and diarrhea    Lymphocytic colitis    Abnormal CT scan, colon      OCCUPATIONAL THERAPY ASSESSMENT   Patient demonstrates limited progress this session, goals remain in progress.    Patient is requiring minimal assist and maximum assist as a result of the following impairments: decreased functional strength, decreased endurance, impaired standing balance, decreased muscular endurance, and medical status.    Patient continues to function near baseline with ADLs and functional mobility.  Next session anticipate patient to progress toileting, lower body dressing, bed mobility, transfers, and functional standing tolerance.  Occupational Therapy will continue to follow patient for duration of hospitalization.    Patient continues to benefit from continued skilled OT services: at discharge to promote prior level of function and safety with additional support and return home with home health OT.     PLAN DURING HOSPITALIZATION     OT Treatment Plan: Balance activities;Energy conservation/work simplification techniques;ADL training;Functional transfer training;Compensatory technique education     SUBJECTIVE  \"I feel better now\"     OBJECTIVE  Precautions: Hard of hearing      PAIN ASSESSMENT  Ratin    ACTIVITIES OF DAILY LIVING ASSESSMENT  AM-PAC ‘6-Clicks’ Inpatient  Daily Activity Short Form  How much help from another person does the patient currently need…  -   Putting on and taking off regular lower body clothing?: A Little  -   Bathing (including washing, rinsing, drying)?: A Little  -   Toileting, which includes using toilet, bedpan or urinal? : A Little  -   Putting on and taking off regular upper body clothing?: A Little  -   Taking care of personal grooming such as brushing teeth?: A Little  -   Eating meals?: A Little    AM-PAC Score:  Score: 18  Approx Degree of Impairment: 46.65%  Standardized Score (AM-PAC Scale): 38.66  CMS Modifier (G-Code): CK    FUNCTIONAL TRANSFER ASSESSMENT  Sit to Stand: Edge of Bed  Edge of Bed: Minimal Assist  Toilet Transfer: Minimal Assist    BED MOBILITY  Supine to Sit : Moderate Assist    BALANCE ASSESSMENT  Static Standing: Minimal Assist  Dynamic Standing: minimal assist     FUNCTIONAL ADL ASSESSMENT  Grooming Seated: Supervision  LB Dressing Seated: Maximum Assist (to thread new brief through legs seated on toilet)  LB Dressing Standing: Maximum Assist  Toileting Standing: Maximum Assist (for ronnie cares after bowel movement)     Skilled Therapy Provided:   RN cleared pt for participation. Session coordinated with PT. Pt received laying in bed with son and caregiver present in room. Pt agreeable to participation in therapy. Pt benefited from increased time for processing, verbal/tactile cues for body positioning/hand placement, and RW for support.     To assess pt's participation during tasks while also providing intermittent education to maximize participation, OT facilitated the following: bed mobility, functional transfers, toileting, and LB dressing. Pt completed ADLs and transfers with assist levels listed above. Pt required min assist x2 to complete functional mobility to bathroom using RW for support/ Pt required assist for RW manipulation and physical support due to being unsteady with ambulation. Pt incontinent of bowel  during mobility. Pt with some emesis while seated on toilet. RN notified.         EDUCATION PROVIDED  Patient Education : Role of Occupational Therapy; Plan of Care; Functional Transfer Techniques; Fall Prevention; Posture/Positioning; Proper Body Mechanics  Patient's Response to Education: Verbalized Understanding  Family/Caregiver Education : Role of Occupational Therapy; Plan of Care  Family/Caregiver's Response to Education: Verbalized Understanding    The patient's Approx Degree of Impairment: 46.65% has been calculated based on documentation in the [facility] '6 clicks' Inpatient Daily Activity Short Form.  Research supports that patients with this level of impairment may benefit from HHOT.  Final disposition will be made by interdisciplinary medical team.    Patient End of Session: Up in chair;Needs met;Call light within reach;RN aware of session/findings;All patient questions and concerns addressed;Hospital anti-slip socks;Family present    OT Goals:  Patients self stated goal is: increase strength       Patient will complete functional transfer with supervision  Comment: ongoing- min assist     Patient will complete toileting with supervision  Comment: ongoing- incontinent of bowel, max assist for ronnie cares    Patient will tolerate standing for 5 minutes in prep for adls with supervision   Comment: ongoing     Patient will complete item retrieval with supervision  Comment: ongoing             Goals  on: 24  Frequency: 3x a week      OT Session Time: 25 minutes  Self-Care Home Management: 25 minutes

## 2024-11-21 NOTE — PLAN OF CARE
Problem: GASTROINTESTINAL - ADULT  Goal: Minimal or absence of nausea and vomiting  Description: INTERVENTIONS:  - Maintain adequate hydration with IV or PO as ordered and tolerated  - Nasogastric tube to low intermittent suction as ordered  - Evaluate effectiveness of ordered antiemetic medications  - Provide nonpharmacologic comfort measures as appropriate  - Advance diet as tolerated, if ordered  - Obtain nutritional consult as needed  - Evaluate fluid balance  Outcome: Progressing     Problem: GASTROINTESTINAL - ADULT  Goal: Maintains or returns to baseline bowel function  Description: INTERVENTIONS:  - Assess bowel function  - Maintain adequate hydration with IV or PO as ordered and tolerated  - Evaluate effectiveness of GI medications  - Encourage mobilization and activity  - Obtain nutritional consult as needed  - Establish a toileting routine/schedule  - Consider collaborating with pharmacy to review patient's medication profile  Outcome: Progressing     Problem: GASTROINTESTINAL - ADULT  Goal: Maintains adequate nutritional intake (undernourished)  Description: INTERVENTIONS:  - Monitor percentage of each meal consumed  - Identify factors contributing to decreased intake, treat as appropriate  - Assist with meals as needed  - Monitor I&O, WT and lab values  - Obtain nutritional consult as needed  - Optimize oral hygiene and moisture  - Encourage food from home; allow for food preferences  - Enhance eating environment  Outcome: Progressing     Problem: MUSCULOSKELETAL - ADULT  Goal: Return mobility to safest level of function  Description: INTERVENTIONS:  - Assess patient stability and activity tolerance for standing, transferring and ambulating w/ or w/o assistive devices  - Assist with transfers and ambulation using safe patient handling equipment as needed  - Ensure adequate protection for wounds/incisions during mobilization  - Obtain PT/OT consults as needed  - Advance activity as appropriate  -  Communicate ordered activity level and limitations with patient/family  Outcome: Progressing

## 2024-11-21 NOTE — PHYSICAL THERAPY NOTE
PHYSICAL THERAPY TREATMENT NOTE - INPATIENT     Room Number: 565/565-A       Presenting Problem: enteris, ileus  Co-Morbidities : p    Problem List  Principal Problem:    Vomiting, unspecified vomiting type, unspecified whether nausea present  Active Problems:    Vomiting    Ileus (HCC)    Diffuse abdominal pain    Abnormal CT scan, gastrointestinal tract    Nausea vomiting and diarrhea    Lymphocytic colitis    Abnormal CT scan, colon      PHYSICAL THERAPY ASSESSMENT   Patient demonstrates fair progress this session, goals  remain in progress.      Patient is requiring minimal assist and moderate assist x2 as a result of the following impairments: decreased functional strength, decreased endurance/aerobic capacity, pain, impaired static and dynamic balance, decreased muscular endurance, and medical status.     Patient continues to function below baseline with bed mobility, transfers, gait, stair negotiation, and standing prolonged periods.  Next session anticipate patient to progress bed mobility, transfers, and gait.  Physical Therapy will continue to follow patient for duration of hospitalization.    Patient continues to benefit from continued skilled PT services: at discharge to promote prior level of function and safety with additional support and return home with home health PT.    PLAN DURING HOSPITALIZATION  Nursing Mobility Recommendation :  (2 assist with RW)  PT Device Recommendation: Mechanical lift;Hospital bed  PT Treatment Plan: Bed mobility;Body mechanics;Endurance;Energy conservation;Patient education;Family education;Gait training;Strengthening;Balance training;Transfer training  Frequency (Obs): 5x/week     SUBJECTIVE  Agreeable to activity    OBJECTIVE  Precautions: Hard of hearing        PAIN ASSESSMENT   Ratin  Location: denies       BALANCE  Static Sitting: Good  Dynamic Sitting: Fair -  Static Standing: Poor +  Dynamic Standing: Poor        AM-PAC '6-Clicks' INPATIENT SHORT FORM - BASIC  MOBILITY  How much difficulty does the patient currently have...  Patient Difficulty: Turning over in bed (including adjusting bedclothes, sheets and blankets)?: A Lot   Patient Difficulty: Sitting down on and standing up from a chair with arms (e.g., wheelchair, bedside commode, etc.): A Little   Patient Difficulty: Moving from lying on back to sitting on the side of the bed?: A Lot   How much help from another person does the patient currently need...   Help from Another: Moving to and from a bed to a chair (including a wheelchair)?: A Lot   Help from Another: Need to walk in hospital room?: A Lot   Help from Another: Climbing 3-5 steps with a railing?: A Lot     AM-PAC Score:  Raw Score: 13   Approx Degree of Impairment: 64.91%   Standardized Score (AM-PAC Scale): 36.74   CMS Modifier (G-Code): CL    FUNCTIONAL ABILITY STATUS  Functional Mobility/Gait Assessment  Gait Assistance: Minimum assistance (x2)  Distance (ft): 10 ft + 4 ft  Assistive Device: Rolling walker  Pattern: Shuffle (unsteady, forward flexed posture)  Supine to Sit: moderate assist  Sit to Stand: minimal assist to RW    Skilled Therapy Provided: Pt agreeable to therapy, identified by name and , gait belt donned for mobility. Coordinated session with OT to maximize pt outcomes. Pt with limited progress this session 2/2 having incontinent bowel movement and episode of emesis. Pt requiring hands on assist to maintain safety with all mobility. Demos unsteady gait and limited activity tolerance. Caregiver present to assist with mobility.       The patient's Approx Degree of Impairment: 64.91% has been calculated based on documentation in the [facility] '6 clicks' Inpatient Daily Activity Short Form.  Research supports that patients with this level of impairment may benefit from rehab.  Final disposition will be made by interdisciplinary medical team.      Patient End of Session: Up in chair;Needs met;Call light within reach;RN aware of  session/findings;Family present    CURRENT GOALS   Goals to be met by: 2024  Patient Goal Patient's self-stated goal is: to go home   Goal #1 Patient is able to demonstrate supine - sit EOB @ level: minimum assistance      Goal #1   Current Status In progress   Goal #2 Patient is able to demonstrate transfers EOB to/from BSC at assistance level: minimum assistance with walker - rolling      Goal #2  Current Status In progress   Goal #3 Patient is able to ambulate 10 feet with assist device: walker - rolling at assistance level: minimum assistance   Goal #3   Current Status regression   Goal #4     Goal #4   Current Status     Goal #5 Patient to demonstrate independence with home activity/exercise instructions provided to patient in preparation for discharge.   Goal #5   Current Status  in progress   Goal #6     Goal #6  Current Status       Gait Trainin minutes  Therapeutic Activity: 15 minutes

## 2024-11-21 NOTE — PROGRESS NOTES
[facility]  part of [facility]    Progress Note    Christy Lowry Patient Status:  Inpatient    1924 MRN [id number]   Location [facility] 5SW/SE Attending Rebecca Montelongo MD   Hosp Day # 6 PCP Patrick Stafford MD     Chief Complaint:     Vomiting    Subjective:   Subjective:    Patient seen and examined this morning  Endorsing abdominal pain and nausea    Symptoms appear to be waxing and waning at this time.   Caregiver at bedside.     Objective:   Blood pressure 137/58, pulse 71, temperature 98 °F (36.7 °C), temperature source Axillary, resp. rate 20, weight 140 lb 6.4 oz (63.7 kg), SpO2 94%.  Physical Exam    General: Patient is alert and oriented x3  HEENT: EOMI PERRLA, atraumatic normocephalic  Cardiac: S1-S2 appreciated  Lungs: Good air entry bilaterally clear to auscultation  Abdomen: Soft nontender nondistended positive bowel sounds  Ext: Peripheral pulses are positive  Neuro: No focal deficits noted  Psych: Normal mood  Skin: No rashes noted  MSK: Full range of motion intact      Results:   Lab Results   Component Value Date    WBC 14.2 (H) 2024    HGB 12.7 2024    HCT 39.9 2024    .0 2024    CREATSERUM 0.66 2024    BUN 16 2024     2024    K 4.1 2024     2024    CO2 28.0 2024    GLU 97 2024    CA 8.4 (L) 2024    ALB 3.4 11/15/2024    ALKPHO 111 11/15/2024    BILT 0.3 11/15/2024    TP 6.9 11/15/2024    AST 35 (H) 11/15/2024    ALT 16 11/15/2024    T4F 1.3 2023    TSH 5.400 (H) 2023    LIP 25 11/15/2024    CRP <0.40 2024    MG 2.1 2024    PHOS 3.3 2024    TROPHS 11 11/15/2024       XR ABDOMEN (1 VIEW) (CPT=74018)    Result Date: 2024  PROCEDURE: XR ABDOMEN (1 VIEW) (CPT=74018)  COMPARISON: None.  INDICATIONS: Vomiting and abdominal distension today.  TECHNIQUE:   Single view.   FINDINGS/IMPRESSION:   There are moderately distended gas-filled loops  of small bowel throughout the abdomen and pelvis measuring up to 3.5 cm. The findings could represent a partial or early total small-bowel obstruction, or an ileus.  These dilated loops of bowel appear to be slightly increased when compared CT dated 11/15/2024.    Dictated by (CST): Lauri Fortune MD on 11/19/2024 at 6:24 PM     Finalized by (CST): Lauri Fortune MD on 11/19/2024 at 6:26 PM               Assessment & Plan:      Severe enteritis, ileus  - pt presenting for recurrent nausea, vomiting (recent hospital discharge for same)  - Recent EGD w/biopsy suggestive of possible lymphocytic colitis or enteritis.  - repeat cdiff and stool culture negative  - Surgery consulted, no plans for emergent intervention for now  - cont IV Abx with IVceftriaxone  - GI consulted  -  s/p NGT, diet advanced to low fiber soft  - cont PPI BID  - cont IV solumedrol TID- on discharge budesonide for lymphocytic colitis.      Pleural Effusions  - Patient with shortness of breath, CXR 11/15 revealed worsening pleural effusions, R>L concern for CHF  - no prior CHF history noted  - s/p 1x IV lasix  - scoplolamine patch 1x for excessive secretions   - strict I/Os  - ECHO showed EF 65-70%.  No WMA.   - wean fluids as per clinical course above      Left lower lung loculated fluid   - seen on admission imaging, current no urgent respiratory compromise noted   - Pulm consulted  - monitor for now         Dispo: Pending clinical course, PT/OT    Global A/P  -still endorsing pain and nausea - appreciate GI recs  -Echo reviewed  -leukocytosis  -plan for CT enterography?  -Abdominal Xray - findings c/w possible sbo.   -DC planning.  -CLD  -Reviewed previous consultant notes  -Reviewed CBC, BMP, Mag, and Phos  -Reviewed tests ordered  -Repeat labs in am  -MDM: High, severe exacerbation of chronic illness posing a threat to life. IV medications requiring close inpatient monitoring.           Rebecca Montelongo MD

## 2024-11-22 ENCOUNTER — TELEPHONE (OUTPATIENT)
Facility: CLINIC | Age: 89
End: 2024-11-22

## 2024-11-22 PROCEDURE — 99232 SBSQ HOSP IP/OBS MODERATE 35: CPT | Performed by: REGISTERED NURSE

## 2024-11-22 PROCEDURE — 99233 SBSQ HOSP IP/OBS HIGH 50: CPT | Performed by: HOSPITALIST

## 2024-11-22 RX ORDER — PANTOPRAZOLE SODIUM 40 MG/1
40 TABLET, DELAYED RELEASE ORAL
Status: DISCONTINUED | OUTPATIENT
Start: 2024-11-22 | End: 2024-11-23

## 2024-11-22 RX ORDER — BUDESONIDE 3 MG/1
9 CAPSULE, COATED PELLETS ORAL DAILY
Status: DISCONTINUED | OUTPATIENT
Start: 2024-11-22 | End: 2024-11-23

## 2024-11-22 RX ORDER — ONDANSETRON 2 MG/ML
4 INJECTION INTRAMUSCULAR; INTRAVENOUS EVERY 6 HOURS PRN
Status: DISCONTINUED | OUTPATIENT
Start: 2024-11-22 | End: 2024-11-22

## 2024-11-22 RX ORDER — METOCLOPRAMIDE HYDROCHLORIDE 5 MG/ML
5 INJECTION INTRAMUSCULAR; INTRAVENOUS 4 TIMES DAILY PRN
Status: DISCONTINUED | OUTPATIENT
Start: 2024-11-22 | End: 2024-11-23

## 2024-11-22 NOTE — PLAN OF CARE
Problem: Patient Centered Care  Goal: Patient preferences are identified and integrated in the patient's plan of care  Description: Interventions:  - What would you like us to know as we care for you? I have incredible caregivers.  - Provide timely, complete, and accurate information to patient/family  - Incorporate patient and family knowledge, values, beliefs, and cultural backgrounds into the planning and delivery of care  - Encourage patient/family to participate in care and decision-making at the level they choose  - Honor patient and family perspectives and choices  Outcome: Progressing     Problem: Patient/Family Goals  Goal: Patient/Family Long Term Goal  Description: Patient's Long Term Goal: discharge home    Interventions:  - intravenous solumedrol  -scheduled intravenous antiemetics   - See additional Care Plan goals for specific interventions  Outcome: Progressing     Problem: Patient/Family Goals  Goal: Patient/Family Short Term Goal  Description: Patient's Short Term Goal: sleep     Interventions:   - decrease stimuli   -cluster care  -blankets, pillows  -call light in reach  - See additional Care Plan goals for specific interventions  Outcome: Progressing     Problem: GASTROINTESTINAL - ADULT  Goal: Minimal or absence of nausea and vomiting  Description: INTERVENTIONS:  - Maintain adequate hydration with IV or PO as ordered and tolerated  - Nasogastric tube to low intermittent suction as ordered  - Evaluate effectiveness of ordered antiemetic medications  - Provide nonpharmacologic comfort measures as appropriate  - Advance diet as tolerated, if ordered  - Obtain nutritional consult as needed  - Evaluate fluid balance  Outcome: Progressing     Problem: GASTROINTESTINAL - ADULT  Goal: Maintains or returns to baseline bowel function  Description: INTERVENTIONS:  - Assess bowel function  - Maintain adequate hydration with IV or PO as ordered and tolerated  - Evaluate effectiveness of GI medications  -  Encourage mobilization and activity  - Obtain nutritional consult as needed  - Establish a toileting routine/schedule  - Consider collaborating with pharmacy to review patient's medication profile  Outcome: Progressing     Problem: GASTROINTESTINAL - ADULT  Goal: Maintains adequate nutritional intake (undernourished)  Description: INTERVENTIONS:  - Monitor percentage of each meal consumed  - Identify factors contributing to decreased intake, treat as appropriate  - Assist with meals as needed  - Monitor I&O, WT and lab values  - Obtain nutritional consult as needed  - Optimize oral hygiene and moisture  - Encourage food from home; allow for food preferences  - Enhance eating environment  Outcome: Progressing     Problem: SKIN/TISSUE INTEGRITY - ADULT  Goal: Skin integrity remains intact  Description: INTERVENTIONS  - Assess and document risk factors for pressure ulcer development  - Assess and document skin integrity  - Monitor for areas of redness and/or skin breakdown  - Initiate interventions, skin care algorithm/standards of care as needed  Outcome: Progressing     Problem: MUSCULOSKELETAL - ADULT  Goal: Return mobility to safest level of function  Description: INTERVENTIONS:  - Assess patient stability and activity tolerance for standing, transferring and ambulating w/ or w/o assistive devices  - Assist with transfers and ambulation using safe patient handling equipment as needed  - Ensure adequate protection for wounds/incisions during mobilization  - Obtain PT/OT consults as needed  - Advance activity as appropriate  - Communicate ordered activity level and limitations with patient/family  Outcome: Progressing     Problem: NEUROLOGICAL - ADULT  Goal: Achieves stable or improved neurological status  Description: INTERVENTIONS  - Assess for and report changes in neurological status  - Initiate measures to prevent increased intracranial pressure  - Maintain blood pressure and fluid volume within ordered  parameters to optimize cerebral perfusion and minimize risk of hemorrhage  - Monitor temperature, glucose, and sodium. Initiate appropriate interventions as ordered  Outcome: Progressing     Problem: NEUROLOGICAL - ADULT  Goal: Achieves maximal functionality and self care  Description: INTERVENTIONS  - Monitor swallowing and airway patency with patient fatigue and changes in neurological status  - Encourage and assist patient to increase activity and self care with guidance from PT/OT  - Encourage visually impaired, hearing impaired and aphasic patients to use assistive/communication devices  Outcome: Progressing     Problem: Impaired Functional Mobility  Goal: Achieve highest/safest level of mobility/gait  Description: Interventions:  - Assess patient's functional ability and stability  - Promote increasing activity/tolerance for mobility and gait  - Educate and engage patient/family in tolerated activity level and precautions  - Recommend use of  martha-walker for transfers and ambulation  Outcome: Progressing     Problem: Impaired Activities of Daily Living  Goal: Achieve highest/safest level of independence in self care  Description: Interventions:  - Assess ability and encourage patient to participate in ADLs to maximize function  - Promote sitting position while performing ADLs such as feeding, grooming, and bathing  - Educate and encourage patient/family in tolerated functional activity level and precautions during self-care  Outcome: Progressing

## 2024-11-22 NOTE — PROGRESS NOTES
[facility]     Gastroenterology Progress Note    Christy Lowry Patient Status:  Inpatient    1924 MRN [id number]   Location [facility] 5SW/SE Attending Rebecca Montelongo MD   Hosp Day # 7 PCP Patrick Stafford MD       Subjective:   Pt sitting up in bed  Tolerating soft diet without ABD pain, N/V  Had two soft, brown stools overnight  No fever, chills  No chest pain, SOB  Caregiver feels she looks at her baseline  Pt continues to defer further endoscopic evaluation given she is feeling much improved  Objective:   Blood pressure 134/60, pulse 73, temperature 97.2 °F (36.2 °C), temperature source Oral, resp. rate 16, weight 140 lb 6.4 oz (63.7 kg), SpO2 92%. Body mass index is 24.1 kg/m².    Gen: awake, alert patient, NAD  HEENT: EOMI, the sclera appears anicteric, oropharynx clear, mucus membranes appear moist  CV: RRR  Lung: no conversational dyspnea   Abdomen: soft NTND abdomen with NABS appreciated   Skin: dry, warm, no jaundice  Ext: no LE edema is evident  Neuro: Alert and interactive  Psych: calm, cooperative    Assessment and Plan:   Christy Lowry is a a(n) 89 year old female w/ a history of OA, GERD, squamous cell carcinoma, who presents with ongoing n/v/d, abdominal pain now near 2 weeks. CT showed diffuse enterocolitis, initially felt to be infectious. SBFT negative for bowel obstruction. C diff and GI stool PCR negative. She was readmitted for ongoing symptoms.      Nausea, vomiting, diarrhea  Diffuse abdominal pain  Lymphocytic colitis   Repeat CT A/P showed diffuse enterocolitis, no obstruction. Repeat stool culture and C.diff were negative. She underwent EGD and flex sig last week and colonic biopsies showed increased IELs c/w lymphocytic colitis. CRP 2. Could be slowly resolving infectious process vs inflammatory process in the setting of newly diagnosed lymphocytic colitis.  Empiric IV steroids were initiated 11/15/24, with initial improvement in symptoms though  recurrence of ABD cramping, nausea and vomiting with contrast for CTE.  -Pt declines endoscopic procedures as she is consistently feeling better and tolerating a soft diet without ABD pain, N/V.  2 stools overnight, soft and brown.  Caregiver feels pt is back at her baseline.  She did not take zofran/reglan overnight as she was not having any nausea, which is reassuring.  -Agree with holding on further endoscopic evaluation as pt is higher risk for intervention given age alone.  Will continue supportive therapy with course of oral steroids and PPI therapy.  Will have her follow up in GI clinic for symptom check and further evaluation.     Recommend  - Soft diet as tolerated  - As needed antiemetics, fine to continue at discharge  - PPI BID, continue PO to complete 8 week trial  - Transition to PO budesonide 9 mg to complete 8 week course for lymphocytic colitis   - Follow up in GI clinic, TE sent    GI available as needed.  Please call with further questions or concerns.    Case discussed with Mark Mac MD and Coni ANGULO.    Rosemarie Han DNP, FNP-BC  [facility]  11/22/2024      Results:     Lab Results   Component Value Date    WBC 14.2 (H) 11/21/2024    HGB 12.7 11/21/2024    HCT 39.9 11/21/2024    .0 11/21/2024    CREATSERUM 0.66 11/21/2024    BUN 16 11/21/2024     11/21/2024    K 4.1 11/21/2024     11/21/2024    CO2 28.0 11/21/2024    GLU 97 11/21/2024    CA 8.4 (L) 11/21/2024    ALB 3.4 11/15/2024    ALKPHO 111 11/15/2024    BILT 0.3 11/15/2024    TP 6.9 11/15/2024    AST 35 (H) 11/15/2024    ALT 16 11/15/2024    T4F 1.3 05/30/2023    TSH 5.400 (H) 05/30/2023    LIP 25 11/15/2024    CRP <0.40 11/19/2024    MG 2.1 11/20/2024    PHOS 3.3 11/21/2024       No results found.

## 2024-11-22 NOTE — TELEPHONE ENCOUNTER
Pt will need to be seen for hospital follow up in the next 2 weeks with Paulette.  She remains on PPI trial and budesonide for lymphocytic colitis.    ThanksRosemarie

## 2024-11-22 NOTE — PROGRESS NOTES
[facility]  part of [facility]    Progress Note    Christy Lowry Patient Status:  Inpatient    1924 MRN [id number]   Location [facility] 5SW/SE Attending Rebecca Montelongo MD   Hosp Day # 7 PCP Patrick Stafford MD     Chief Complaint:     Vomiting    Subjective:   Subjective:    Patient seen and examined this morning  Endorsing abdominal pain and nausea    Symptoms appear to be waxing and waning at this time.   Caregiver at bedside.  Clinically appears better    Objective:   Blood pressure 124/78, pulse 79, temperature 97.8 °F (36.6 °C), temperature source Oral, resp. rate 19, weight 140 lb 6.4 oz (63.7 kg), SpO2 93%.  Physical Exam    General: Patient is alert and oriented x3  HEENT: EOMI PERRLA, atraumatic normocephalic  Cardiac: S1-S2 appreciated  Lungs: Good air entry bilaterally clear to auscultation  Abdomen: Soft nontender nondistended positive bowel sounds  Ext: Peripheral pulses are positive  Neuro: No focal deficits noted  Psych: Normal mood  Skin: No rashes noted  MSK: Full range of motion intact      Results:   Lab Results   Component Value Date    WBC 14.2 (H) 2024    HGB 12.7 2024    HCT 39.9 2024    .0 2024    CREATSERUM 0.66 2024    BUN 16 2024     2024    K 4.1 2024     2024    CO2 28.0 2024    GLU 97 2024    CA 8.4 (L) 2024    ALB 3.4 11/15/2024    ALKPHO 111 11/15/2024    BILT 0.3 11/15/2024    TP 6.9 11/15/2024    AST 35 (H) 11/15/2024    ALT 16 11/15/2024    T4F 1.3 2023    TSH 5.400 (H) 2023    LIP 25 11/15/2024    CRP <0.40 2024    MG 2.1 2024    PHOS 3.3 2024    TROPHS 11 11/15/2024       No results found.        Assessment & Plan:      Severe enteritis, ileus  - pt presenting for recurrent nausea, vomiting (recent hospital discharge for same)  - Recent EGD w/biopsy suggestive of possible lymphocytic colitis or enteritis.  - repeat  cdiff and stool culture negative  - Surgery consulted, no plans for emergent intervention for now  - cont IV Abx with IVceftriaxone  - GI consulted  -  s/p NGT, diet advanced to low fiber soft  - cont PPI BID  - cont IV solumedrol TID- on discharge budesonide for lymphocytic colitis.      Pleural Effusions  - Patient with shortness of breath, CXR 11/15 revealed worsening pleural effusions, R>L concern for CHF  - no prior CHF history noted  - s/p 1x IV lasix  - scoplolamine patch 1x for excessive secretions   - strict I/Os  - ECHO showed EF 65-70%.  No WMA.   - wean fluids as per clinical course above      Left lower lung loculated fluid   - seen on admission imaging, current no urgent respiratory compromise noted   - Pulm consulted  - monitor for now         Dispo: Pending clinical course, PT/OT    Global A/P  -clinically improved, diet advanced, steroids transitioned to PO  -plan for DC in 24 hours.  -Echo reviewed  -leukocytosis  -Abdominal Xray - findings c/w possible sbo.   -DC planning.  -CLD  -Reviewed previous consultant notes  -Reviewed CBC, BMP, Mag, and Phos  -Reviewed tests ordered  -Repeat labs in am  -MDM: High, severe exacerbation of chronic illness posing a threat to life. IV medications requiring close inpatient monitoring.           Rebecca Montelongo MD

## 2024-11-22 NOTE — PLAN OF CARE
Problem: Patient Centered Care  Goal: Patient preferences are identified and integrated in the patient's plan of care  Description: Interventions:  - What would you like us to know as we care for you?     - Provide timely, complete, and accurate information to patient/family  - Incorporate patient and family knowledge, values, beliefs, and cultural backgrounds into the planning and delivery of care  - Encourage patient/family to participate in care and decision-making at the level they choose  - Honor patient and family perspectives and choices  Outcome: Progressing     Problem: Patient/Family Goals  Goal: Patient/Family Long Term Goal  Description: Patient's Long Term Goal:       Interventions:  -     - See additional Care Plan goals for specific interventions  Outcome: Progressing  Goal: Patient/Family Short Term Goal  Description: Patient's Short Term Goal:       Interventions:   -     - See additional Care Plan goals for specific interventions  Outcome: Progressing     Problem: Safety Risk - Non-Violent Restraints  Goal: Patient will remain free from self-harm  Description: INTERVENTIONS:  - Apply the least restrictive restraint to prevent harm  - Notify patient and family of reasons restraints applied  - Assess for any contributing factors to confusion (electrolyte disturbances, delirium, medications)  - Discontinue any unnecessary medical devices as soon as possible  - Assess the patient's physical comfort, circulation, skin condition, hydration, nutrition and elimination needs   - Reorient and redirection as needed  - Assess for the need to continue restraints  Outcome: Progressing     Problem: GASTROINTESTINAL - ADULT  Goal: Minimal or absence of nausea and vomiting  Description: INTERVENTIONS:  - Maintain adequate hydration with IV or PO as ordered and tolerated  - Nasogastric tube to low intermittent suction as ordered  - Evaluate effectiveness of ordered antiemetic medications  - Provide  nonpharmacologic comfort measures as appropriate  - Advance diet as tolerated, if ordered  - Obtain nutritional consult as needed  - Evaluate fluid balance  Outcome: Progressing  Goal: Maintains or returns to baseline bowel function  Description: INTERVENTIONS:  - Assess bowel function  - Maintain adequate hydration with IV or PO as ordered and tolerated  - Evaluate effectiveness of GI medications  - Encourage mobilization and activity  - Obtain nutritional consult as needed  - Establish a toileting routine/schedule  - Consider collaborating with pharmacy to review patient's medication profile  Outcome: Progressing  Goal: Maintains adequate nutritional intake (undernourished)  Description: INTERVENTIONS:  - Monitor percentage of each meal consumed  - Identify factors contributing to decreased intake, treat as appropriate  - Assist with meals as needed  - Monitor I&O, WT and lab values  - Obtain nutritional consult as needed  - Optimize oral hygiene and moisture  - Encourage food from home; allow for food preferences  - Enhance eating environment  Outcome: Progressing     Problem: GENITOURINARY - ADULT  Goal: Absence of urinary retention  Description: INTERVENTIONS:  - Assess patient’s ability to void and empty bladder  - Monitor intake/output and perform bladder scan as needed  - Follow urinary retention protocol/standard of care  - Consider collaborating with pharmacy to review patient's medication profile  - Implement strategies to promote bladder emptying  Outcome: Progressing     Problem: SKIN/TISSUE INTEGRITY - ADULT  Goal: Skin integrity remains intact  Description: INTERVENTIONS  - Assess and document risk factors for pressure ulcer development  - Assess and document skin integrity  - Monitor for areas of redness and/or skin breakdown  - Initiate interventions, skin care algorithm/standards of care as needed  Outcome: Progressing     Problem: MUSCULOSKELETAL - ADULT  Goal: Return mobility to safest level of  function  Description: INTERVENTIONS:  - Assess patient stability and activity tolerance for standing, transferring and ambulating w/ or w/o assistive devices  - Assist with transfers and ambulation using safe patient handling equipment as needed  - Ensure adequate protection for wounds/incisions during mobilization  - Obtain PT/OT consults as needed  - Advance activity as appropriate  - Communicate ordered activity level and limitations with patient/family  Outcome: Progressing     Problem: NEUROLOGICAL - ADULT  Goal: Achieves stable or improved neurological status  Description: INTERVENTIONS  - Assess for and report changes in neurological status  - Initiate measures to prevent increased intracranial pressure  - Maintain blood pressure and fluid volume within ordered parameters to optimize cerebral perfusion and minimize risk of hemorrhage  - Monitor temperature, glucose, and sodium. Initiate appropriate interventions as ordered  Outcome: Progressing  Goal: Achieves maximal functionality and self care  Description: INTERVENTIONS  - Monitor swallowing and airway patency with patient fatigue and changes in neurological status  - Encourage and assist patient to increase activity and self care with guidance from PT/OT  - Encourage visually impaired, hearing impaired and aphasic patients to use assistive/communication devices  Outcome: Progressing     Problem: Impaired Functional Mobility  Goal: Achieve highest/safest level of mobility/gait  Description: Interventions:  - Assess patient's functional ability and stability  - Promote increasing activity/tolerance for mobility and gait  - Educate and engage patient/family in tolerated activity level and precautions  - Recommend use of  RW for transfers and ambulation  Outcome: Progressing     Problem: Impaired Activities of Daily Living  Goal: Achieve highest/safest level of independence in self care  Description: Interventions:  - Assess ability and encourage patient to  participate in ADLs to maximize function  - Promote sitting position while performing ADLs such as feeding, grooming, and bathing  - Educate and encourage patient/family in tolerated functional activity level and precautions during self-care  - Provide support under elbow of weak side to prevent shoulder subluxation  Outcome: Progressing

## 2024-11-23 VITALS
RESPIRATION RATE: 16 BRPM | SYSTOLIC BLOOD PRESSURE: 120 MMHG | OXYGEN SATURATION: 95 % | DIASTOLIC BLOOD PRESSURE: 52 MMHG | BODY MASS INDEX: 24 KG/M2 | TEMPERATURE: 98 F | HEART RATE: 74 BPM | WEIGHT: 140.38 LBS

## 2024-11-23 LAB
ANION GAP SERPL CALC-SCNC: 6 MMOL/L (ref 0–18)
BASOPHILS # BLD AUTO: 0.04 X10(3) UL (ref 0–0.2)
BASOPHILS NFR BLD AUTO: 0.3 %
BUN BLD-MCNC: 17 MG/DL (ref 9–23)
BUN/CREAT SERPL: 24.6 (ref 10–20)
CALCIUM BLD-MCNC: 8.3 MG/DL (ref 8.7–10.4)
CHLORIDE SERPL-SCNC: 102 MMOL/L (ref 98–112)
CO2 SERPL-SCNC: 29 MMOL/L (ref 21–32)
CREAT BLD-MCNC: 0.69 MG/DL
DEPRECATED RDW RBC AUTO: 49.9 FL (ref 35.1–46.3)
EGFRCR SERPLBLD CKD-EPI 2021: 77 ML/MIN/1.73M2 (ref 60–?)
EOSINOPHIL # BLD AUTO: 0.08 X10(3) UL (ref 0–0.7)
EOSINOPHIL NFR BLD AUTO: 0.5 %
ERYTHROCYTE [DISTWIDTH] IN BLOOD BY AUTOMATED COUNT: 15.6 % (ref 11–15)
GLUCOSE BLD-MCNC: 106 MG/DL (ref 70–99)
HCT VFR BLD AUTO: 42.5 %
HGB BLD-MCNC: 14.2 G/DL
IMM GRANULOCYTES # BLD AUTO: 0.12 X10(3) UL (ref 0–1)
IMM GRANULOCYTES NFR BLD: 0.8 %
LYMPHOCYTES # BLD AUTO: 2.19 X10(3) UL (ref 1–4)
LYMPHOCYTES NFR BLD AUTO: 13.8 %
MAGNESIUM SERPL-MCNC: 2 MG/DL (ref 1.6–2.6)
MCH RBC QN AUTO: 29.7 PG (ref 26–34)
MCHC RBC AUTO-ENTMCNC: 33.4 G/DL (ref 31–37)
MCV RBC AUTO: 88.9 FL
MONOCYTES # BLD AUTO: 1.45 X10(3) UL (ref 0.1–1)
MONOCYTES NFR BLD AUTO: 9.1 %
NEUTROPHILS # BLD AUTO: 11.99 X10 (3) UL (ref 1.5–7.7)
NEUTROPHILS # BLD AUTO: 11.99 X10(3) UL (ref 1.5–7.7)
NEUTROPHILS NFR BLD AUTO: 75.5 %
OSMOLALITY SERPL CALC.SUM OF ELEC: 286 MOSM/KG (ref 275–295)
PLATELET # BLD AUTO: 252 10(3)UL (ref 150–450)
PLATELETS.RETICULATED NFR BLD AUTO: 5.5 % (ref 0–7)
POTASSIUM SERPL-SCNC: 4.7 MMOL/L (ref 3.5–5.1)
RBC # BLD AUTO: 4.78 X10(6)UL
SODIUM SERPL-SCNC: 137 MMOL/L (ref 136–145)
WBC # BLD AUTO: 15.9 X10(3) UL (ref 4–11)

## 2024-11-23 PROCEDURE — 99239 HOSP IP/OBS DSCHRG MGMT >30: CPT | Performed by: HOSPITALIST

## 2024-11-23 RX ORDER — BUDESONIDE 3 MG/1
9 CAPSULE, COATED PELLETS ORAL DAILY
Qty: 180 CAPSULE | Refills: 0 | Status: SHIPPED | OUTPATIENT
Start: 2024-11-23 | End: 2025-01-22

## 2024-11-23 RX ORDER — PANTOPRAZOLE SODIUM 40 MG/1
40 TABLET, DELAYED RELEASE ORAL
Qty: 120 TABLET | Refills: 0 | Status: SHIPPED | OUTPATIENT
Start: 2024-11-23 | End: 2025-01-22

## 2024-11-23 NOTE — PLAN OF CARE
Christy Sales is A&O x 3 with forgetfulness and confusion.  Lives at home with 24 hour caregiver.    One assist with walker.  Intermittent of bowel and bladder.  VSS:  stable.  Pain: denies.  Plan:  discharge home  Bed in lowest position, alarms on, and call light within reach.  Will continue to monitor and treat.    Problem: Patient Centered Care  Goal: Patient preferences are identified and integrated in the patient's plan of care  Description: Interventions:  - What would you like us to know as we care for you? I have a 24 hour caregiver  - Provide timely, complete, and accurate information to patient/family  - Incorporate patient and family knowledge, values, beliefs, and cultural backgrounds into the planning and delivery of care  - Encourage patient/family to participate in care and decision-making at the level they choose  - Honor patient and family perspectives and choices  Outcome: Completed     Problem: Patient/Family Goals  Goal: Patient/Family Long Term Goal  Description: Patient's Long Term Goal: Discharge Home    Interventions:  - Plan of care compliance; medication management, care, and education     - See additional Care Plan goals for specific interventions  Outcome: Completed  Goal: Patient/Family Short Term Goal  Description: Patient's Short Term Goal: remain free from N/V    Interventions:   - Plan of care compliance; medication management, care, and education     - See additional Care Plan goals for specific interventions  Outcome: Completed     Problem: Safety Risk - Non-Violent Restraints  Goal: Patient will remain free from self-harm  Description: INTERVENTIONS:  - Apply the least restrictive restraint to prevent harm  - Notify patient and family of reasons restraints applied  - Assess for any contributing factors to confusion (electrolyte disturbances, delirium, medications)  - Discontinue any unnecessary medical devices as soon as possible  - Assess the patient's physical comfort, circulation,  skin condition, hydration, nutrition and elimination needs   - Reorient and redirection as needed  - Assess for the need to continue restraints  Outcome: Completed     Problem: GASTROINTESTINAL - ADULT  Goal: Minimal or absence of nausea and vomiting  Description: INTERVENTIONS:  - Maintain adequate hydration with IV or PO as ordered and tolerated  - Nasogastric tube to low intermittent suction as ordered  - Evaluate effectiveness of ordered antiemetic medications  - Provide nonpharmacologic comfort measures as appropriate  - Advance diet as tolerated, if ordered  - Obtain nutritional consult as needed  - Evaluate fluid balance  Outcome: Completed  Goal: Maintains or returns to baseline bowel function  Description: INTERVENTIONS:  - Assess bowel function  - Maintain adequate hydration with IV or PO as ordered and tolerated  - Evaluate effectiveness of GI medications  - Encourage mobilization and activity  - Obtain nutritional consult as needed  - Establish a toileting routine/schedule  - Consider collaborating with pharmacy to review patient's medication profile  Outcome: Completed  Goal: Maintains adequate nutritional intake (undernourished)  Description: INTERVENTIONS:  - Monitor percentage of each meal consumed  - Identify factors contributing to decreased intake, treat as appropriate  - Assist with meals as needed  - Monitor I&O, WT and lab values  - Obtain nutritional consult as needed  - Optimize oral hygiene and moisture  - Encourage food from home; allow for food preferences  - Enhance eating environment  Outcome: Completed     Problem: GENITOURINARY - ADULT  Goal: Absence of urinary retention  Description: INTERVENTIONS:  - Assess patient’s ability to void and empty bladder  - Monitor intake/output and perform bladder scan as needed  - Follow urinary retention protocol/standard of care  - Consider collaborating with pharmacy to review patient's medication profile  - Implement strategies to promote bladder  emptying  Outcome: Completed     Problem: SKIN/TISSUE INTEGRITY - ADULT  Goal: Skin integrity remains intact  Description: INTERVENTIONS  - Assess and document risk factors for pressure ulcer development  - Assess and document skin integrity  - Monitor for areas of redness and/or skin breakdown  - Initiate interventions, skin care algorithm/standards of care as needed  Outcome: Completed     Problem: MUSCULOSKELETAL - ADULT  Goal: Return mobility to safest level of function  Description: INTERVENTIONS:  - Assess patient stability and activity tolerance for standing, transferring and ambulating w/ or w/o assistive devices  - Assist with transfers and ambulation using safe patient handling equipment as needed  - Ensure adequate protection for wounds/incisions during mobilization  - Obtain PT/OT consults as needed  - Advance activity as appropriate  - Communicate ordered activity level and limitations with patient/family  Outcome: Completed     Problem: NEUROLOGICAL - ADULT  Goal: Achieves stable or improved neurological status  Description: INTERVENTIONS  - Assess for and report changes in neurological status  - Initiate measures to prevent increased intracranial pressure  - Maintain blood pressure and fluid volume within ordered parameters to optimize cerebral perfusion and minimize risk of hemorrhage  - Monitor temperature, glucose, and sodium. Initiate appropriate interventions as ordered  Outcome: Completed  Goal: Achieves maximal functionality and self care  Description: INTERVENTIONS  - Monitor swallowing and airway patency with patient fatigue and changes in neurological status  - Encourage and assist patient to increase activity and self care with guidance from PT/OT  - Encourage visually impaired, hearing impaired and aphasic patients to use assistive/communication devices  Outcome: Completed     Problem: Impaired Functional Mobility  Goal: Achieve highest/safest level of mobility/gait  Description:  Interventions:  - Assess patient's functional ability and stability  - Promote increasing activity/tolerance for mobility and gait  - Educate and engage patient/family in tolerated activity level and precautions  - Recommend use of  RW for transfers and ambulation  - Recommend use of chair position in bed 3 times per day  Outcome: Completed     Problem: Impaired Activities of Daily Living  Goal: Achieve highest/safest level of independence in self care  Description: Interventions:  - Assess ability and encourage patient to participate in ADLs to maximize function  - Promote sitting position while performing ADLs such as feeding, grooming, and bathing  - Educate and encourage patient/family in tolerated functional activity level and precautions during self-care    Outcome: Completed

## 2024-11-23 NOTE — CM/SW NOTE
11/23/24 1200   Discharge disposition   Expected discharge disposition Home-Health   Post Acute Care Provider [facility]   Discharge transportation Private car     Pt discussed during nursing rounds. Pt is stable for KS today. MD KS order entered. [facility] will provide RN and therapy services at [place], liaison Malorie notified of KS home today. Pt's son confirmed pt's 24 hr CG will be available when she arrives home. Pt's family will provide transport at [place].    Plan: Home w/24 hr CG and H today.    / to remain available for support and/or discharge planning.     TRAY Velasquez    [phone]

## 2024-11-23 NOTE — PLAN OF CARE
Problem: Patient Centered Care  Goal: Patient preferences are identified and integrated in the patient's plan of care  Description: Interventions:  - What would you like us to know as we care for you? I have incredible caregivers.   - Provide timely, complete, and accurate information to patient/family  - Incorporate patient and family knowledge, values, beliefs, and cultural backgrounds into the planning and delivery of care  - Encourage patient/family to participate in care and decision-making at the level they choose  - Honor patient and family perspectives and choices  Outcome: Progressing     Problem: Patient/Family Goals  Goal: Patient/Family Long Term Goal  Description: Patient's Long Term Goal: discharge home    Interventions:  - monitor labs  -vital signs  -scheduled meds  -interventions for sleep   - See additional Care Plan goals for specific interventions  Outcome: Progressing  Goal: Patient/Family Short Term Goal  Description: Patient's Short Term Goal: sleep    Interventions:   -decrease stimuli  -blankets, pillows  -low lights  -cluster care  - See additional Care Plan goals for specific interventions  Outcome: Progressing     Problem: Safety Risk - Non-Violent Restraints  Goal: Patient will remain free from self-harm  Description: INTERVENTIONS:  - Apply the least restrictive restraint to prevent harm  - Notify patient and family of reasons restraints applied  - Assess for any contributing factors to confusion (electrolyte disturbances, delirium, medications)  - Discontinue any unnecessary medical devices as soon as possible  - Assess the patient's physical comfort, circulation, skin condition, hydration, nutrition and elimination needs   - Reorient and redirection as needed  - Assess for the need to continue restraints  Outcome: Progressing     Problem: GASTROINTESTINAL - ADULT  Goal: Minimal or absence of nausea and vomiting  Description: INTERVENTIONS:  - Maintain adequate hydration with IV or  PO as ordered and tolerated  - Nasogastric tube to low intermittent suction as ordered  - Evaluate effectiveness of ordered antiemetic medications  - Provide nonpharmacologic comfort measures as appropriate  - Advance diet as tolerated, if ordered  - Obtain nutritional consult as needed  - Evaluate fluid balance  Outcome: Progressing  Goal: Maintains or returns to baseline bowel function  Description: INTERVENTIONS:  - Assess bowel function  - Maintain adequate hydration with IV or PO as ordered and tolerated  - Evaluate effectiveness of GI medications  - Encourage mobilization and activity  - Obtain nutritional consult as needed  - Establish a toileting routine/schedule  - Consider collaborating with pharmacy to review patient's medication profile  Outcome: Progressing  Goal: Maintains adequate nutritional intake (undernourished)  Description: INTERVENTIONS:  - Monitor percentage of each meal consumed  - Identify factors contributing to decreased intake, treat as appropriate  - Assist with meals as needed  - Monitor I&O, WT and lab values  - Obtain nutritional consult as needed  - Optimize oral hygiene and moisture  - Encourage food from home; allow for food preferences  - Enhance eating environment  Outcome: Progressing     Problem: GENITOURINARY - ADULT  Goal: Absence of urinary retention  Description: INTERVENTIONS:  - Assess patient’s ability to void and empty bladder  - Monitor intake/output and perform bladder scan as needed  - Follow urinary retention protocol/standard of care  - Consider collaborating with pharmacy to review patient's medication profile  - Implement strategies to promote bladder emptying  Outcome: Progressing     Problem: SKIN/TISSUE INTEGRITY - ADULT  Goal: Skin integrity remains intact  Description: INTERVENTIONS  - Assess and document risk factors for pressure ulcer development  - Assess and document skin integrity  - Monitor for areas of redness and/or skin breakdown  - Initiate  interventions, skin care algorithm/standards of care as needed  Outcome: Progressing     Problem: MUSCULOSKELETAL - ADULT  Goal: Return mobility to safest level of function  Description: INTERVENTIONS:  - Assess patient stability and activity tolerance for standing, transferring and ambulating w/ or w/o assistive devices  - Assist with transfers and ambulation using safe patient handling equipment as needed  - Ensure adequate protection for wounds/incisions during mobilization  - Obtain PT/OT consults as needed  - Advance activity as appropriate  - Communicate ordered activity level and limitations with patient/family  Outcome: Progressing     Problem: NEUROLOGICAL - ADULT  Goal: Achieves stable or improved neurological status  Description: INTERVENTIONS  - Assess for and report changes in neurological status  - Initiate measures to prevent increased intracranial pressure  - Maintain blood pressure and fluid volume within ordered parameters to optimize cerebral perfusion and minimize risk of hemorrhage  - Monitor temperature, glucose, and sodium. Initiate appropriate interventions as ordered  Outcome: Progressing  Goal: Achieves maximal functionality and self care  Description: INTERVENTIONS  - Monitor swallowing and airway patency with patient fatigue and changes in neurological status  - Encourage and assist patient to increase activity and self care with guidance from PT/OT  - Encourage visually impaired, hearing impaired and aphasic patients to use assistive/communication devices  Outcome: Progressing     Problem: Impaired Functional Mobility  Goal: Achieve highest/safest level of mobility/gait  Description: Interventions:  - Assess patient's functional ability and stability  - Promote increasing activity/tolerance for mobility and gait  - Educate and engage patient/family in tolerated activity level and precautions  - Recommend use of  martha-walker for transfers and ambulation  Outcome: Progressing     Problem:  Impaired Activities of Daily Living  Goal: Achieve highest/safest level of independence in self care  Description: Interventions:  - Assess ability and encourage patient to participate in ADLs to maximize function  - Promote sitting position while performing ADLs such as feeding, grooming, and bathing  - Educate and encourage patient/family in tolerated functional activity level and precautions during self-care  Outcome: Progressing

## 2024-11-23 NOTE — DISCHARGE PLANNING
Christy Lowry discharged to home with belongings.  Discharge instructions reviewed with daughter; expressed understanding, no further questions.  IV access removed without complications.  Patient transported via family in private vehicle,  stable upon discharge.

## 2024-11-23 NOTE — DISCHARGE INSTRUCTIONS
Continue your low fiber/soft diet    Sometimes managing your health at home requires assistance.  The [facility] team has recognized your preference to use [facility].  They can be reached by phone at [phone].  The fax number for your reference is [phone].  A representative from the home health agency will contact you or your family to schedule your first visit.

## 2024-11-23 NOTE — DISCHARGE SUMMARY
[facility]  part of [facility]     Discharge Summary    Christy Lowry Patient Status:  Inpatient    1924 MRN [id number]   Location [facility] 5SW/SE Attending Rebecca Monteolngo MD   Hosp Day # 8 PCP Patrick Stafford MD     Date of Admission: 11/15/2024    Date of Discharge: 2024.    Admitting Diagnosis: Vomiting, unspecified vomiting type, unspecified whether nausea present [R11.10]    Discharge Diagnosis:   Patient Active Problem List   Diagnosis    Skin cancer    Moderate single current episode of major depressive disorder (HCC)    Acquired hypothyroidism    Arthralgia of right lower leg    Neoplasm of uncertain behavior of skin    Generalized anxiety disorder    Irritable bowel syndrome with diarrhea    Nausea and vomiting in adult    Persistent vomiting in adult patient    Enteritis    Vomiting    Vomiting, unspecified vomiting type, unspecified whether nausea present    Ileus (HCC)    Diffuse abdominal pain    Abnormal CT scan, gastrointestinal tract    Nausea vomiting and diarrhea    Lymphocytic colitis    Abnormal CT scan, colon       Reason for Admission:     Vomiting     Physical Exam:     General: Patient is alert and oriented x3 does not appear to be in acute distress at this time  HEENT: EOMI PERRLA, atraumatic normocephalic  Cardiac: S1-S2 appreciated  Lungs: Good air entry bilaterally clear to auscultation  Abdomen: Soft nontender nondistended positive bowel sounds  Ext: Peripheral pulses are positive  Neuro: No focal deficits noted  Psych: Normal mood  Skin: No rashes noted  MSK: Full range of motion intact      Hospital Course:     Severe enteritis, ileus  - pt presenting for recurrent nausea, vomiting (recent hospital discharge for same)  - Recent EGD w/biopsy suggestive of possible lymphocytic colitis or enteritis.  - repeat cdiff and stool culture negative  - Surgery consulted, no plans for emergent intervention for now  - cont IV Abx with IVceftriaxone  -  GI consulted  -  s/p NGT, diet advanced to low fiber soft  - cont PPI BID  - cont IV solumedrol TID- on discharge budesonide for lymphocytic colitis.      Pleural Effusions  - Patient with shortness of breath, CXR 11/15 revealed worsening pleural effusions, R>L concern for CHF  - no prior CHF history noted  - s/p 1x IV lasix  - scoplolamine patch 1x for excessive secretions   - strict I/Os  - ECHO showed EF 65-70%.  No WMA.   - wean fluids as per clinical course above      Left lower lung loculated fluid   - seen on admission imaging, current no urgent respiratory compromise noted   - Pulm consulted  - monitor for now         Dispo: Pending clinical course, PT/OT     Global A/P  -clinically improved, diet advanced, steroids transitioned to Po  PLAN FOR 8 WEEK COURSE OF PO BUDESONIDE AND PROTONIX 40 BID    History of Present Illness:     PER ADMITTING:   Patient is a 89-year-old  female who was hospitalized with a similar presentation and discharged yesterday. During her recent hospitalization, she had infectious workup including GI PCR panel and C difficile that was negative. She had upper endoscopy and flexible sigmoidoscopy. Biopsies were positive for possible lymphocytic colitis. She had small bowel followthrough which was negative for obstruction. She was able to tolerate diet and discharged home yesterday. Last night, started having recurrent nausea, vomiting, and loose bowel movements today with abdominal pain. Family brought her back for evaluation. CBC showed white blood cell count of 15.7 with left shift, with chemistry and liver function tests unremarkable. Troponin was negative. CT scan of the abdomen and pelvis showed diffuse nonspecific enteritis and right-sided colitis, persistent nodular thickening of the ascending colon. Findings are probably related to infection and less likely inflammatory bowel disease. Bibasilar pleural effusions with compression, atelectasis, probable partially  visualized pseudomass related to loculated fluid in the left lower lung. Patient will be admitted to the hospital for further evaluation by Gastroenterology.       Disposition: Home Health Care Services    Discharge Condition: Fair    Discharge Medications:   Current Discharge Medication List        START taking these medications    Details   budesonide 3 MG Oral Cap DR Particles Take 3 capsules (9 mg total) by mouth daily.  Qty: 180 capsule, Refills: 0      !! pantoprazole 40 MG Oral Tab EC Take 1 tablet (40 mg total) by mouth 2 (two) times daily before meals.  Qty: 120 tablet, Refills: 0       !! - Potential duplicate medications found. Please discuss with provider.        CONTINUE these medications which have NOT CHANGED    Details   carboxymethylcellulose 1 % Ophthalmic Gel Place 1-2 drops into both eyes as needed.      !! pantoprazole 40 MG Oral Tab EC Take 1 tablet (40 mg total) by mouth daily.  Qty: 30 tablet, Refills: 0      ondansetron (ZOFRAN) 4 mg tablet Take 1 tablet (4 mg total) by mouth every 8 (eight) hours as needed for Nausea.  Qty: 60 tablet, Refills: 0      levothyroxine 50 MCG Oral Tab Take 1 tablet (50 mcg total) by mouth before breakfast.  Qty: 90 tablet, Refills: 3      dicyclomine 10 MG Oral Cap Take 1 capsule (10 mg total) by mouth every 12 (twelve) hours as needed.  Qty: 180 capsule, Refills: 3       !! - Potential duplicate medications found. Please discuss with provider.        STOP taking these medications       escitalopram 10 MG Oral Tab        Loperamide HCl 1 MG/7.5ML Oral Liquid              TOTAL DC TIME > 30 MIN    Rebecca Montelongo MD  11/23/2024  11:57 AM     Hospital Discharge Diagnoses:  COLITIS    Lace+ Score: 65  59-90 High Risk  29-58 Medium Risk  0-28   Low Risk.    TCM Follow-Up Recommendation:  LACE > 58: High Risk of readmission after discharge from the hospital.

## 2024-11-25 ENCOUNTER — PATIENT OUTREACH (OUTPATIENT)
Dept: CASE MANAGEMENT | Age: 89
End: 2024-11-25

## 2024-11-25 ENCOUNTER — TELEPHONE (OUTPATIENT)
Dept: INTERNAL MEDICINE CLINIC | Facility: CLINIC | Age: 89
End: 2024-11-25

## 2024-11-25 DIAGNOSIS — K52.9 ENTERITIS: Primary | ICD-10-CM

## 2024-11-25 DIAGNOSIS — R11.2 NAUSEA AND VOMITING IN ADULT: ICD-10-CM

## 2024-11-25 DIAGNOSIS — Z02.9 ENCOUNTERS FOR UNSPECIFIED ADMINISTRATIVE PURPOSE: ICD-10-CM

## 2024-11-25 PROCEDURE — 1111F DSCHRG MED/CURRENT MED MERGE: CPT

## 2024-11-25 NOTE — TELEPHONE ENCOUNTER
See 11/22/2024 encounter  Patient now discharged and left message to call back to set up appointment.

## 2024-11-25 NOTE — PROGRESS NOTES
Transitional Care Management   Discharge Date: 24  Contact Date: 2024    Assessment:  TCM Initial Assessment    General:  Assessment completed with: Children  Patient Subjective: The daughter reported the patient has been fatigued at home. The patient has not had any abdominal pain, fever, diarrhea, inability to pass stool or gas, or nausea/vomiting. The patient has been tolerating a low fiber/soft diet. The patient's appetite has returned. The patient denies any worsening shortness of breath/trouble breathing , chest pain, hemoptysis, dizziness, or syncope.  Chief Complaint: Severe enteritis, ileus  -  recurrent nausea, vomiting  -  s/p NGT, diet advanced to low fiber soft  Pleural Effusions  Left lower lung loculated fluid  Verify patient name and  with patient/ caregiver: Yes    Hospital Stay/Discharge:  Tell me what you understand of why you were in the hospital or emergency department: She had intestinal problems.  Prior to leaving the hospital were your Discharge Instructions reviewed with you?: Yes  Did you receive a copy of your written Discharge Instructions?: Yes  What questions do you have about your Discharge Instructions?: Educated the family on the recommneded low fiber/soft diet.  Do you feel better or worse since you left the hospital or emergency department?: Better    Follow - Up Appointment:  Do you have a follow-up appointment?: Yes  Date: 24  Physician: Dr. Stafford  Are there any barriers to getting to your follow-up appointment?: No    Home Health/DME:  Prior to leaving the hospital was Home Health (HH) arranged for you?: Yes ([facility])  Has HH been out or set up a visit to see you?: Been out     Prior to leaving the hospital or emergency department was Durable Medical Equipment (DME), medical supplies, or infusions arranged for you?: No  Are DME/medical supply/infusions needs identified by staff during this assessment?: No     Medications/Diet:  Did any of  your medications change, during or after your hospital stay or ED visit?: Yes  Do you have your new or updated medications?: Yes  Do you understand what your medications are for and possible side effects?: Yes  Are there any reasons that keep you from taking your medication as prescribed?: No  Any concerns about medication refills?: No    Were you given a different diet per your Discharge Instructions?: Yes  Diet Type: low fiber/soft diet  Reason: enteritis  Are there any barriers to following that diet?: No     Questions/Concerns:  Do you have any questions or concerns that have not been discussed?: No       Nursing Interventions:    Patient symptoms were assessed, education was completed for signs/symptoms to monitor, and reviewed when to contact providers versus go to the emergency department/call 911.   Reviewed all discharge instructions.   Educated on a low fiber/soft diet.   All medications were reviewed and educated on the importance of taking the medications as directed.   Appointments were reviewed and stressed the importance of a close follow up with providers. A Transitional Care Management-hospital follow up appointment was scheduled.  A telephone encounter was sent to the primary care provider's office for an appointment request/review.  An outreach was sent to the [facility] Scheduling Team.  [facility] orders were confirmed and started.   Nurse care manager confirmed the patient has a 24 hour caregiver at the home.   The daughter verbalized understanding and will contact the office with any further questions or concerns.       Medication Review:     NCM did confirm the patient has discontinued the following as directed:  Escitalopram 10mg, loperamide   Current Outpatient Medications   Medication Sig Dispense Refill    budesonide 3 MG Oral Cap DR Particles Take 3 capsules (9 mg total) by mouth daily. 180 capsule 0    pantoprazole 40 MG Oral Tab EC Take 1 tablet (40 mg total) by mouth  2 (two) times daily before meals. 120 tablet 0    carboxymethylcellulose 1 % Ophthalmic Gel Place 1-2 drops into both eyes as needed.      ondansetron (ZOFRAN) 4 mg tablet Take 1 tablet (4 mg total) by mouth every 8 (eight) hours as needed for Nausea. 60 tablet 0    levothyroxine 50 MCG Oral Tab Take 1 tablet (50 mcg total) by mouth before breakfast. 90 tablet 3    dicyclomine 10 MG Oral Cap Take 1 capsule (10 mg total) by mouth every 12 (twelve) hours as needed. 180 capsule 3     Did patient review medications using current pill bottles and not just a medication list?  No; The daughter was not at the patient's home.   Discharge medications reviewed/discussed/and reconciled against outpatient medications with patient.  Any changes or updates to medications sent to primary care provider.  Patient Acknowledged    SDOH:   Transportation:   Transportation Needs: No Transportation Needs (11/15/2024)    Transportation Needs     Lack of Transportation: No     Car Seat: Not on file     Financial strain:   Financial Resource Strain: Low Risk  (11/15/2024)    Financial Resource Strain     Difficulty of Paying Living Expenses: Not very hard     Med Affordability: No       Diagnosis specifics:  Re-Admit:  Tell me what led up to your readmission: The patient has abdominal pain and nausea/vomiting.   Did you call your PCP office first? no  Did you attempt to get in with your PCP?  no  Do you feel this could have been prevented? no          Follow-up Appointments:      Transitional Care Clinic  Was TCC Ordered: No    Primary Care Provider (If no TCC appointment)  Does patient already have a PCP appointment scheduled? No  Nurse Care Manager Scheduled PCP office TCM virtual appointment for 12/4/2024.     The daughter requested to be seen by Dr. Stafford only. The daughter requested a virtual visit due to the taxing effort to leave the patient's home.     A telephone encounter was sent to the primary care physician office for an  appointment request.       Specialist  Does the patient have any other follow-up appointment(s) that need to be scheduled? Yes   -If yes: Nurse Care Manager reviewed upcoming specialist appointments with patient: Yes   -Does the patient need assistance scheduling appointment(s): Yes, message sent to TST team    CCM referral placed:  No    Book By Date: 11/30/2024.

## 2024-11-25 NOTE — TELEPHONE ENCOUNTER
Spoke to the patient's daughter (per HIPAA) today for a Transitional Care Management hospital follow up call. The patient is scheduled for a virtual Transitional Care Management hospital follow up appointment with Dr. Patrick Stafford, but a Transitional Care Management/hospital follow up appointment is recommended by 11/30/2024, as the patient is a high risk for readmission.    The daughter requested a virtual visit. The daughter also requested to be seen by Dr. Stafford only.   Please advise.    BOOK BY DATE (last date for Transitional Care Management): 12/7/2024    Please follow up with the patient's daughter and assist with scheduling a sooner Transitional Care Management/hospital follow up appointment.  Thank you!

## 2024-11-25 NOTE — PROGRESS NOTES
TCM has contacted patient and patient needs additional appointments.  Please contact patient for assistance with scheduling a follow-up appointment for  gastroenterology  .     Tosha Valle M.D.  Gastroenterology  [facility] [address] [place] [address] [phone]    It was recommended the patient follow up with gastroenterology in 2 weeks.  Thank you!

## 2024-11-25 NOTE — TELEPHONE ENCOUNTER
Left message to call back to set up appointment with Paulette Kraft PA-C  :  Please transfer to RN if patient calls back.  Thank you.

## 2024-11-26 NOTE — TELEPHONE ENCOUNTER
Patient's daughter called back,date of birth verified and f/u appointment made for 12/16/2024 at 1:00 pm with Paulette Kraft at [facility].  Daughter advised to arrive 15 minutes prior to appointment and address given.  Voiced understanding.

## 2024-11-26 NOTE — TELEPHONE ENCOUNTER
Patient's daughter states it is difficult to get her mother to an in person appointment and would prefer to keep the video appointment on 12/4/24 instead of the in person appointment on 12/2/24.  Is this ok with the doctor? Please let the patient's daughter know via [employer].

## 2024-11-26 NOTE — PROGRESS NOTES
Voicemail received; Patient daughter, Christy Betancourt returning call  Called patient's daughter back    TCM request (discharged 11/23)     Shayy Kraft PA-C  Gastroenterology  [address] [place] [address]  [phone]  Follow up 2 weeks  Left Voicemail to call [phone] to assist w/scheduling apt

## 2024-11-26 NOTE — PROGRESS NOTES
TCM request (discharged 11/23)    Shayy Kraft PA-C  Gastroenterology  [address] [place] [address]  [phone]  Follow up 2 weeks    Attempt #1:  Left message with patient daughter, Christy Betancourt on voicemail to call transitions specialist back to schedule follow up appointments. Provided Transitions specialist scheduling phone number [phone].

## 2024-11-27 NOTE — PROGRESS NOTES
TCM request (discharged 11/23)     Shayy Kraft PA-C  Gastroenterology  [address] [place] [address]  [phone]  Follow up 2 weeks  Patient has existing appointment Mon 12/16 @1:00pm (patient's daughter call office and made appointment)  Closing encounter

## 2024-12-04 ENCOUNTER — TELEMEDICINE (OUTPATIENT)
Dept: INTERNAL MEDICINE CLINIC | Facility: CLINIC | Age: 89
End: 2024-12-04
Payer: MEDICARE

## 2024-12-04 DIAGNOSIS — K56.7 ILEUS (HCC): ICD-10-CM

## 2024-12-04 DIAGNOSIS — K52.832 LYMPHOCYTIC COLITIS: Primary | ICD-10-CM

## 2024-12-04 PROCEDURE — 99495 TRANSJ CARE MGMT MOD F2F 14D: CPT | Performed by: INTERNAL MEDICINE

## 2024-12-04 NOTE — PROGRESS NOTES
Subjective:   Christy Lowry is a 89 year old female who presents for hospital follow up.   She was discharged from [facility] to [facility]  Admission Date: 11/15/24   Discharge Date: 11/23/24  Hospital Discharge Diagnosis: Lymphocytic colitis, ileus    Interactive contact within 2 business days post discharge first initiated on Date: 11/25/2024    During the visit, the following was completed:  Obtained and reviewed discharge summary, continuity of care documents, and Hospitalist notes  Reviewed Labs (CBC, CMP)    HPI: Discharge summary copied    Patient is a 89-year-old  female who was hospitalized with a similar presentation and discharged yesterday. During her recent hospitalization, she had infectious workup including GI PCR panel and C difficile that was negative. She had upper endoscopy and flexible sigmoidoscopy. Biopsies were positive for possible lymphocytic colitis. She had small bowel followthrough which was negative for obstruction. She was able to tolerate diet and discharged home yesterday. Last night, started having recurrent nausea, vomiting, and loose bowel movements today with abdominal pain. Family brought her back for evaluation. CBC showed white blood cell count of 15.7 with left shift, with chemistry and liver function tests unremarkable. Troponin was negative. CT scan of the abdomen and pelvis showed diffuse nonspecific enteritis and right-sided colitis, persistent nodular thickening of the ascending colon. Findings are probably related to infection and less likely inflammatory bowel disease. Bibasilar pleural effusions with compression, atelectasis, probable partially visualized pseudomass related to loculated fluid in the left lower lung. Patient will be admitted to the hospital for further evaluation by Gastroenterology.            Hospital Course:      Severe enteritis, ileus  - pt presenting for recurrent nausea, vomiting (recent hospital discharge for  same)  - Recent EGD w/biopsy suggestive of possible lymphocytic colitis or enteritis.  - repeat cdiff and stool culture negative  - Surgery consulted, no plans for emergent intervention for now  - cont IV Abx with IVceftriaxone  - GI consulted  -  s/p NGT, diet advanced to low fiber soft  - cont PPI BID  - cont IV solumedrol TID- on discharge budesonide for lymphocytic colitis.      Pleural Effusions  - Patient with shortness of breath, CXR 11/15 revealed worsening pleural effusions, R>L concern for CHF  - no prior CHF history noted  - s/p 1x IV lasix  - scoplolamine patch 1x for excessive secretions   - strict I/Os  - ECHO showed EF 65-70%.  No WMA.   - wean fluids as per clinical course above      Left lower lung loculated fluid   - seen on admission imaging, current no urgent respiratory compromise noted   - Pulm consulted  - monitor for now         Dispo: Pending clinical course, PT/OT     Global A/P  -clinically improved, diet advanced, steroids transitioned to Po  PLAN FOR 8 WEEK COURSE OF PO BUDESONIDE AND PROTONIX 40 BID    Patient was accompanied with daughter for telemedicine TCM visit.  She has no more recurrence of vomiting.  She is tolerating budesonide and Protonix.  She does have a follow-up with gastroenterology next week.  Overall she is feeling better.  She is off Lexapro.       History/Other:   Current Medications:  Medication Reconciliation:  I am aware of an inpatient discharge within the last 30 days.  The discharge medication list has been reconciled with the patient's current medication list and reviewed by me. See medication list for additions of new medication, and changes to current doses of medications and discontinued medications.  No outpatient medications have been marked as taking for the 12/4/24 encounter (Telemedicine) with Patrick Stafford MD.       Review of Systems   Constitutional:  Negative for activity change, appetite change and fever.   HENT:  Negative for congestion and  voice change.    Respiratory:  Negative for cough and shortness of breath.    Cardiovascular:  Negative for chest pain.   Gastrointestinal:  Negative for abdominal distention, abdominal pain and vomiting.   Genitourinary:  Negative for hematuria.   Skin:  Negative for wound.   Psychiatric/Behavioral:  Negative for behavioral problems.          Objective:   Patient is alert oriented.  No distress.  Head nose appears normal.  Cooperative.   Alert orient and answering questions appropriately.    There were no vitals taken for this visit. Estimated body mass index is 24.1 kg/m² as calculated from the following:    Height as of 11/5/24: 5' 4\" (1.626 m).    Weight as of 11/19/24: 140 lb 6.4 oz (63.7 kg).    Assessment & Plan:   1. Lymphocytic colitis (Primary)  2. Ileus (HCC)    Plan  Ileus has been resolved  Continue budesonide and Protonix as prescribed.  She/daughter will discuss with gastroenterology regarding the duration of both medications.  Continue to monitor without Lexapro.  I will see her back in the office in 4 months      No follow-ups on file.

## 2024-12-16 ENCOUNTER — TELEPHONE (OUTPATIENT)
Facility: CLINIC | Age: 89
End: 2024-12-16

## 2024-12-16 ENCOUNTER — OFFICE VISIT (OUTPATIENT)
Facility: CLINIC | Age: 89
End: 2024-12-16
Payer: MEDICARE

## 2024-12-16 VITALS
BODY MASS INDEX: 23.9 KG/M2 | HEART RATE: 90 BPM | WEIGHT: 140 LBS | DIASTOLIC BLOOD PRESSURE: 74 MMHG | SYSTOLIC BLOOD PRESSURE: 131 MMHG | HEIGHT: 64 IN

## 2024-12-16 DIAGNOSIS — K52.832 LYMPHOCYTIC COLITIS: Primary | ICD-10-CM

## 2024-12-16 DIAGNOSIS — R11.2 NAUSEA AND VOMITING IN ADULT: ICD-10-CM

## 2024-12-16 DIAGNOSIS — R11.15 PERSISTENT VOMITING IN ADULT PATIENT: ICD-10-CM

## 2024-12-16 PROCEDURE — 99214 OFFICE O/P EST MOD 30 MIN: CPT | Performed by: PHYSICIAN ASSISTANT

## 2024-12-16 RX ORDER — BUDESONIDE 3 MG/1
6 CAPSULE, COATED PELLETS ORAL EVERY MORNING
Qty: 120 CAPSULE | Refills: 0 | Status: SHIPPED | OUTPATIENT
Start: 2025-01-22 | End: 2025-03-23

## 2024-12-16 RX ORDER — PANTOPRAZOLE SODIUM 40 MG/1
40 TABLET, DELAYED RELEASE ORAL
Qty: 90 TABLET | Refills: 4 | Status: SHIPPED | OUTPATIENT
Start: 2025-01-22 | End: 2026-04-17

## 2024-12-16 NOTE — TELEPHONE ENCOUNTER
I called the pharmacy back.  I reviewed below plan from Paulette's office note with the pharmacist to clarify the order.    \"Recommendations:  - continue 9 mg of budesonide for 8 weeks (done 1/22/2025)  - decrease to 6 mg of budesonide for 4 weeks   - phone call visit mid February   - decrease pantoprazole 40 mg twice a day then decrease to once a day on 1/22/2025\"

## 2024-12-16 NOTE — PATIENT INSTRUCTIONS
Recommendations:  - continue 9 mg of budesonide for 8 weeks (done 1/22/2025)  - decrease to 6 mg of budesonide for 4 weeks   - phone call visit mid February   - decrease pantoprazole 40 mg twice a day then decrease to once a day on 1/22/2025

## 2024-12-16 NOTE — PROGRESS NOTES
[facility] - Gastroenterology                                                                                                               Reason for consult:   Chief Complaint   Patient presents with    ER F/U       Requesting physician or provider: Patrick Stafford MD      HPI:   Christy Lowry is a 89 year old year-old female with history of OA, GERD, squamous cell carcinoma, who presents with ongoing n/v/d, abdominal pain now near 2 weeks. CT showed diffuse enterocolitis, initially felt to be infectious. SBFT negative for bowel obstruction. C diff and GI stool PCR negative. She was readmitted for ongoing symptoms. Repeat CT A/P showed diffuse enterocolitis, no obstruction. Repeat stool culture and C.diff were negative. She underwent EGD and flex sig last week and colonic biopsies showed increased IELs c/w lymphocytic colitis. CRP 2. Could be slowly resolving infectious process vs inflammatory process in the setting of newly diagnosed lymphocytic colitis. Empiric IV steroids were initiated 11/15/24, with initial improvement in symptoms though recurrence of ABD cramping, nausea and vomiting with contrast for CTE. Discharged with 9 mg budesonide for 8 week course, advised to follow up with GI for next step.     Follow up  -no abdominal pain  -has been puree all food  -has been having all food types  -incorporating fiber  -having 2-3 BM overnight and 1 during the day   -more formed   -brown in color, no bright red blood or dark black stools  -no nausea or vomiting  -has a good appetite  -no dysphagia or GERD   -no bloating    Pertinent Family Hx:  - No known history of esophageal, gastric cancers.  Mother + colon cancer, Sister + colon CA.   - No known IBD  - No known liver pathologies     Endoscopy Hx:  -EGD/flexible sigmoidoscopy 11/9/2024  Impression:  No etiology for symptoms found, possible infectious     Recommend:  Await pathology  NPO given slowed motility and  vomiting recently   Antiemetics  IVF  Final Diagnosis:      A. 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  Associated epithelial atypia, favor reactive/regnerative.  Negative for intestinal metaplasia or dysplasia.  Diff-Quik stain (with appropriate control reactivity) is negative for H. pylori microorganisms.     B. Random colon; biopsy:  Colonic mucosa with increased intraepithelial lymphocytes, see comment.     Comment: The findings in the random colon biopsy (part B) can be seen in lymphocytic (microscopic) colitis, infection (particularly viral), and drug effects. Clinical and endoscopic correlation is recommended.     - >10 years ago colonoscopy performed at [facility], findings per patient: Unremarkable      Social Hx:  - No current tobacco use  - + social ETOH  - Denies cannabis/illicit drug use  - Denies NSAIDs  - Has care taker  - Occupation: Retired    Wt Readings from Last 6 Encounters:   12/16/24 140 lb (63.5 kg)   11/19/24 140 lb 6.4 oz (63.7 kg)   11/05/24 152 lb (68.9 kg)   10/31/24 128 lb 1.4 oz (58.1 kg)   07/18/23 128 lb (58.1 kg)   07/08/23 136 lb 14.5 oz (62.1 kg)        History, Medications, Allergies, ROS:      Past Medical History:    Actinic keratosis    hypertrophic AK - left central forehead    Benzodiazepine withdrawal without complication (HCC)    Chronic GERD    Constipation    Diarrhea    Eustachian tube dysfunction    Heart palpitations    Loose stools    Occult blood in stools    SCC (squamous cell carcinoma)    left forehead, invasive, well-diff.     SCC (squamous cell carcinoma)    right temple    SCC (squamous cell carcinoma)    right neck    Skin cancer      Past Surgical History:   Procedure Laterality Date    Tonsillectomy        Family Hx:   Family History   Problem Relation Age of Onset    Colon Cancer Mother     Colon Cancer Sister       Social History:   Social History     Socioeconomic History     Marital status:    Tobacco Use    Smoking status: Former    Smokeless tobacco: Former   Vaping Use    Vaping status: Never Used   Substance and Sexual Activity    Alcohol use: Not Currently    Drug use: No   Other Topics Concern    Reaction to local anesthetic No     Social Drivers of Health     Financial Resource Strain: Low Risk  (11/15/2024)    Financial Resource Strain     Difficulty of Paying Living Expenses: Not very hard     Med Affordability: No   Food Insecurity: No Food Insecurity (11/15/2024)    Food Insecurity     Food Insecurity: Never true   Transportation Needs: No Transportation Needs (11/15/2024)    Transportation Needs     Lack of Transportation: No   Housing Stability: Low Risk  (11/15/2024)    Housing Stability     Housing Instability: No        Medications (Active prior to today's visit):  Current Outpatient Medications   Medication Sig Dispense Refill    [START ON 1/22/2025] pantoprazole 40 MG Oral Tab EC Take 1 tablet (40 mg total) by mouth every morning before breakfast. 90 tablet 4    [START ON 1/22/2025] budesonide 3 MG Oral Cap DR Particles Take 2 capsules (6 mg total) by mouth every morning. Take 3 capsules (9mg) every day in the morning for up to 8 weeks 120 capsule 0    budesonide 3 MG Oral Cap DR Particles Take 3 capsules (9 mg total) by mouth daily. 180 capsule 0    pantoprazole 40 MG Oral Tab EC Take 1 tablet (40 mg total) by mouth 2 (two) times daily before meals. 120 tablet 0    carboxymethylcellulose 1 % Ophthalmic Gel Place 1-2 drops into both eyes as needed.      ondansetron (ZOFRAN) 4 mg tablet Take 1 tablet (4 mg total) by mouth every 8 (eight) hours as needed for Nausea. 60 tablet 0    levothyroxine 50 MCG Oral Tab Take 1 tablet (50 mcg total) by mouth before breakfast. 90 tablet 3    dicyclomine 10 MG Oral Cap Take 1 capsule (10 mg total) by mouth every 12 (twelve) hours as needed. 180 capsule 3       Allergies:  Allergies[1]    ROS:   CONSTITUTIONAL: negative for  fevers, chills, sweats and weight loss  EYES Negative for red eyes, yellow eyes, changes in vision  HEENT: Negative for dysphagia and hoarseness  RESPIRATORY: Negative for cough and shortness of breath  CARDIOVASCULAR: Negative for chest pain, palpitations  GASTROINTESTINAL: See HPI  GENITOURINARY: Negative for dysuria and frequency  MUSCULOSKELETAL: Negative for arthralgias and myalgias  NEUROLOGICAL: Negative for dizziness and headaches  BEHAVIOR/PSYCH: Negative for anxiety and poor appetite    PHYSICAL EXAM:   Blood pressure 131/74, pulse 90, height 5' 4\" (1.626 m), weight 140 lb (63.5 kg).    GEN: WD/WN, NAD  HEENT: Supple symmetrical, trachea midline  CV: RRR, the extremities are warm and well perfused   LUNGS: No increased work of breathing  ABDOMEN: No scars, normal bowel sounds, soft, non-tender, non-distended no rebound or guarding, no masses, no hepatomegaly  MSK: No redness, no warmth, no swelling of joints  SKIN: No jaundice, no erythema, no rashes  HEMATOLOGIC: No bleeding, no bruising  NEURO: Alert and interactive, normal gait    Labs/Imaging/Procedures:     Patient's pertinent labs and imaging were reviewed and discussed with patient today.     Lab Results   Component Value Date    WBC 15.9 (H) 11/23/2024    RBC 4.78 11/23/2024    HGB 14.2 11/23/2024    HCT 42.5 11/23/2024    MCV 88.9 11/23/2024    MCH 29.7 11/23/2024    MCHC 33.4 11/23/2024    RDW 15.6 (H) 11/23/2024    .0 11/23/2024    MPV 9.5 06/14/2018        Lab Results   Component Value Date     (H) 11/23/2024    BUN 17 11/23/2024    BUNCREA 24.6 (H) 11/23/2024    CREATSERUM 0.69 11/23/2024    ANIONGAP 6 11/23/2024    GFRNAA 56 (L) 10/19/2020    GFRAA 64 10/19/2020    CA 8.3 (L) 11/23/2024    OSMOCALC 286 11/23/2024    ALKPHO 111 11/15/2024    AST 35 (H) 11/15/2024    ALT 16 11/15/2024    ALKPHOS 84 07/12/2016    BILT 0.3 11/15/2024    TP 6.9 11/15/2024    ALB 3.4 11/15/2024    GLOBULIN 3.1 11/10/2024    AGRATIO 0.9 (L) 07/12/2016      2024    K 4.7 2024     2024    CO2 29.0 2024        XR ABDOMEN (1 VIEW) (CPT=74018)    Result Date: 2024         PROCEDURE: XR ABDOMEN (1 VIEW) (CPT=74018)  COMPARISON: None.  INDICATIONS: Vomiting and abdominal distension today.  TECHNIQUE:   Single view.   FINDINGS/IMPRESSION:   There are moderately distended gas-filled loops of small bowel throughout the abdomen and pelvis measuring up to 3.5 cm. The findings could represent a partial or early total small-bowel obstruction, or an ileus.  These dilated loops of bowel appear to be slightly increased when compared CT dated 11/15/2024.    Dictated by (CST): Lauri Fortune MD on 2024 at 6:24 PM     Finalized by (CST): Lauri Fortune MD on 2024 at 6:26 PM          CARD ECHO 2D DOPPLER CONTRAST (CPT=93306)    Result Date: 2024  Transthoracic Echocardiogram Name:Christy Lowry Date: 2024 :  1924 Ht:  (64in)  BP: 128 / 55 MRN:  [id number]    Age:  89years   Wt:  (140lb) HR: 77bpm Loc:  [facility]       Gndr: F          BSA: 1.68m^2 Sonographer: David VERDUGO Ordering:    Lexii Meade Consulting:  Tosha Valle ---------------------------------------------------------------------------- History/Indications:   Congestive heart failure.  Risk factors:  Vomiting. ---------------------------------------------------------------------------- Procedure information:  A transthoracic complete 2D study was performed. Additional evaluation included M-mode, complete spectral Doppler, and color Doppler.  Patient status:  Inpatient.  Location:  Bedside.    No prior study was available for comparison.    This was a routine study. Transthoracic echocardiography for diagnosis and ventricular function evaluation. Image quality was suboptimal. The study was technically limited due to poor acoustic window availability, restricted patient mobility, body habitus, and patient sitting up. Intravenous contrast  (Definity) was administered to evaluate left ventricular function and opacify the LV. ECG rhythm:   Normal sinus ---------------------------------------------------------------------------- Conclusions: 1. Left ventricle: The cavity size was normal. Wall thickness was normal.    Systolic function was vigorous. The estimated ejection fraction was    65-70%, by visual assessment. No diagnostic evidence for regional wall    motion abnormalities. Left ventricular diastolic function parameters were    normal for the patient's age. 2. Left atrium: The left atrial volume was normal. 3. Aortic valve: The peak systolic velocity was 1.6m/sec. 4. Pericardium, extracardiac: There was a left pleural effusion. Impressions:  No previous study was available for comparison. * ---------------------------------------------------------------------------- * Findings: Left ventricle:  The cavity size was normal. Wall thickness was normal. Systolic function was vigorous. The estimated ejection fraction was 65-70%, by visual assessment. No diagnostic evidence for regional wall motion abnormalities. Left ventricular diastolic function parameters were normal for the patient's age. Left atrium:  The left atrial volume was normal. Right ventricle:  The cavity size was normal. Systolic function was normal. Right atrium:  The atrium was normal in size. Mitral valve:  The annulus was mildly calcified. Leaflet separation was normal.  Doppler:  Transvalvular velocity was within the normal range. There was no evidence for stenosis. There was no significant regurgitation. Aortic valve:   The valve was probably trileaflet. The leaflets were mildly thickened and mildly calcified. Cusp separation was normal.  Doppler: Transvalvular velocity was within the normal range. There was no evidence for stenosis. There was no significant regurgitation.    The peak systolic gradient was 10mm Hg. Tricuspid valve:  The valve is structurally normal. Leaflet  separation was normal.  Doppler:  Transvalvular velocity was within the normal range. There was no evidence for stenosis. There was trace regurgitation. Pulmonic valve:   The valve is structurally normal. Cusp separation was normal.  Doppler:  Transvalvular velocity was within the normal range. There was no evidence for stenosis. There was trivial regurgitation. Pericardium:   There was no pericardial effusion. Pleura:  There was a left pleural effusion. Aorta: Aortic root: The aortic root was normal. Ascending aorta: The ascending aorta was normal. Pulmonary arteries: Systolic pressure could not be accurately estimated. Systemic veins:  Central venous respirophasic diameter changes are in the normal range (>50%). Inferior vena cava: The IVC was normal-sized. ---------------------------------------------------------------------------- Measurements  Left ventricle                    Value        Ref  IVS thickness, ED, PLAX           0.9   cm     0.6 -                                                 0.9  LV ID, ED, PLAX                   4.2   cm     3.8 -                                                 5.2  LV PW thickness, ED, PLAX     (H) 1.0   cm     0.6 -                                                 0.9  IVS/LV PW ratio, ED, PLAX         0.90         --------  LV PW/LV ID ratio, ED, PLAX       0.24         --------  Stroke volume/bsa, 2D             55    ml/m^2 --------  LV e', lateral                (L) 9.0   cm/sec >=10.0  LV E/e', lateral                  8            <=13  LV e', medial                 (L) 6.1   cm/sec >=7.0  LV E/e', medial                   12           --------  LV e', average                    7.6   cm/sec --------  LV E/e', average                  10           <=14  LVOT                              Value        Ref  LVOT ID                           1.9   cm     --------  LVOT peak velocity, S             1.67  m/sec  --------  LVOT VTI, S                       32.4  cm      --------  LVOT peak gradient, S             11    mm Hg  --------  LVOT mean gradient, S             6     mm Hg  --------  Stroke volume (SV), LVOT DP       92    ml     --------  Stroke index (SV/bsa), LVOT       55    ml/m^2 --------  DP  Aortic valve                      Value        Ref  Aortic valve peak velocity, S     1.6   m/sec  --------  Aortic peak gradient, S           10    mm Hg  --------  Velocity ratio, peak, LVOT/AV     1.04         --------  Aortic root                       Value        Ref  Aortic root ID                    3.1   cm     2.4 -                                                 3.9  Ascending aorta                   Value        Ref  Ascending aorta ID                3.3   cm     1.9 -                                                 3.5  Left atrium                       Value        Ref  LA volume, S                      51    ml     22 - 52  LA volume/bsa, S                  30    ml/m^2 16 - 34  LA volume, ES, 1-p A4C            52    ml     22 - 52  LA volume, ES, 1-p A2C            49    ml     22 - 52  LA volume, ES, A/L                52    ml     --------  LA volume/bsa, ES, A/L            31    ml/m^2 16 - 34  Mitral valve                      Value        Ref  Mitral E-wave peak velocity       0.75  m/sec  --------  Mitral A-wave peak velocity       1     m/sec  --------  Mitral deceleration time          278   ms     --------  Mitral peak gradient, D           2     mm Hg  --------  Mitral E/A ratio, peak            0.7          --------  Systemic veins                    Value        Ref  Estimated CVP                     3     mm Hg  --------  Inferior vena cava                Value        Ref  ID                                0.9   cm     <=2.1  Right ventricle                   Value        Ref  TAPSE, 2D                         2.29  cm     >=1.70  TAPSE, MM                         2.29  cm     >=1.70  RV s', lateral                    11.1  cm/sec >=9.5 Legend: (L)   and  (H)  ed values outside specified reference range. ---------------------------------------------------------------------------- Prepared and electronically signed by Agustin Dunham 11/16/2024 18:04             .  ASSESSMENT/PLAN:   Christy Lowry is a 89 year old year-old female with history of OA, GERD, squamous cell carcinoma, who presents with ongoing n/v/d, abdominal pain now near 2 weeks. CT showed diffuse enterocolitis, initially felt to be infectious. SBFT negative for bowel obstruction. C diff and GI stool PCR negative. She was readmitted for ongoing symptoms. Repeat CT A/P showed diffuse enterocolitis, no obstruction. Repeat stool culture and C.diff were negative. She underwent EGD and flex sig last week and colonic biopsies showed increased IELs c/w lymphocytic colitis. CRP 2. Could be slowly resolving infectious process vs inflammatory process in the setting of newly diagnosed lymphocytic colitis. Empiric IV steroids were initiated 11/15/24, with initial improvement in symptoms though recurrence of ABD cramping, nausea and vomiting with contrast for CTE. Discharged with 9 mg budesonide for 8 week course, advised to follow up with GI for next step.     Nausea, vomiting, diarrhea  Diffuse abdominal pain  Lymphocytic colitis   -patient has been doing well on 9 mg budesonide  -no further abdominal pain or vomiting episodes  -has also been on pantoprazole 40 mg BID   -having 2-3 BM overnight, 1 during the day  -formed brown in color  -discussed plan to trial decrease ppi and budesonide after the 8 week ed  -should continue soft diet but able to eat what she can tolerate     Recommendations:  - continue 9 mg of budesonide for 8 weeks (done 1/22/2025)  - decrease to 6 mg of budesonide for 4 weeks   - phone call visit mid February   - decrease pantoprazole 40 mg twice a day then decrease to once a day on 1/22/2025    Orders This Visit:  No orders of the defined types were placed in this  encounter.      Meds This Visit:  Requested Prescriptions     Signed Prescriptions Disp Refills    pantoprazole 40 MG Oral Tab EC 90 tablet 4     Sig: Take 1 tablet (40 mg total) by mouth every morning before breakfast.    budesonide 3 MG Oral Cap DR Particles 120 capsule 0     Sig: Take 2 capsules (6 mg total) by mouth every morning. Take 3 capsules (9mg) every day in the morning for up to 8 weeks       Imaging & Referrals:  None      Paulette Kraft PA-C   12/16/2024        This note was partially prepared using Dragon Medical voice recognition dictation software. As a result, errors may occur. When identified, these errors have been corrected. While every attempt is made to correct errors during dictation, discrepancies may still exist.          [1] No Known Allergies

## 2024-12-16 NOTE — TELEPHONE ENCOUNTER
Current Outpatient Medications   Medication Sig Dispense Refill    [START ON 1/22/2025] budesonide 3 MG Oral Cap DR Particles Take 2 capsules (6 mg total) by mouth every morning. Take 3 capsules (9mg) every day in the morning for up to 8 weeks 120 capsule 0         Please verify direction

## 2024-12-17 NOTE — TELEPHONE ENCOUNTER
A [facility] follow up appointment was completed with Dr. Patrick Stafford on 12/4/2024. Nurse care manager closing encounter.

## 2025-01-07 ENCOUNTER — TELEPHONE (OUTPATIENT)
Facility: CLINIC | Age: OVER 89
End: 2025-01-07

## 2025-01-07 NOTE — TELEPHONE ENCOUNTER
I spoke with Home Health nurse Ayla.  She states patient has been having 2 loose stools during the day and 6 to 7 stools at night for 1 week, mild epigastric pain (level 2)  and 1 episode of nausea after eating pork tenderloin yesterday.   Today stool was formed,no nausea (after taking 1 Zofran) or vomiting,blood in the stool or fever.  Patient had a small bowel obstruction in November 2024.  Ayla asking if patient can take either Imodium or Dicyclomine to lessen the frequency so the patient and her care giver can sleep at night.  Please advise. Thank you.

## 2025-01-07 NOTE — TELEPHONE ENCOUNTER
Patient can use dicyclomine. I would avoid imodium.   If diarrhea increases should contact office and can discuss if stool studies are needed.     Paulette Kraft PA-C

## 2025-01-07 NOTE — TELEPHONE ENCOUNTER
Ayla states patient has been experiencing loose stools and was wondering if patient is ok to resume taking Imodium?  States daughter used to give Imodium to patient to control loose stools, but since November 2024 acute small bowel obstructions, patient has not been given Imodium.  Please call.  Thank you.

## 2025-01-08 ENCOUNTER — MED REC SCAN ONLY (OUTPATIENT)
Facility: CLINIC | Age: OVER 89
End: 2025-01-08

## 2025-01-14 ENCOUNTER — HOSPITAL ENCOUNTER (INPATIENT)
Facility: HOSPITAL | Age: OVER 89
LOS: 1 days | Discharge: HOME | End: 2025-01-15
Attending: EMERGENCY MEDICINE | Admitting: INTERNAL MEDICINE
Payer: MEDICARE

## 2025-01-14 ENCOUNTER — HOSPITAL ENCOUNTER (INPATIENT)
Facility: HOSPITAL | Age: OVER 89
LOS: 1 days | Discharge: HOME HEALTH | End: 2025-01-15
Attending: EMERGENCY MEDICINE | Admitting: INTERNAL MEDICINE
Payer: MEDICARE

## 2025-01-14 ENCOUNTER — APPOINTMENT (OUTPATIENT)
Dept: CT IMAGING | Facility: HOSPITAL | Age: OVER 89
End: 2025-01-14
Attending: EMERGENCY MEDICINE
Payer: MEDICARE

## 2025-01-14 DIAGNOSIS — D72.829 LEUKOCYTOSIS, UNSPECIFIED TYPE: ICD-10-CM

## 2025-01-14 DIAGNOSIS — R10.9 ABDOMINAL PAIN, ACUTE: Primary | ICD-10-CM

## 2025-01-14 DIAGNOSIS — K52.9 COLITIS: ICD-10-CM

## 2025-01-14 PROBLEM — R73.9 HYPERGLYCEMIA: Status: ACTIVE | Noted: 2025-01-14

## 2025-01-14 LAB
ALBUMIN SERPL-MCNC: 3.5 G/DL (ref 3.2–4.8)
ALBUMIN/GLOB SERPL: 1.2 {RATIO} (ref 1–2)
ALP LIVER SERPL-CCNC: 92 U/L
ALT SERPL-CCNC: 11 U/L
ANION GAP SERPL CALC-SCNC: 8 MMOL/L (ref 0–18)
AST SERPL-CCNC: 24 U/L (ref ?–34)
BASOPHILS # BLD AUTO: 0.1 X10(3) UL (ref 0–0.2)
BASOPHILS NFR BLD AUTO: 0.5 %
BILIRUB SERPL-MCNC: 0.3 MG/DL (ref 0.2–0.9)
BUN BLD-MCNC: 10 MG/DL (ref 9–23)
BUN/CREAT SERPL: 14.7 (ref 10–20)
CALCIUM BLD-MCNC: 8.9 MG/DL (ref 8.7–10.4)
CHLORIDE SERPL-SCNC: 102 MMOL/L (ref 98–112)
CO2 SERPL-SCNC: 29 MMOL/L (ref 21–32)
CREAT BLD-MCNC: 0.68 MG/DL
DEPRECATED RDW RBC AUTO: 52.3 FL (ref 35.1–46.3)
EGFRCR SERPLBLD CKD-EPI 2021: 78 ML/MIN/1.73M2 (ref 60–?)
EOSINOPHIL # BLD AUTO: 0.11 X10(3) UL (ref 0–0.7)
EOSINOPHIL NFR BLD AUTO: 0.6 %
ERYTHROCYTE [DISTWIDTH] IN BLOOD BY AUTOMATED COUNT: 15.9 % (ref 11–15)
GLOBULIN PLAS-MCNC: 3 G/DL (ref 2–3.5)
GLUCOSE BLD-MCNC: 118 MG/DL (ref 70–99)
HCT VFR BLD AUTO: 40.4 %
HGB BLD-MCNC: 13.1 G/DL
IMM GRANULOCYTES # BLD AUTO: 0.2 X10(3) UL (ref 0–1)
IMM GRANULOCYTES NFR BLD: 1.1 %
LIPASE SERPL-CCNC: 22 U/L (ref 12–53)
LYMPHOCYTES # BLD AUTO: 3.62 X10(3) UL (ref 1–4)
LYMPHOCYTES NFR BLD AUTO: 19.8 %
MCH RBC QN AUTO: 29.1 PG (ref 26–34)
MCHC RBC AUTO-ENTMCNC: 32.4 G/DL (ref 31–37)
MCV RBC AUTO: 89.8 FL
MONOCYTES # BLD AUTO: 1.22 X10(3) UL (ref 0.1–1)
MONOCYTES NFR BLD AUTO: 6.7 %
NEUTROPHILS # BLD AUTO: 13.07 X10 (3) UL (ref 1.5–7.7)
NEUTROPHILS # BLD AUTO: 13.07 X10(3) UL (ref 1.5–7.7)
NEUTROPHILS NFR BLD AUTO: 71.3 %
OSMOLALITY SERPL CALC.SUM OF ELEC: 288 MOSM/KG (ref 275–295)
PLATELET # BLD AUTO: 426 10(3)UL (ref 150–450)
POTASSIUM SERPL-SCNC: 4.3 MMOL/L (ref 3.5–5.1)
PROT SERPL-MCNC: 6.5 G/DL (ref 5.7–8.2)
RBC # BLD AUTO: 4.5 X10(6)UL
SODIUM SERPL-SCNC: 139 MMOL/L (ref 136–145)
WBC # BLD AUTO: 18.3 X10(3) UL (ref 4–11)

## 2025-01-14 PROCEDURE — 74177 CT ABD & PELVIS W/CONTRAST: CPT | Performed by: EMERGENCY MEDICINE

## 2025-01-14 PROCEDURE — 99223 1ST HOSP IP/OBS HIGH 75: CPT | Performed by: INTERNAL MEDICINE

## 2025-01-14 RX ORDER — LEVOTHYROXINE SODIUM 75 UG/1
75 TABLET ORAL DAILY
COMMUNITY
Start: 2024-12-10

## 2025-01-14 RX ORDER — SODIUM CHLORIDE 9 MG/ML
INJECTION, SOLUTION INTRAVENOUS CONTINUOUS
Status: DISCONTINUED | OUTPATIENT
Start: 2025-01-14 | End: 2025-01-15

## 2025-01-14 RX ORDER — SODIUM CHLORIDE 9 MG/ML
1000 INJECTION, SOLUTION INTRAVENOUS ONCE
Status: COMPLETED | OUTPATIENT
Start: 2025-01-14 | End: 2025-01-14

## 2025-01-14 RX ORDER — ERGOCALCIFEROL 1.25 MG/1
50000 CAPSULE, LIQUID FILLED ORAL WEEKLY
COMMUNITY
Start: 2024-12-10

## 2025-01-14 RX ORDER — LIDOCAINE HYDROCHLORIDE 20 MG/ML
1 SOLUTION ORAL; TOPICAL DAILY
COMMUNITY

## 2025-01-14 RX ORDER — HEPARIN SODIUM 5000 [USP'U]/ML
5000 INJECTION, SOLUTION INTRAVENOUS; SUBCUTANEOUS EVERY 12 HOURS SCHEDULED
Status: DISCONTINUED | OUTPATIENT
Start: 2025-01-14 | End: 2025-01-15

## 2025-01-15 VITALS
TEMPERATURE: 98 F | RESPIRATION RATE: 18 BRPM | HEART RATE: 81 BPM | HEIGHT: 60 IN | SYSTOLIC BLOOD PRESSURE: 151 MMHG | OXYGEN SATURATION: 94 % | WEIGHT: 128 LBS | BODY MASS INDEX: 25.13 KG/M2 | DIASTOLIC BLOOD PRESSURE: 59 MMHG

## 2025-01-15 LAB
ADENOVIRUS F 40/41 PCR: NEGATIVE
ALBUMIN SERPL-MCNC: 2.8 G/DL (ref 3.2–4.8)
ALBUMIN/GLOB SERPL: 1.1 {RATIO} (ref 1–2)
ALP LIVER SERPL-CCNC: 76 U/L
ALT SERPL-CCNC: <7 U/L
ANION GAP SERPL CALC-SCNC: 9 MMOL/L (ref 0–18)
AST SERPL-CCNC: 19 U/L (ref ?–34)
ASTROVIRUS PCR: NEGATIVE
BASOPHILS # BLD AUTO: 0.06 X10(3) UL (ref 0–0.2)
BASOPHILS NFR BLD AUTO: 0.5 %
BILIRUB SERPL-MCNC: 0.3 MG/DL (ref 0.2–0.9)
BILIRUB UR QL: NEGATIVE
BUN BLD-MCNC: 8 MG/DL (ref 9–23)
BUN/CREAT SERPL: 14.3 (ref 10–20)
C CAYETANENSIS DNA SPEC QL NAA+PROBE: NEGATIVE
C DIFF TOX B STL QL: NEGATIVE
CALCIUM BLD-MCNC: 8.2 MG/DL (ref 8.7–10.4)
CAMPY SP DNA.DIARRHEA STL QL NAA+PROBE: NEGATIVE
CHLORIDE SERPL-SCNC: 106 MMOL/L (ref 98–112)
CO2 SERPL-SCNC: 27 MMOL/L (ref 21–32)
COLOR UR: YELLOW
CREAT BLD-MCNC: 0.56 MG/DL
CRYPTOSP DNA SPEC QL NAA+PROBE: NEGATIVE
DEPRECATED RDW RBC AUTO: 53.1 FL (ref 35.1–46.3)
EAEC PAA PLAS AGGR+AATA ST NAA+NON-PRB: NEGATIVE
EC STX1+STX2 + H7 FLIC SPEC NAA+PROBE: NEGATIVE
EGFRCR SERPLBLD CKD-EPI 2021: 81 ML/MIN/1.73M2 (ref 60–?)
ENTAMOEBA HISTOLYTICA PCR: NEGATIVE
EOSINOPHIL # BLD AUTO: 0.19 X10(3) UL (ref 0–0.7)
EOSINOPHIL NFR BLD AUTO: 1.5 %
EPEC EAE GENE STL QL NAA+NON-PROBE: NEGATIVE
ERYTHROCYTE [DISTWIDTH] IN BLOOD BY AUTOMATED COUNT: 15.8 % (ref 11–15)
ETEC LTA+ST1A+ST1B TOX ST NAA+NON-PROBE: NEGATIVE
GIARDIA LAMBLIA PCR: NEGATIVE
GLOBULIN PLAS-MCNC: 2.5 G/DL (ref 2–3.5)
GLUCOSE BLD-MCNC: 87 MG/DL (ref 70–99)
GLUCOSE UR-MCNC: NORMAL MG/DL
HCT VFR BLD AUTO: 36.8 %
HGB BLD-MCNC: 12.2 G/DL
IMM GRANULOCYTES # BLD AUTO: 0.16 X10(3) UL (ref 0–1)
IMM GRANULOCYTES NFR BLD: 1.2 %
KETONES UR-MCNC: NEGATIVE MG/DL
LEUKOCYTE ESTERASE UR QL STRIP.AUTO: 500
LYMPHOCYTES # BLD AUTO: 4.33 X10(3) UL (ref 1–4)
LYMPHOCYTES NFR BLD AUTO: 33.3 %
MCH RBC QN AUTO: 30.5 PG (ref 26–34)
MCHC RBC AUTO-ENTMCNC: 33.2 G/DL (ref 31–37)
MCV RBC AUTO: 92 FL
MONOCYTES # BLD AUTO: 1.04 X10(3) UL (ref 0.1–1)
MONOCYTES NFR BLD AUTO: 8 %
NEUTROPHILS # BLD AUTO: 7.24 X10 (3) UL (ref 1.5–7.7)
NEUTROPHILS # BLD AUTO: 7.24 X10(3) UL (ref 1.5–7.7)
NEUTROPHILS NFR BLD AUTO: 55.5 %
NOROVIRUS GI/GII PCR: NEGATIVE
OSMOLALITY SERPL CALC.SUM OF ELEC: 292 MOSM/KG (ref 275–295)
P SHIGELLOIDES DNA STL QL NAA+PROBE: NEGATIVE
PH UR: 8 [PH] (ref 5–8)
PLATELET # BLD AUTO: 348 10(3)UL (ref 150–450)
POTASSIUM SERPL-SCNC: 3.7 MMOL/L (ref 3.5–5.1)
PROT SERPL-MCNC: 5.3 G/DL (ref 5.7–8.2)
PROT UR-MCNC: 30 MG/DL
RBC # BLD AUTO: 4 X10(6)UL
RBC #/AREA URNS AUTO: >10 /HPF
ROTAVIRUS A PCR: NEGATIVE
SALMONELLA DNA SPEC QL NAA+PROBE: NEGATIVE
SAPOVIRUS PCR: NEGATIVE
SHIGELLA SP+EIEC IPAH ST NAA+NON-PROBE: NEGATIVE
SODIUM SERPL-SCNC: 142 MMOL/L (ref 136–145)
SP GR UR STRIP: >1.03 (ref 1–1.03)
UROBILINOGEN UR STRIP-ACNC: NORMAL
V CHOLERAE DNA SPEC QL NAA+PROBE: NEGATIVE
VIBRIO DNA SPEC NAA+PROBE: NEGATIVE
WBC # BLD AUTO: 13 X10(3) UL (ref 4–11)
YERSINIA DNA SPEC NAA+PROBE: NEGATIVE

## 2025-01-15 PROCEDURE — 99239 HOSP IP/OBS DSCHRG MGMT >30: CPT | Performed by: HOSPITALIST

## 2025-01-15 PROCEDURE — 99223 1ST HOSP IP/OBS HIGH 75: CPT | Performed by: INTERNAL MEDICINE

## 2025-01-15 NOTE — H&P
[facility]  part of [facility]    History and Physical    Christy Lowry Patient Status:  Emergency    1924 MRN [id number]   Location [facility] EMERGENCY DEPARTMENT Attending Rommel Brown MD   Hosp Day # 0 PCP Patrick Stafford MD     Date:  2025  Date of Admission:  2025    History provided by:patient  HPI:     Chief Complaint   Patient presents with    Abdomen/Flank Pain     HPI    Christy Lowry, a 89-year-old female with a history of lymphocytic colitis/enteritis and hypothyroidism, presented to the emergency room with abdominal pain.  In The ED hemodynamically stable.  CT abd pelvis revealed  8x7.5 cm mass-like wall thickening at the cecum and ascending colon, with associated dilated fluid-filled small bowel loops and circumferential wall thickening upstream from cecum.      Regarding lymphocytic colitis/enteritis diagnosed in 2024 during previous admission.  Currently on Budesonide 9 mg daily.  Has followed with GI as an outpatient.      Gastroenterology consulted and patient admitted for further symptomatic treatment and evaluation.     History     Past Medical History:    Actinic keratosis    hypertrophic AK - left central forehead    Benzodiazepine withdrawal without complication (HCC)    Chronic GERD    Constipation    Diarrhea    Eustachian tube dysfunction    Heart palpitations    Loose stools    Occult blood in stools    SCC (squamous cell carcinoma)    left forehead, invasive, well-diff.     SCC (squamous cell carcinoma)    right temple    SCC (squamous cell carcinoma)    right neck    Skin cancer     Past Surgical History:   Procedure Laterality Date    Tonsillectomy       Family History   Problem Relation Age of Onset    Colon Cancer Mother     Colon Cancer Sister      Social History:  Social History     Socioeconomic History    Marital status:    Tobacco Use    Smoking status: Former    Smokeless tobacco: Former   Vaping Use    Vaping  status: Never Used   Substance and Sexual Activity    Alcohol use: Not Currently    Drug use: No   Other Topics Concern    Reaction to local anesthetic No     Social Drivers of Health     Financial Resource Strain: Low Risk  (11/15/2024)    Financial Resource Strain     Difficulty of Paying Living Expenses: Not very hard     Med Affordability: No   Food Insecurity: No Food Insecurity (11/15/2024)    Food Insecurity     Food Insecurity: Never true   Transportation Needs: No Transportation Needs (11/15/2024)    Transportation Needs     Lack of Transportation: No   Housing Stability: Low Risk  (11/15/2024)    Housing Stability     Housing Instability: No     Allergies/Medications:   Allergies: Allergies[1]  (Not in a hospital admission)      Review of Systems:     Constitutional: Negative.    HENT: Negative.     Eyes: Negative.    Respiratory: Negative.     Cardiovascular: Negative.    Gastrointestinal:  Positive for abdominal pain.   Endocrine: Negative.    Genitourinary: Negative.    Musculoskeletal: Negative.    Skin: Negative.    Allergic/Immunologic: Negative.    Neurological: Negative.    Hematological: Negative.    Psychiatric/Behavioral: Negative.         Physical Exam:   Vital Signs:  Blood pressure 146/83, pulse 94, temperature 98.3 °F (36.8 °C), temperature source Oral, resp. rate 25, height 5' (1.524 m), weight 130 lb (59 kg), SpO2 95%.  Physical Exam  Constitutional:       Appearance: She is ill-appearing.   HENT:      Head: Normocephalic.      Nose: Nose normal.      Mouth/Throat:      Mouth: Mucous membranes are moist.   Eyes:      Pupils: Pupils are equal, round, and reactive to light.   Cardiovascular:      Pulses: Normal pulses.      Heart sounds: Normal heart sounds.   Pulmonary:      Effort: Pulmonary effort is normal.   Abdominal:      General: Abdomen is flat. There is distension.      Tenderness: There is abdominal tenderness.   Skin:     General: Skin is warm.   Neurological:      General: No  focal deficit present.      Mental Status: She is alert.       Results:     Lab Results   Component Value Date    WBC 18.3 (H) 01/14/2025    HGB 13.1 01/14/2025    HCT 40.4 01/14/2025    .0 01/14/2025    CREATSERUM 0.68 01/14/2025    BUN 10 01/14/2025     01/14/2025    K 4.3 01/14/2025     01/14/2025    CO2 29.0 01/14/2025     (H) 01/14/2025    CA 8.9 01/14/2025    ALB 3.5 01/14/2025    ALKPHO 92 01/14/2025    BILT 0.3 01/14/2025    TP 6.5 01/14/2025    AST 24 01/14/2025    ALT 11 01/14/2025    T4F 1.3 05/30/2023    TSH 5.400 (H) 05/30/2023    LIP 22 01/14/2025    CRP <0.40 11/19/2024    MG 2.0 11/23/2024    PHOS 3.3 11/21/2024    TROPHS 11 11/15/2024     CT ABDOMEN+PELVIS(CONTRAST ONLY)(CPT=74177)    Result Date: 1/14/2025  CONCLUSION:  1. Approximate 8 x 7.5 cm focus of masslike wall thickening at the cecum and ascending colon.  There are associated mildly dilated and fluid-filled mid to distal small bowel loops upstream to the aforementioned masslike colonic wall thickening and some of the small bowel loops also demonstrate circumferential wall thickening.  Differential considerations include an infectious/inflammatory enterocolitis with partial resultant stricture or potentially a partially obstructing proximal colonic neoplasm.  Colonoscopy correlation is suggested for complete evaluation (note that prior flexible sigmoidoscopy in November, 2024 only extended to the distal descending colon).  No free intraperitoneal air or well-defined/drainable intra-abdominal collection. 2. Colonic diverticulosis. 3. Circumferential urinary bladder wall thickening, which may relate to incomplete distention or cystitis.  If there are referable symptoms, urinalysis correlation is requested. 4. Coronary and peripheral atherosclerosis. 5. Stable small cystic lesion in the pancreatic body.  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 6. Small to moderate left and small right pleural effusions.  Additional partially loculated pleural fluid in the dependent left lung.  Small 0.5 cm right lower lobe lung nodule is nonspecific and was likely obscured by airspace disease on prior abdominal CT. 7. Lesser incidental findings as above.   Dictated by (CST): Dakotah Ochoa MD on 1/14/2025 at 8:50 PM     Finalized by (CST): Dakotah Ochoa MD on 1/14/2025 at 9:04 PM               Assessment/Plan:     Abdominal pain and suspected enterocolitis with partial stricture or partially obstructing neoplasm:     - CT findings show mass-like wall thickening at the cecum and ascending colon with dilated and fluid-filled distal small bowel loops upstream from this mass.     - Gastroenterology consulted for further recommendations.     - Keep NPO for now due to discomfort and partial obstruction.     - Administer antiemetics PRN IV.     - Continue IV PPI.    2. Lymphocytic colitis/enteritis:     - Diagnosed in November 2024, followed by gastroenterology since.     - Currently on budesonide.  Await further GI recs.     3. Leukocytosis:     - Patient on steroids (9 mg budesonide daily) which may contribute to leukocytosis.     - Monitor WBC count and consider further evaluation if leukocytosis persists or worsens.    4. Hypothyroidism.      - Patient on levothyroxine for hypothyroidism.      - Monitor thyroid function tests and adjust levothyroxine dosage as needed.        diet: NPO  ivf: 0.9 NS  dvt prophylaxis: heparin  antibiotics: none  tubes: none  central lines: none  code status: full code  dispo: home upon medical clearance    Greater than 70 minutes, >50% time spent counseling and coordinating care regarding treatment and workup.        **Certification      PHYSICIAN Certification of Need for Inpatient Hospitalization - Initial Certification    Patient will require inpatient services that will reasonably be expected to span two midnight's  based on the clinical documentation in H+P.   Based on patients current state of illness, I anticipate that, after discharge, patient will require TBD.        Abel Swenson MD  1/14/2025         [1] No Known Allergies

## 2025-01-15 NOTE — DISCHARGE SUMMARY
[facility]  part of Loc[facility]    Discharge Summary    Christy Lowry Patient Status:  Inpatient    1924 MRN [id number]   Location [facility] 4W/SW/SE Attending No att. providers found   Hosp Day # 1 PCP Patrick Stafford MD     Date of Admission: 2025 Disposition: Home or Self Care     Date of Discharge: 1/15/2025  8:00 PM    Admitting Diagnosis: Colitis [K52.9]  Abdominal pain, acute [R10.9]  Leukocytosis, unspecified type [D72.829]    Hospital Discharge Diagnoses: Lymphocytic colitis    Lace+ Score: 60  59-90 High Risk  29-58 Medium Risk  0-28   Low Risk.    TCM Follow-Up Recommendation:  LACE > 58: High Risk of readmission after discharge from the hospital.    Problem List:   Patient Active Problem List   Diagnosis    Skin cancer    Acquired hypothyroidism    Arthralgia of right lower leg    Neoplasm of uncertain behavior of skin    Generalized anxiety disorder    Irritable bowel syndrome with diarrhea    Nausea and vomiting in adult    Persistent vomiting in adult patient    Enteritis    Vomiting    Vomiting, unspecified vomiting type, unspecified whether nausea present    Ileus (HCC)    Diffuse abdominal pain    Abnormal CT scan, gastrointestinal tract    Nausea vomiting and diarrhea    Lymphocytic colitis    Abnormal CT scan, colon    Leukocytosis    Hyperglycemia    Abdominal pain, acute    Leukocytosis, unspecified type    Colitis       Reason for Admission: abd pain    Physical Exam:   Vitals:    01/15/25 1147   BP: 151/59   Pulse:    Resp: 18   Temp: 98.1 °F (36.7 °C)   Gen: awake, alert patient, NAD  HEENT: EOMI, the sclera appears anicteric, oropharynx clear, mucus membranes appear moist  CV: RRR  Lung: CTAB with decreased entry at bases  Abdomen: soft NTND abdomen with NABS appreciated   Back: No CVA tenderness   Skin: dry, warm, no jaundice  Ext: no LE edema is evident  Neuro: non-focal  Psych: calm, cooperative      History of Present Illness:   Per   Messi Lowry, a 89-year-old female with a history of lymphocytic colitis/enteritis and hypothyroidism, presented to the emergency room with abdominal pain.  In The ED hemodynamically stable.  CT abd pelvis revealed  8x7.5 cm mass-like wall thickening at the cecum and ascending colon, with associated dilated fluid-filled small bowel loops and circumferential wall thickening upstream from cecum.       Regarding lymphocytic colitis/enteritis diagnosed in Nov 2024 during previous admission.  Currently on Budesonide 9 mg daily.  Has followed with GI as an outpatient.       Gastroenterology consulted and patient admitted for further symptomatic treatment and evaluation.     Hospital Course:   Abdominal pain and suspected enterocolitis with partial stricture or partially obstructing neoplasm:     - CT findings show mass-like wall thickening at the cecum and ascending colon with dilated and fluid-filled distal small bowel loops upstream from this mass.     - Gastroenterology was consulted for further recommendations.     - at this time pt and family not wanting to pursue further testing with colonoscopy     - pt was started on a diet and tolerated     - stable for dc on PO steroids and short course of PO abx     2. Lymphocytic colitis/enteritis:     - Diagnosed in November 2024, followed by gastroenterology since.     - Currently on budesonide. Continue     3. Leukocytosis:     - Patient on steroids (9 mg budesonide daily) which may contribute to leukocytosis.     4. Hypothyroidism.      - Patient on levothyroxine for hypothyroidism. Continue     Consultations: GI    Procedures: none    Complications: none    Discharge Condition: Stable    Discharge Medications:      Discharge Medications        START taking these medications        Instructions Prescription details   amoxicillin clavulanate 875-125 MG Tabs  Commonly known as: Augmentin  Notes to patient: Antibiotic. Be sure to complete entire course even if you  start to feel better.      Take 1 tablet by mouth 2 (two) times daily for 5 days.   Stop taking on: January 20, 2025  Quantity: 10 tablet  Refills: 0            CONTINUE taking these medications        Instructions Prescription details   budesonide 3 MG Cpep  Commonly known as: Entocort EC  Start taking on: January 22, 2025      Take 2 capsules (6 mg total) by mouth every morning. Take 3 capsules (9mg) every day in the morning for up to 8 weeks   Stop taking on: March 23, 2025  Quantity: 120 capsule  Refills: 0     carboxymethylcellulose 1 % Gel  Commonly known as: Celluvisc      Place 1-2 drops into both eyes as needed.   Refills: 0     dicyclomine 10 MG Caps  Commonly known as: Bentyl      Take 1 capsule (10 mg total) by mouth every 12 (twelve) hours as needed.   Quantity: 180 capsule  Refills: 3     ergocalciferol 1.25 MG (65015 UT) Caps  Commonly known as: Vitamin D2      Take 1 capsule (50,000 Units total) by mouth once a week.   Refills: 0     FeroSul 325 (65 Fe) MG Tabs  Generic drug: Ferrous Sulfate      Take 1 tablet (325 mg total) by mouth daily.   Refills: 0     levothyroxine 75 MCG Tabs  Commonly known as: Synthroid      Take 1 tablet (75 mcg total) by mouth daily.   Refills: 0     ondansetron 4 mg tablet  Commonly known as: Zofran      Take 1 tablet (4 mg total) by mouth every 8 (eight) hours as needed for Nausea.   Quantity: 60 tablet  Refills: 0     pantoprazole 40 MG Tbec  Commonly known as: Protonix      Take 1 tablet (40 mg total) by mouth 2 (two) times daily before meals.   Stop taking on: January 22, 2025  Quantity: 120 tablet  Refills: 0               Where to Get Your Medications        These medications were sent to [id number] - [place], [place] - [address] AT [facility] [address], [phone], [phone]  [address], [facility] [address]      Phone: [phone]   amoxicillin clavulanate 875-125 MG Tabs         Greater than 35 minutes spent, >50% spent counseling  re: treatment plan and workup     Ramon Montelongo MD  1/16/2025

## 2025-01-15 NOTE — PLAN OF CARE
Patient admitted to unit from ED. Alert and oriented to self only. Room air. Remote tele in place. Vital signs stable. NPO. Multiple loose BM overnight. Incontinence care provided promptly. Stool samples sent down. Voiding via purewick. Urine sample sent down. IVF continued. Repositioned as tolerated. Call light and personal belongings within reach. Safety precautions in place. Caregiver at bedside.

## 2025-01-15 NOTE — ED INITIAL ASSESSMENT (HPI)
Pt arrived via EMS from home for abdominal pain. Pt was discharged from here 2 months ago for an intestinal infection. Pt was at the doctor's office today for blood work and WBC were elevated. Per daughter, pt's pain has been worsening for the past 3 days. A/O x1 and speaking complete sentences.

## 2025-01-15 NOTE — CONSULTS
Is this a shared or split note between Advanced Practice Provider and Physician? Yes      [facility]   Gastroenterology Consultation Note    Christy Lowry  Patient Status:    Inpatient  Date of Admission:         1/14/2025, Hospital day #1  Attending:   Ramon Montelongo MD  PCP:     Patrick Stafford MD    Reason for Consultation:  ABD Pain, ABD CT abdomen    History of Present Illness:  Christy Lowry is a 89 year old female w/ PMHx of OA, GERD, squamous cell carcinoma, who presents with return of abdominal pain and diarrhea.     Pt recently admitted x2 in 11/2024 for similar symptoms.  Underwent EGD/FS 11/9 with path + for lymphocytic colitis.  She was started steroid therapy upon readmission for persistent symptoms.  Repeat upper endoscopy with push enteroscopy to obtain SB biopsies and further differentiate infectious vs inflammatory etiology offered at that time but deferred by Pt and her family.  She was discharged on budesonide and remains on therapy.      Pt has been having worsening ABD pain for the past 3 days, associated with nausea (no vomiting) and diarrhea.  She saw her doctor in office who ordered blood work and noted leukocytosis.  She was brought to ED via EMS. Today, she is already feeling much improved without ABD pain, nausea or vomiting though diarrhea persists.  Her daughter and caregiver are at bedside and relay that she was doing well on pureed diet but wanted solid diet.  She consumed some meatballs and pieces of pork prior to onset of symptoms.  Last semi-formed stool was 4 days ago.  No black/bloody stools or emesis.       Pertinent Family Hx:  - No known history of esophageal, gastric cancers.  Mother + colon cancer, Sister + colon CA.   - No known IBD  - No known liver pathologies     Endoscopy Hx:  - >10 years ago colonoscopy performed at [facility], findings per patient: Unremarkable     -EGD/FS 11/2024:  negative for acute findings, Not able to get to  area of possible transition point on flex sig given stool burden.     Final Diagnosis:      A. 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  Associated epithelial atypia, favor reactive/regnerative.  Negative for intestinal metaplasia or dysplasia.  Diff-Quik stain (with appropriate control reactivity) is negative for H. pylori microorganisms.     B. Random colon; biopsy:  Colonic mucosa with increased intraepithelial lymphocytes, see comment.        Social Hx:  - No current tobacco use  - + social ETOH  - Denies cannabis/illicit drug use  - Denies NSAIDs  - Has care taker  - Occupation: Retired     History:  Past Medical History:    Actinic keratosis    hypertrophic AK - left central forehead    Benzodiazepine withdrawal without complication (HCC)    Chronic GERD    Constipation    Diarrhea    Eustachian tube dysfunction    Heart palpitations    Loose stools    Occult blood in stools    SCC (squamous cell carcinoma)    left forehead, invasive, well-diff.     SCC (squamous cell carcinoma)    right temple    SCC (squamous cell carcinoma)    right neck    Skin cancer     Past Surgical History:   Procedure Laterality Date    Tonsillectomy       Family History   Problem Relation Age of Onset    Colon Cancer Mother     Colon Cancer Sister       reports that she has quit smoking. She has quit using smokeless tobacco. She reports that she does not currently use alcohol. She reports that she does not use drugs.    Allergies:  Allergies[1]    Medications:    Current Facility-Administered Medications:     sodium chloride 0.9% infusion, , Intravenous, Continuous    heparin (Porcine) 5000 UNIT/ML injection 5,000 Units, 5,000 Units, Subcutaneous, 2 times per day    Review of Systems:   CONSTITUTIONAL:  negative for fevers, chills, unintentional weight loss   EYES:  negative for diplopia or change in vision   RESPIRATORY:  negative for severe shortness of  breath  CARDIOVASCULAR:  negative for crushing sub-sternal chest pain  GASTROINTESTINAL:  see HPI  GENITOURINARY:  negative for dysuria or gross hematuria  INTEGUMENT/BREAST:  SKIN:  negative for jaundice or new rash   ALLERGIC/IMMUNOLOGIC:  negative for hay fever   ENDOCRINE:  negative for cold intolerance and heat intolerance  MUSCULOSKELETAL:  negative for joint effusion/severe erythema  NEURO: negative for new loss of consciousness or dizziness   BEHAVIOR/PSYCH:  negative for psychotic behavior    Physical Exam:    Blood pressure 158/64, pulse 81, temperature 97.7 °F (36.5 °C), temperature source Oral, resp. rate 18, height 5' (1.524 m), weight 128 lb (58.1 kg), SpO2 94%. Body mass index is 25 kg/m².    Gen: awake, alert patient, NAD  HEENT: EOMI, the sclera appears anicteric, oropharynx clear, mucus membranes appear moist  CV: RRR  Lung: no conversational dyspnea   Abdomen: soft NTND abdomen with NABS appreciated   Back: No CVA tenderness   Skin: dry, warm, no jaundice  Ext: no LE edema is evident  Neuro: Alert, oriented x1-2 and interactive  Psych: calm, cooperative    Laboratory Data:  Lab Results   Component Value Date    WBC 13.0 01/15/2025    HGB 12.2 01/15/2025    HCT 36.8 01/15/2025    .0 01/15/2025    CREATSERUM 0.56 01/15/2025    BUN 8 01/15/2025     01/15/2025    K 3.7 01/15/2025     01/15/2025    CO2 27.0 01/15/2025    GLU 87 01/15/2025    CA 8.2 01/15/2025    ALB 2.8 01/15/2025    ALKPHO 76 01/15/2025    BILT 0.3 01/15/2025    TP 5.3 01/15/2025    AST 19 01/15/2025    ALT <7 01/15/2025    LIP 22 01/14/2025       Imaging:  CT ABDOMEN+PELVIS(CONTRAST ONLY)(CPT=74177)    Result Date: 1/14/2025  CONCLUSION:  1. Approximate 8 x 7.5 cm focus of masslike wall thickening at the cecum and ascending colon.  There are associated mildly dilated and fluid-filled mid to distal small bowel loops upstream to the aforementioned masslike colonic wall thickening and some of the small bowel loops  also demonstrate circumferential wall thickening.  Differential considerations include an infectious/inflammatory enterocolitis with partial resultant stricture or potentially a partially obstructing proximal colonic neoplasm.  Colonoscopy correlation is suggested for complete evaluation (note that prior flexible sigmoidoscopy in November, 2024 only extended to the distal descending colon).  No free intraperitoneal air or well-defined/drainable intra-abdominal collection. 2. Colonic diverticulosis. 3. Circumferential urinary bladder wall thickening, which may relate to incomplete distention or cystitis.  If there are referable symptoms, urinalysis correlation is requested. 4. Coronary and peripheral atherosclerosis. 5. Stable small cystic lesion in the pancreatic body.  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 6. Small to moderate left and small right pleural effusions.  Additional partially loculated pleural fluid in the dependent left lung.  Small 0.5 cm right lower lobe lung nodule is nonspecific and was likely obscured by airspace disease on prior abdominal CT. 7. Lesser incidental findings as above.   Dictated by (CST): Dakotah Ochoa MD on 1/14/2025 at 8:50 PM     Finalized by (CST): Dakotah Ochoa MD on 1/14/2025 at 9:04 PM           Assessment & Plan   Christy Lowry is a 89 year old female w/ PMHx of OA, GERD, squamous cell carcinoma, who presents with return of abdominal pain and diarrhea.     #ABD Pain  #Diarrhea  #Abnormal CT Abdomen  -Pt recently admitted in November of 2024, FS to distal descending colon completed at that time with path + for lymphocytic colitis.  She remains on budesonide.  -Labs on admission without electrolyte or liver function test abnormalities, WBC count elevated to 18.3 with elevated in ANC.   -CT A/P with 8x7.5 cm focus of mass-like wall thickening at the cecum and ascending colon with  associated mildly dilated fluid-filled mid to distal small bowel loops upstream to the aforementioned colonic wall thickening, some small bowel loops also with circumferential wall thickening.    -C. Diff negative, GI stool PCR negative  -Etiology possible ongoing enterocolitis now with stricture vs partial obstructive neoplasm.  -Discussed possible colonoscopy with Pt and her daughter.  Pt states she does not want to pursue colonoscopy and if it is a cancer would not want to pursue treatment given her age.  She would prefer to \"coast\".  Pt's daughter agrees with holding off on procedures but would like to discuss with her brothers before making a final decision.  Discussed bowel rest and Pt states she is hungry and would like to attempt eating.  Given benign ABD exam, ok to start clears from GI stance.  Recommend IVF as tolerated, ABX.  Will discuss restarting empiric steroid therapy with negative stool studies.     Recommend:  -Clear liquid diet  -IVF until taking in adequate PO  -Empiric ABX  -Consider restarting empiric course of steroid therapy  -Possible colonoscopy pending further family discussion but currently Pt declines    Thank you for the opportunity to participate in the care of this patient.    Case discussed with Tosha Valle MD and Barbra ANGULO.    Rosemarie Han DNP, FNP-[facility]  1/15/2025          [1] No Known Allergies

## 2025-01-15 NOTE — ED PROVIDER NOTES
Patient Seen in: [facility] Emergency Department      History     Chief Complaint   Patient presents with    Abdomen/Flank Pain     Stated Complaint:     Subjective:   HPI      Patient presents the emergency department complaining of abdominal discomfort.  She states that for the last few days she has had abdominal pain without nausea or vomiting.  There is been some diarrhea.  She had a home health visit today and they did some blood work and found her white blood cell count to be elevated.  She was admitted last month to the hospital with enterocolitis.  There is no other aggravating or alleviating factors.    Objective:     Past Medical History:    Actinic keratosis    hypertrophic AK - left central forehead    Benzodiazepine withdrawal without complication (HCC)    Chronic GERD    Constipation    Diarrhea    Eustachian tube dysfunction    Heart palpitations    Loose stools    Occult blood in stools    SCC (squamous cell carcinoma)    left forehead, invasive, well-diff.     SCC (squamous cell carcinoma)    right temple    SCC (squamous cell carcinoma)    right neck    Skin cancer              Past Surgical History:   Procedure Laterality Date    Tonsillectomy                  Social History     Socioeconomic History    Marital status:    Tobacco Use    Smoking status: Former    Smokeless tobacco: Former   Vaping Use    Vaping status: Never Used   Substance and Sexual Activity    Alcohol use: Not Currently    Drug use: No   Other Topics Concern    Reaction to local anesthetic No     Social Drivers of Health     Financial Resource Strain: Low Risk  (11/15/2024)    Financial Resource Strain     Difficulty of Paying Living Expenses: Not very hard     Med Affordability: No   Food Insecurity: No Food Insecurity (11/15/2024)    Food Insecurity     Food Insecurity: Never true   Transportation Needs: No Transportation Needs (11/15/2024)    Transportation Needs     Lack of Transportation: No   Housing  Stability: Low Risk  (11/15/2024)    Housing Stability     Housing Instability: No                  Physical Exam     ED Triage Vitals [01/14/25 1926]   /83   Pulse 94   Resp 25   Temp 98.3 °F (36.8 °C)   Temp src Oral   SpO2 95 %   O2 Device None (Room air)       Current Vitals:   Vital Signs  BP: 138/64  Pulse: 89  Resp: 20  Temp: 97.6 °F (36.4 °C)  Temp src: Oral  MAP (mmHg): 86    Oxygen Therapy  SpO2: 95 %  O2 Device: None (Room air)        Physical Exam  Vitals and nursing note reviewed.   Constitutional:       General: She is not in acute distress.     Appearance: She is well-developed.   HENT:      Head: Normocephalic.      Nose: Nose normal.      Mouth/Throat:      Mouth: Mucous membranes are moist.   Eyes:      Conjunctiva/sclera: Conjunctivae normal.   Cardiovascular:      Rate and Rhythm: Normal rate and regular rhythm.      Heart sounds: No murmur heard.  Pulmonary:      Effort: Pulmonary effort is normal. No respiratory distress.      Breath sounds: Normal breath sounds.   Abdominal:      General: There is no distension.      Palpations: Abdomen is soft.      Tenderness: There is generalized abdominal tenderness. There is no guarding or rebound.   Musculoskeletal:         General: No tenderness. Normal range of motion.      Cervical back: Normal range of motion and neck supple.   Skin:     General: Skin is warm and dry.      Findings: No rash.   Neurological:      Mental Status: She is alert and oriented to person, place, and time.             ED Course     Labs Reviewed   CBC WITH DIFFERENTIAL WITH PLATELET - Abnormal; Notable for the following components:       Result Value    WBC 18.3 (*)     RDW-SD 52.3 (*)     RDW 15.9 (*)     Neutrophil Absolute Prelim 13.07 (*)     Neutrophil Absolute 13.07 (*)     Monocyte Absolute 1.22 (*)     All other components within normal limits   COMP METABOLIC PANEL (14) - Abnormal; Notable for the following components:    Glucose 118 (*)     All other components  within normal limits   LIPASE - Normal   URINALYSIS, ROUTINE   RAINBOW DRAW GOLD   RAINBOW DRAW BLUE   GI STOOL PANEL BY PCR   C. DIFFICILE(TOXIGENIC)PCR                   MDM          Admission disposition: 1/14/2025  9:16 PM           Medical Decision Making  Differential diagnosis considered for diverticulitis, colitis, enteritis, urinary tract infection.    Problems Addressed:  Abdominal pain, acute: acute illness or injury  Colitis: acute illness or injury  Leukocytosis, unspecified type: acute illness or injury    Amount and/or Complexity of Data Reviewed  Labs: ordered. Decision-making details documented in ED Course.     Details: Is elevated white blood cell count.  Chemistry panel unremarkable  Radiology: ordered and independent interpretation performed. Decision-making details documented in ED Course.     Details: CT scan shows stricture or thickening in the cecum with possible partial small bowel obstruction.  Discussion of management or test interpretation with external provider(s): Recommend obtaining stool prior to starting any antibiotics.  Discussed with the hospitalist and gastroenterology.  Nontoxic in appearance, normal labs, other than elevated white blood cell count.  IV fluids initiated.    Risk  Decision regarding hospitalization.        Disposition and Plan     Clinical Impression:  1. Abdominal pain, acute    2. Leukocytosis, unspecified type    3. Colitis         Disposition:  Admit  1/14/2025  9:16 pm    Follow-up:  No follow-up provider specified.  We recommend that you schedule follow up care with a primary care provider within the next three months to obtain basic health screening including reassessment of your blood pressure.      Medications Prescribed:  Current Discharge Medication List              Supplementary Documentation:         Hospital Problems       Present on Admission  Date Reviewed: 12/16/2024            ICD-10-CM Noted POA    * (Principal) Abdominal pain, acute R10.9  1/14/2025 Unknown    Colitis K52.9 1/14/2025 Unknown    Hyperglycemia R73.9 1/14/2025 Yes    Leukocytosis D72.829 1/14/2025 Yes    Leukocytosis, unspecified type D72.829 1/14/2025 Unknown

## 2025-01-15 NOTE — CM/SW NOTE
CM self ref to case for dc planning    Per chart review pt lives at home with 24/7 cg. Is A&O to self only. Is on RA    At last dc in 11/2024 she had [facility] RN PT OT set up. CM sent inquiry to [facility] liaison to confirm current status. YING order placed.    1355  MDO for Comm PC  Pt is current w/ HC so ref was sent to  to review,  others sent as back up plan.    1532   is avail for C-PC. Res'd in aidin      Plan  Home w/ cg and [facility]/ C-PC     / to remain available for support and/or discharge planning.     Haritha Marsh, RN    Ext [phone]

## 2025-01-15 NOTE — ED QUICK NOTES
Orders for admission, patient is aware of plan and ready to go upstairs. Any questions, please call ED RN Maritza at extension [phone].     Patient Covid vaccination status: Fully vaccinated     COVID Test Ordered in ED: None    COVID Suspicion at Admission: N/A    Running Infusions:  None    Mental Status/LOC at time of transport: A/O x1, alert but confused at baseline    Other pertinent information:   CIWA score: N/A   NIH score:  N/A

## 2025-01-15 NOTE — HOME CARE LIAISON
This is a current pt with [facility]. Pt will need a YING order entered prior to DC for the below services. Please add a F2F if more services need to be added. Notified CM Haritha.   KEV/PT/OT.

## 2025-01-16 ENCOUNTER — PATIENT OUTREACH (OUTPATIENT)
Dept: CASE MANAGEMENT | Age: OVER 89
End: 2025-01-16

## 2025-01-16 NOTE — PLAN OF CARE
Patient alert and oriented X1-2, vitals stable. Denies pain. Continued with IVFs. Diet advanced to pureed, tolerated well; denies nausea and vomiting. Multiple loose stools throughout the shift; gi panel and c-diff negative. Voiding per purewick. Cleared for discharge. Discharge instructions reviewed with son and caregiver at bedside including need for follow up appointment; new and continued medications and later resume date for budesonide. PIV removed. Escorted to lobby via wheelchair.    Problem: Patient Centered Care  Goal: Patient preferences are identified and integrated in the patient's plan of care  Description: Interventions:  - What would you like us to know as we care for you?   - Provide timely, complete, and accurate information to patient/family  - Incorporate patient and family knowledge, values, beliefs, and cultural backgrounds into the planning and delivery of care  - Encourage patient/family to participate in care and decision-making at the level they choose  - Honor patient and family perspectives and choices  Outcome: Adequate for Discharge      Problem: PAIN - ADULT  Goal: Verbalizes/displays adequate comfort level or patient's stated pain goal  Description: INTERVENTIONS:  - Encourage pt to monitor pain and request assistance  - Assess pain using appropriate pain scale  - Administer analgesics based on type and severity of pain and evaluate response  - Implement non-pharmacological measures as appropriate and evaluate response  - Consider cultural and social influences on pain and pain management  - Manage/alleviate anxiety  - Utilize distraction and/or relaxation techniques  - Monitor for opioid side effects  - Notify MD/LIP if interventions unsuccessful or patient reports new pain  - Anticipate increased pain with activity and pre-medicate as appropriate  Outcome: Adequate for Discharge     Problem: RISK FOR INFECTION - ADULT  Goal: Absence of fever/infection during anticipated neutropenic  period  Description: INTERVENTIONS  - Monitor WBC  - Administer growth factors as ordered  - Implement neutropenic guidelines  Outcome: Adequate for Discharge     Problem: SAFETY ADULT - FALL  Goal: Free from fall injury  Description: INTERVENTIONS:  - Assess pt frequently for physical needs  - Identify cognitive and physical deficits and behaviors that affect risk of falls.  - [place] fall precautions as indicated by assessment.  - Educate pt/family on patient safety including physical limitations  - Instruct pt to call for assistance with activity based on assessment  - Modify environment to reduce risk of injury  - Provide assistive devices as appropriate  - Consider OT/PT consult to assist with strengthening/mobility  - Encourage toileting schedule  Outcome: Adequate for Discharge     Problem: DISCHARGE PLANNING  Goal: Discharge to home or other facility with appropriate resources  Description: INTERVENTIONS:  - Identify barriers to discharge w/pt and caregiver  - Include patient/family/discharge partner in discharge planning  - Arrange for needed discharge resources and transportation as appropriate  - Identify discharge learning needs (meds, wound care, etc)  - Arrange for interpreters to assist at discharge as needed  - Consider post-discharge preferences of patient/family/discharge partner  - Complete POLST form as appropriate  - Assess patient's ability to be responsible for managing their own health  - Refer to Case Management Department for coordinating discharge planning if the patient needs post-hospital services based on physician/LIP order or complex needs related to functional status, cognitive ability or social support system  Outcome: Adequate for Discharge     Problem: GASTROINTESTINAL - ADULT  Goal: Maintains or returns to baseline bowel function  Description: INTERVENTIONS:  - Assess bowel function  - Maintain adequate hydration with IV or PO as ordered and tolerated  - Evaluate effectiveness  of GI medications  - Encourage mobilization and activity  - Obtain nutritional consult as needed  - Establish a toileting routine/schedule  - Consider collaborating with pharmacy to review patient's medication profile  Outcome: Adequate for Discharge     Problem: SKIN/TISSUE INTEGRITY - ADULT  Goal: Skin integrity remains intact  Description: INTERVENTIONS  - Assess and document risk factors for pressure ulcer development  - Assess and document skin integrity  - Monitor for areas of redness and/or skin breakdown  - Initiate interventions, skin care algorithm/standards of care as needed  Outcome: Adequate for Discharge

## 2025-01-16 NOTE — DISCHARGE INSTRUCTIONS
.Follow up with physicians as instructed, call their office to schedule an appointment.    Seek medical attention if you develop any chest pain or shortness of breath; severe nausea, vomiting, diarrhea or constipation; temperature greater than 100.5.

## 2025-01-17 ENCOUNTER — TELEPHONE (OUTPATIENT)
Facility: CLINIC | Age: OVER 89
End: 2025-01-17

## 2025-01-17 ENCOUNTER — TELEPHONE (OUTPATIENT)
Dept: INTERNAL MEDICINE CLINIC | Facility: CLINIC | Age: OVER 89
End: 2025-01-17

## 2025-01-17 RX ORDER — PANTOPRAZOLE SODIUM 40 MG/1
40 TABLET, DELAYED RELEASE ORAL
Qty: 90 TABLET | Refills: 0 | Status: SHIPPED | OUTPATIENT
Start: 2025-01-17 | End: 2025-04-17

## 2025-01-17 NOTE — TELEPHONE ENCOUNTER
Lilia requesting RN to reach out to patient's daughter regarding Pantoprazole and Budesonide.  Please call daughter, Colton, at [phone].  For additional questions please call.  Thank you.

## 2025-01-17 NOTE — PROGRESS NOTES
Transitions of Care Navigation  Discharge Date: 1/15/25  Contact Date: 2025    Transitions of Care Assessment:  FORREST Initial Assessment    General:  Assessment completed with: Family (Dtr Christy Betancourt per HIPAA)  Patient Subjective: S/w, dtr Christy Betancourt per HIPAA, Pt is doing okay, just tired. Pt denies abdominal pain, n/v/d, fevers, chills, dizziness, confusion, chest pain, shortness of breath and feeling faint. Pt is taking abx. Dtr did have questions regarding Pantoprazole and Budesonide, call to GI to have office with dtr to discuss medications. Dtr stated pt has an MD that comes to the home monthly. Pt is also set to have [facility]  PT/OT/RN come out and resume care.  Pt is voiding well. Pt is ambulating at home, has to take her time and uses a walker. Pt is able to have bowel movements. Pt is hydrating.  Dtr has not other concerns at this time.  Chief Complaint: Abdominal pain and suspected enterocolitis with partial stricture or partially obstructing neoplasm  Verify patient name and  with patient/ caregiver: Yes    Hospital Stay/Discharge:  Tell me what you understand of why you were in the hospital or emergency department: Abdominal pain  Prior to leaving the hospital were your Discharge Instructions reviewed with you?: Yes  Did you receive a copy of your written Discharge Instructions?: Yes  What questions do you have about your Discharge Instructions?: Medication questions- call to GI  Do you feel better or worse since you left the hospital or emergency department?: Better    Follow - Up Appointment:  Do you have a follow-up appointment?: Yes  Date: 25  Physician: GI  Are there any barriers to getting to your follow-up appointment?: No    Home Health/DME:  Prior to leaving the hospital was Home Health (HH) arranged for you?: Yes  Has HH been out or set up a visit to see you?: No (Dtr plans to call [facility] if needed)     Prior to leaving the hospital or emergency department was  Durable Medical Equipment (DME), medical supplies, or infusions arranged for you?: No  Are DME/medical supply/infusions needs identified by staff during this assessment?: No     Medications/Diet:  Did any of your medications change, during or after your hospital stay or ED visit?: Yes  Do you have your new or updated medications?: Yes  Do you understand what your medications are for and possible side effects?: Yes  Are there any reasons that keep you from taking your medication as prescribed?: No  Any concerns about medication refills?: No    Were you given a different diet per your Discharge Instructions?: Yes  Diet Type: Full liquid diet     Questions/Concerns:  Do you have any questions or concerns that have not been discussed?: No '      Nursing Interventions: All d/c instructions reviewed with the pt's dtr. Reviewed when to call MD vs when to call 911 or go the ED. Reviewed fall precautions. Educated pt's dtr on the importance of taking all meds as prescribed as well as close f/u with PCP/specialists. Dtr verbalized understanding and will contact the office with any further questions or concerns. TE to PCP office re: update. Call to GI to discuss medications as dtr feels the Budesonide dose was to be decreased and Pantoprazole is out of refills. Pt will run out of medication before appt with GI.     Contacted GI office ([phone]) to see if an RN can call dtr to discuss medications as dtr has questions regarding Pantoprazole and Budesonide. S/w Todd, she took a message and will give Aura BECKETT the message to FU with the dtr. [facility] provided contact information to call back for additional questions.     Medications:  Medication Reconciliation:  I am aware of an inpatient discharge within the last 30 days.  The discharge medication list has not been reconciled with the patient's current medication list and reviewed by me. See medication list for additions of new medication, and changes to current doses of  medications and discontinued medications.  Current Outpatient Medications   Medication Sig Dispense Refill    amoxicillin clavulanate 875-125 MG Oral Tab Take 1 tablet by mouth 2 (two) times daily for 5 days. 10 tablet 0    ergocalciferol 1.25 MG (23090 UT) Oral Cap Take 1 capsule (50,000 Units total) by mouth once a week.      FEROSUL 325 (65 Fe) MG Oral Tab Take 1 tablet (325 mg total) by mouth daily.      levothyroxine 75 MCG Oral Tab Take 1 tablet (75 mcg total) by mouth daily.      [START ON 1/22/2025] budesonide 3 MG Oral Cap DR Particles Take 2 capsules (6 mg total) by mouth every morning. Take 3 capsules (9mg) every day in the morning for up to 8 weeks (Patient taking differently: Take 3 capsules (9 mg total) by mouth every morning. Take 3 capsules (9mg) every day in the morning for up to 8 weeks) 120 capsule 0    pantoprazole 40 MG Oral Tab EC Take 1 tablet (40 mg total) by mouth 2 (two) times daily before meals. 120 tablet 0    carboxymethylcellulose 1 % Ophthalmic Gel Place 1-2 drops into both eyes as needed.      ondansetron (ZOFRAN) 4 mg tablet Take 1 tablet (4 mg total) by mouth every 8 (eight) hours as needed for Nausea. 60 tablet 0    dicyclomine 10 MG Oral Cap Take 1 capsule (10 mg total) by mouth every 12 (twelve) hours as needed. 180 capsule 3         Follow-up Appointments: Reviewed recommended follow up appointments. Pt denies any barriers to keeping/getting to U appts.   Your appointments       Date & Time Appointment Department (Center)    Feb 14, 2025 8:30 AM CST Virtual Phone Visit with Paulette Kraft PA-C [facility] ([facility])    You have been scheduled for a Virtual Telephone visit with your provider. Please be available at your phone so that your physician can contact you, and be prepared with any questions or concerns. As always, your health is our priority.     We suggest confirming with your insurance regarding  coverage prior to your appointment as some payors may no longer cover telephone visits. Depending on your insurance you may also be billed a copay at a later time.              [facility], [address]  [facility] [address]  [phone]            Trans[facility]  Was TCC Ordered: No    Primary Care Provider (If no TCC appointment)  Does patient already have a PCP appointment scheduled? No  Nurse Care Manager Attempted to schedule PCP office TCM/FORREST appointment with patient   -If no appointment scheduled: Explain : Pt has an MD that comes to her home monthly at this time. Dtr declined an appt with Dr. Stafford at this time.     Specialist  Does the patient have any other follow-up appointment(s) need to be scheduled? Yes   -If yes: Nurse Care Manager reviewed upcoming specialist appointments with patient: Yes   -Does the patient need assistance scheduling appointment(s): No- Pt has an appt with GI on 2/14/25.           Book By Date: 1/22/25

## 2025-01-17 NOTE — TELEPHONE ENCOUNTER
Paulette    I spoke to Christy's daughter Christy Betancourt  She said that Christy was in the Er and is back home and doing fine.  Mentioned there might be changes to the pantoprazole and budesonide early February.  She wants to know how her mother should be taking these medications before she goes to refill them  Should she continue on the 9mg of budesonide a day or is she going to taper down?  If so, when?  Also, her pantoprazole order was discontinued.  If she is to continue on this, she would need a refill - she has 19 days left.    Please advise    Thank you

## 2025-01-17 NOTE — TELEPHONE ENCOUNTER
I spoke to daughter.  I reviewed below plan with her over the phone and she voiced understanding.  She asked that I change the phone visit to later in the day so her mom will be more alert  Changed to 1030 on 2/14    Your Appointments      Friday February 14, 2025 10:30 AM  Virtual Phone Visit with Paulette Kraft PA-C  [facility] ([facility]) [address] [address]  [phone]   You have been scheduled for a Virtual Telephone visit with your provider. Please be available at your phone so that your physician can contact you, and be prepared with any questions or concerns. As always, your health is our priority.     We suggest confirming with your insurance regarding coverage prior to your appointment as some payors may no longer cover telephone visits. Depending on your insurance you may also be billed a copay at a later time.

## 2025-01-17 NOTE — TELEPHONE ENCOUNTER
Just an FYI-    Spoke to patient's dtr, [place] for Transitions of Care call today.  Patient does not have an appointment scheduled at this time. Pt currently had a home MD that comes to visit her monthly or as needed. Dtr declined an appt with Dr. Stafford at this time.      TCM/FORREST appointment needed by 1/22/25.  Please advise.    BOOK BY DATE: 1/22/25    Thank you!

## 2025-01-17 NOTE — TELEPHONE ENCOUNTER
Since recent admission please have patient stay on 9 mg budesonide until visit with me on 2/14, please let me know if she needs a refill.   Will decrease pantoprazole to once a day. New prescription sent.       Paulette Kraft PA-C

## 2025-01-20 ENCOUNTER — TELEPHONE (OUTPATIENT)
Facility: CLINIC | Age: OVER 89
End: 2025-01-20

## 2025-01-20 NOTE — TELEPHONE ENCOUNTER
I spoke to daughter Christy again  Caregiver was confused about dosing so wanted to confirm how she is to be taking the budesonide and pantoprazole again.  See 1/17/2025 encounter-I clarified with instructions in that encounter.  Patient had only been on 2 (6mg of budesonide) will make sure getting 3 (9mg) as advised going forward until her phone visit with Paulette on 2/14/25.  States patient has no vomiting, fever, or diarrhea but does have a little upset stomach  Could not give more details like if it was cramping/burning/etc  I let her know if continues or develops other symptoms to let us know.  Will see if being on the 9mg dose of budesonide as advised by Paulette helps with this.  She will be seen by home health nurse and OT today.

## 2025-01-20 NOTE — TELEPHONE ENCOUNTER
Patient called to speak with a nurse in regards to the medication, Budesonide and the  pantoprazole she was prescribed when she was in the hospital. Patient isn't sure what the dosage is that she needs to take. Please call.       pantoprazole 40 MG Oral Tab EC, Take 1 tablet (40 mg total) by mouth every morning before breakfast., Disp: 90 tablet, Rfl:

## 2025-01-27 ENCOUNTER — TELEPHONE (OUTPATIENT)
Facility: CLINIC | Age: OVER 89
End: 2025-01-27

## 2025-01-27 NOTE — TELEPHONE ENCOUNTER
I spoke to Christy Betancourt  Rescheduled to 2/17/25 appointment below for phone visit  Canceled 2/14/25 phone visit.    Your Appointments      Monday February 17, 2025 11:30 AM  Virtual Phone Visit with Paulette Kraft PA-C  [facility] ([facility]) [address] [id number] [phone]   You have been scheduled for a Virtual Telephone visit with your provider. Please be available at your phone so that your physician can contact you, and be prepared with any questions or concerns. As always, your health is our priority.     We suggest confirming with your insurance regarding coverage prior to your appointment as some payors may no longer cover telephone visits. Depending on your insurance you may also be billed a copay at a later time.

## 2025-01-27 NOTE — TELEPHONE ENCOUNTER
Patient daughter is calling to reschedule 2/14/25 virtual phone visit.  Daughter will be out of town, so she would like to be present for appointment.  Please call

## 2025-01-28 ENCOUNTER — TELEPHONE (OUTPATIENT)
Facility: CLINIC | Age: OVER 89
End: 2025-01-28

## 2025-02-04 ENCOUNTER — TELEPHONE (OUTPATIENT)
Facility: CLINIC | Age: OVER 89
End: 2025-02-04

## 2025-02-04 NOTE — TELEPHONE ENCOUNTER
Ayla RN from Home Health calling to clarify sig on Budesonide.  Please advise. Thank you.      budesonide 3 MG Oral Cap DR Particles 120 capsule 0 1/22/2025 3/23/2025    Sig - Route: Take 2 capsules (6 mg total) by mouth every morning. Take 3 capsules (9mg) every day in the morning for up to 8 weeks - Oral    Patient taking differently: Take 3 capsules (9 mg total) by mouth every morning. Take 3 capsules (9mg) every day in the morning for up to 8 weeks    Sent to pharmacy as: Budesonide 3 MG Oral Capsule Delayed Release Particles (Entocort EC)    E-Prescribing Status: Receipt confirmed by pharmacy (12/16/2024  1:41 PM CST)      Pharmacy    [facility] [address] [place] [address] [address] AT [facility] [address], [phone], [phone]

## 2025-02-04 NOTE — TELEPHONE ENCOUNTER
Ayla ANGULO from [facility] calling states wants to confirm dosage and directions on Budesonide. Please call.

## 2025-02-04 NOTE — TELEPHONE ENCOUNTER
Hospitalist Progress Note:     SUBJECTIVE / OVERNIGHT EVENTS:  -In afib with RVR this am, asymptomatic- no chest pain/sob, no asymmetric LE edema, no f/c. Seen by cards bedside started on amio and moved to SICU. bedside TTE no sig pericardial effusion/no sig RH strain noted/systolic function appearing wnl  -stable effusion per pulm bedside exam and stable PTX on CXR   -started by primary team on eliquis in am- will monitor carefully for bleeding. we discussed heparin drip as option if sig Hg drop noted     ADDITIONAL REVIEW OF SYSTEMS:    MEDICATIONS  (STANDING):  allopurinol 50 milliGRAM(s) Oral <User Schedule>  aMIOdarone Infusion 1 mG/Min (33.3 mL/Hr) IV Continuous <Continuous>  apixaban 2.5 milliGRAM(s) Oral two times a day  calcitriol   Capsule 0.25 MICROGram(s) Oral daily  dextrose 40% Gel 15 Gram(s) Oral once  dextrose 5%. 1000 milliLiter(s) (50 mL/Hr) IV Continuous <Continuous>  dextrose 5%. 1000 milliLiter(s) (100 mL/Hr) IV Continuous <Continuous>  dextrose 50% Injectable 25 Gram(s) IV Push once  dextrose 50% Injectable 12.5 Gram(s) IV Push once  dextrose 50% Injectable 25 Gram(s) IV Push once  doxazosin 4 milliGRAM(s) Oral at bedtime  glucagon  Injectable 1 milliGRAM(s) IntraMuscular once  insulin lispro (ADMELOG) corrective regimen sliding scale   SubCutaneous Before meals and at bedtime  metoprolol succinate  milliGRAM(s) Oral daily  senna 2 Tablet(s) Oral at bedtime    MEDICATIONS  (PRN):  acetaminophen   Tablet .. 650 milliGRAM(s) Oral every 6 hours PRN Moderate Pain (4 - 6)  oxycodone    5 mG/acetaminophen 325 mG 1 Tablet(s) Oral every 6 hours PRN Severe Pain (7 - 10)    CAPILLARY BLOOD GLUCOSE      POCT Blood Glucose.: 178 mg/dL (22 Dec 2020 09:42)  POCT Blood Glucose.: 90 mg/dL (22 Dec 2020 08:13)  POCT Blood Glucose.: 77 mg/dL (22 Dec 2020 07:35)  POCT Blood Glucose.: 72 mg/dL (22 Dec 2020 06:44)    I&O's Summary    21 Dec 2020 07:01  -  22 Dec 2020 07:00  --------------------------------------------------------  IN: 340 mL / OUT: 250 mL / NET: 90 mL    22 Dec 2020 07:01  -  22 Dec 2020 12:52  --------------------------------------------------------  IN: 66.6 mL / OUT: 0 mL / NET: 66.6 mL        PHYSICAL EXAM:  Vital Signs Last 24 Hrs  T(C): 36.8 (22 Dec 2020 12:00), Max: 37.3 (21 Dec 2020 22:12)  T(F): 98.2 (22 Dec 2020 12:00), Max: 99.2 (21 Dec 2020 22:12)  HR: 109 (22 Dec 2020 11:00) (75 - 128)  BP: 138/79 (22 Dec 2020 11:00) (125/59 - 158/72)  BP(mean): 101 (22 Dec 2020 11:00) (98 - 101)  RR: 18 (22 Dec 2020 11:00) (17 - 20)  SpO2: 100% (22 Dec 2020 11:00) (98% - 100%)  Gen: Middle-aged M, NAD  Lungs: lungs clear b/l anteriorly, R sided surgical drainage site dressing c/d/i. L thora site with bandage c/d/i  CV: RRR, no m/r/g  Abd: Soft,NT  Ext: WWP no sig le edema     LABS:                        8.0    10.37 )-----------( 297      ( 22 Dec 2020 07:01 )             26.9     12-22    142  |  108  |  49<H>  ----------------------------<  66<L>  3.9   |  17<L>  |  4.81<H>    Ca    8.4      22 Dec 2020 07:01  Phos  4.4     12-22  Mg     2.1     12-22      PT/INR - ( 21 Dec 2020 07:01 )   PT: 17.0 sec;   INR: 1.44          PTT - ( 21 Dec 2020 07:01 )  PTT:39.7 sec          Culture - Body Fluid with Gram Stain (collected 20 Dec 2020 18:36)  Source: .Body Fluid None  Preliminary Report (21 Dec 2020 08:25):    No growth to date      COVID-19 PCR: NotDetec (17 Dec 2020 19:31)      Electrocardiogram/QTc Interval: EKG nonischemic, afib noted     ASSESSMENT AND PLAN:   65yo gentleman with a PMH of HTN, stage 4 CKD (not on HD, LUE AVF), T Cell lymphoma (actively getting chemo), AFib (on Eliquis), chronic systolic CHF and severe pHTN who p/w pain and swelling to R chest wall, found to have a large R-chest wall  hematoma, along w L loculated effusion.  Now s/p hematoma evacuation w MILAN drain removed today, needing 2 units RBC post op now Hg stable.  s/p L sided thora, while exudative and low c/f infection and bleeding, stable. overnight and this am with afib with RVR no clear trigger, reported low fsg but largely wnl, no overt infection, unlikely ischemia, no overload and lytes stable. Started on amio drip moved to SICU     #Afib, CHF hx as below  -- c/w amio load per cards  -- anticoag with eliquis, bid Hg monitor carefully, switch to hepgtt if active bleeding as more reversible than eliquis    #L Loculated effusion, transudative, low c/f bleed/infection. postp small pnuemothorax stable  -continue on 02, daily CXR   -F/u cytology  -F/u daily pulm recs who will monitor site with US- stable today, low c/f bleeding    #R Chest Wall Hematoma, MILAN drain out   -- c/w BID CBC for Hb trend, goal >7 ronda with noac started   -- pain control  -- wound care per primary team     #Chronic systolic CHF- remains relatively euvolemic on exam  -- c/w home meds   -- Restart torsemide once acute issues resolved/ensure no bleeding on noac restart    #Mild hypoglycemia, largely asymptomatic  -Can use standing d10 as needed if not tolerating po well     #Stage 4-5 CKD  -- not on HD  -- daily BMP   -- Renal following, appreciate assistance    #T Cell Lymphoma   -- on outpatient chemo with Dr. Vel Dimas ([phone])  -- c/w allopurinol     #Hepatic Lesion and Pancreatic Cyst lesion on CT  -- will need outpt eval      #Diet - Renal Restrictions   #DVT PPx - eliquis restarted   #Dispo - Pending stability active issues above, PT eval      Greater than 35 minutes spent on total encounter; more than 50% of the visit was spent counseling and/or coordinating care by the attending physician.    Robin Guadarrama MD [phone]      Patient should be on 6 mg of budesonide until her appointment with me on 2/24/2025.     At that time will determine if we are decreasing to 3 mg.     Paulette Kraft PA-C

## 2025-02-04 NOTE — TELEPHONE ENCOUNTER
I spoke with nurse Aguila from home health and message from Paulette Kraft given.  Voiced understanding.  No further questions at this time.

## 2025-02-05 NOTE — TELEPHONE ENCOUNTER
Patient : Jayleen Marshall Age: 22 year old Sex: female   MRN: [id number] Encounter Date: 4/15/2019      History     Chief Complaint   Patient presents with   • Urinary Complaint     HPI     This is a 22-year-old female that presents with urinary discomfort. Patient reports that for the past 2 days, she has been having increased frequency of urination and burning discomfort. Patient did have an outpatient urinalysis ordered, however, she can no longer wait. She denies having any back pain, nausea, vomiting, or fevers. Patient has had similar symptoms in the past with prior urinary tract infections.    Allergies   Allergen Reactions   • Kiwi Other (See Comments)   • Seasonal Other (See Comments)     sinus   • Tape [Adhesive   (Environmental)] RASH       Discharge Medication List as of 4/15/2019 10:00 PM      Prior to Admission Medications    Details   medroxyPROGESTERone (PROVERA) 10 MG tablet Take one po q d for 7-10 days to induce menses every monthEprescribe, Disp-30 tablet, R-2      fluoxetine (PROZAC) 20 MG tablet Take 1 tablet by mouth daily.Eprescribe, Disp-30 tablet, R-11      metFORMIN (GLUCOPHAGE) 500 MG tablet Take one tab dailyEprescribe, Disp-30 tablet, R-11      metoCLOPramide (REGLAN) 10 MG tablet Take 1 tablet by mouth 4 times daily as needed for Nausea.Eprescribe, Disp-10 tablet, R-0      loratadine (CLARITIN) 10 MG tablet Take 1 tablet by mouth daily as needed for Allergies.OTC      ibuprofen (MOTRIN) 800 MG tablet Take 1 tablet by mouth 3 times daily as needed for Pain.Eprescribe, Disp-30 tablet, R-0      Acetaminophen 325 MG Cap Take 1-2 capsules by mouth every 4 hours as needed (pain).OTC, Disp-120 capsule      sumatriptan (IMITREX) 100 MG tablet 1 TABLET AT ONSET OF HEADACHE. MAY REPEAT 1 TIME IN 2HRS. NOT MORE THAN 2 TABLETS IN 24HRS.Eprescribe, Disp-9 tablet, R-5      mupirocin (BACTROBAN) 2 % ointment Apply topically 3 times daily.Disp-22 g, R-0, Eprescribe             Discharge Medication List  Requested Prescriptions     Pending Prescriptions Disp Refills    BUDESONIDE 3 MG Oral Cap DR Particles [Pharmacy Med Name: BUDESONIDE 3MG DR CAPSULES] 120 capsule 0     Sig: TAKE 3 CAPSULES BY MOUTH EVERY MORNING FOR 8 WEEKS, THEN 2 CAPSULES EVERY MORNING FOR 4 WEEKS     Last seen: 12/16/24  Suggested follow up:  Next appointment: 2/24/25  Last refill: 1/22/25    Refill pended, please review/sign if agreeable.     as of 4/15/2019 10:00 PM      New Prescriptions    Details   nitrofurantoin, macrocrystal-monohydrate, (MACROBID) 100 MG capsule Take 1 capsule by mouth 2 times daily for 7 days.Normal, Oral, 2 TIMES DAILY, Disp-14 capsule, R-0MacroBID:  FDA approved for UTI treatment.  Not FDA approved for prophylaxis.      UTI Treatment dose (uncomplicated): 100 mg orally every 12 hours for 5  days (IDSA Guidelines Uncomplicated Cystitis - 2011)      phenazopyridine (PYRIDIUM) 200 MG tablet Take 1 tablet by mouth 3 times daily for 2 days.Normal, Disp-6 tablet, R-0             Past Medical History:   Diagnosis Date   • Depression    • Frequent headaches    • Irregular menstrual cycle    • Pre-diabetes        Past Surgical History:   Procedure Laterality Date   • ABDOMINAL HERNIA REPAIR      x 2   • ESOPHAGEAL DILATION  04/21/2011   • HB ETONOGESTREL (NEXPLANON) IMPLANT  11/10/14    removed 4/24/15       Family History   Problem Relation Age of Onset   • Colon Polyps Mother    • COPD Mother    • Other Father         degenerative disc disease   • ADHD/ADD Father    • Bipolar disorder Father    • * Sister    • ADHD/ADD Brother    • Cancer, Breast Maternal Grandmother    • Diabetes Maternal Grandmother    • Diabetes Maternal Grandfather    • Cancer Paternal Grandmother         liver lung and pancreatic   • Diabetes Paternal Grandfather        Social History     Tobacco Use   • Smoking status: Current Every Day Smoker     Packs/day: 1.00     Types: Cigarettes   • Smokeless tobacco: Never Used   • Tobacco comment: trying to quit/ cut down- only smoking 5-6 a day   Substance Use Topics   • Alcohol use: Yes     Alcohol/week: 0.0 oz     Comment: occasional   • Drug use: No     Comment: none       Review of Systems     Positive for increased frequency of urination, dysuria. Negative for back pain, abdominal pain, nausea, vomiting, fever. 7 systems reviewed all negative exception previously noted.    Physical Exam     ED Triage Vitals   ED  Triage Vitals Group      Temp 04/15/19 2124 98.2 °F (36.8 °C)      Pulse 04/15/19 2124 80      Resp 04/15/19 2124 14      BP 04/15/19 2124 125/82      SpO2 04/15/19 2124 97 %      EtCO2 mmHg --       Height --       Weight 04/15/19 2157 225 lb 15.5 oz (102.5 kg)      Weight Scale Used 04/15/19 2124 ED Actual       Physical Exam   Constitutional: She is oriented to person, place, and time. She appears well-developed and well-nourished.   HENT:   Head: Normocephalic and atraumatic.   Nose: Nose normal.   Mouth/Throat: Oropharynx is clear and moist.   Eyes: Pupils are equal, round, and reactive to light. Conjunctivae and EOM are normal.   Neck: Normal range of motion. Neck supple.   Cardiovascular: Normal rate, regular rhythm and normal heart sounds.   No murmur heard.  Pulmonary/Chest: Effort normal and breath sounds normal. She has no wheezes. She has no rhonchi. She has no rales.   Abdominal: Soft. Bowel sounds are normal. There is tenderness in the suprapubic area. There is no CVA tenderness.   Musculoskeletal: Normal range of motion. She exhibits no edema.   Neurological: She is alert and oriented to person, place, and time.   Skin: Skin is warm and dry. No rash noted.   Psychiatric: She has a normal mood and affect. Her behavior is normal.   Nursing note and vitals reviewed.           ED Course     Procedures    Lab Results     Results for orders placed or performed during the hospital encounter of 04/15/19   Urinalysis & Reflex Micro with Culture if Indicated   Result Value Ref Range    COLOR YELLOW YELLOW    APPEARANCE CLOUDY     GLUCOSE(URINE) NEGATIVE NEGATIVE mg/dL    BILIRUBIN NEGATIVE NEGATIVE    KETONES NEGATIVE NEGATIVE mg/dL    SPECIFIC GRAVITY 1.025 1.005 - 1.030    BLOOD SMALL (A) NEGATIVE    pH 5.5 5.0 - 7.0 Units    PROTEIN(URINE) NEGATIVE NEGATIVE mg/dL    UROBILINOGEN 0.2 0.0 - 1.0 mg/dL    NITRITE NEGATIVE NEGATIVE    LEUKOCYTE ESTERASE SMALL (A) NEGATIVE    SPECIMEN TYPE URINE, CLEAN  CATCH/MIDSTREAM    Urinalysis Microscopic   Result Value Ref Range    Squamous EPI'S 1 to 5 0 - 5 /hpf    RBC 4 to 5 0 - 3 /hpf    WBC 26 to 100 0 - 5 /hpf    BACTERIA FEW (A) NONE SEEN /hpf    Hyaline Casts NONE SEEN 0 - 5 /lpf    MUCOUS PRESENT      Urinalysis reviewed by this physician.    EKG Results       Radiology Results     Imaging Results    None         ED Medication Orders (From admission, onward)    Start Ordered     Status Ordering Provider    04/15/19 2200 04/15/19 2159  phenazopyridine (PYRIDIUM) tablet 100 mg  ONCE      Last MAR action:  Given SHREE RUCKER    04/15/19 2200 04/15/19 2159  nitrofurantoin (macrocrystal-monohydrate) (MACROBID) SR capsule 100 mg  ONCE      Last MAR action:  Given SHREE RUCKER               [facility]    Clinical Impression     ED Diagnosis   1. Acute UTI         Disposition        Discharge 4/15/2019  9:57 PM  Jayleen Marshall discharge to home/self care.    Patient should return sooner for back pain, vomiting, hematuria, worsening symptoms or other concerns.    Diagnosis  The encounter diagnosis was Acute UTI.    Follow Up:  Isis Cabrera DO  [address] [facility] [address]  [phone]      As needed       Discharge Medication List as of 4/15/2019 10:00 PM      New Prescriptions    Details   nitrofurantoin, macrocrystal-monohydrate, (MACROBID) 100 MG capsule Take 1 capsule by mouth 2 times daily for 7 days.Normal, Oral, 2 TIMES DAILY, Disp-14 capsule, R-0MacroBID:  FDA approved for UTI treatment.  Not FDA approved for prophylaxis.      UTI Treatment dose (uncomplicated): 100 mg orally every 12 hours for 5  days (IDSA Guidelines Uncomplicated Cystitis - 2011)      phenazopyridine (PYRIDIUM) 200 MG tablet Take 1 tablet by mouth 3 times daily for 2 days.Normal, Disp-6 tablet, R-0             Disposition:  Discharge 4/15/2019  9:57 PM  Jayleen Marshall discharge to home/self care.         Shree Rucker DO  04/16/19 0018

## 2025-02-06 RX ORDER — BUDESONIDE 3 MG/1
6 CAPSULE, COATED PELLETS ORAL EVERY MORNING
Qty: 60 CAPSULE | Refills: 0 | Status: SHIPPED | OUTPATIENT
Start: 2025-02-06 | End: 2025-03-08

## 2025-02-24 ENCOUNTER — VIRTUAL PHONE E/M (OUTPATIENT)
Facility: CLINIC | Age: OVER 89
End: 2025-02-24
Payer: MEDICARE

## 2025-02-24 DIAGNOSIS — K52.832 LYMPHOCYTIC COLITIS: ICD-10-CM

## 2025-02-24 DIAGNOSIS — R11.2 NAUSEA AND VOMITING IN ADULT: Primary | ICD-10-CM

## 2025-02-24 DIAGNOSIS — R10.84 DIFFUSE ABDOMINAL PAIN: ICD-10-CM

## 2025-02-24 RX ORDER — PANTOPRAZOLE SODIUM 40 MG/1
40 TABLET, DELAYED RELEASE ORAL
Qty: 90 TABLET | Refills: 3 | Status: SHIPPED | OUTPATIENT
Start: 2025-04-07 | End: 2026-04-02

## 2025-02-24 NOTE — PROGRESS NOTES
[facility] - Gastroenterology                                                                                                               Reason for consult:   Chief Complaint   Patient presents with    Follow - Up       Requesting physician or provider: Patrick Stafford MD    >>>This is a Telemedicine with live, interactive video and audio. Patient understands and accepts financial responsibility for any deductible, co-insurance and/or co-pays associated with this service.      HPI:   Christy Lowry is a 89 year old year-old female with history of OA, GERD, squamous cell carcinoma, who presents with ongoing n/v/d, abdominal pain now near 2 weeks. CT showed diffuse enterocolitis, initially felt to be infectious. SBFT negative for bowel obstruction. C diff and GI stool PCR negative. She was readmitted for ongoing symptoms. Repeat CT A/P showed diffuse enterocolitis, no obstruction. Repeat stool culture and C.diff were negative. She underwent EGD and flex sig last week and colonic biopsies showed increased IELs c/w lymphocytic colitis. CRP 2. Could be slowly resolving infectious process vs inflammatory process in the setting of newly diagnosed lymphocytic colitis. Empiric IV steroids were initiated 11/15/24, with initial improvement in symptoms though recurrence of ABD cramping, nausea and vomiting with contrast for CTE. Discharged with 9 mg budesonide for 8 week course and has now been on 6 mg dose. Presents today via video call for follow up.      Follow up  -no abdominal pain  -has been puree all food  -has been having all food types  -incorporating fiber  -having 2-3 BM overnight and 1 during the day, on 6 mg budesonide  -more formed   -brown in color, no bright red blood or dark black stools  -no nausea or vomiting  -has a good appetite  -no dysphagia or GERD   -no bloating      Pertinent Family Hx:  - No known history of esophageal, gastric cancers.  Mother + colon  cancer, Sister + colon CA.   - No known IBD  - No known liver pathologies     Endoscopy Hx:  -EGD/flexible sigmoidoscopy 11/9/2024  Impression:  No etiology for symptoms found, possible infectious     Recommend:  Await pathology  NPO given slowed motility and vomiting recently   Antiemetics  IVF  Final Diagnosis:      A. 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  Associated epithelial atypia, favor reactive/regnerative.  Negative for intestinal metaplasia or dysplasia.  Diff-Quik stain (with appropriate control reactivity) is negative for H. pylori microorganisms.     B. Random colon; biopsy:  Colonic mucosa with increased intraepithelial lymphocytes, see comment.     Comment: The findings in the random colon biopsy (part B) can be seen in lymphocytic (microscopic) colitis, infection (particularly viral), and drug effects. Clinical and endoscopic correlation is recommended.      - >10 years ago colonoscopy performed at [facility], findings per patient: Unremarkable      Social Hx:  - No current tobacco use  - + social ETOH  - Denies cannabis/illicit drug use  - Denies NSAIDs  - Has care taker  - Occupation: Retired           Wt Readings from Last 6 Encounters:   01/14/25 128 lb (58.1 kg)   12/16/24 140 lb (63.5 kg)   11/19/24 140 lb 6.4 oz (63.7 kg)   11/05/24 152 lb (68.9 kg)   10/31/24 128 lb 1.4 oz (58.1 kg)   07/18/23 128 lb (58.1 kg)        History, Medications, Allergies, ROS:      Past Medical History:    Actinic keratosis    hypertrophic AK - left central forehead    Benzodiazepine withdrawal without complication (HCC)    Chronic GERD    Constipation    Diarrhea    Eustachian tube dysfunction    Heart palpitations    Loose stools    Occult blood in stools    SCC (squamous cell carcinoma)    left forehead, invasive, well-diff.     SCC (squamous cell carcinoma)    right temple    SCC (squamous cell carcinoma)    right neck    Skin  cancer      Past Surgical History:   Procedure Laterality Date    Tonsillectomy        Family Hx:   Family History   Problem Relation Age of Onset    Colon Cancer Mother     Colon Cancer Sister       Social History:   Social History     Socioeconomic History    Marital status:    Tobacco Use    Smoking status: Former    Smokeless tobacco: Former   Vaping Use    Vaping status: Never Used   Substance and Sexual Activity    Alcohol use: Not Currently    Drug use: No   Other Topics Concern    Reaction to local anesthetic No     Social Drivers of Health     Food Insecurity: No Food Insecurity (1/14/2025)    Food Insecurity     Food Insecurity: Never true   Transportation Needs: No Transportation Needs (1/17/2025)    Transportation Needs     Lack of Transportation: No   Housing Stability: Low Risk  (1/14/2025)    Housing Stability     Housing Instability: No        Medications (Active prior to today's visit):  Current Outpatient Medications   Medication Sig Dispense Refill    [START ON 4/7/2025] pantoprazole 40 MG Oral Tab EC Take 1 tablet (40 mg total) by mouth every morning before breakfast. 90 tablet 3    budesonide 3 MG Oral Cap DR Particles Take 2 capsules (6 mg total) by mouth every morning. 60 capsule 0    ergocalciferol 1.25 MG (54435 UT) Oral Cap Take 1 capsule (50,000 Units total) by mouth once a week.      FEROSUL 325 (65 Fe) MG Oral Tab Take 1 tablet (325 mg total) by mouth daily.      levothyroxine 75 MCG Oral Tab Take 1 tablet (75 mcg total) by mouth daily.      carboxymethylcellulose 1 % Ophthalmic Gel Place 1-2 drops into both eyes as needed.      ondansetron (ZOFRAN) 4 mg tablet Take 1 tablet (4 mg total) by mouth every 8 (eight) hours as needed for Nausea. 60 tablet 0    dicyclomine 10 MG Oral Cap Take 1 capsule (10 mg total) by mouth every 12 (twelve) hours as needed. 180 capsule 3       Allergies:  Allergies[1]    ROS:   CONSTITUTIONAL: negative for fevers, chills, sweats and weight loss  EYES  Negative for red eyes, yellow eyes, changes in vision  HEENT: Negative for dysphagia and hoarseness  RESPIRATORY: Negative for cough and shortness of breath  CARDIOVASCULAR: Negative for chest pain, palpitations  GASTROINTESTINAL: See HPI  GENITOURINARY: Negative for dysuria and frequency  MUSCULOSKELETAL: Negative for arthralgias and myalgias  NEUROLOGICAL: Negative for dizziness and headaches  BEHAVIOR/PSYCH: Negative for anxiety and poor appetite    PHYSICAL EXAM:   There were no vitals taken for this visit.    Not completed due to video visit.     Labs/Imaging/Procedures:     Patient's pertinent labs and imaging were reviewed and discussed with patient today.     Lab Results   Component Value Date    WBC 13.0 (H) 01/15/2025    RBC 4.00 01/15/2025    HGB 12.2 01/15/2025    HCT 36.8 01/15/2025    MCV 92.0 01/15/2025    MCH 30.5 01/15/2025    MCHC 33.2 01/15/2025    RDW 15.8 (H) 01/15/2025    .0 01/15/2025    MPV 9.5 06/14/2018        Lab Results   Component Value Date    GLU 87 01/15/2025    BUN 8 (L) 01/15/2025    BUNCREA 14.3 01/15/2025    CREATSERUM 0.56 01/15/2025    ANIONGAP 9 01/15/2025    GFRNAA 56 (L) 10/19/2020    GFRAA 64 10/19/2020    CA 8.2 (L) 01/15/2025    OSMOCALC 292 01/15/2025    ALKPHO 76 01/15/2025    AST 19 01/15/2025    ALT <7 (L) 01/15/2025    ALKPHOS 84 07/12/2016    BILT 0.3 01/15/2025    TP 5.3 (L) 01/15/2025    ALB 2.8 (L) 01/15/2025    GLOBULIN 2.5 01/15/2025    AGRATIO 0.9 (L) 07/12/2016     01/15/2025    K 3.7 01/15/2025     01/15/2025    CO2 27.0 01/15/2025        No results found.          .  ASSESSMENT/PLAN:   Christy Lowry is a 89 year old year-old female with history of OA, GERD, squamous cell carcinoma, who presents with ongoing n/v/d, abdominal pain now near 2 weeks. CT showed diffuse enterocolitis, initially felt to be infectious. SBFT negative for bowel obstruction. C diff and GI stool PCR negative. She was readmitted for ongoing symptoms. Repeat CT A/P  showed diffuse enterocolitis, no obstruction. Repeat stool culture and C.diff were negative. She underwent EGD and flex sig last week and colonic biopsies showed increased IELs c/w lymphocytic colitis. CRP 2. Could be slowly resolving infectious process vs inflammatory process in the setting of newly diagnosed lymphocytic colitis. Empiric IV steroids were initiated 11/15/24, with initial improvement in symptoms though recurrence of ABD cramping, nausea and vomiting with contrast for CTE. Discharged with 9 mg budesonide for 8 week course and has now been on 6 mg dose. Presents today via video call for follow up.     Nausea, vomiting, diarrhea  Diffuse abdominal pain  Lymphocytic colitis   -patient has been doing well on 6 mg budesonide  -no further abdominal pain or vomiting episodes  -has also been on pantoprazole 40 mg BID   -having 2-3 BM overnight, 1 during the day  -formed brown in color  -discussed plan to trial decrease ppi and budesonide after the 8 week ed  -should continue soft diet but able to eat what she can tolerate      Recommendations:  - decrease to 3 mg budesonide  - phone call visit June  - continue pantoprazole once a day   - follow up sooner if needed      Total face to face time was 22, more than 50% of the time was spent in counseling and/or coordination of care related to above.    Orders This Visit:  No orders of the defined types were placed in this encounter.      Meds This Visit:  Requested Prescriptions     Signed Prescriptions Disp Refills    pantoprazole 40 MG Oral Tab EC 90 tablet 3     Sig: Take 1 tablet (40 mg total) by mouth every morning before breakfast.       Imaging & Referrals:  None      Paulette Kraft PA-C   2/24/2025        This note was partially prepared using Dragon Medical voice recognition dictation software. As a result, errors may occur. When identified, these errors have been corrected. While every attempt is made to correct errors during dictation,  discrepancies may still exist.          [1] No Known Allergies

## 2025-03-21 RX ORDER — BUDESONIDE 3 MG/1
6 CAPSULE, COATED PELLETS ORAL EVERY MORNING
Qty: 180 CAPSULE | Refills: 0 | Status: SHIPPED | OUTPATIENT
Start: 2025-03-21 | End: 2025-06-19

## 2025-03-21 NOTE — TELEPHONE ENCOUNTER
Requested Prescriptions     Pending Prescriptions Disp Refills    BUDESONIDE 3 MG Oral Cap DR Particles [Pharmacy Med Name: BUDESONIDE 3MG DR CAPSULES] 60 capsule 0     Sig: TAKE 2 CAPSULES(6 MG) BY MOUTH EVERY MORNING         Last virtual phone visit  2/24/2025  LR  2/06/2025  Routed to MD on call    em

## 2025-03-29 ENCOUNTER — LAB ENCOUNTER (OUTPATIENT)
Dept: LAB | Facility: HOSPITAL | Age: OVER 89
End: 2025-03-29
Attending: INTERNAL MEDICINE
Payer: MEDICARE

## 2025-03-29 ENCOUNTER — TELEPHONE (OUTPATIENT)
Dept: INTERNAL MEDICINE CLINIC | Facility: CLINIC | Age: OVER 89
End: 2025-03-29

## 2025-03-29 ENCOUNTER — NURSE TRIAGE (OUTPATIENT)
Dept: INTERNAL MEDICINE CLINIC | Facility: CLINIC | Age: OVER 89
End: 2025-03-29

## 2025-03-29 DIAGNOSIS — R30.0 DYSURIA: Primary | ICD-10-CM

## 2025-03-29 DIAGNOSIS — R30.0 DYSURIA: ICD-10-CM

## 2025-03-29 LAB
WBC #/AREA URNS AUTO: >50 /HPF
WBC CLUMPS UR QL AUTO: PRESENT /HPF

## 2025-03-29 PROCEDURE — 87077 CULTURE AEROBIC IDENTIFY: CPT

## 2025-03-29 PROCEDURE — 81015 MICROSCOPIC EXAM OF URINE: CPT

## 2025-03-29 PROCEDURE — 87086 URINE CULTURE/COLONY COUNT: CPT

## 2025-03-29 PROCEDURE — 87186 SC STD MICRODIL/AGAR DIL: CPT

## 2025-03-29 RX ORDER — CEPHALEXIN 500 MG/1
500 CAPSULE ORAL 2 TIMES DAILY
Qty: 14 CAPSULE | Refills: 0 | Status: SHIPPED | OUTPATIENT
Start: 2025-03-29

## 2025-03-29 NOTE — TELEPHONE ENCOUNTER
Action Requested: Summary for Provider     []  Critical Lab, Recommendations Needed  [] Need Additional Advice  []   FYI    []   Need Orders  [] Need Medications Sent to Pharmacy  []  Other     SUMMARY: patients daughter is calling to advise that her mother is hallucinating and usually when she does this it's been due to a UTI. Per her daughter she does get frequent UTI's and is prescribed antibiotic for this. Patient wears a diaper most of the time. She is not complaining of pain and it's difficult to tell if the urine is cloudy due to the diaper. Patient is drinking plenty of water and eats a pureed diet. Advised per protocol. I did advise Urgent Care of ER today but her daughter advised that is not possible. I called patient's provider on his cell and Dr. Stafford placed order for UA and culture. I called her daughter and advised her of this.     Her daughter is requesting a UA with culture and a possible antibiotic    Reason for call: Urinary Symptoms  Onset: Data Unavailable                   Reason for Disposition   Urinating more frequently than usual (i.e., frequency)    Protocols used: Urinary Symptoms-A-[place]

## 2025-03-29 NOTE — TELEPHONE ENCOUNTER
Daughter called requesting medication for it, culture is pending, urine does look abnormal.  Left message for the daughter to call back

## 2025-03-29 NOTE — TELEPHONE ENCOUNTER
Daughter called that patient is more confused, usually a sign of infection, he had UTIs in the past, 2 days urine analysis shows leukocytes and small amount of blood and bacteria.  Advised daughter that culture will be available in 48-72 hours, will review records and see what the last antibiotic was given to her and sent to the pharmacy.  If patient more confused, feverish blood pressure is low we will take you to emergency room

## 2025-03-29 NOTE — TELEPHONE ENCOUNTER
Daughter called informing that change in her mental status.  She believes that some UTI.  Wants to check urinalysis.  I have placed the orders.  If there is any worsening symptoms, she will take mom to the emergency room.

## 2025-04-16 ENCOUNTER — TELEPHONE (OUTPATIENT)
Dept: INTERNAL MEDICINE CLINIC | Facility: CLINIC | Age: OVER 89
End: 2025-04-16

## 2025-04-21 ENCOUNTER — TELEPHONE (OUTPATIENT)
Facility: CLINIC | Age: OVER 89
End: 2025-04-21

## 2025-04-21 ENCOUNTER — NURSE TRIAGE (OUTPATIENT)
Dept: INTERNAL MEDICINE CLINIC | Facility: CLINIC | Age: OVER 89
End: 2025-04-21

## 2025-04-21 NOTE — TELEPHONE ENCOUNTER
Paulette    I spoke to Christy Betancourt  When patient goes to bed at night she gets up 7 times to go to the bathroom  Stools are soft and formed, not diarrhea but has frequent urge to defecate.  Because she is getting up so often, she is very tired.    She is eating all pureed foods since cannot have solid foods.  Had UTI recently and was on antibiotics for that  They are trying to hydrate her due to recent UTI.  However, her legs are swollen so they are concerned.  She has a home health nurse who assessed her and told her that her vitals and oxygen status are good.    Asking if needs diuretic or compression socks.  If needs diuretic, which one would you recommend?    Daughter asking if she could take dicylclomine - she took this in the past   Or if she should be taking imodium.    I let her know since they are soft but formed frequent stools, the imodium would not be good right now because might make her more constipated and she would still have that urge to defecate but stools would then be even harder to pass.    Discussed if ever has swelling in just one leg it is concerning because may be blood clot, but she has swelling in both legs.  Discussed to watch salt intake.  Discussed high fiber foods that can be pureed because increase in fiber can help with more complete bowel movements.    Please advise    Thank you.

## 2025-04-21 NOTE — TELEPHONE ENCOUNTER
Patient's daughter asking if recommends diateretic. States is having swollen legs and also asking if recommends Dicyclomine or Imodium. Please call.

## 2025-04-21 NOTE — TELEPHONE ENCOUNTER
Called and talked with Maya.     Bowel movement- Per daughter her mom is having small BM throughout the day and then small BM overnight keeping her awake. Not having diarrhea. No blood in stool. No dark black stool. Discussed concern for heavy stool burden causing her to incompletely empty. Will trial miralax one capful daily and see if there is any improvement. If patient develops overt diarrhea can consider increasing budesonide to 6 mg daily. Daughter acknowledged understanding. All questions answered.     Swelling- Contact primary care provider.       Paulette Kraft PA-C

## 2025-04-21 NOTE — TELEPHONE ENCOUNTER
Action Requested: Summary for Provider     []  Critical Lab, Recommendations Needed  [x] Need Additional Advice  []   FYI    []   Need Orders  [] Need Medications Sent to Pharmacy  []  Other     SUMMARY: Recommended visit with 3 days. Dr. Stafford: per daughter bringing patient in is nearly impossible, last visit was completed as virtual/ Would you be okay with virtual visit to discuss options for treatment?     Reason for call: Swelling    Onset: 2 weeks     Per daughter Christy Betancourt patient has bilateral feet swelling that comes and goes. Denies chest pain and shortness of breath. Swelling is present in both ankles and sometimes travels up leg     Reason for Disposition   MILD swelling of both ankles (i.e., pedal edema) AND new-onset or worsening    Protocols used: Leg Swelling and Edema-A-[place]

## 2025-04-22 ENCOUNTER — HOSPITAL ENCOUNTER (INPATIENT)
Facility: HOSPITAL | Age: OVER 89
LOS: 9 days | Discharge: HOSPICE | End: 2025-05-01
Attending: EMERGENCY MEDICINE | Admitting: HOSPITALIST
Payer: MEDICARE

## 2025-04-22 ENCOUNTER — TELEMEDICINE (OUTPATIENT)
Dept: INTERNAL MEDICINE CLINIC | Facility: CLINIC | Age: OVER 89
End: 2025-04-22

## 2025-04-22 DIAGNOSIS — R53.1 WEAKNESS GENERALIZED: ICD-10-CM

## 2025-04-22 DIAGNOSIS — G93.41 METABOLIC ENCEPHALOPATHY: Primary | ICD-10-CM

## 2025-04-22 DIAGNOSIS — N30.00 ACUTE CYSTITIS WITHOUT HEMATURIA: Primary | ICD-10-CM

## 2025-04-22 PROBLEM — N39.0 UTI (URINARY TRACT INFECTION): Status: ACTIVE | Noted: 2025-04-22

## 2025-04-22 LAB
ALBUMIN SERPL-MCNC: 2.4 G/DL (ref 3.2–4.8)
ALBUMIN/GLOB SERPL: 0.8 {RATIO} (ref 1–2)
ALP LIVER SERPL-CCNC: 104 U/L (ref 55–142)
ALT SERPL-CCNC: <7 U/L (ref 10–49)
ANION GAP SERPL CALC-SCNC: 7 MMOL/L (ref 0–18)
AST SERPL-CCNC: 20 U/L (ref ?–34)
BASOPHILS # BLD AUTO: 0.06 X10(3) UL (ref 0–0.2)
BASOPHILS NFR BLD AUTO: 0.3 %
BILIRUB SERPL-MCNC: 0.4 MG/DL (ref 0.2–0.9)
BILIRUB UR QL: NEGATIVE
BUN BLD-MCNC: 10 MG/DL (ref 9–23)
BUN/CREAT SERPL: 19.2 (ref 10–20)
C DIFF TOX B STL QL: NEGATIVE
CALCIUM BLD-MCNC: 7.5 MG/DL (ref 8.7–10.4)
CHLORIDE SERPL-SCNC: 96 MMOL/L (ref 98–112)
CO2 SERPL-SCNC: 28 MMOL/L (ref 21–32)
CREAT BLD-MCNC: 0.52 MG/DL (ref 0.55–1.02)
DEPRECATED RDW RBC AUTO: 50.7 FL (ref 35.1–46.3)
EGFRCR SERPLBLD CKD-EPI 2021: 82 ML/MIN/1.73M2 (ref 60–?)
EOSINOPHIL # BLD AUTO: 0.03 X10(3) UL (ref 0–0.7)
EOSINOPHIL NFR BLD AUTO: 0.2 %
ERYTHROCYTE [DISTWIDTH] IN BLOOD BY AUTOMATED COUNT: 16.4 % (ref 11–15)
GLOBULIN PLAS-MCNC: 3.2 G/DL (ref 2–3.5)
GLUCOSE BLD-MCNC: 112 MG/DL (ref 70–99)
GLUCOSE BLDC GLUCOMTR-MCNC: 107 MG/DL (ref 70–99)
GLUCOSE UR-MCNC: NORMAL MG/DL
HCT VFR BLD AUTO: 35.4 % (ref 35–48)
HGB BLD-MCNC: 11.8 G/DL (ref 12–16)
IMM GRANULOCYTES # BLD AUTO: 0.17 X10(3) UL (ref 0–1)
IMM GRANULOCYTES NFR BLD: 0.9 %
KETONES UR-MCNC: NEGATIVE MG/DL
LEUKOCYTE ESTERASE UR QL STRIP.AUTO: 500
LYMPHOCYTES # BLD AUTO: 3.91 X10(3) UL (ref 1–4)
LYMPHOCYTES NFR BLD AUTO: 21.4 %
MCH RBC QN AUTO: 28.6 PG (ref 26–34)
MCHC RBC AUTO-ENTMCNC: 33.3 G/DL (ref 31–37)
MCV RBC AUTO: 85.7 FL (ref 80–100)
MONOCYTES # BLD AUTO: 1.16 X10(3) UL (ref 0.1–1)
MONOCYTES NFR BLD AUTO: 6.3 %
NEUTROPHILS # BLD AUTO: 12.96 X10 (3) UL (ref 1.5–7.7)
NEUTROPHILS # BLD AUTO: 12.96 X10(3) UL (ref 1.5–7.7)
NEUTROPHILS NFR BLD AUTO: 70.9 %
OSMOLALITY SERPL CALC.SUM OF ELEC: 272 MOSM/KG (ref 275–295)
PH UR: 6 [PH] (ref 5–8)
PLATELET # BLD AUTO: 390 10(3)UL (ref 150–450)
POTASSIUM SERPL-SCNC: 4 MMOL/L (ref 3.5–5.1)
PROT SERPL-MCNC: 5.6 G/DL (ref 5.7–8.2)
PROT UR-MCNC: NEGATIVE MG/DL
RBC # BLD AUTO: 4.13 X10(6)UL (ref 3.8–5.3)
RBC #/AREA URNS AUTO: >10 /HPF
SODIUM SERPL-SCNC: 131 MMOL/L (ref 136–145)
SP GR UR STRIP: 1.01 (ref 1–1.03)
UROBILINOGEN UR STRIP-ACNC: NORMAL
WBC # BLD AUTO: 18.3 X10(3) UL (ref 4–11)
WBC #/AREA URNS AUTO: >50 /HPF

## 2025-04-22 PROCEDURE — 99223 1ST HOSP IP/OBS HIGH 75: CPT | Performed by: HOSPITALIST

## 2025-04-22 PROCEDURE — 99213 OFFICE O/P EST LOW 20 MIN: CPT | Performed by: INTERNAL MEDICINE

## 2025-04-22 RX ORDER — ONDANSETRON 2 MG/ML
4 INJECTION INTRAMUSCULAR; INTRAVENOUS EVERY 6 HOURS PRN
Status: DISCONTINUED | OUTPATIENT
Start: 2025-04-22 | End: 2025-05-01

## 2025-04-22 RX ORDER — LEVOTHYROXINE SODIUM 75 UG/1
75 TABLET ORAL DAILY
Status: DISCONTINUED | OUTPATIENT
Start: 2025-04-22 | End: 2025-05-01

## 2025-04-22 RX ORDER — VANCOMYCIN HYDROCHLORIDE 125 MG/1
125 CAPSULE ORAL ONCE
Status: DISCONTINUED | OUTPATIENT
Start: 2025-04-22 | End: 2025-04-22

## 2025-04-22 RX ORDER — VANCOMYCIN HYDROCHLORIDE 125 MG/1
125 CAPSULE ORAL 4 TIMES DAILY
Status: DISCONTINUED | OUTPATIENT
Start: 2025-04-22 | End: 2025-04-22

## 2025-04-22 RX ORDER — ACETAMINOPHEN 500 MG
500 TABLET ORAL EVERY 4 HOURS PRN
Status: DISCONTINUED | OUTPATIENT
Start: 2025-04-22 | End: 2025-05-01

## 2025-04-22 RX ORDER — HEPARIN SODIUM 5000 [USP'U]/ML
5000 INJECTION, SOLUTION INTRAVENOUS; SUBCUTANEOUS EVERY 12 HOURS SCHEDULED
Status: DISCONTINUED | OUTPATIENT
Start: 2025-04-22 | End: 2025-05-01

## 2025-04-22 RX ORDER — PANTOPRAZOLE SODIUM 40 MG/1
40 TABLET, DELAYED RELEASE ORAL
Status: DISCONTINUED | OUTPATIENT
Start: 2025-04-23 | End: 2025-05-01

## 2025-04-22 RX ORDER — BUDESONIDE 3 MG/1
3 CAPSULE, COATED PELLETS ORAL EVERY MORNING
Status: DISCONTINUED | OUTPATIENT
Start: 2025-04-23 | End: 2025-05-01

## 2025-04-22 RX ORDER — METOCLOPRAMIDE HYDROCHLORIDE 5 MG/ML
5 INJECTION INTRAMUSCULAR; INTRAVENOUS EVERY 8 HOURS PRN
Status: DISCONTINUED | OUTPATIENT
Start: 2025-04-22 | End: 2025-05-01

## 2025-04-22 RX ORDER — SODIUM CHLORIDE 9 MG/ML
INJECTION, SOLUTION INTRAVENOUS CONTINUOUS
Status: DISCONTINUED | OUTPATIENT
Start: 2025-04-22 | End: 2025-04-27

## 2025-04-22 RX ORDER — VANCOMYCIN HYDROCHLORIDE 125 MG/1
125 CAPSULE ORAL DAILY
Status: DISCONTINUED | OUTPATIENT
Start: 2025-04-22 | End: 2025-05-01

## 2025-04-22 NOTE — PROGRESS NOTES
Virtual Virtual Check-In    Christy Lowry verbally consents to a Virtual Video Check-In service on 04/22/25. Patient understands and accepts financial responsibility for any deductible, co-insurance and/or co-pays associated with this service. This visit is conducted using Telemedicine with live, interactive video and audio.     I spoke with Christy Lowry by secure video chat, verified date of birth, and discussed their current concerns:   Duration: 20 minutes    HPI    89 -year-old patient's family requesting telemedicine consultation for few issues #1 patient is not that active.  When I saw her she was not responding much.  She opened her eyes.  I was worried about recurrence of UTI versus dehydration.  I had lengthy discussion with family who was at bedside regarding CODE STATUS and my concern of clinical deterioration, aspiration.  If they would like to continue with ongoing care, my recommendation is to take her to the emergency room.    She also has some skin discoloration and some swelling in the lower extremities.  They informed me that swelling gets better when wearing compression stockings.  There is no increased warmth at the swelling site or at the skin discoloration side.    Review of Systems:   Review of Systems       Reviewed Active Problems:  Patient Active Problem List    Diagnosis    Leukocytosis    Hyperglycemia    Abdominal pain, acute    Leukocytosis, unspecified type    Colitis    Lymphocytic colitis    Abnormal CT scan, colon    Diffuse abdominal pain    Abnormal CT scan, gastrointestinal tract    Nausea vomiting and diarrhea    Vomiting    Vomiting, unspecified vomiting type, unspecified whether nausea present    Ileus (HCC)    Nausea and vomiting in adult    Persistent vomiting in adult patient    Enteritis    Irritable bowel syndrome with diarrhea    Generalized anxiety disorder    Neoplasm of uncertain behavior of skin    Arthralgia of right lower leg    Acquired hypothyroidism    Skin  cancer      Reviewed Past Medical History:  Past Medical History[1]   Reviewed Family History:  Family History[2]    Reviewed Social History:  Short Social Hx on File[3]   Reviewed Current Medications:  Current Medications[4]       Physical Exam  There were no vitals filed for this visit.  Physical Exam   Constitutional:       General: She is not in acute distress.     Appearance: Looks very drowsy  HENT:      Head: Normocephalic and atraumatic.   Neurological:      Mental Status: She is alert opens eyes  Psychiatric:      Very drowsy.   Skin discolorations present.  More edema at foot     Diagnosis:  1. Metabolic encephalopathy  I had a lengthy discussion with family who was present at bedside regarding my concerns as above.  I am worried about aspiration, worsening infection, cellulitis versus dehydration.  We have decided to recheck urinalysis to make sure that this is not from UTI.  They will discuss with other family members and will decide about CODE STATUS, goal of care etc.  If it is a UTI, I will treat with antibiotics.  Aspiration precautions discussed.  If she develops any fever, he will take her to the emergency room.  - UA Microscopic only, urine; Future  - Urine Culture, Routine; Future     Plan: As above.  I am worried about her prognosis.    Follow up: No follow-ups on file.    Please note that the following visit was completed using two-way, real-time interactive audio and/or video communication.  This has been done in good jesica to provide continuity of care in the best interest of the provider-patient relationship, due to the ongoing public health crisis/national emergency and because of restrictions of visitation.  There are limitations of this visit as no physical exam could be performed.  Every conscious effort was taken to allow for sufficient and adequate time.  This billing was spent on reviewing labs, medications, radiology tests and decision making.  Appropriate medical decision-making and  tests are ordered as detailed in the plan of care above. Christy Lowry understands video evaluation is not a substitute for in person examination or emergency care. Patient advised to go to ER or call 911 for worsening symptoms or acute distress.     This note was prepared using Dragon Medical voice recognition dictation software. As a result errors may occur. When identified these errors have been corrected. While every attempt is made to correct errors during dictation discrepancies may still exist.  Patrick Stafford MD         [1]   Past Medical History:   Actinic keratosis    hypertrophic AK - left central forehead    Benzodiazepine withdrawal without complication (HCC)    Chronic GERD    Constipation    Diarrhea    Eustachian tube dysfunction    Heart palpitations    Loose stools    Occult blood in stools    SCC (squamous cell carcinoma)    left forehead, invasive, well-diff.     SCC (squamous cell carcinoma)    right temple    SCC (squamous cell carcinoma)    right neck    Skin cancer   [2]   Family History  Problem Relation Age of Onset    Colon Cancer Mother     Colon Cancer Sister    [3]   Social History  Socioeconomic History    Marital status:    Tobacco Use    Smoking status: Former    Smokeless tobacco: Former   Vaping Use    Vaping status: Never Used   Substance and Sexual Activity    Alcohol use: Not Currently    Drug use: No   Other Topics Concern    Reaction to local anesthetic No     Social Drivers of Health     Food Insecurity: No Food Insecurity (1/14/2025)    Food Insecurity     Food Insecurity: Never true   Transportation Needs: No Transportation Needs (1/17/2025)    Transportation Needs     Lack of Transportation: No   Housing Stability: Low Risk  (1/14/2025)    Housing Stability     Housing Instability: No   [4]   Current Outpatient Medications   Medication Sig Dispense Refill    cephALEXin 500 MG Oral Cap Take 1 capsule (500 mg total) by mouth 2 (two) times daily. 14 capsule 0     budesonide 3 MG Oral Cap DR Particles Take 2 capsules (6 mg total) by mouth every morning. 180 capsule 0    pantoprazole 40 MG Oral Tab EC Take 1 tablet (40 mg total) by mouth every morning before breakfast. 90 tablet 3    ergocalciferol 1.25 MG (85423 UT) Oral Cap Take 1 capsule (50,000 Units total) by mouth once a week.      FEROSUL 325 (65 Fe) MG Oral Tab Take 1 tablet (325 mg total) by mouth daily.      levothyroxine 75 MCG Oral Tab Take 1 tablet (75 mcg total) by mouth daily.      carboxymethylcellulose 1 % Ophthalmic Gel Place 1-2 drops into both eyes as needed.      ondansetron (ZOFRAN) 4 mg tablet Take 1 tablet (4 mg total) by mouth every 8 (eight) hours as needed for Nausea. 60 tablet 0    dicyclomine 10 MG Oral Cap Take 1 capsule (10 mg total) by mouth every 12 (twelve) hours as needed. 180 capsule 3

## 2025-04-22 NOTE — ED INITIAL ASSESSMENT (HPI)
Arrives via EMS for weakness x24hrs. Per family, pt is not communicating \"as clearly\". SBP 130s, HR 80-90 with possible afib. .

## 2025-04-22 NOTE — TELEPHONE ENCOUNTER
Spoke to patient's daughter Christy Betancourt (on Release of Information), verified Name and . Relayed provider's message below, verbalized understanding. States caregiver is with the patient and unsure if caregiver will be capable or familiar in assisting with the video visit. Daughter will check and will call back so we can schedule the appointment for video visit today at 1:45.

## 2025-04-22 NOTE — TELEPHONE ENCOUNTER
Patient daughter Christy Betancourt  returned our call ( Identified name and date of birth )     Will have the video visit today     Sister in law Xena [phone]  will scooby with the video visit     Routing as For Your Information '    Future Appointments   Date Time Provider Department Center   4/22/2025  1:45 PM Patrick Stafford MD [facility]   6/2/2025  1:00 PM Paulette Kraft PA-C [facility]

## 2025-04-22 NOTE — ED QUICK NOTES
Orders for admission, patient is aware of plan and ready to go upstairs. Any questions, please call ED RN Mikayla at extension [phone].     Patient Covid vaccination status: Fully vaccinated     COVID Test Ordered in ED: None    COVID Suspicion at Admission: N/A    Running Infusions: Medication Infusions[1] None    Mental Status/LOC at time of transport: x2    Other pertinent information:   CIWA score: N/A   NIH score:  N/A             [1]

## 2025-04-23 LAB
ADENOVIRUS F 40/41 PCR: NEGATIVE
ANION GAP SERPL CALC-SCNC: 8 MMOL/L (ref 0–18)
ASTROVIRUS PCR: NEGATIVE
BASOPHILS # BLD AUTO: 0.1 X10(3) UL (ref 0–0.2)
BASOPHILS NFR BLD AUTO: 0.7 %
BUN BLD-MCNC: 9 MG/DL (ref 9–23)
BUN/CREAT SERPL: 19.6 (ref 10–20)
C CAYETANENSIS DNA SPEC QL NAA+PROBE: NEGATIVE
CALCIUM BLD-MCNC: 7.2 MG/DL (ref 8.7–10.4)
CAMPY SP DNA.DIARRHEA STL QL NAA+PROBE: NEGATIVE
CHLORIDE SERPL-SCNC: 101 MMOL/L (ref 98–112)
CO2 SERPL-SCNC: 27 MMOL/L (ref 21–32)
CREAT BLD-MCNC: 0.46 MG/DL (ref 0.55–1.02)
CRYPTOSP DNA SPEC QL NAA+PROBE: NEGATIVE
DEPRECATED RDW RBC AUTO: 51.8 FL (ref 35.1–46.3)
EAEC PAA PLAS AGGR+AATA ST NAA+NON-PRB: NEGATIVE
EC STX1+STX2 + H7 FLIC SPEC NAA+PROBE: NEGATIVE
EGFRCR SERPLBLD CKD-EPI 2021: 85 ML/MIN/1.73M2 (ref 60–?)
ENTAMOEBA HISTOLYTICA PCR: NEGATIVE
EOSINOPHIL # BLD AUTO: 0.09 X10(3) UL (ref 0–0.7)
EOSINOPHIL NFR BLD AUTO: 0.6 %
EPEC EAE GENE STL QL NAA+NON-PROBE: NEGATIVE
ERYTHROCYTE [DISTWIDTH] IN BLOOD BY AUTOMATED COUNT: 17 % (ref 11–15)
ETEC LTA+ST1A+ST1B TOX ST NAA+NON-PROBE: NEGATIVE
GIARDIA LAMBLIA PCR: NEGATIVE
GLUCOSE BLD-MCNC: 82 MG/DL (ref 70–99)
HCT VFR BLD AUTO: 32.8 % (ref 35–48)
HGB BLD-MCNC: 11 G/DL (ref 12–16)
IMM GRANULOCYTES # BLD AUTO: 0.16 X10(3) UL (ref 0–1)
IMM GRANULOCYTES NFR BLD: 1.1 %
LYMPHOCYTES # BLD AUTO: 1.14 X10(3) UL (ref 1–4)
LYMPHOCYTES NFR BLD AUTO: 8.2 %
MCH RBC QN AUTO: 28.4 PG (ref 26–34)
MCHC RBC AUTO-ENTMCNC: 33.5 G/DL (ref 31–37)
MCV RBC AUTO: 84.5 FL (ref 80–100)
MONOCYTES # BLD AUTO: 0.89 X10(3) UL (ref 0.1–1)
MONOCYTES NFR BLD AUTO: 6.4 %
NEUTROPHILS # BLD AUTO: 11.6 X10 (3) UL (ref 1.5–7.7)
NEUTROPHILS # BLD AUTO: 11.6 X10(3) UL (ref 1.5–7.7)
NEUTROPHILS NFR BLD AUTO: 83 %
NOROVIRUS GI/GII PCR: NEGATIVE
OSMOLALITY SERPL CALC.SUM OF ELEC: 280 MOSM/KG (ref 275–295)
P SHIGELLOIDES DNA STL QL NAA+PROBE: NEGATIVE
PLATELET # BLD AUTO: 442 10(3)UL (ref 150–450)
PLATELETS.RETICULATED NFR BLD AUTO: 2.2 % (ref 0–7)
POTASSIUM SERPL-SCNC: 3 MMOL/L (ref 3.5–5.1)
POTASSIUM SERPL-SCNC: 4.1 MMOL/L (ref 3.5–5.1)
RBC # BLD AUTO: 3.88 X10(6)UL (ref 3.8–5.3)
ROTAVIRUS A PCR: NEGATIVE
SALMONELLA DNA SPEC QL NAA+PROBE: NEGATIVE
SAPOVIRUS PCR: NEGATIVE
SHIGELLA SP+EIEC IPAH ST NAA+NON-PROBE: NEGATIVE
SODIUM SERPL-SCNC: 136 MMOL/L (ref 136–145)
T3FREE SERPL-MCNC: 1.37 PG/ML (ref 2.4–4.2)
T4 FREE SERPL-MCNC: 1.1 NG/DL (ref 0.8–1.7)
TSI SER-ACNC: 1.31 UIU/ML (ref 0.55–4.78)
V CHOLERAE DNA SPEC QL NAA+PROBE: NEGATIVE
VIBRIO DNA SPEC NAA+PROBE: NEGATIVE
WBC # BLD AUTO: 14 X10(3) UL (ref 4–11)
YERSINIA DNA SPEC NAA+PROBE: NEGATIVE

## 2025-04-23 PROCEDURE — 99233 SBSQ HOSP IP/OBS HIGH 50: CPT | Performed by: HOSPITALIST

## 2025-04-23 RX ORDER — POTASSIUM CHLORIDE 1500 MG/1
40 TABLET, EXTENDED RELEASE ORAL EVERY 4 HOURS
Status: COMPLETED | OUTPATIENT
Start: 2025-04-23 | End: 2025-04-23

## 2025-04-23 NOTE — PLAN OF CARE
Patient denies of pain. Axo to self. Caregiver, Joycelyn present during admission. Pt bedrest. Incontinent episodes. Purwick inplace. GI panel sent to lab. IV, antibiotics infusing.Care endorsed to KEV Thibodeaux.

## 2025-04-23 NOTE — H&P
[facility]    PATIENT'S NAME: DIEGO CASTRO   ATTENDING PHYSICIAN: Thaddeus Teran MD   PATIENT ACCOUNT#:   [id number]    LOCATION:  [place] [address]  MEDICAL RECORD #:   [id number]       YOB: 1924  ADMISSION DATE:       04/22/2025    HISTORY AND PHYSICAL EXAMINATION    CHIEF COMPLAINT:  Urinary tract infection, possible C difficile infection.    HISTORY OF PRESENT ILLNESS:  The patient is a 89-year-old  female who was treated recently for urinary tract infection.  Culture positive for pansensitive E coli on March 29 with 10-day course of cephalexin.  Patient initially improved then family reported that she become very drowsy in the last few days.  Today, she had a liquid bowel movement.  They brought her in today to the emergency department for evaluation.  Repeat urinalysis showed gross urinary tract infection.  CBC showed white blood cell count of 18.3 with left shift.  Chemistry:  Sodium 131, chloride 96, potassium 4.0, normal GFR.  Started empirically on IV Ancef and oral vancomycin.  Stool for C difficile was obtained.  Patient will be admitted to the hospital for further management.    PAST MEDICAL HISTORY:  Actinic keratosis, osteoarthritis, osteoporosis, gastroesophageal reflux disease, hypothyroidism, anxiety disorder.    PAST SURGICAL HISTORY:  Multiple squamous cell carcinoma skin lesion resections, tonsillectomy.    MEDICATIONS:  Please see medication reconciliation list.     ALLERGIES:  No known drug allergies.    FAMILY HISTORY:  Mother had colon cancer.    SOCIAL HISTORY:  Lives with a caregiver.  Requires assistance in her basic activities of daily living.     REVIEW OF SYSTEMS:  Difficult to obtain from the patient.  Family reported the patient has been very drowsy and fatigued.  This morning, she started having frequent liquid bowel movement, foul smelling in odor.  Also, she has been developing macular rash for last week or two.  Denies any recent new  medications.      PHYSICAL EXAMINATION:    GENERAL:  Patient is drowsy but alert, opens her eyes and responds to questions.  VITAL SIGNS:  Temperature 97.8, pulse 70, respiratory rate 25, blood pressure 92/44, pulse ox 100% on room air.    HEENT:  Atraumatic.  Oropharynx clear.  Dry mucous membranes.  Ears, nose normal.  Eyes:  Anicteric sclerae.   NECK:  Supple.  No lymphadenopathy.  Trachea midline.  LUNGS:  Clear to auscultation bilaterally.  Normal respiratory effort.    HEART:  Regular rate, rhythm.  S1 and S2 auscultated.  No murmur.   ABDOMEN:  Soft, nondistended.  No tenderness.  Positive bowel sounds.   EXTREMITIES:  No peripheral edema, clubbing, or cyanosis.  Right anterior leg with mild ecchymosis and cellulitic changes.  No open wounds.  NEUROLOGIC:  Motor and sensory intact.    ASSESSMENT:    1.   Urinary tract infection.  2.   Right lower extremity cellulitis.  3.   Liquid diarrhea post antibiotic exposure.  Rule out Clostridium difficile infection.    PLAN:  Patient will be admitted to general medical floor.  IV Ancef.  Oral vancomycin for C difficile treatment.  IV fluids.  Aspiration and fall precautions.  Monitor temperature curve and hemodynamic status.  Further recommendations to follow.     Dictated By Chelly Dangelo MD  d: 04/22/2025 18:36:07  t: 04/22/2025 19:07:17  Job [id number]  FB/

## 2025-04-23 NOTE — PLAN OF CARE
Aox Self on RA. Remote tele no calls. 1x walker. VSS. Voiding via purewick. Nothing given for pain - no complaints. Plan TBD. Caregiver at the bedside. Frequent rounding in place. Call light within reach. Bed in lowest position and locked.

## 2025-04-23 NOTE — ED PROVIDER NOTES
Patient Seen in: [facility] Emergency Department    History     Chief Complaint   Patient presents with    Numbness Weakness       HPI    The patient presents to the ED with her daughter for weakness for the past 24 hours.  Patient not communicating as well per daughter and very weak.  Daughter states that she was recently treated for UTI, no other symptoms.    History reviewed. Past Medical History[1]    History reviewed. Past Surgical History[2]      Medications :  Prescriptions Prior to Admission[3]     Family History[4]    Smoking Status: Social Hx on file[5]    Constitutional and vital signs reviewed.      Social History and Family History elements reviewed from today, pertinent positives to the presenting problem noted.    Physical Exam     ED Triage Vitals   BP 04/22/25 1646 117/70   Pulse 04/22/25 1646 105   Resp 04/22/25 1646 25   Temp 04/22/25 1646 97.8 °F (36.6 °C)   Temp src 04/22/25 1646 Temporal   SpO2 04/22/25 1730 100 %   O2 Device 04/22/25 1646 None (Room air)       All measures to prevent infection transmission during my interaction with the patient were taken. Handwashing was performed prior to and after the exam.  Stethoscope and any equipment used during my examination was cleaned with super sani-cloth germicidal wipes following the exam.     Physical Exam  Vitals and nursing note reviewed.   Constitutional:       General: She is not in acute distress.     Appearance: She is well-developed. She is not ill-appearing or toxic-appearing.      Comments: Somnolent, provides little history   HENT:      Head: Normocephalic and atraumatic.   Eyes:      General:         Right eye: No discharge.         Left eye: No discharge.      Conjunctiva/sclera: Conjunctivae normal.   Neck:      Trachea: No tracheal deviation.   Cardiovascular:      Rate and Rhythm: Normal rate.   Pulmonary:      Effort: Pulmonary effort is normal. No respiratory distress.      Breath sounds: No stridor.   Abdominal:       General: There is no distension.      Palpations: Abdomen is soft.      Tenderness: There is abdominal tenderness.      Comments: Mild suprapubic tenderness   Musculoskeletal:         General: No deformity.   Skin:     General: Skin is warm and dry.   Neurological:      Mental Status: She is oriented to person, place, and time.   Psychiatric:         Mood and Affect: Mood normal.         Behavior: Behavior normal.         ED Course        Labs Reviewed   CBC WITH DIFFERENTIAL WITH PLATELET - Abnormal; Notable for the following components:       Result Value    WBC 18.3 (*)     HGB 11.8 (*)     RDW-SD 50.7 (*)     RDW 16.4 (*)     Neutrophil Absolute Prelim 12.96 (*)     Neutrophil Absolute 12.96 (*)     Monocyte Absolute 1.16 (*)     All other components within normal limits   COMP METABOLIC PANEL (14) - Abnormal; Notable for the following components:    Glucose 112 (*)     Sodium 131 (*)     Chloride 96 (*)     Creatinine 0.52 (*)     Calcium, Total 7.5 (*)     Calculated Osmolality 272 (*)     ALT <7 (*)     Total Protein 5.6 (*)     Albumin 2.4 (*)     A/G Ratio 0.8 (*)     All other components within normal limits   URINALYSIS WITH CULTURE REFLEX - Abnormal; Notable for the following components:    Clarity Urine Turbid (*)     Blood Urine 2+ (*)     Nitrite Urine 2+ (*)     Leukocyte Esterase Urine 500 (*)     WBC Urine >50 (*)     RBC Urine >10 (*)     Bacteria Urine 1+ (*)     Squamous Epi. Cells Few (*)     Transitional Cells Few (*)     All other components within normal limits   POCT GLUCOSE - Abnormal; Notable for the following components:    POC Glucose  107 (*)     All other components within normal limits   C. DIFFICILE(TOXIGENIC)PCR - Normal   BASIC METABOLIC PANEL (8)   CBC WITH DIFFERENTIAL WITH PLATELET   RAINBOW DRAW BLUE   URINE CULTURE, ROUTINE   GI STOOL PANEL BY PCR         As Interpreted by me    Imaging Results Available and Reviewed while in ED: No results found.  ED Medications  Administered:   Medications   ceFAZolin (Ancef) 2g in 10mL IV syringe premix (2 g Intravenous New Bag 4/22/25 2318)   sodium chloride 0.9% infusion ( Intravenous New Bag 4/22/25 2121)   heparin (Porcine) 5000 UNIT/ML injection 5,000 Units (5,000 Units Subcutaneous Given 4/22/25 2121)   acetaminophen (Tylenol Extra Strength) tab 500 mg (has no administration in time range)   ondansetron (Zofran) 4 MG/2ML injection 4 mg (has no administration in time range)   metoclopramide (Reglan) 5 mg/mL injection 5 mg (has no administration in time range)   budesonide (Entocort EC) DR cap 3 mg (has no administration in time range)   levothyroxine (Synthroid) tab 75 mcg (75 mcg Oral Not Given 4/22/25 2121)   pantoprazole (Protonix) DR tab 40 mg (has no administration in time range)   vancomycin (Vancocin) cap 125 mg (125 mg Oral Given 4/22/25 2121)   sodium chloride 0.9 % IV bolus 1,000 mL (0 mL Intravenous Stopped 4/22/25 2008)   ceFAZolin (Ancef) 1 g in dextrose 5% 100mL IVPB-ADD (0 g Intravenous Stopped 4/22/25 2008)         MDM     Vitals:    04/22/25 1930 04/22/25 2000 04/22/25 2027 04/22/25 2324   BP: 110/65 118/52 129/57    BP Location:   Right arm    Pulse: 76 68 90    Resp: 23 19 18    Temp:   97.6 °F (36.4 °C)    TempSrc:   Axillary    SpO2: 96% 95% 96%    Weight:    57.3 kg     *I personally reviewed and interpreted all ED vitals.    Pulse Ox: 96%, Room air, Normal     Monitor Interpretation:   normal sinus rhythm as interpreted by me.  The cardiac monitor was ordered to monitor heart rate.    Differential Diagnosis/ Diagnostic Considerations: UTI, viral syndrome, dehydration, other    Complicating Factors: The patient already has does not have any pertinent problems on file. to contribute to the complexity of this ED evaluation.    Medical Decision Making  Patient presents to the ED with weakness and decreased responsiveness.  Laboratory testing with leukocytosis and evidence for UTI.  Patient's blood pressure somewhat  low.  Discussed admission and IV antibiotics which daughter is agreeable with.  Patient started on Ancef in the ED and given a large liquidy stool concern for possible C. difficile.  Started on oral antibiotics and as well.  I discussed with Dr. Dangelo for admission.    Problems Addressed:  Acute cystitis without hematuria: acute illness or injury that poses a threat to life or bodily functions  Weakness generalized: acute illness or injury that poses a threat to life or bodily functions    Amount and/or Complexity of Data Reviewed  Independent Historian:      Details: Daughter provides history details  Labs: ordered. Decision-making details documented in ED Course.  Discussion of management or test interpretation with external provider(s):  I discussed with Dr. Dangelo for admission.          Condition upon leaving the department: Stable    Disposition and Plan     Clinical Impression:  1. Acute cystitis without hematuria    2. Weakness generalized        Disposition:  Admit    Follow-up:  No follow-up provider specified.    Medications Prescribed:  Current Discharge Medication List          Hospital Problems       Present on Admission  Date Reviewed: 4/22/2025          ICD-10-CM Noted POA    * (Principal) Acute cystitis without hematuria N30.00 4/22/2025 Unknown    UTI (urinary tract infection) N39.0 4/22/2025 Unknown    Weakness generalized R53.1 4/22/2025 Unknown                       [1]   Past Medical History:   Actinic keratosis    hypertrophic AK - left central forehead    Benzodiazepine withdrawal without complication (HCC)    Chronic GERD    Constipation    Diarrhea    Eustachian tube dysfunction    Heart palpitations    Loose stools    Occult blood in stools    SCC (squamous cell carcinoma)    left forehead, invasive, well-diff.     SCC (squamous cell carcinoma)    right temple    SCC (squamous cell carcinoma)    right neck    Skin cancer   [2]   Past Surgical History:  Procedure Laterality Date     Tonsillectomy     [3]   Medications Prior to Admission   Medication Sig Dispense Refill Last Dose/Taking    budesonide 3 MG Oral Cap DR Particles Take 2 capsules (6 mg total) by mouth every morning. (Patient taking differently: Take 1 capsule (3 mg total) by mouth every morning.) 180 capsule 0 4/22/2025    pantoprazole 40 MG Oral Tab EC Take 1 tablet (40 mg total) by mouth every morning before breakfast. 90 tablet 3 4/22/2025    ergocalciferol 1.25 MG (52397 UT) Oral Cap Take 1 capsule (50,000 Units total) by mouth once a week.   4/22/2025    FEROSUL 325 (65 Fe) MG Oral Tab Take 1 tablet (325 mg total) by mouth in the morning.   4/22/2025    levothyroxine 75 MCG Oral Tab Take 1 tablet (75 mcg total) by mouth in the morning.   4/22/2025    carboxymethylcellulose 1 % Ophthalmic Gel Place 1-2 drops into both eyes as needed.   4/21/2025   [4]   Family History  Problem Relation Age of Onset    Colon Cancer Mother     Colon Cancer Sister    [5]   Social History  Socioeconomic History    Marital status:    Tobacco Use    Smoking status: Former    Smokeless tobacco: Former   Vaping Use    Vaping status: Never Used   Substance and Sexual Activity    Alcohol use: Not Currently    Drug use: No   Other Topics Concern    Reaction to local anesthetic No

## 2025-04-23 NOTE — PROGRESS NOTES
[facility]  part of [facility]    Progress Note    Christy Lowry Patient Status:  Inpatient    1924 MRN [id number]   Location [facility] 4W/SW/SE Attending Abdelrahman Jimenez,*   Hosp Day # 1 PCP Patrick Stafford MD     Chief Complaint: ok    Subjective:     Unable to perform ROS: Acuity of condition       Objective:   Blood pressure 107/52, pulse 85, temperature 98 °F (36.7 °C), temperature source Temporal, resp. rate 18, weight 126 lb 6.4 oz (57.3 kg), SpO2 94%.  Physical Exam  Vitals and nursing note reviewed.   Constitutional:       General: She is not in acute distress.     Appearance: She is ill-appearing. She is not toxic-appearing or diaphoretic.   HENT:      Head:      Comments: Temp wasting  Cardiovascular:      Rate and Rhythm: Normal rate.      Pulses: Normal pulses.   Pulmonary:      Breath sounds: Rhonchi present.   Abdominal:      General: Bowel sounds are normal.      Palpations: Abdomen is soft.   Skin:     General: Skin is warm and dry.      Capillary Refill: Capillary refill takes less than 2 seconds.   Neurological:      General: No focal deficit present.      Mental Status: She is alert and oriented to person, place, and time.   Psychiatric:         Behavior: Behavior normal.         Judgment: Judgment normal.         Results:   Lab Results   Component Value Date    WBC 14.0 (H) 2025    HGB 11.0 (L) 2025    HCT 32.8 (L) 2025    .0 2025    CREATSERUM 0.46 (L) 2025    BUN 9 2025     2025    K 4.1 2025     2025    CO2 27.0 2025    GLU 82 2025    CA 7.2 (L) 2025    ALB 2.4 (L) 2025    ALKPHO 104 2025    BILT 0.4 2025    TP 5.6 (L) 2025    AST 20 2025    ALT <7 (L) 2025    T4F 1.3 2023    TSH 5.400 (H) 2023    LIP 22 2025    CRP <0.40 2024    MG 2.0 2024    PHOS 3.3 2024    TROPHS 11 11/15/2024        No results found.        Assessment & Plan:     1. Urinary tract infection await culture  2. Right lower extremity cellulitis really seems more edema will check us legs and echo  3. Liquid diarrhea post antibiotic exposure. Rule out Clostridium difficile infection. Studies neg  4. Full code dw son 4/23  5. Passing out per caregiver will place on tele  6. Poor uo no obs will check us and fluid bolus  Patient will require inpatient services that will reasonably be expected to span two midnight's based on the clinical documentation in H+P.   Based on patients current state of illness, I anticipate that, after discharge, patient will require TBD.  Complex mdm; coordinating care with nurse and counseling pt and with her nodded permission her caregiver and son in room about care    BOYD SERVIN MD

## 2025-04-23 NOTE — H&P
[facility]    PATIENT'S NAME: DIEGO CASTRO   ATTENDING PHYSICIAN: Abdelrahman Jimenez MD   PATIENT ACCOUNT#:   [id number]    LOCATION:  [address] [place] [address]  MEDICAL RECORD #:   [id number]       YOB: 1924  ADMISSION DATE:       04/22/2025    HISTORY AND PHYSICAL EXAMINATION    (Addendum added 04/23/2025)    CHIEF COMPLAINT:   Urinary tract infection, possible C difficile infection.    HISTORY OF PRESENT ILLNESS:  The patient is a 89-year-old  female who was treated recently for urinary tract infection.  Culture positive for pansensitive E coli on March 29 with 10-day course of cephalexin.  Patient initially improved then family reported that she become very drowsy in the last few days.  Today, she had a liquid bowel movement.  They brought her in today to the emergency department for evaluation.  Repeat urinalysis showed gross urinary tract infection.  CBC showed white blood cell count of 18.3 with left shift.  Chemistry:  Sodium 131, chloride 96, potassium 4.0, normal GFR.  Started empirically on IV Ancef and oral vancomycin.  Stool for C difficile was obtained.  Patient will be admitted to the hospital for further management.    PAST MEDICAL HISTORY:  Actinic keratosis, osteoarthritis, osteoporosis, gastroesophageal reflux disease, hypothyroidism, anxiety disorder.    PAST SURGICAL HISTORY:  Multiple squamous cell carcinoma skin lesion resections, tonsillectomy.    MEDICATIONS:  Please see medication reconciliation list.     ALLERGIES:  No known drug allergies.    FAMILY HISTORY:  Mother had colon cancer.    SOCIAL HISTORY:  Lives with a caregiver.  Requires assistance in her basic activities of daily living.     REVIEW OF SYSTEMS:  Difficult to obtain from the patient.  Family reported the patient has been very drowsy and fatigued.  This morning, she started having frequent liquid bowel movement, foul smelling in odor.  Also, she has been developing macular rash for last week or  two.  Denies any recent new medications.      PHYSICAL EXAMINATION:    GENERAL:  Patient is drowsy but alert, opens her eyes and responds to questions.  VITAL SIGNS:  Temperature 97.8, pulse 70, respiratory rate 25, blood pressure 92/44, pulse ox 100% on room air.    HEENT:  Atraumatic.  Oropharynx clear.  Dry mucous membranes.  Ears, nose normal.  Eyes:  Anicteric sclerae.   NECK:  Supple.  No lymphadenopathy.  Trachea midline.  LUNGS:  Clear to auscultation bilaterally.  Normal respiratory effort.    HEART:  Regular rate, rhythm.  S1 and S2 auscultated.  No murmur.   ABDOMEN:  Soft, nondistended.  No tenderness.  Positive bowel sounds.   EXTREMITIES:  No peripheral edema, clubbing, or cyanosis.  Right anterior leg with mild ecchymosis and cellulitic changes.  No open wounds.  NEUROLOGIC:  Motor and sensory intact.    ASSESSMENT:    1.       Urinary tract infection.  2.       Right lower extremity cellulitis.  3.       Liquid diarrhea post antibiotic exposure.  Rule out Clostridium difficile infection.    PLAN:  Patient will be admitted to general medical floor.  IV Ancef.  Oral vancomycin for C difficile treatment.  IV fluids.  Aspiration and fall precautions.  Monitor temperature curve and hemodynamic status.  Further recommendations to follow.    ADDENDUM (Job [id number]):    PAST MEDICAL HISTORY:  Lymphocytic colitis, takes budesonide 3 mg daily.    Dictated By Chelly Dangelo MD  d: 04/22/2025 18:36:07  t: 04/23/2025 08:44:07  Job [id number]  FB/

## 2025-04-23 NOTE — PROGRESS NOTES
Patient received this am resting in bed, patient alert and oriented to self, did however tell this RN that she remembers her which in fact this RN took care of her before. Continues with IVFs. Patient noted to have only a small amount urine in depends, bladder scan done by this RN and the pct, both obtained 0 ml. Dr Jimenez notified, orders given and to be carried out. Son and care-taker at bedside.

## 2025-04-24 ENCOUNTER — APPOINTMENT (OUTPATIENT)
Dept: ULTRASOUND IMAGING | Facility: HOSPITAL | Age: OVER 89
End: 2025-04-24
Attending: HOSPITALIST
Payer: MEDICARE

## 2025-04-24 ENCOUNTER — APPOINTMENT (OUTPATIENT)
Dept: CV DIAGNOSTICS | Facility: HOSPITAL | Age: OVER 89
End: 2025-04-24
Attending: HOSPITALIST
Payer: MEDICARE

## 2025-04-24 LAB
ALBUMIN SERPL-MCNC: 2 G/DL (ref 3.2–4.8)
ALBUMIN/GLOB SERPL: 0.7 {RATIO} (ref 1–2)
ALP LIVER SERPL-CCNC: 89 U/L (ref 55–142)
ALT SERPL-CCNC: <7 U/L (ref 10–49)
AMMONIA PLAS-MCNC: <10 UMOL/L (ref 11–32)
ANION GAP SERPL CALC-SCNC: 7 MMOL/L (ref 0–18)
AST SERPL-CCNC: 12 U/L (ref ?–34)
ATRIAL RATE: 113 BPM
BASOPHILS # BLD AUTO: 0.04 X10(3) UL (ref 0–0.2)
BASOPHILS NFR BLD AUTO: 0.4 %
BILIRUB SERPL-MCNC: 0.2 MG/DL (ref 0.2–0.9)
BUN BLD-MCNC: 9 MG/DL (ref 9–23)
BUN/CREAT SERPL: 17 (ref 10–20)
CALCIUM BLD-MCNC: 7.1 MG/DL (ref 8.7–10.4)
CHLORIDE SERPL-SCNC: 109 MMOL/L (ref 98–112)
CO2 SERPL-SCNC: 23 MMOL/L (ref 21–32)
CREAT BLD-MCNC: 0.53 MG/DL (ref 0.55–1.02)
DEPRECATED RDW RBC AUTO: 56.2 FL (ref 35.1–46.3)
EGFRCR SERPLBLD CKD-EPI 2021: 82 ML/MIN/1.73M2 (ref 60–?)
EOSINOPHIL # BLD AUTO: 0.07 X10(3) UL (ref 0–0.7)
EOSINOPHIL NFR BLD AUTO: 0.6 %
ERYTHROCYTE [DISTWIDTH] IN BLOOD BY AUTOMATED COUNT: 17.2 % (ref 11–15)
GLOBULIN PLAS-MCNC: 2.7 G/DL (ref 2–3.5)
GLUCOSE BLD-MCNC: 90 MG/DL (ref 70–99)
HCT VFR BLD AUTO: 29.3 % (ref 35–48)
HGB BLD-MCNC: 9.3 G/DL (ref 12–16)
IMM GRANULOCYTES # BLD AUTO: 0.18 X10(3) UL (ref 0–1)
IMM GRANULOCYTES NFR BLD: 1.6 %
LYMPHOCYTES # BLD AUTO: 2.98 X10(3) UL (ref 1–4)
LYMPHOCYTES NFR BLD AUTO: 27.1 %
MAGNESIUM SERPL-MCNC: 1.6 MG/DL (ref 1.6–2.6)
MCH RBC QN AUTO: 28.6 PG (ref 26–34)
MCHC RBC AUTO-ENTMCNC: 31.7 G/DL (ref 31–37)
MCV RBC AUTO: 90.2 FL (ref 80–100)
MONOCYTES # BLD AUTO: 0.95 X10(3) UL (ref 0.1–1)
MONOCYTES NFR BLD AUTO: 8.6 %
NEUTROPHILS # BLD AUTO: 6.78 X10 (3) UL (ref 1.5–7.7)
NEUTROPHILS # BLD AUTO: 6.78 X10(3) UL (ref 1.5–7.7)
NEUTROPHILS NFR BLD AUTO: 61.7 %
OSMOLALITY SERPL CALC.SUM OF ELEC: 286 MOSM/KG (ref 275–295)
P AXIS: 55 DEGREES
P-R INTERVAL: 162 MS
PHOSPHATE SERPL-MCNC: 2.2 MG/DL (ref 2.4–5.1)
PLATELET # BLD AUTO: 198 10(3)UL (ref 150–450)
POTASSIUM SERPL-SCNC: 4 MMOL/L (ref 3.5–5.1)
PROT SERPL-MCNC: 4.7 G/DL (ref 5.7–8.2)
Q-T INTERVAL: 292 MS
QRS DURATION: 66 MS
QTC CALCULATION (BEZET): 400 MS
R AXIS: -4 DEGREES
RBC # BLD AUTO: 3.25 X10(6)UL (ref 3.8–5.3)
SODIUM SERPL-SCNC: 139 MMOL/L (ref 136–145)
T AXIS: 153 DEGREES
T PALLIDUM AB SER QL IA: NONREACTIVE
VENTRICULAR RATE: 113 BPM
VIT B12 SERPL-MCNC: 604 PG/ML (ref 211–911)
WBC # BLD AUTO: 11 X10(3) UL (ref 4–11)

## 2025-04-24 PROCEDURE — 93306 TTE W/DOPPLER COMPLETE: CPT | Performed by: HOSPITALIST

## 2025-04-24 PROCEDURE — 93970 EXTREMITY STUDY: CPT | Performed by: HOSPITALIST

## 2025-04-24 PROCEDURE — 99233 SBSQ HOSP IP/OBS HIGH 50: CPT | Performed by: HOSPITALIST

## 2025-04-24 PROCEDURE — 76775 US EXAM ABDO BACK WALL LIM: CPT | Performed by: HOSPITALIST

## 2025-04-24 RX ORDER — MAGNESIUM OXIDE 400 MG/1
400 TABLET ORAL ONCE
Status: COMPLETED | OUTPATIENT
Start: 2025-04-24 | End: 2025-04-24

## 2025-04-24 NOTE — SLP NOTE
ADULT SWALLOWING EVALUATION    ASSESSMENT    ASSESSMENT/OVERALL IMPRESSION:  PPE REQUIRED. THIS THERAPIST WORE GLOVES FOR DURATION OF EVALUATION. HANDS WASHED UPON ENTRANCE/EXIT.    Per MD H&P, \"The patient is a 89-year-old  female who was treated recently for urinary tract infection. Culture positive for pansensitive E coli on March 29 with 10-day course of cephalexin. Patient initially improved then family reported that she become very drowsy in the last few days. Today, she had a liquid bowel movement. They brought her in today to the emergency department for evaluation.\"    SLP BSSE orders received and acknowledged. A swallow evaluation warranted per \"choking with straw\". Pt afebrile with weak vocal quality, on room air, with oxygen saturation at 98%. Pt with hx of dysphagia at [facility], BSE 11/12/24 with recommendations for thin liquids (CLD per MD).  Pt positioned 90 degrees in bed, alert/cooperative/son present. Pt with no complaints of pain. Oral motor examination revealed reduced strength, ROM, and rate of motion. Pt presented with trials of hard solids, puree, mildly thick and thin liquids via cup. Pt with adequate oral acceptance and bilabial seal across all trials. Pt with weak bite, reduced/prolonged mastication of solids, and delayed A-P transit. Pt's swallow response appears delayed with reduced hyolaryngeal elevation/excursion. Intermittent throat clearing and wet vocal quality observed after thin liquids. No clinical signs of aspiration (e.g., immediate/delayed throat clear, immediate/delayed cough, wet vocal quality, increased O2 effort) observed across hard solid, puree, and mildly thick liquid trials. No CXR on this admission. Oxygen status remained stable t/o the entire evaluation.     At this time, pt presents with mild oral dysphagia and probable pharyngeal dysfunction. Recommend a soft easy to chew diet and mildly thick liquids with strict adherence to safe swallowing compensatory  strategies. Results and recommendations reviewed with RN, pt, and family. Pt/pt's family v/u to all results/recommendations. Recommendations remain written on whiteboard. SLP collaborated with RN for MD diet orders.     PLAN: SLP to f/u x2 meal assessments, monitor imaging, and VFSS if clinically indicated       RECOMMENDATIONS   Diet Recommendations - Solids: Soft/ Easy to chew  Diet Recommendations - Liquids: Nectar thick liquids/ Mildly thick                       Aspiration Precautions: Upright position, Slow rate, Small bites, Small sips, No straw  Medication Administration Recommendations:  (as tolerated)  Treatment Plan/Recommendations: Aspiration precautions    HISTORY   MEDICAL HISTORY  Reason for Referral:  (\"choking with straw\")    Problem List  Principal Problem:    Acute cystitis without hematuria  Active Problems:    Weakness generalized    UTI (urinary tract infection)      Past Medical History  Past Medical History[1]    Prior Living Situation: Home with spouse  Diet Prior to Admission: Unknown  Precautions: Aspiration    Patient/Family Goals: did not state    SWALLOWING HISTORY  Current Diet Consistency: Soft/ Easy to Chew, Thin liquids  Dysphagia History: see above  Imaging Results: none    SUBJECTIVE       OBJECTIVE   ORAL MOTOR EXAMINATION  Dentition: Functional  Symmetry: Within Functional Limits  Strength: Overall reduced     Range of Motion: Overall reduced  Rate of Motion: Reduced    Voice Quality: Weak  Respiratory Status: Unlabored  Consistencies Trialed: Thin liquids, Nectar thick liquids, Puree, Hard solid  Method of Presentation: Staff/Clinician assistance, Spoon, Cup, Single sips  Patient Positioned: Upright, Midline    Oral Phase of Swallow: Impaired  Bolus Retrieval: Intact  Bilabial Seal: Intact  Bolus Formation: Impaired  Bolus Propulsion: Impaired  Mastication: Impaired  Retention: Impaired    Pharyngeal Phase of Swallow: Appears Impaired  Laryngeal Elevation Timing: Appears  impaired  Laryngeal Elevation Strength: Appears impaired  Laryngeal Elevation Coordination: Appears intact  (Please note: Silent aspiration cannot be evaluated clinically. Videofluoroscopic Swallow Study is required to rule-out silent aspiration.)    Esophageal Phase of Swallow: No complaints consistent with possible esophageal involvement  Comments: NA          GOALS  Goal #1 The patient will tolerate soft easy to chew consistency and mildly thick liquids without overt signs or symptoms of aspiration with 100 % accuracy over 1-2 session(s).  In Progress   Goal #2 The patient/family/caregiver will demonstrate understanding and implementation of aspiration precautions and swallow strategies independently over 1-2 session(s).    In Progress   Goal #3 The patient will utilize compensatory strategies as outlined by  BSSE (clinical evaluation) including Slow rate, Small bites, Small sips, Alternate liquids/solids, No straws, Upright 90 degrees, Upright 90 degrees 30 mins after meal, Eliminate distractions with PRN assistance 100 % of the time across 2 sessions.  In Progress     FOLLOW UP  Treatment Plan/Recommendations: Aspiration precautions  Duration: 1 week  Follow Up Needed (Documentation Required): Yes  SLP Follow-up Date: 04/25/25    Thank you for your referral.   If you have any questions, please contact Sarina Peterson, MICHELL Peterson M.S. CCC-SLP  Speech Language Pathologist  Phone Number Ext. [phone]         [1]   Past Medical History:   Actinic keratosis    hypertrophic AK - left central forehead    Benzodiazepine withdrawal without complication (HCC)    Chronic GERD    Constipation    Diarrhea    Eustachian tube dysfunction    Heart palpitations    Loose stools    Occult blood in stools    SCC (squamous cell carcinoma)    left forehead, invasive, well-diff.     SCC (squamous cell carcinoma)    right temple    SCC (squamous cell carcinoma)    right neck    Skin cancer

## 2025-04-24 NOTE — PROGRESS NOTES
IV fluids continued. IV ancef continued. Cardiac monitoring in place. EKG completed. Incontinent. Minimal urine output, bladder scans performed. Diarrhea continued. MD aware of urine output less than 240 mL in 8 hr, bolus ordered. Dressing changed to R forearm skin tear. Aspiration and fall precautions in place. Bed in lowest position and bed alarm on. Caregiver at the bedside.

## 2025-04-24 NOTE — PLAN OF CARE
No acute changes today. US kidneys and BLE completed. Continues to have incontinent bouts of diarrhea. IVF infusing, rocephin as abx coverage. Choking while drinking fluids, speech consult added. On tele, no calls. Repositioning as tolerated. All comfort and safety measures put in place, frequent rounding.    Ultrasound from [facility] called stating patient is negative for DVT. Notified Dr. Nehemias Camargo.

## 2025-04-24 NOTE — PROGRESS NOTES
[facility]  part of [facility]    Progress Note    Christy Lowry Patient Status:  Inpatient    1924 MRN [id number]   Location [facility] 4W/SW/SE Attending Abdelrahman Jimenez,*   Hosp Day # 2 PCP Patrick Stafford MD     Chief Complaint: ok    Subjective:     Unable to perform ROS: Acuity of condition       Objective:   Blood pressure 116/56, pulse 95, temperature 98.7 °F (37.1 °C), temperature source Oral, resp. rate 20, weight 126 lb 6.4 oz (57.3 kg), SpO2 98%.  Physical Exam  Vitals and nursing note reviewed.   Constitutional:       General: She is not in acute distress.     Appearance: She is ill-appearing. She is not toxic-appearing or diaphoretic.   HENT:      Head:      Comments: Temp wasting  Cardiovascular:      Rate and Rhythm: Normal rate.      Pulses: Normal pulses.   Pulmonary:      Breath sounds: Rhonchi present.   Abdominal:      General: Bowel sounds are normal.      Palpations: Abdomen is soft.   Skin:     General: Skin is warm and dry.      Capillary Refill: Capillary refill takes less than 2 seconds.   Neurological:      General: No focal deficit present.      Mental Status: She is alert and oriented to person, place, and time.   Psychiatric:         Behavior: Behavior normal.         Judgment: Judgment normal.         Results:   Lab Results   Component Value Date    WBC 11.0 2025    HGB 9.3 (L) 2025    HCT 29.3 (L) 2025    .0 2025    CREATSERUM 0.53 (L) 2025    BUN 9 2025     2025    K 4.0 2025     2025    CO2 23.0 2025    GLU 90 2025    CA 7.1 (L) 2025    ALB 2.0 (L) 2025    ALKPHO 89 2025    BILT 0.2 2025    TP 4.7 (L) 2025    AST 12 2025    ALT <7 (L) 2025    T4F 1.1 2025    TSH 1.307 2025    LIP 22 2025    CRP <0.40 2024    MG 1.6 2025    PHOS 2.2 (L) 2025    TROPHS 11 11/15/2024    B12 604  04/24/2025       US VENOUS DOPPLER LEG BILAT - DIAG IMG (CPT=93970)  Result Date: 4/24/2025  CONCLUSION: Short segment peripheral based organized nonocclusive chronic appearing DVT within the proximal and distal left popliteal vein.  No other DVT is identified.    Dictated by (CST): Lauri Fortune MD on 4/24/2025 at 10:09 AM     Finalized by (CST): Lauri Fortune MD on 4/24/2025 at 10:11 AM          EKG 12 Lead  Result Date: 4/24/2025  Sinus tachycardia with Premature atrial complexes Possible Inferior infarct (cited on or before 31-OCT-2024) Cannot rule out Anterior infarct (cited on or before 31-OCT-2024) Abnormal ECG When compared with ECG of 15-NOV-2024 12:39, No significant change was found Confirmed by LENY REYES ([id number]) on 4/24/2025 4:09:51 PM      Assessment & Plan:     1. Urinary tract infection resist ecoli changed to rocephin  2. Right lower extremity cellulitis really seems more edema small old sub popliteal dvts  and echo pend  Family refuses scd's on heparin  3. Liquid diarrhea post antibiotic exposure. Rule out Clostridium difficile infection. Studies neg  4. Full code dw son 4/23  5. Passing out per caregiver will place on tele; await echo  6. Poor uo no obs; incontinent  Patient will require inpatient services that will reasonably be expected to span two midnight's based on the clinical documentation in H+P.   Based on patients current state of illness, I anticipate that, after discharge, patient will require TBD.  Complex mdm; coordinating care with nurse and counseling pt and with her nodded permission her caregiver and son in room about care    BOYD SERVIN MD

## 2025-04-25 ENCOUNTER — APPOINTMENT (OUTPATIENT)
Dept: GENERAL RADIOLOGY | Facility: HOSPITAL | Age: OVER 89
End: 2025-04-25
Attending: HOSPITALIST
Payer: MEDICARE

## 2025-04-25 ENCOUNTER — APPOINTMENT (OUTPATIENT)
Dept: PICC SERVICES | Facility: HOSPITAL | Age: OVER 89
End: 2025-04-25
Attending: HOSPITALIST
Payer: MEDICARE

## 2025-04-25 LAB
ANION GAP SERPL CALC-SCNC: 7 MMOL/L (ref 0–18)
ATRIAL RATE: 101 BPM
BASOPHILS # BLD AUTO: 0.05 X10(3) UL (ref 0–0.2)
BASOPHILS NFR BLD AUTO: 0.3 %
BUN BLD-MCNC: 8 MG/DL (ref 9–23)
BUN/CREAT SERPL: 14.8 (ref 10–20)
CALCIUM BLD-MCNC: 7.6 MG/DL (ref 8.7–10.4)
CHLORIDE SERPL-SCNC: 110 MMOL/L (ref 98–112)
CO2 SERPL-SCNC: 23 MMOL/L (ref 21–32)
CREAT BLD-MCNC: 0.54 MG/DL (ref 0.55–1.02)
DEPRECATED RDW RBC AUTO: 55.1 FL (ref 35.1–46.3)
EGFRCR SERPLBLD CKD-EPI 2021: 82 ML/MIN/1.73M2 (ref 60–?)
EOSINOPHIL # BLD AUTO: 0.06 X10(3) UL (ref 0–0.7)
EOSINOPHIL NFR BLD AUTO: 0.4 %
ERYTHROCYTE [DISTWIDTH] IN BLOOD BY AUTOMATED COUNT: 17.3 % (ref 11–15)
GLUCOSE BLD-MCNC: 93 MG/DL (ref 70–99)
HCT VFR BLD AUTO: 36 % (ref 35–48)
HGB BLD-MCNC: 12 G/DL (ref 12–16)
IMM GRANULOCYTES # BLD AUTO: 0.28 X10(3) UL (ref 0–1)
IMM GRANULOCYTES NFR BLD: 1.7 %
LYMPHOCYTES # BLD AUTO: 2.77 X10(3) UL (ref 1–4)
LYMPHOCYTES NFR BLD AUTO: 16.9 %
MAGNESIUM SERPL-MCNC: 1.6 MG/DL (ref 1.6–2.6)
MCH RBC QN AUTO: 29.2 PG (ref 26–34)
MCHC RBC AUTO-ENTMCNC: 33.3 G/DL (ref 31–37)
MCV RBC AUTO: 87.6 FL (ref 80–100)
MONOCYTES # BLD AUTO: 0.98 X10(3) UL (ref 0.1–1)
MONOCYTES NFR BLD AUTO: 6 %
NEUTROPHILS # BLD AUTO: 12.29 X10 (3) UL (ref 1.5–7.7)
NEUTROPHILS # BLD AUTO: 12.29 X10(3) UL (ref 1.5–7.7)
NEUTROPHILS NFR BLD AUTO: 74.7 %
OSMOLALITY SERPL CALC.SUM OF ELEC: 288 MOSM/KG (ref 275–295)
P AXIS: 76 DEGREES
P-R INTERVAL: 148 MS
PHOSPHATE SERPL-MCNC: 2.6 MG/DL (ref 2.4–5.1)
PLATELET # BLD AUTO: 395 10(3)UL (ref 150–450)
POTASSIUM SERPL-SCNC: 3.4 MMOL/L (ref 3.5–5.1)
Q-T INTERVAL: 314 MS
QRS DURATION: 68 MS
QTC CALCULATION (BEZET): 407 MS
R AXIS: 2 DEGREES
RBC # BLD AUTO: 4.11 X10(6)UL (ref 3.8–5.3)
SODIUM SERPL-SCNC: 140 MMOL/L (ref 136–145)
T AXIS: 171 DEGREES
VENTRICULAR RATE: 101 BPM
WBC # BLD AUTO: 16.4 X10(3) UL (ref 4–11)

## 2025-04-25 PROCEDURE — 71046 X-RAY EXAM CHEST 2 VIEWS: CPT | Performed by: HOSPITALIST

## 2025-04-25 PROCEDURE — 99233 SBSQ HOSP IP/OBS HIGH 50: CPT | Performed by: HOSPITALIST

## 2025-04-25 RX ORDER — MAGNESIUM SULFATE HEPTAHYDRATE 40 MG/ML
2 INJECTION, SOLUTION INTRAVENOUS ONCE
Status: COMPLETED | OUTPATIENT
Start: 2025-04-25 | End: 2025-04-25

## 2025-04-25 NOTE — PROGRESS NOTES
[facility]  part of [facility]    Progress Note    Christy Lowry Patient Status:  Inpatient    1924 MRN [id number]   Location [facility] 4W/SW/SE Attending Abdelrahman Jimenez,*   Hosp Day # 3 PCP Patrick Stafford MD     Chief Complaint: ok    Subjective:     Unable to perform ROS: Acuity of condition       Objective:   Blood pressure 106/73, pulse 100, temperature 97.6 °F (36.4 °C), temperature source Temporal, resp. rate 18, weight 126 lb 6.4 oz (57.3 kg), SpO2 96%.  Physical Exam  Vitals and nursing note reviewed.   Constitutional:       General: She is not in acute distress.     Appearance: She is ill-appearing. She is not toxic-appearing or diaphoretic.   HENT:      Head:      Comments: Temp wasting  Cardiovascular:      Rate and Rhythm: Normal rate.      Pulses: Normal pulses.   Pulmonary:      Breath sounds: Rhonchi present.   Abdominal:      General: Bowel sounds are normal.      Palpations: Abdomen is soft.   Skin:     General: Skin is warm and dry.      Capillary Refill: Capillary refill takes less than 2 seconds.   Neurological:      General: No focal deficit present.      Mental Status: She is alert and oriented to person, place, and time.   Psychiatric:         Behavior: Behavior normal.         Judgment: Judgment normal.         Results:   Lab Results   Component Value Date    WBC 16.4 (H) 2025    HGB 12.0 2025    HCT 36.0 2025    .0 2025    CREATSERUM 0.54 (L) 2025    BUN 8 (L) 2025     2025    K 3.4 (L) 2025     2025    CO2 23.0 2025    GLU 93 2025    CA 7.6 (L) 2025    ALB 2.0 (L) 2025    ALKPHO 89 2025    BILT 0.2 2025    TP 4.7 (L) 2025    AST 12 2025    ALT <7 (L) 2025    T4F 1.1 2025    TSH 1.307 2025    LIP 22 2025    CRP <0.40 2024    MG 1.6 2025    PHOS 2.6 2025    TROPHS 11 11/15/2024    B12  604 04/24/2025       US VENOUS DOPPLER LEG BILAT - DIAG IMG (CPT=93970)  Result Date: 4/24/2025  CONCLUSION: Short segment peripheral based organized nonocclusive chronic appearing DVT within the proximal and distal left popliteal vein.  No other DVT is identified.    Dictated by (CST): Lauri Fortune MD on 4/24/2025 at 10:09 AM     Finalized by (CST): Lauri Fortune MD on 4/24/2025 at 10:11 AM          EKG 12 Lead  Result Date: 4/25/2025  Sinus tachycardia with occasional Premature ventricular complexes Low voltage QRS, consider pulmonary disease, pericardial effusion, or normal variant Nonspecific T wave abnormality Abnormal ECG When compared with ECG of 23-APR-2025 21:52, Premature ventricular complexes are now Present Premature atrial complexes are no longer Present    EKG 12 Lead  Result Date: 4/24/2025  Sinus tachycardia with Premature atrial complexes Possible Inferior infarct (cited on or before 31-OCT-2024) Cannot rule out Anterior infarct (cited on or before 31-OCT-2024) Abnormal ECG When compared with ECG of 15-NOV-2024 12:39, No significant change was found Confirmed by LENY REYES ([id number]) on 4/24/2025 4:09:51 PM      Assessment & Plan:     1. Urinary tract infection resist ecoli changed to rocephin  2. Right lower extremity cellulitis really seems more edema small old sub popliteal dvts  and echo pend  Family refuses scd's on heparin  3. Liquid diarrhea post antibiotic exposure. Rule out Clostridium difficile infection. Studies neg  4. Full code dw son 4/23  5. Passing out per caregiver will place on tele; echo   6. Poor uo no obs; incontinent  7. Neutrophilia worse check cxr  8. Tachycardia from inf EKG and echo ok  Patient will require inpatient services that will reasonably be expected to span two midnight's based on the clinical documentation in H+P.   Based on patients current state of illness, I anticipate that, after discharge, patient will require TBD.  Complex mdm; coordinating care  with nurse and counseling pt and with her nodded permission her son in room about care/diet    BOYD SERVIN MD

## 2025-04-25 NOTE — DIETARY NOTE
ADULT NUTRITION INITIAL ASSESSMENT    Pt is at moderate nutrition risk.  Pt meets moderate malnutrition criteria.      CRITERIA FOR MALNUTRITION DIAGNOSIS:  Criteria for non-severe malnutrition diagnosis: chronic illness related to energy intake less than75% for greater than 1 month, body fat mild depletion, and muscle mass mild depletion.    RECOMMENDATIONS TO MD: CPM    ADMITTING DIAGNOSIS:  Weakness generalized [R53.1]  Acute cystitis without hematuria [N30.00]  PERTINENT PAST MEDICAL HISTORY: Past Medical History[1]    PATIENT STATUS: Initial 04/25/25: Pt admit for UTI. PMH sig for See Above.   Pt assessed due to screening at risk for MD consult: Protein Calorie Malnutrition + MD Ordered ONS TID.   Resides with Caregiver.   Connected with Daughter-In-Law this afternoon. No other family or Caregiver available. Will follow up at a later time as able.   Patient was resting at visit. Patient woke up on her own to answer a few questions. Reports she does not eat much at home/small amounts of food. Hx of Ensure. Will have to discuss with Caregiver.     Consuming thickened cranberry juice. Noted Mighty Shake un-opened on tray.   Reviewed RD Assessments from 11/2024.     FOOD/NUTRITION RELATED HISTORY:  Appetite: Decreased  Intake:  Noted 4/23 and 4/24 with fair to good oral intake. Documentation today: few bites taken today. Patient with nausea + emesis.   Intake Meeting Needs: No, but oral nutrition supplements (ONS) to maximize  Percent Meals Eaten (last 3 days)       Date/Time Percent Meals Eaten (%)    04/23/25 1022 75 %    04/23/25 1456 50 %    04/24/25 0900 75 %    04/24/25 1306 50 %    04/24/25 1439 100 %    04/25/25 1010 0 %     Percent Meals Eaten (%): few bites taken; pt nauseaus w/ emesis at 04/25/25 1010           Food Allergies: No Known Food Allergies (NKFA)  Cultural/Ethnic/Sikhism Preferences: None    GASTROINTESTINAL: Swallow evaluation noted  + liquid stools (diarrhea) per MD post antibiotic  exposure.   Missing Teeth.  + very small amount of emesis/\"spit up\" today per RN     MEDICATIONS: reviewed  Scheduled Medications[2]    LABS: reviewed  Recent Labs     04/23/25  0626 04/23/25  1549 04/24/25  0607 04/25/25  0610   GLU 82  --  90 93   BUN 9  --  9 8*   CREATSERUM 0.46*  --  0.53* 0.54*   CA 7.2*  --  7.1* 7.6*   MG  --   --  1.6 1.6     --  139 140   K 3.0* 4.1 4.0 3.4*     --  109 110   CO2 27.0  --  23.0 23.0   PHOS  --   --  2.2* 2.6   OSMOCALC 280  --  286 288     NUTRITION RELATED PHYSICAL FINDINGS:  - Nutrition Focused Physical Exam (NFPE): mild depletion body fat orbital region and mild depletion muscle mass temple region - Sarcopenia consistent with advanced age.    - Fluid Accumulation: non-pitting Bilateral Upper extremity and Lower extremity see RN documentation for details  - Skin Integrity: at risk and (R) LE Cellulitis (MD notes)  see RN documentation for details    ANTHROPOMETRICS:  HT:  4'11\" - Discussed with patient and family member   WT: 57.3 kg (126 lb 6.4 oz)   BMI: Body mass index is 24.69 kg/m². BMI: 25.44 (Using Ht: 59\")   BMI CLASSIFICATION: 25-29.9 kg/m2 - overweight  IBW: 95 lbs        133% IBW  Usual Body Wt: 127 lbs 11/5/24      99% UBW  Noted weight fluctuations   WEIGHT HISTORY:  Patient Weight(s) for the past 336 hrs:   Weight   04/22/25 2324 57.3 kg (126 lb 6.4 oz)     Wt Readings from Last 10 Encounters:   04/22/25 57.3 kg (126 lb 6.4 oz)   01/14/25 58.1 kg (128 lb)   12/16/24 63.5 kg (140 lb)   11/19/24 63.7 kg (140 lb 6.4 oz)   11/05/24 68.9 kg (152 lb)   10/31/24 58.1 kg (128 lb 1.4 oz)   07/18/23 58.1 kg (128 lb)   07/08/23 62.1 kg (136 lb 14.5 oz)   06/14/23 62.1 kg (136 lb 14.5 oz)   05/30/23 62.1 kg (137 lb)   Noted 2020 weights: 112-123#     NUTRITION DIAGNOSIS/PROBLEM:    Moderate Malnutrition related to Chronic - Physiological causes resulting in anorexia or diminished intake as evidenced by mild body fat and muscle mass losses.     NUTRITION  INTERVENTION:     NUTRITION PRESCRIPTION:   Estimated Nutrition needs: --dosing wt of 57 kg - wt taken on 4/22/25  Calories: 1425 calories/day (25 calories per kg Dosing wt)  Protein: 57 g protein/day (1.0 g protein/kg Dosing wt)  Fluid Needs: per MD     - Diet:       Procedures    Regular/General diet Fluid Consistency: Nectar Thick / Mildly Thick Liquids; Texture Consistency: Soft / Easy to Chew ; Is Patient on Accuchecks? No    Added No Straw per Speech Therapist Recommendation.     - RD Malnutrition Care Plan:  MD ordered ONS TID  Encourage good po intake at meals.  - Medical Food Supplements-RD added Magic Cup (290 calories/ 9 g protein each) BID Orange, Vanilla, Chocolate, or Mixed berry and Mighty Shake (300 calories/ 9 g protein each) Daily Vanilla. Rational/use of oral supplements discussed with RN.   Hx of receiving Ensure Compact Chocolate and Ensure Clear Apple.      - Vitamin and mineral supplements: none  - Feeding assistance: meal set up - feed as needed   - Nutrition education: not appropriate at this time   Hx of RD discussing importance of adequate energy and protein intake w/Caregiver + Use of ONS at home.   - Coordination of nutrition care: collaboration with other providers - Discussed w/RN.  - Discharge and transfer of nutrition care to new setting or provider: monitor plans - From home with Caregiver.     MONITOR AND EVALUATE/NUTRITION GOALS:  - Food and Nutrient Intake:      Monitor: adequacy of PO intake, tolerance of PO intake, adequacy of supplement intake, and tolerance of supplement intake  - Food and Nutrient Administration:      Monitor: N/A  - Anthropometric Measurement:    Monitor weight  - Nutrition Goals:      allow wt loss due to fluid losses, PO and supplement greater than 75% of needs, minimize lean body mass loss, prevent skin breakdown, and improved GI status    DIETITIAN FOLLOW UP: RD to follow and monitor nutrition status    Melanie Lee RDN, LDN, CDE   Clinical  Nutrition  Ext [phone]         [1]   Past Medical History:   Actinic keratosis    hypertrophic AK - left central forehead    Benzodiazepine withdrawal without complication (HCC)    Chronic GERD    Constipation    Diarrhea    Eustachian tube dysfunction    Heart palpitations    Loose stools    Occult blood in stools    SCC (squamous cell carcinoma)    left forehead, invasive, well-diff.     SCC (squamous cell carcinoma)    right temple    SCC (squamous cell carcinoma)    right neck    Skin cancer   [2]    potassium chloride  40 mEq Intravenous Once    magnesium sulfate  2 g Intravenous Once    cefTRIAXone  1 g Intravenous Q24H    heparin  5,000 Units Subcutaneous 2 times per day    budesonide  3 mg Oral QAM    levothyroxine  75 mcg Oral Daily    pantoprazole  40 mg Oral QAM AC    vancomycin  125 mg Oral Daily

## 2025-04-25 NOTE — PLAN OF CARE
Patient alert, forgetful at times, no c/o pain, electrolytes replaced per protocol, new IV in R upper arm placed by vascular team, pt has poor appetite, chest x-ray ordered today.  Problem: Patient Centered Care  Goal: Patient preferences are identified and integrated in the patient's plan of care  Description: Interventions:- What would you like us to know as we care for you? - Provide timely, complete, and accurate information to patient/family- Incorporate patient and family knowledge, values, beliefs, and cultural backgrounds into the planning and delivery of care- Encourage patient/family to participate in care and decision-making at the level they choose- Honor patient and family perspectives and choices  Outcome: Progressing

## 2025-04-25 NOTE — PROGRESS NOTES
Patient is somnolent and oriented to person. IV fluids continued. Cardiac monitoring in place. Incontinent diarrhea continued. Heparin for VTE prophylaxis. Aspiration precautions maintained, patient upright in bed. Fall precautions maintained, bed in lowest position and bed alarm on.

## 2025-04-26 ENCOUNTER — APPOINTMENT (OUTPATIENT)
Dept: ULTRASOUND IMAGING | Facility: HOSPITAL | Age: OVER 89
End: 2025-04-26
Attending: HOSPITALIST
Payer: MEDICARE

## 2025-04-26 LAB
ANION GAP SERPL CALC-SCNC: 9 MMOL/L (ref 0–18)
BASOPHILS # BLD AUTO: 0.05 X10(3) UL (ref 0–0.2)
BASOPHILS NFR BLD AUTO: 0.3 %
BUN BLD-MCNC: 7 MG/DL (ref 9–23)
BUN/CREAT SERPL: 11.1 (ref 10–20)
CALCIUM BLD-MCNC: 7.5 MG/DL (ref 8.7–10.4)
CHLORIDE SERPL-SCNC: 112 MMOL/L (ref 98–112)
CO2 SERPL-SCNC: 18 MMOL/L (ref 21–32)
CREAT BLD-MCNC: 0.63 MG/DL (ref 0.55–1.02)
DEPRECATED RDW RBC AUTO: 63.3 FL (ref 35.1–46.3)
EGFRCR SERPLBLD CKD-EPI 2021: 79 ML/MIN/1.73M2 (ref 60–?)
EOSINOPHIL # BLD AUTO: 0.04 X10(3) UL (ref 0–0.7)
EOSINOPHIL NFR BLD AUTO: 0.3 %
ERYTHROCYTE [DISTWIDTH] IN BLOOD BY AUTOMATED COUNT: 18.2 % (ref 11–15)
GLUCOSE BLD-MCNC: 104 MG/DL (ref 70–99)
HCT VFR BLD AUTO: 36.1 % (ref 35–48)
HGB BLD-MCNC: 10.9 G/DL (ref 12–16)
IMM GRANULOCYTES # BLD AUTO: 0.2 X10(3) UL (ref 0–1)
IMM GRANULOCYTES NFR BLD: 1.4 %
LYMPHOCYTES # BLD AUTO: 2.42 X10(3) UL (ref 1–4)
LYMPHOCYTES NFR BLD AUTO: 16.4 %
MAGNESIUM SERPL-MCNC: 2.1 MG/DL (ref 1.6–2.6)
MCH RBC QN AUTO: 28.6 PG (ref 26–34)
MCHC RBC AUTO-ENTMCNC: 30.2 G/DL (ref 31–37)
MCV RBC AUTO: 94.8 FL (ref 80–100)
MONOCYTES # BLD AUTO: 0.93 X10(3) UL (ref 0.1–1)
MONOCYTES NFR BLD AUTO: 6.3 %
NEUTROPHILS # BLD AUTO: 11.16 X10 (3) UL (ref 1.5–7.7)
NEUTROPHILS # BLD AUTO: 11.16 X10(3) UL (ref 1.5–7.7)
NEUTROPHILS NFR BLD AUTO: 75.3 %
OSMOLALITY SERPL CALC.SUM OF ELEC: 286 MOSM/KG (ref 275–295)
PHOSPHATE SERPL-MCNC: 2.4 MG/DL (ref 2.4–5.1)
PLATELET # BLD AUTO: 225 10(3)UL (ref 150–450)
POTASSIUM SERPL-SCNC: 4.1 MMOL/L (ref 3.5–5.1)
POTASSIUM SERPL-SCNC: 4.1 MMOL/L (ref 3.5–5.1)
RBC # BLD AUTO: 3.81 X10(6)UL (ref 3.8–5.3)
SODIUM SERPL-SCNC: 139 MMOL/L (ref 136–145)
WBC # BLD AUTO: 14.8 X10(3) UL (ref 4–11)

## 2025-04-26 PROCEDURE — 99233 SBSQ HOSP IP/OBS HIGH 50: CPT | Performed by: HOSPITALIST

## 2025-04-26 PROCEDURE — 93971 EXTREMITY STUDY: CPT | Performed by: HOSPITALIST

## 2025-04-26 RX ORDER — ALBUMIN (HUMAN) 12.5 G/50ML
25 SOLUTION INTRAVENOUS EVERY 6 HOURS
Status: DISPENSED | OUTPATIENT
Start: 2025-04-26 | End: 2025-04-27

## 2025-04-26 NOTE — PLAN OF CARE
Problem: Patient Centered Care  Goal: Patient preferences are identified and integrated in the patient's plan of care  Description: Interventions:- What would you like us to know as we care for you? - Provide timely, complete, and accurate information to patient/family- Incorporate patient and family knowledge, values, beliefs, and cultural backgrounds into the planning and delivery of care- Encourage patient/family to participate in care and decision-making at the level they choose- Honor patient and family perspectives and choices  Outcome: Progressing     Problem: SKIN/TISSUE INTEGRITY - ADULT  Goal: Skin integrity remains intact  Description: INTERVENTIONS- Assess and document risk factors for pressure ulcer development- Assess and document skin integrity- Monitor for areas of redness and/or skin breakdown- Initiate interventions, skin care algorithm/standards of care as needed  Outcome: Progressing  Goal: Incision(s), wounds(s) or drain site(s) healing without S/S of infection  Description: INTERVENTIONS:- Assess and document risk factors for pressure ulcer development- Assess and document skin integrity- Assess and document dressing/incision, wound bed, drain sites and surrounding tissue- Implement wound care per orders- Initiate isolation precautions as appropriate- Initiate Pressure Ulcer prevention bundle as indicated  Outcome: Progressing

## 2025-04-26 NOTE — SLP NOTE
SPEECH DAILY NOTE - INPATIENT    ASSESSMENT & PLAN   ASSESSMENT  PPE REQUIRED. THIS THERAPIST WORE GLOVES FOR DURATION OF EVALUATION. HANDS WASHED UPON ENTRANCE/EXIT.    SLP f/u for ongoing dysphagia tx meal assessment per recommendations of BSE. RN reports pt tolerates diet and medication well with no overt clinical s/s aspiration.       Pt positioned upright in bed. Pt afebrile, tolerating room air with oxygen status at 97 prior to the start of oral trials. Completed trials of soft solids from breakfast tray, mildly thick liquids, and thin liquids. There was adequate oral acceptance with prolonged mastication noted of solids. Moderate oral residue was noted. Cueing and several liquid washes were required to clear oral cavity. There was reduced labial seal of thin liquids to cup with some anterior spillage noted. There was throat clearing and coughing noted with thin liquids via cup and spoon.  No clinical signs of aspiration (e.g., immediate/delayed throat clear, immediate/delayed cough, wet vocal quality, increased O2 effort) observed across mildly thick liquids and solid trials. Recommend downgrade of solids to mechanical soft minced and moist and continue mildly thick liquids.      SLP to f/u with meal assessment x2-3, monitor  CXR, and VFSS if any overt CSA and/or decline in CXR. RN was alerted with results and recommendations.     MOST RECENT CXR 4/25  1. Small left basilar pleural effusion with adjacent atelectasis or less likely pneumonia.   2. Small right basilar pleural effusion.    3. 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        Diet Recommendations - Solids: Mechanical soft ground/ Minced & Moist  Diet Recommendations - Liquids: Nectar thick liquids/ Mildly thick       Aspiration Precautions: Upright position, Slow rate, Small bites, Small sips, No straw, 1:1 feeding  Medication Administration Recommendations: Crushed in puree    Patient Experiencing Pain: No                Treatment  Plan  Treatment Plan/Recommendations: Aspiration precautions    Interdisciplinary Communication: Discussed with RN            GOALS  Goal #1 The patient will tolerate minced and moist consistency and mildly liquids without overt signs or symptoms of aspiration with 90 % accuracy over 1 session(s).  Met   Goal #2 The patient/family/caregiver will demonstrate understanding and implementation of aspiration precautions and swallow strategies independently over 2-3 session(s).    In Progress   Goal #3 The patient will tolerate trial upgrade of solid consistency and thin liquids without overt signs or symptoms of aspiration with 90 % accuracy over 2-3 session(s).  In Progress   Goal #4 The patient will utilize compensatory strategies as outlined by  BSSE (clinical evaluation) including Slow rate, Small bites, Small sips, Alternate liquids/solids, No straws, Upright 90 degrees, Upright 90 degrees 30 mins after meal, Eliminate distractions with PRN assistance 100 % of the time across 2 sessions.    In Progress     FOLLOW UP  Follow Up Needed (Documentation Required): Yes  SLP Follow-up Date: 04/28/25  Duration: 1 week    Session: 1    If you have any questions, please contact Grisel Castillo, SLP Grisel Castillo, MSCThe[facility]  [phone]

## 2025-04-26 NOTE — PROGRESS NOTES
Called to evaluate for IV access.    ELPIV placed yesterday R basilic infiltrated. Ultrasound to r/o DVT RUE pending.  LUE markedly edematous. Placed 22g ELPIV R brachial at antecubital, could not fully advance catheter, secured in place.  Reviewed w/ Navdeep ANGULO and verified blood return.      She is receiving IVF Albumin and daily Ceftriaxone.  RN will notify Dr. Jimenez of above may need to hold on Albumin and if possible change antibiotics to oral.    America Tineo NP  Critical Care

## 2025-04-26 NOTE — PROGRESS NOTES
[facility]  part of [facility]    Progress Note    Christy Lowry Patient Status:  Inpatient    1924 MRN [id number]   Location [facility] 4W/SW/SE Attending Abdelrahman Jimenez,*   Hosp Day # 4 PCP Patrick Stafford MD     Chief Complaint: ok    Subjective:     Constitutional:  Positive for appetite change and fatigue.   HENT:  Positive for congestion.    Respiratory:  Positive for choking.    Genitourinary:  Negative for dysuria.       Objective:   Blood pressure 111/40, pulse 69, temperature 97.3 °F (36.3 °C), temperature source Temporal, resp. rate 18, weight 126 lb 6.4 oz (57.3 kg), SpO2 97%.  Physical Exam  Vitals and nursing note reviewed.   Constitutional:       General: She is not in acute distress.     Appearance: She is ill-appearing. She is not toxic-appearing or diaphoretic.   HENT:      Head:      Comments: Temp wasting  Cardiovascular:      Rate and Rhythm: Normal rate.      Pulses: Normal pulses.   Pulmonary:      Breath sounds: Rhonchi present.   Abdominal:      General: Bowel sounds are normal.      Palpations: Abdomen is soft.   Skin:     General: Skin is warm and dry.      Capillary Refill: Capillary refill takes less than 2 seconds.   Neurological:      General: No focal deficit present.      Mental Status: She is alert and oriented to person, place, and time.   Psychiatric:         Behavior: Behavior normal.         Judgment: Judgment normal.         Results:   Lab Results   Component Value Date    WBC 16.4 (H) 2025    HGB 12.0 2025    HCT 36.0 2025    .0 2025    CREATSERUM 0.54 (L) 2025    BUN 8 (L) 2025     2025    K 3.4 (L) 2025     2025    CO2 23.0 2025    GLU 93 2025    CA 7.6 (L) 2025    ALB 2.0 (L) 2025    ALKPHO 89 2025    BILT 0.2 2025    TP 4.7 (L) 2025    AST 12 2025    ALT <7 (L) 2025    T4F 1.1 2025    TSH 1.307  04/23/2025    LIP 22 01/14/2025    CRP <0.40 11/19/2024    MG 1.6 04/25/2025    PHOS 2.6 04/25/2025    TROPHS 11 11/15/2024    B12 604 04/24/2025       XR CHEST PA + LAT CHEST (BNQ=[id number])  Result Date: 4/25/2025  CONCLUSION:  1. Small left basilar pleural effusion with adjacent atelectasis or less likely pneumonia. 2. Small right basilar pleural effusion.  3. 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    Dictated by (CST): Yfn Workman MD on 4/25/2025 at 11:17 PM     Finalized by (CST): Yfn Workman MD on 4/25/2025 at 11:20 PM          EKG 12 Lead  Result Date: 4/25/2025  Sinus tachycardia with occasional Premature ventricular complexes and Premature atrial complexes Low voltage QRS, consider pulmonary disease, pericardial effusion, or normal variant Nonspecific T wave abnormality Abnormal ECG When compared with ECG of 23-APR-2025 21:52, Premature ventricular complexes are now Present Confirmed by LENY REYES ([id number]) on 4/25/2025 5:22:03 PM      Assessment & Plan:     1. Urinary tract infection resist ecoli changed to rocephin  2. Right lower extremity cellulitis really seems more edema small old sub popliteal dvts  and echo pend  Family refuses scd's on heparin  3. Liquid diarrhea post antibiotic exposure. Rule out Clostridium difficile infection. Studies neg  4. Full code dw son 4/23  5. Passing out per caregiver will place on tele; echo   6. Poor uo no obs; incontinent; renal function stable  7. Neutrophilia worse check cxr  8. Tachycardia from inf EKG and echo ok  9. Hypoalbuminemia with swelling from suellen maln   10. Right arm swelling ro dvt right  Patient will require inpatient services that will reasonably be expected to span two midnight's based on the clinical documentation in H+P.   Based on patients current state of illness, I anticipate that, after discharge, patient will require TBD.  Complex mdm; coordinating care with nurse and counseling pt and with her nodded permission her son in room  about care/diet    BOYD SERVIN MD

## 2025-04-27 LAB — PHOSPHATE SERPL-MCNC: 2.2 MG/DL (ref 2.4–5.1)

## 2025-04-27 PROCEDURE — 99233 SBSQ HOSP IP/OBS HIGH 50: CPT | Performed by: HOSPITALIST

## 2025-04-27 NOTE — PLAN OF CARE
Problem: Patient Centered Care  Goal: Patient preferences are identified and integrated in the patient's plan of care  Description: Interventions:- What would you like us to know as we care for you? Patient lives at home with a caregiver.  Her children are also involved in her care.      - Provide timely, complete, and accurate information to patient/family  - Incorporate patient and family knowledge, values, beliefs, and cultural backgrounds into the planning and delivery of care  - Encourage patient/family to participate in care and decision-making at the level they choose  - Honor patient and family perspectives and choices  Outcome: Progressing     Problem: SKIN/TISSUE INTEGRITY - ADULT  Goal: Incision(s), wounds(s) or drain site(s) healing without S/S of infection  Description: INTERVENTIONS:- Assess and document risk factors for pressure ulcer development- Assess and document skin integrity- Assess and document dressing/incision, wound bed, drain sites and surrounding tissue- Implement wound care per orders- Initiate isolation precautions as appropriate- Initiate Pressure Ulcer prevention bundle as indicated  Outcome: Progressing     Problem: Patient/Family Goals  Goal: Patient/Family Long Term Goal  Description: Patient's Long Term Goal: Discharge home    Interventions:  - Resolve urinary tract infection  - Tolerate adequate oral intake  - regain fluid/electrolyte balance  - See additional Care Plan goals for specific interventions  Outcome: Progressing  Goal: Patient/Family Short Term Goal  Description: Patient's Short Term Goal: Resolve urinary tract infection    Interventions:   - Maintain appropriate antibiotic therapy  - Promote positive perineal hygiene  - Trend WBC count and labs daily  - See additional Care Plan goals for specific interventions  Outcome: Progressing     Problem: PAIN - ADULT  Goal: Verbalizes/displays adequate comfort level or patient's stated pain goal  Description: INTERVENTIONS:-  Encourage pt to monitor pain and request assistance- Assess pain using appropriate pain scale- Administer analgesics based on type and severity of pain and evaluate response- Implement non-pharmacological measures as appropriate and evaluate response- Consider cultural and social influences on pain and pain management- Manage/alleviate anxiety- Utilize distraction and/or relaxation techniques- Monitor for opioid side effects- Notify MD/LIP if interventions unsuccessful or patient reports new pain- Anticipate increased pain with activity and pre-medicate as appropriate  Outcome: Progressing     Problem: RISK FOR INFECTION - ADULT  Goal: Absence of fever/infection during anticipated neutropenic period  Description: INTERVENTIONS- Monitor WBC- Administer growth factors as ordered- Implement neutropenic guidelines  Outcome: Progressing     Problem: SAFETY ADULT - FALL  Goal: Free from fall injury  Description: INTERVENTIONS:- Assess pt frequently for physical needs- Identify cognitive and physical deficits and behaviors that affect risk of falls.- [place] fall precautions as indicated by assessment.- Educate pt/family on patient safety including physical limitations- Instruct pt to call for assistance with activity based on assessment- Modify environment to reduce risk of injury- Provide assistive devices as appropriate- Consider OT/PT consult to assist with strengthening/mobility- Encourage toileting schedule  Outcome: Progressing     Problem: DISCHARGE PLANNING  Goal: Discharge to home or other facility with appropriate resources  Description: INTERVENTIONS:- Identify barriers to discharge w/pt and caregiver- Include patient/family/discharge partner in discharge planning- Arrange for needed discharge resources and transportation as appropriate- Identify discharge learning needs (meds, wound care, etc)- Arrange for interpreters to assist at discharge as needed- Consider post-discharge preferences of  patient/family/discharge partner- Complete POLST form as appropriate- Assess patient's ability to be responsible for managing their own health- Refer to Case Management Department for coordinating discharge planning if the patient needs post-hospital services based on physician/LIP order or complex needs related to functional status, cognitive ability or social support system  Outcome: Progressing     Problem: METABOLIC/FLUID AND ELECTROLYTES - ADULT  Goal: Electrolytes maintained within normal limits  Description: INTERVENTIONS:- Monitor labs and rhythm and assess patient for signs and symptoms of electrolyte imbalances- Administer electrolyte replacement as ordered- Monitor response to electrolyte replacements, including rhythm and repeat lab results as appropriate- Fluid restriction as ordered- Instruct patient on fluid and nutrition restrictions as appropriate  Outcome: Progressing     Lethargic, easily arousable. Incontinent x2. Prompt incontinence care provided. Aspiration precautions. Limbs elevated.  Caregiver at bedside.

## 2025-04-27 NOTE — PROGRESS NOTES
[facility]  part of [facility]    Progress Note    Christy Lowry Patient Status:  Inpatient    1924 MRN [id number]   Location [facility] 4W/SW/SE Attending Abdelrahman Jimenez,*   Hosp Day # 5 PCP Patrick Stafford MD     Chief Complaint: ok    Subjective:     Constitutional:  Positive for appetite change and fatigue.   HENT:  Positive for congestion.    Respiratory:  Positive for choking.    Cardiovascular:  Positive for leg swelling.   Gastrointestinal:  Negative for abdominal pain.   Genitourinary:  Negative for dysuria.   Neurological:  Positive for weakness.   Psychiatric/Behavioral:  Negative for decreased concentration.        Objective:   Blood pressure 128/33, pulse 67, temperature 97.2 °F (36.2 °C), temperature source Temporal, resp. rate 18, weight 126 lb 6.4 oz (57.3 kg), SpO2 95%.  Physical Exam  Vitals and nursing note reviewed.   Constitutional:       General: She is not in acute distress.     Appearance: She is ill-appearing. She is not toxic-appearing or diaphoretic.   HENT:      Head:      Comments: Temp wasting  Cardiovascular:      Rate and Rhythm: Normal rate.      Pulses: Normal pulses.   Pulmonary:      Breath sounds: Rhonchi present.   Abdominal:      General: Bowel sounds are normal.      Palpations: Abdomen is soft.   Skin:     General: Skin is warm and dry.      Capillary Refill: Capillary refill takes less than 2 seconds.   Neurological:      General: No focal deficit present.      Mental Status: She is alert and oriented to person, place, and time.   Psychiatric:         Behavior: Behavior normal.         Judgment: Judgment normal.         Results:   Lab Results   Component Value Date    WBC 14.8 (H) 2025    HGB 10.9 (L) 2025    HCT 36.1 2025    .0 2025    CREATSERUM 0.63 2025    BUN 7 (L) 2025     2025    K 4.1 2025    K 4.1 2025     2025    CO2 18.0 (L) 2025      (H) 04/26/2025    CA 7.5 (L) 04/26/2025    ALB 2.0 (L) 04/24/2025    ALKPHO 89 04/24/2025    BILT 0.2 04/24/2025    TP 4.7 (L) 04/24/2025    AST 12 04/24/2025    ALT <7 (L) 04/24/2025    T4F 1.1 04/23/2025    TSH 1.307 04/23/2025    LIP 22 01/14/2025    CRP <0.40 11/19/2024    MG 2.1 04/26/2025    PHOS 2.2 (L) 04/27/2025    TROPHS 11 11/15/2024    B12 604 04/24/2025       US VENOUS DOPPLER ARM RIGHT - DIAG IMG (CPT=93971)  Result Date: 4/27/2025  CONCLUSION:  1. No right upper extremity DVT. 2. Superficial venous thrombosis involving a segment of right basilic vein.    Dictated by (CST): Yfn Workman MD on 4/27/2025 at 0:54 AM     Finalized by (CST): Yfn Workman MD on 4/27/2025 at 0:57 AM          XR CHEST PA + LAT CHEST (IDY=[id number])  Result Date: 4/25/2025  CONCLUSION:  1. Small left basilar pleural effusion with adjacent atelectasis or less likely pneumonia. 2. Small right basilar pleural effusion.  3. 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    Dictated by (CST): Yfn Workman MD on 4/25/2025 at 11:17 PM     Finalized by (CST): Yfn Workman MD on 4/25/2025 at 11:20 PM                  Assessment & Plan:     1. Urinary tract infection resist ecoli changed to rocephin  2. Right lower extremity cellulitis really seems more edema small old sub popliteal dvts  and echo pend  Family refuses scd's on heparin  3. Liquid diarrhea post antibiotic exposure. Rule out Clostridium difficile infection. Studies neg; better  4. Full code dw son 4/23; had goc dw son; pall care to see tomorrow for info talk  5. Passing out per caregiver will place on tele; echo ok  6. Poor uo no obs; incontinent; renal function stable  7. Neutrophilia worse checked cxr; atelectasis  8. Tachycardia from inf EKG and echo ok  9. Hypoalbuminemia with swelling from severe protein suellen maln; started to eat  10. Right arm swelling ro dvt right; no dvt superficial clot  Patient will require inpatient services that will reasonably be  expected to span two midnight's based on the clinical documentation in H+P.   Based on patients current state of illness, I anticipate that, after discharge, patient will require TBD.  Complex mdm; coordinating care with nurse and counseling pt and with her nodded permission her son in [place] about care/diet    BOYD SERVIN MD

## 2025-04-27 NOTE — PLAN OF CARE
Discussed plan of care for day, including all given medications and their indications. IV Rocephin and low dose IV fluids maintained.  Increased edema and weeping in bilateral arms.  Loss of IV access -- difficult to obtain ([facility] able to establish access with extended length but not able to fully advance catheter).  Turning and repositioning schedule continued and prompt incontinence care maintained.  Poor oral intake and decreased urine output -- MD aware.  Aspiration precautions also maintained.  Comfort measures encouraged to promote restful environment.     Problem: Patient Centered Care  Goal: Patient preferences are identified and integrated in the patient's plan of care  Description: Interventions:- What would you like us to know as we care for you? Patient lives at home with a caregiver.  Her children are also involved in her care.      - Provide timely, complete, and accurate information to patient/family  - Incorporate patient and family knowledge, values, beliefs, and cultural backgrounds into the planning and delivery of care  - Encourage patient/family to participate in care and decision-making at the level they choose  - Honor patient and family perspectives and choices  Outcome: Progressing     Problem: SKIN/TISSUE INTEGRITY - ADULT  Goal: Skin integrity remains intact  Description: INTERVENTIONS- Assess and document risk factors for pressure ulcer development- Assess and document skin integrity- Monitor for areas of redness and/or skin breakdown- Initiate interventions, skin care algorithm/standards of care as needed  Outcome: Progressing  Goal: Incision(s), wounds(s) or drain site(s) healing without S/S of infection  Description: INTERVENTIONS:- Assess and document risk factors for pressure ulcer development- Assess and document skin integrity- Assess and document dressing/incision, wound bed, drain sites and surrounding tissue- Implement wound care per orders- Initiate isolation  precautions as appropriate- Initiate Pressure Ulcer prevention bundle as indicated  Outcome: Progressing     Problem: METABOLIC/FLUID AND ELECTROLYTES - ADULT  Goal: Electrolytes maintained within normal limits  Description: INTERVENTIONS:- Monitor labs and rhythm and assess patient for signs and symptoms of electrolyte imbalances- Administer electrolyte replacement as ordered- Monitor response to electrolyte replacements, including rhythm and repeat lab results as appropriate- Fluid restriction as ordered- Instruct patient on fluid and nutrition restrictions as appropriate  Outcome: Progressing     Problem: Patient/Family Goals  Goal: Patient/Family Long Term Goal  Description: Patient's Long Term Goal: Discharge home    Interventions:  - Resolve urinary tract infection  - Tolerate adequate oral intake  - regain fluid/electrolyte balance  - See additional Care Plan goals for specific interventions  Outcome: Progressing  Goal: Patient/Family Short Term Goal  Description: Patient's Short Term Goal: Resolve urinary tract infection    Interventions:   - Maintain appropriate antibiotic therapy  - Promote positive perineal hygiene  - Trend WBC count and labs daily  - See additional Care Plan goals for specific interventions  Outcome: Progressing     Problem: PAIN - ADULT  Goal: Verbalizes/displays adequate comfort level or patient's stated pain goal  Description: INTERVENTIONS:- Encourage pt to monitor pain and request assistance- Assess pain using appropriate pain scale- Administer analgesics based on type and severity of pain and evaluate response- Implement non-pharmacological measures as appropriate and evaluate response- Consider cultural and social influences on pain and pain management- Manage/alleviate anxiety- Utilize distraction and/or relaxation techniques- Monitor for opioid side effects- Notify MD/LIP if interventions unsuccessful or patient reports new pain- Anticipate increased pain with activity and  pre-medicate as appropriate  Outcome: Progressing     Problem: RISK FOR INFECTION - ADULT  Goal: Absence of fever/infection during anticipated neutropenic period  Description: INTERVENTIONS- Monitor WBC- Administer growth factors as ordered- Implement neutropenic guidelines  Outcome: Progressing     Problem: SAFETY ADULT - FALL  Goal: Free from fall injury  Description: INTERVENTIONS:- Assess pt frequently for physical needs- Identify cognitive and physical deficits and behaviors that affect risk of falls.- [place] fall precautions as indicated by assessment.- Educate pt/family on patient safety including physical limitations- Instruct pt to call for assistance with activity based on assessment- Modify environment to reduce risk of injury- Provide assistive devices as appropriate- Consider OT/PT consult to assist with strengthening/mobility- Encourage toileting schedule  Outcome: Progressing     Problem: DISCHARGE PLANNING  Goal: Discharge to home or other facility with appropriate resources  Description: INTERVENTIONS:- Identify barriers to discharge w/pt and caregiver- Include patient/family/discharge partner in discharge planning- Arrange for needed discharge resources and transportation as appropriate- Identify discharge learning needs (meds, wound care, etc)- Arrange for interpreters to assist at discharge as needed- Consider post-discharge preferences of patient/family/discharge partner- Complete POLST form as appropriate- Assess patient's ability to be responsible for managing their own health- Refer to Case Management Department for coordinating discharge planning if the patient needs post-hospital services based on physician/LIP order or complex needs related to functional status, cognitive ability or social support system  Outcome: Progressing     All efforts maintained to promote infection prevention during my assessment and care for this patient.  Stethoscope cleansed and hand hygiene strictly  maintained.

## 2025-04-27 NOTE — PLAN OF CARE
Discussed plan of care for day, including all given medications and their indications. With intermittent increased alertness today and improved oral intake.  IV Rocephin maintained and IV fluids discontinued.  Superficial clot in right upper arm per US doppler on 4/26 -- Arm elevated and heat packs applied.  Family at bedside as positive presence.  Continued discussions with family at bedside about goals of care.  Plan for palliative care consult tomorrow.  Comfort measures encouraged to promote restful environment.     Problem: Patient Centered Care  Goal: Patient preferences are identified and integrated in the patient's plan of care  Description: Interventions:- What would you like us to know as we care for you? Patient lives at home with a caregiver.  Her children are also involved in her care.      - Provide timely, complete, and accurate information to patient/family  - Incorporate patient and family knowledge, values, beliefs, and cultural backgrounds into the planning and delivery of care  - Encourage patient/family to participate in care and decision-making at the level they choose  - Honor patient and family perspectives and choices  Outcome: Progressing     Problem: SKIN/TISSUE INTEGRITY - ADULT  Goal: Skin integrity remains intact  Description: INTERVENTIONS- Assess and document risk factors for pressure ulcer development- Assess and document skin integrity- Monitor for areas of redness and/or skin breakdown- Initiate interventions, skin care algorithm/standards of care as needed  Outcome: Progressing  Goal: Incision(s), wounds(s) or drain site(s) healing without S/S of infection  Description: INTERVENTIONS:- Assess and document risk factors for pressure ulcer development- Assess and document skin integrity- Assess and document dressing/incision, wound bed, drain sites and surrounding tissue- Implement wound care per orders- Initiate isolation precautions as appropriate- Initiate Pressure Ulcer  prevention bundle as indicated  Outcome: Progressing     Problem: METABOLIC/FLUID AND ELECTROLYTES - ADULT  Goal: Electrolytes maintained within normal limits  Description: INTERVENTIONS:- Monitor labs and rhythm and assess patient for signs and symptoms of electrolyte imbalances- Administer electrolyte replacement as ordered- Monitor response to electrolyte replacements, including rhythm and repeat lab results as appropriate- Fluid restriction as ordered- Instruct patient on fluid and nutrition restrictions as appropriate  Outcome: Progressing     Problem: Patient/Family Goals  Goal: Patient/Family Long Term Goal  Description: Patient's Long Term Goal: Discharge home    Interventions:  - Resolve urinary tract infection  - Tolerate adequate oral intake  - regain fluid/electrolyte balance  - See additional Care Plan goals for specific interventions  Outcome: Progressing  Goal: Patient/Family Short Term Goal  Description: Patient's Short Term Goal: Resolve urinary tract infection    Interventions:   - Maintain appropriate antibiotic therapy  - Promote positive perineal hygiene  - Trend WBC count and labs daily  - See additional Care Plan goals for specific interventions  Outcome: Progressing     Problem: PAIN - ADULT  Goal: Verbalizes/displays adequate comfort level or patient's stated pain goal  Description: INTERVENTIONS:- Encourage pt to monitor pain and request assistance- Assess pain using appropriate pain scale- Administer analgesics based on type and severity of pain and evaluate response- Implement non-pharmacological measures as appropriate and evaluate response- Consider cultural and social influences on pain and pain management- Manage/alleviate anxiety- Utilize distraction and/or relaxation techniques- Monitor for opioid side effects- Notify MD/LIP if interventions unsuccessful or patient reports new pain- Anticipate increased pain with activity and pre-medicate as appropriate  Outcome: Progressing      Problem: RISK FOR INFECTION - ADULT  Goal: Absence of fever/infection during anticipated neutropenic period  Description: INTERVENTIONS- Monitor WBC- Administer growth factors as ordered- Implement neutropenic guidelines  Outcome: Progressing     Problem: SAFETY ADULT - FALL  Goal: Free from fall injury  Description: INTERVENTIONS:- Assess pt frequently for physical needs- Identify cognitive and physical deficits and behaviors that affect risk of falls.- [place] fall precautions as indicated by assessment.- Educate pt/family on patient safety including physical limitations- Instruct pt to call for assistance with activity based on assessment- Modify environment to reduce risk of injury- Provide assistive devices as appropriate- Consider OT/PT consult to assist with strengthening/mobility- Encourage toileting schedule  Outcome: Progressing     Problem: DISCHARGE PLANNING  Goal: Discharge to home or other facility with appropriate resources  Description: INTERVENTIONS:- Identify barriers to discharge w/pt and caregiver- Include patient/family/discharge partner in discharge planning- Arrange for needed discharge resources and transportation as appropriate- Identify discharge learning needs (meds, wound care, etc)- Arrange for interpreters to assist at discharge as needed- Consider post-discharge preferences of patient/family/discharge partner- Complete POLST form as appropriate- Assess patient's ability to be responsible for managing their own health- Refer to Case Management Department for coordinating discharge planning if the patient needs post-hospital services based on physician/LIP order or complex needs related to functional status, cognitive ability or social support system  Outcome: Progressing     All efforts maintained to promote infection prevention during my assessment and care for this patient.  Stethoscope cleansed and hand hygiene strictly maintained.

## 2025-04-28 ENCOUNTER — APPOINTMENT (OUTPATIENT)
Dept: PICC SERVICES | Facility: HOSPITAL | Age: OVER 89
End: 2025-04-28
Attending: HOSPITALIST
Payer: MEDICARE

## 2025-04-28 PROBLEM — Z78.9 DIFFICULT INTRAVENOUS ACCESS: Status: ACTIVE | Noted: 2025-04-28

## 2025-04-28 PROBLEM — Z51.5 PALLIATIVE CARE BY SPECIALIST: Status: ACTIVE | Noted: 2025-04-28

## 2025-04-28 PROBLEM — Z71.89 ADVANCE CARE PLANNING: Status: ACTIVE | Noted: 2025-04-28

## 2025-04-28 PROBLEM — Z71.89 GOALS OF CARE, COUNSELING/DISCUSSION: Status: ACTIVE | Noted: 2025-04-28

## 2025-04-28 LAB
ANION GAP SERPL CALC-SCNC: 7 MMOL/L (ref 0–18)
BASOPHILS # BLD AUTO: 0.02 X10(3) UL (ref 0–0.2)
BASOPHILS NFR BLD AUTO: 0.2 %
BUN BLD-MCNC: 9 MG/DL (ref 9–23)
BUN/CREAT SERPL: 21.4 (ref 10–20)
CALCIUM BLD-MCNC: 7.5 MG/DL (ref 8.7–10.4)
CHLORIDE SERPL-SCNC: 110 MMOL/L (ref 98–112)
CO2 SERPL-SCNC: 24 MMOL/L (ref 21–32)
CREAT BLD-MCNC: 0.42 MG/DL (ref 0.55–1.02)
DEPRECATED RDW RBC AUTO: 56.1 FL (ref 35.1–46.3)
EGFRCR SERPLBLD CKD-EPI 2021: 87 ML/MIN/1.73M2 (ref 60–?)
EOSINOPHIL # BLD AUTO: 0.09 X10(3) UL (ref 0–0.7)
EOSINOPHIL NFR BLD AUTO: 0.7 %
ERYTHROCYTE [DISTWIDTH] IN BLOOD BY AUTOMATED COUNT: 17.7 % (ref 11–15)
GLUCOSE BLD-MCNC: 81 MG/DL (ref 70–99)
HCT VFR BLD AUTO: 30.7 % (ref 35–48)
HGB BLD-MCNC: 10.2 G/DL (ref 12–16)
IMM GRANULOCYTES # BLD AUTO: 0.12 X10(3) UL (ref 0–1)
IMM GRANULOCYTES NFR BLD: 0.9 %
LYMPHOCYTES # BLD AUTO: 3.04 X10(3) UL (ref 1–4)
LYMPHOCYTES NFR BLD AUTO: 23 %
MAGNESIUM SERPL-MCNC: 1.7 MG/DL (ref 1.6–2.6)
MCH RBC QN AUTO: 29.1 PG (ref 26–34)
MCHC RBC AUTO-ENTMCNC: 33.2 G/DL (ref 31–37)
MCV RBC AUTO: 87.7 FL (ref 80–100)
MONOCYTES # BLD AUTO: 0.9 X10(3) UL (ref 0.1–1)
MONOCYTES NFR BLD AUTO: 6.8 %
NEUTROPHILS # BLD AUTO: 9.05 X10 (3) UL (ref 1.5–7.7)
NEUTROPHILS # BLD AUTO: 9.05 X10(3) UL (ref 1.5–7.7)
NEUTROPHILS NFR BLD AUTO: 68.4 %
OSMOLALITY SERPL CALC.SUM OF ELEC: 290 MOSM/KG (ref 275–295)
PHOSPHATE SERPL-MCNC: 1.7 MG/DL (ref 2.4–5.1)
PLATELET # BLD AUTO: 310 10(3)UL (ref 150–450)
POTASSIUM SERPL-SCNC: 3.1 MMOL/L (ref 3.5–5.1)
RBC # BLD AUTO: 3.5 X10(6)UL (ref 3.8–5.3)
SODIUM SERPL-SCNC: 141 MMOL/L (ref 136–145)
WBC # BLD AUTO: 13.2 X10(3) UL (ref 4–11)

## 2025-04-28 PROCEDURE — 99223 1ST HOSP IP/OBS HIGH 75: CPT | Performed by: REGISTERED NURSE

## 2025-04-28 PROCEDURE — 99233 SBSQ HOSP IP/OBS HIGH 50: CPT | Performed by: HOSPITALIST

## 2025-04-28 RX ORDER — MAGNESIUM SULFATE HEPTAHYDRATE 40 MG/ML
2 INJECTION, SOLUTION INTRAVENOUS ONCE
Status: COMPLETED | OUTPATIENT
Start: 2025-04-28 | End: 2025-04-28

## 2025-04-28 RX ORDER — POTASSIUM CHLORIDE 1500 MG/1
40 TABLET, EXTENDED RELEASE ORAL ONCE
Status: COMPLETED | OUTPATIENT
Start: 2025-04-28 | End: 2025-04-28

## 2025-04-28 RX ORDER — ALBUMIN (HUMAN) 12.5 G/50ML
25 SOLUTION INTRAVENOUS EVERY 6 HOURS
Status: COMPLETED | OUTPATIENT
Start: 2025-04-28 | End: 2025-04-29

## 2025-04-28 RX ORDER — HYDROXYZINE HYDROCHLORIDE 10 MG/1
5 TABLET, FILM COATED ORAL NIGHTLY PRN
Status: DISCONTINUED | OUTPATIENT
Start: 2025-04-28 | End: 2025-05-01

## 2025-04-28 NOTE — CONSULTS
[facility]  part of [facility]  Palliative Care Initial Consult Note    Christy Lowry Patient Status:  Inpatient    1924 MRN [id number]   Location [facility] 4W/SW/SE Attending Abdelrahman Jimenez,*   Hosp Day # 6 PCP Patrick Stafford MD     Date of Consult: 2025  Patient seen at: [facility] In[facility]    The  Cures Act makes medical notes like these available to patients in the interest of transparency. Please be advised this is a medical document. Medical documents are intended to carry relevant information, facts as evident, and the clinical opinion of the practitioner. The medical note is intended as peer to peer communication and may appear blunt or direct. It is written in medical language and may contain abbreviations or verbiage that are unfamiliar.     Reason for Consultation: Consult ordered by:: Dr. Jimenez for evaluation of Palliative Care needs and Goals of care discussion.    Subjective     History of Present Illness: Christy Lowry is a 89 year old female with history of osteoporosis, OA, GERD, chronic dysphagia, hypothyroidism, anxiety, recent UTI who was admitted on 2025 for AMS, lethargy, and diarrhea. See below for reviewed labs and imaging. Pt was admitted for treatment and evaluation of leukocytosis, UTI, RLE cellulitis and diarrhea-stool panel and c-diff negative. See below for reviewed imaging.     I reviewed labs and imaging-see below. History was obtained from [facility] and pt's CG Joycelyn at bedside and also son Tristen by phone.  Today is day 6 of hospitalization. This is her 3rd hospitalization in the past 5 months.    Pt is listed as full code in Epic, no POLST on file. I reviewed previously completed HCPOA paperwork which shows her son Tristen as primary HCPOA and son Luciano as successor. I also reviewed living will on file.     Reviewed symptom needs past 24 hrs: none    Patient was seen and examined in bed with her CG Joycelyn at  bedside.Pt was initially sleeping, but easily arousable. She is A&OX2 and appears frail. +reports of fatigue. She has edema noted to her extremities. Denies any pain symptoms. Denies any dyspnea symptoms on RA. Appetite has been poor although she ate well this morning, +chronic dysphagia issues on puree diet. Denies abdominal pain, n/v and moved her bowels today which were loose. She has been having chronic diarrhea issues per CG. VSS. See physical exam and ROS below.    Review of Systems/Palliative Care symptom needs assessed:   Pertinent items are noted in HPI/below    Fatigue:  Yes  Dyspnea: denies   Current O2 therapy: RA  Cough: denies  Pain Present: denies  Non-verbal signs of pain present: NO  Appetite: poor  Diarrhea: +chronic issues, moved her bowels today  Nausea/Vomiting: denies    Medical History: obtained from [facility]  Past Medical History[1]  Past Surgical History[2]    Family History: obtained from [facility]  Family History[3]    Palliative Care Social History:   Marital Status:   Children: 5 living children (4 sons/1 dtr)  Living Situation Prior to Admit: lives with 24 hr  support  Occupational History: Retired    Substance History:   reports that she has quit smoking. She has quit using smokeless tobacco.  reports that she does not currently use alcohol.  reports no history of drug use.  Hx of Substance Use/Abuse: No    Spiritual Assessment:   Adventist: Jainism    Allergies:  Allergies[4]    Medications:   Current Hospital Medications[5]    Nutritional status:  BMI: 24 Weight: 126  Weight Loss: wt is down 14 pounds since November per EPIC documentation  Current Appetite: Poor  Dysphagia: Yes    Functional Status History:  ADLs: mostly in bed currently and needs help with ADL's, at home she is mostly bed to chair/w/c existence at home    Palliative Performance Scale:   Prior to admission: 40%  Observed during hospitalization: 30%  % Ambulation Activity Level Self-Care Intake Consciousness   100 Full   Normal  No Disease Full Normal Full   90 Full  Normal  Some Disease Full Normal Full   80 Full  Normal w/effort  Some Disease Full Normal or reduced Full   70 Reduced  Can't Perform Job  Some Disease Full Normal or reduced Full   60 Reduced  Can't Perform Hobby   Significant Disease Occ Assist Normal or reduced Full or confused   50 Mainly sit/lie Can't do any work  Extensive Disease Partial Assist Normal or reduced Full or confused   40 Mainly in bed Can't do any work  Extensive Disease Mainly Assist Normal or reduced Full or confused   30 Bed Bound Can't do any work  Extensive Disease Max Assist  Total Care Reduced  Drowsy/confused   20 Bed Bound Can't do any work  Extensive Disease Max Assist  Total Care Minimal  Drowsy/confused   10 Bed Bound Can't do any work  Extensive Disease Max Assist  Total Care Mouth Care  Drowsy/confused   0 Death        Objective      Vital Signs:  Blood pressure 109/38, pulse 83, temperature 98.6 °F (37 °C), temperature source Axillary, resp. rate 18, weight 126 lb 6.4 oz (57.3 kg), SpO2 93%.  Body mass index is 24.69 kg/m².  Present Level of pain: 0/10  Non-verbal signs of pain present: No    Physical Exam:  General: Alert and in no apparent distress. Weak, frail appearing  HEENT: No focal deficits.  Cardiac: Regular rate and rhythm, S1, S2 normal, no murmur, rub or gallop.  Lungs: Clear without wheezes, rales, rhonchi or dullness.  Normal excursions and effort.  Abdomen: Soft, non-tender, normal bowel sounds X 4 quadrants, no rebound or guarding  Extremities: Without clubbing, cyanosis. Peripheral pulses are 2+. BULE edema present  Neurologic: Alert and oriented X2, normal affect, follows commands  Skin: Warm and dry.    Hematology:  Lab Results   Component Value Date    WBC 13.2 (H) 04/28/2025    HGB 10.2 (L) 04/28/2025    HCT 30.7 (L) 04/28/2025    .0 04/28/2025       Coags:No results found for: \"PT\", \"INR\", \"PTT\"    Chemistry:  Lab Results   Component Value Date     CREATSERUM 0.42 (L) 04/28/2025    BUN 9 04/28/2025     04/28/2025    K 3.1 (L) 04/28/2025     04/28/2025    CO2 24.0 04/28/2025    GLU 81 04/28/2025    CA 7.5 (L) 04/28/2025    ALB 2.0 (L) 04/24/2025    ALKPHO 89 04/24/2025    BILT 0.2 04/24/2025    TP 4.7 (L) 04/24/2025    AST 12 04/24/2025    ALT <7 (L) 04/24/2025    MG 1.7 04/28/2025    PHOS 1.7 (L) 04/28/2025 4/23/25 Stool panel  Component  Ref Range & Units (hover)    GI Panel Comment: Please Note: This test no longer includes C.difficile toxin. If clinically indicated order separate test for C.difficile toxin.   Campylobacter by PCR Negative   Plesiomonas shigelloides by PCR Negative   Salmonella by PCR Negative   Vibrio by PCR Negative   Vibrio cholera by PCR Negative   Yersinia entercolitica by PCR Negative   Enteroaggregative E. coli (EAEC) by PCR Negative   Enteropathogenic E. coli (EPEC) by PCR Negative   Enterotoxigenic E. coli (ETEC) by PCR Negative   Shiga Toxin 1/2 Producting E. coli (STEC) by PCR Negative   Shigella/Enteroinvasive E. coli (EIEC) by PCR Negative   Cryptosporidium by PCR Negative   Cyclospora cayetanensis by PCR Negative   Entamoeba histolytica by PCR Negative   Giardia lamblia by PCR Negative   Adenovirus F40/41 by PCR Negative   Astrovirus by PCR Negative   Norovirus Gi/Gii by PCR Negative   Rotavirus A by PCR Negative   Sapovirus by PCR Negative   Resulting Agency [facility] ([facility])             4/22/25 C-diff negative    Imaging:  US VENOUS DOPPLER ARM RIGHT - DIAG IMG (CPT=93971)  Result Date: 4/27/2025  CONCLUSION:  1. No right upper extremity DVT. 2. Superficial venous thrombosis involving a segment of right basilic vein.    Dictated by (CST): Yfn Workman MD on 4/27/2025 at 0:54 AM     Finalized by (CST): Yfn Workman MD on 4/27/2025 at 0:57 AM          XR CHEST PA + LAT CHEST (MWE=56337)  Result Date: 4/25/2025  CONCLUSION:  1. Small left basilar pleural effusion with adjacent atelectasis or less likely  pneumonia. 2. Small right basilar pleural effusion.  3. 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    Dictated by ([facility]): Yfn Workman MD on 2025 at 11:17 PM     Finalized by ([facility]): Yfn Workman MD on 2025 at 11:20 PM             CARD ECHO 2D DOPPLER (CPT=93306)  Result Date: 2025  Transthoracic Echocardiogram Name:Christy Lowry Date: 2025 :  1924 Ht:  (60in)  BP: 116 / 56 MRN:  [id number]    Age:  89years   Wt:  (126lb) HR: 99bpm Loc:  [facility]       Gndr: F          BSA: 1.53m^2 Sonographer: Natalie DONG Ordering:    Abdelrahman Jimenez ---------------------------------------------------------------------------- History/Indications:   Evaluate LV Function. ---------------------------------------------------------------------------- Procedure information:  A transthoracic complete 2D study was performed. Additional evaluation included M-mode, complete spectral Doppler, and color Doppler.  Patient status:  Inpatient.  Location:  Bedside.    Comparison was made to the study of 2024.    This was a routine study. Transthoracic echocardiography for diagnosis. Image quality was adequate. The study was technically limited due to poor acoustic window availability. ---------------------------------------------------------------------------- Conclusions: 1. Left ventricle: The cavity size was normal. Wall thickness was mildly    increased. Systolic function was normal. The estimated ejection fraction    was 60-65%, by biplane method of disks. Wall motion is normal; there are    no regional wall motion abnormalities. Left ventricular diastolic    function parameters were normal. 2. Left atrium: The atrium was mildly dilated. 3. Aortic valve: Cusp separation was mildly reduced. The findings were    consistent with mild stenosis. There was mild regurgitation. 4. Mitral valve: There was mild regurgitation. 5. Pulmonary arteries: Systolic pressure was mildly to moderately increased.    The  peak systolic pressure is 47mm Hg. 6. Pericardium, extracardiac: There was a left pleural effusion. 7. Ascending aorta: An echodensity is seen in the ascending aorta. It is    likely an artifact. * ---------------------------------------------------------------------------- * Findings: Left ventricle:  The cavity size was normal. Wall thickness was mildly increased. Systolic function was normal. The estimated ejection fraction was 60-65%, by biplane method of disks. Wall motion is normal; there are no regional wall motion abnormalities. Left ventricular diastolic function parameters were normal. Left atrium:  Well visualized. The atrium was mildly dilated. Right ventricle:  The cavity size was normal. Systolic function was normal. Right atrium:  Well visualized. The atrium was normal in size. Mitral valve:  Well visualized. The valve was structurally normal. The leaflets were normal thickness. Leaflet separation was normal. No evidence for prolapse.  Doppler:  Transvalvular velocity was within the normal range. There was no evidence for stenosis. There was mild regurgitation. Aortic valve:  Not well visualized.  The valve was probably trileaflet. The leaflets were mildly thickened and mildly calcified. Cusp separation was mildly reduced.  Doppler:   The findings were consistent with mild stenosis.   There was mild regurgitation.    The peak systolic gradient was 14mm Hg. Tricuspid valve:  Well visualized. The annulus is normal-sized. The valve is structurally normal. The leaflets are normal thickness. Leaflet separation was normal. No echocardiographic evidence for tricuspid prolapse.  Doppler: Transvalvular velocity was within the normal range. There was no evidence for stenosis. There was trivial regurgitation. Pulmonic valve:   Well visualized. The annulus is normal-sized. The valve is structurally normal. The leaflets are normal thickness. Cusp separation was normal. No evidence for prolapse.  Doppler:   Transvalvular velocity was within the normal range. There was no evidence for stenosis. There was no significant regurgitation. Pericardium:   There was no pericardial effusion. Pleura:  There was a left pleural effusion. Aorta: Aortic root: The aortic root was normal-sized. The aortic root was normal. Ascending aorta: The ascending aorta was normal. An echodensity is seen in the ascending aorta. It is likely an artifact. Pulmonary arteries: The main pulmonary artery was normal-sized. Systolic pressure was mildly to moderately increased. Mild pulmonary hypertension was present. Systemic veins:  Central venous respirophasic diameter changes are blunted (< 50%). Inferior vena cava: The IVC was dilated. ---------------------------------------------------------------------------- Measurements  Left ventricle         Value        Ref       11/16/2024  IVS thickness, ED, (H) 1.3   cm     0.6 - 0.9 0.9  PLAX  LV ID, ED, PLAX    (L) 2.8   cm     3.8 - 5.2 4.2  LV ID, ES, PLAX    (L) 1.8   cm     2.2 - 3.5 ----------  LV PW thickness,       0.9   cm     0.6 - 0.9 1.0  ED, PLAX  IVS/LV PW ratio,       1.44         --------- 0.90  ED, PLAX  LV PW/LV ID ratio,     0.32         --------- 0.24  ED, PLAX  LV ejection            67    %      54 - 74   ----------  fraction  LV end-diastolic       51    ml     48 - 140  ----------  volume, 1-p A4C  LV ejection            59    %      46 - 78   ----------  fraction, 1-p A4C  Stroke volume, 1-p     30    ml     --------- ----------  A4C  LV end-diastolic       33    ml/m^2 30 - 82   ----------  volume/bsa, 1-p  A4C  Stroke volume/bsa,     20    ml/m^2 --------- ----------  1-p A4C  LV end-diastolic       49    ml     46 - 106  ----------  volume, 2-p  LV end-systolic        21    ml     14 - 42   ----------  volume, 2-p  LV ejection            58    %      54 - 74   ----------  fraction, 2-p  Stroke volume, 2-p     28    ml     --------- ----------  LV end-diastolic       32    ml/m^2  29 - 61   ----------  volume/bsa, 2-p  LV end-systolic        14    ml/m^2 8 - 24    ----------  volume/bsa, 2-p  Stroke volume/bsa,     18.5  ml/m^2 --------- ----------  2-p  LV e', lateral         10.3  cm/sec >=10.0    9.0  LV E/e', lateral       9            <=13      8  LV e', medial          8.1   cm/sec >=7.0     6.1  LV E/e', medial        12           --------- 12  LV e', average         9.2   cm/sec --------- 7.6  LV E/e', average       11           <=14      10  LVOT                   Value        Ref       11/16/2024  LVOT ID                2     cm     --------- 1.9  Aortic valve           Value        Ref       11/16/2024  Aortic leaflet         0.9   cm     --------- ----------  separation, MM  Aortic valve peak      1.86  m/sec  --------- 1.6  velocity, S  Aortic peak            14    mm Hg  --------- 10  gradient, S  Aortic pressure        321   ms     --------- ----------  half-time  Aortic regurg          3.07  m/sec  --------- ----------  velocity, ED  Aortic regurg          267   cm/s^2 --------- ----------  deceleration  Aortic regurg          337   ms     --------- ----------  pressure half-time  Aortic regurg          38    mm Hg  --------- ----------  gradient, ED  Aortic root            Value        Ref       11/16/2024  Aortic root ID,        2.8   cm     2.0 - 3.2 ----------  STJ, ED  Ascending aorta        Value        Ref       11/16/2024  Ascending aorta ID     3.3   cm     1.9 - 3.5 3.3  Left atrium            Value        Ref       11/16/2024  LA volume, S       (H) 54    ml     22 - 52   51  LA volume/bsa, S   (H) 35    ml/m^2 16 - 34   30  LA volume, ES, 1-p     44    ml     22 - 52   52  A4C  LA volume, ES, 1-p (H) 63    ml     22 - 52   49  A2C  LA volume, ES, A/L     57    ml     --------- 52  LA volume/bsa, ES, (H) 37    ml/m^2 16 - 34   31  A/L  Mitral valve           Value        Ref       11/16/2024  Mitral E-wave peak     0.96  m/sec  --------- 0.75  velocity  Mitral  A-wave peak     0.97  m/sec  --------- 1  velocity  Mitral                 229   ms     --------- 278  deceleration time  Mitral peak            4     mm Hg  --------- 2  gradient, D  Mitral E/A ratio,      1            --------- 0.7  peak  Pulmonary artery       Value        Ref       11/16/2024  PA pressure, S, DP     47    mm Hg  --------- ----------  Tricuspid valve        Value        Ref       11/16/2024  Tricuspid regurg   (H) 2.81  m/sec  <=2.8     ----------  peak velocity  Tricuspid peak         32    mm Hg  --------- ----------  RV-RA gradient  Systemic veins         Value        Ref       11/16/2024  Estimated CVP          15    mm Hg  --------- 3  Inferior vena cava     Value        Ref       11/16/2024  ID                 (H) 2.2   cm     <=2.1     0.9  Right ventricle        Value        Ref       11/16/2024  TAPSE, 2D              1.80  cm     >=1.70    2.29  TAPSE, MM              1.80  cm     >=1.70    2.29  RV pressure, S, DP     47    mm Hg  --------- ----------  RV s', lateral         14.2  cm/sec >=9.5     11.1 Legend: (L)  and  (H)  ed values outside specified reference range. ---------------------------------------------------------------------------- Prepared and electronically signed by Tristen Smiley 04/25/2025 07:37        Summary of [facility] Discussion        I discussed reason for palliative care consultation with patient's CG Joycelyn and also son Tristen by phone. I confirmed Tristen is primary HCPOA, but is also collaborating with his siblings.    I differentiated the palliative treatment-focus model versus the hospice comfort-focused philosophy of care. I informed the patient/family that having palliative care support does not limit medical treatment options or decisions to those who wish to continue curative or restorative medical therapies. I discussed the benefits of palliative care to include assistance with arising symptom management needs, an extra layer of support, to ensure GOC are  respected throughout healthcare continuum, and assist with transition to hospice care when appropriate.  Palliative care handout provided at bedside.    Outpatient/Community Palliative Care Services:  Usually visit once per 4 weeks depending on contracted agency guidelines  Focus on GOC and symptom management   Palliative Care criteria:  Not altered by prognosis   Does not limit curative or restorative therapies      Outpatient Hospice services:  24/7 phone triage services   RN visit one or more times per week depending on need  Home health aid to assist in ADLs/hygiene   Hospice criteria:  Less than six-month prognosis   Must forego most life-prolonging  measures/treatments   Focus solely on comfort   Must sign onto hospice benefit with agency     Background provided by family:  -Joycelyn tells me at home pt requires assistance with ADL's, she is mostly bed to w/c bound and not able to ambulate much at this point. Tristen says pt is normally fairly cognitively intact, but does have some forgetfulness at times.     Prognostic awareness/understanding: Still in informational gathering stage    -I  discussed current clinical condition and explored previous discussions with MDs.    -I discussed the normal disease trajectory of advanced age/frailty/dysphagia issues with associated symptoms and decline over time.     Hopes/goals/concerns:   -Tristen tells me they want to continue with supportive care.     -Family prefers to keep pt at home and avoid NH placement. I encouraged always keeping her QOL and comfort a priority when making medical decisions.     -CG Joycelyn tells me pt has been telling her she is \"ready to go\" and has lived a good life. She says pt did not want to come back to the hospital for this admission.     -Education provided on differences between palliative care and hospice care in home setting. Provided basic overview of Medicare hospice benefit and philosophy. Tristen does not seem ready for hospice at this time, but  knows about this option for the future if her condition worsens.    -I also addressed her dysphagia/poor po intake issues and discussed pt would be a poor candidate for feeding tube placement given advanced age/debility. Son is concerned with her nutritional status and discussed would have dietician make recommendations.     -Ongoing GOC discussions will be needed over time. Agreeable to palliative care following.     Advance Care Planning counseling and discussion:     -I discussed the importance of advance care planning prior to crisis with son Tristen by phone. I confirmed that patient's HCPOA is Tristen. I reinforced to Tristen what pt documented in her directives and clearly marked QOL is most important to her and prefers natural EOL.     -I addressed pt's full code status discussed the risks vs benefits of life sustaining treatments in the setting of advancing age and in her clinical picture. Education provided on what DNAR entails and encouraged setting limits. I discussed DNAR does not effect the ability for her to have ongoing medical treatments    -Tristen is agreeable to  DNAR, DNI-selective and  continue supportive medical care. He is ok with changing her code status in EPIC.     -I talked with Tristen about the importance of POLST form completion prior to dc so wishes are respected across the healthcare continuum. I provided [place] with instructions and asked him to let me know when he can visit so I  can assist him with completing the POLST form for Christy. I provided my contact information to Tristen and he will let me know when he can meet. Tristen will also talk with his siblings about today's visit.    -Provided emotional support to pt/son who are coping adequately.     Procedures:  No intubation    Assessment and Recommendations      Problem List:    UTI  RLE cellulitis  Diarrhea  Hypokalemia  Dysphagia  Moderate Malnutrition  GERD  Hypothyroidism  Weakness    Goals of care counseling  -see above for  details  -Confirmed wishes for DNAR/DNI-selective and continue supportive care.   -Discussed dysphagia issues/poor po intake with pt's son-discussed pt would be a very poor candidate for feeding tube given advanced age/debility. SLP and dietitian consult for recs.  -Ongoing GOC discussions will be needed through clinical course and over time.  -Pt's son is aware of the option for comfort care with hospice if her condition worsens in the future, NOT ready for this now.  -Agreeable to palliative care following  -Dispo:  Return home with 24 hr CG support. Recommended community palliative care program to follow on dc. SW to help with dc planning.   -Provided emotional support to pt/family who are coping adequately.    Advance care planning  -see above for details  -Pt's son Tristen Lowry is primary HCPOA #[phone].  -Previously completed living will/HCPOA paperwork on file in EPIC.   -Provided [place] for [place] and will facilitate completion prior to dc. Son Tristen will let me know when we can meet.    Palliative Performance Scale 30%    Emotion support provided to patient/family today: Yes    A total of 80 mins were spent on this consult, which included all of the following:direct face to face contact, history taking, physical examination, and >50% was spent counseling and coordinating care.    Discussed today's visit with Dr. Perez, message to Renetta Londono RN .    I will continue to follow clinically.      Thank you for allowing Palliative Care services to participate in the care of Ms. Christy Lowry.       Shannan Hennessy, ANP-BC, ACHPN [id number]  4/28/2025  10:08 AM  Palliative Care Services          [1]   Past Medical History:   Actinic keratosis    hypertrophic AK - left central forehead    Benzodiazepine withdrawal without complication (HCC)    Chronic GERD    Constipation    Diarrhea    Eustachian tube dysfunction    Heart palpitations    Loose stools    Occult blood in stools    SCC (squamous  cell carcinoma)    left forehead, invasive, well-diff.     SCC (squamous cell carcinoma)    right temple    SCC (squamous cell carcinoma)    right neck    Skin cancer   [2]   Past Surgical History:  Procedure Laterality Date    Tonsillectomy     [3]   Family History  Problem Relation Age of Onset    Colon Cancer Mother     Colon Cancer Sister    [4] No Known Allergies  [5]   Current Facility-Administered Medications:     sodium phosphate 15 mmol in 0.9% NaCl 100mL IVPB premix, 15 mmol, Intravenous, Once    cefTRIAXone (Rocephin) 1 g in sodium chloride 0.9% 100 mL IVPB-ADDV, 1 g, Intravenous, Q24H    heparin (Porcine) 5000 UNIT/ML injection 5,000 Units, 5,000 Units, Subcutaneous, 2 times per day    acetaminophen (Tylenol Extra Strength) tab 500 mg, 500 mg, Oral, Q4H PRN    ondansetron (Zofran) 4 MG/2ML injection 4 mg, 4 mg, Intravenous, Q6H PRN    metoclopramide (Reglan) 5 mg/mL injection 5 mg, 5 mg, Intravenous, Q8H PRN    budesonide (Entocort EC) DR cap 3 mg, 3 mg, Oral, QAM    levothyroxine (Synthroid) tab 75 mcg, 75 mcg, Oral, Daily    pantoprazole (Protonix) DR tab 40 mg, 40 mg, Oral, QAM AC    vancomycin (Vancocin) cap 125 mg, 125 mg, Oral, Daily

## 2025-04-28 NOTE — PLAN OF CARE
Problem: Patient Centered Care  Goal: Patient preferences are identified and integrated in the patient's plan of care  Description: Interventions:- What would you like us to know as we care for you? Patient lives at home with a caregiver.  Her children are also involved in her care.      - Provide timely, complete, and accurate information to patient/family  - Incorporate patient and family knowledge, values, beliefs, and cultural backgrounds into the planning and delivery of care  - Encourage patient/family to participate in care and decision-making at the level they choose  - Honor patient and family perspectives and choices  Outcome: Progressing     Problem: SKIN/TISSUE INTEGRITY - ADULT  Goal: Incision(s), wounds(s) or drain site(s) healing without S/S of infection  Description: INTERVENTIONS:- Assess and document risk factors for pressure ulcer development- Assess and document skin integrity- Assess and document dressing/incision, wound bed, drain sites and surrounding tissue- Implement wound care per orders- Initiate isolation precautions as appropriate- Initiate Pressure Ulcer prevention bundle as indicated  Outcome: Progressing     Problem: Patient/Family Goals  Goal: Patient/Family Long Term Goal  Description: Patient's Long Term Goal: Discharge home    Interventions:  - Resolve urinary tract infection  - Tolerate adequate oral intake  - regain fluid/electrolyte balance  - See additional Care Plan goals for specific interventions  Outcome: Progressing  Goal: Patient/Family Short Term Goal  Description: Patient's Short Term Goal: Resolve urinary tract infection    Interventions:   - Maintain appropriate antibiotic therapy  - Promote positive perineal hygiene  - Trend WBC count and labs daily  - See additional Care Plan goals for specific interventions  Outcome: Progressing     Problem: PAIN - ADULT  Goal: Verbalizes/displays adequate comfort level or patient's stated pain goal  Description: INTERVENTIONS:-  Encourage pt to monitor pain and request assistance- Assess pain using appropriate pain scale- Administer analgesics based on type and severity of pain and evaluate response- Implement non-pharmacological measures as appropriate and evaluate response- Consider cultural and social influences on pain and pain management- Manage/alleviate anxiety- Utilize distraction and/or relaxation techniques- Monitor for opioid side effects- Notify MD/LIP if interventions unsuccessful or patient reports new pain- Anticipate increased pain with activity and pre-medicate as appropriate  Outcome: Progressing     Problem: RISK FOR INFECTION - ADULT  Goal: Absence of fever/infection during anticipated neutropenic period  Description: INTERVENTIONS- Monitor WBC- Administer growth factors as ordered- Implement neutropenic guidelines  Outcome: Progressing     Problem: SAFETY ADULT - FALL  Goal: Free from fall injury  Description: INTERVENTIONS:- Assess pt frequently for physical needs- Identify cognitive and physical deficits and behaviors that affect risk of falls.- [place] fall precautions as indicated by assessment.- Educate pt/family on patient safety including physical limitations- Instruct pt to call for assistance with activity based on assessment- Modify environment to reduce risk of injury- Provide assistive devices as appropriate- Consider OT/PT consult to assist with strengthening/mobility- Encourage toileting schedule  Outcome: Progressing     Problem: DISCHARGE PLANNING  Goal: Discharge to home or other facility with appropriate resources  Description: INTERVENTIONS:- Identify barriers to discharge w/pt and caregiver- Include patient/family/discharge partner in discharge planning- Arrange for needed discharge resources and transportation as appropriate- Identify discharge learning needs (meds, wound care, etc)- Arrange for interpreters to assist at discharge as needed- Consider post-discharge preferences of  patient/family/discharge partner- Complete POLST form as appropriate- Assess patient's ability to be responsible for managing their own health- Refer to Case Management Department for coordinating discharge planning if the patient needs post-hospital services based on physician/LIP order or complex needs related to functional status, cognitive ability or social support system  Outcome: Progressing     Problem: METABOLIC/FLUID AND ELECTROLYTES - ADULT  Goal: Electrolytes maintained within normal limits  Description: INTERVENTIONS:- Monitor labs and rhythm and assess patient for signs and symptoms of electrolyte imbalances- Administer electrolyte replacement as ordered- Monitor response to electrolyte replacements, including rhythm and repeat lab results as appropriate- Fluid restriction as ordered- Instruct patient on fluid and nutrition restrictions as appropriate  Outcome: Progressing     Problem: SKIN/TISSUE INTEGRITY - ADULT  Goal: Skin integrity remains intact  Description: INTERVENTIONS- Assess and document risk factors for pressure ulcer development- Assess and document skin integrity- Monitor for areas of redness and/or skin breakdown- Initiate interventions, skin care algorithm/standards of care as needed  Outcome: Not Progressing     Drowsy, easily arousable. Incontinent x2. Prompt incontinence care provided. Foams to sacrum and heels. Heels elevated off bed with pillows. Unable to assess urine output d/t watery bowel movements. Aspiration precautions. Limbs elevated. Heat applied to R arm. Caregiver at bedside.

## 2025-04-28 NOTE — PROGRESS NOTES
[facility]  part of [facility]    Progress Note    Christy Lowry Patient Status:  Inpatient    1924 MRN [id number]   Location [facility] 4W/SW/SE Attending Abdelrahman Jimenez,*   Hosp Day # 6 PCP Patrick Stafford MD     Chief Complaint: ok    Subjective:     Constitutional:  Positive for appetite change and fatigue.   HENT:  Positive for congestion.    Respiratory:  Positive for choking.    Cardiovascular:  Positive for leg swelling.   Gastrointestinal:  Negative for abdominal pain.   Genitourinary:  Negative for dysuria.   Neurological:  Positive for weakness.   Psychiatric/Behavioral:  Negative for decreased concentration.        Objective:   Blood pressure 109/38, pulse 83, temperature 98.6 °F (37 °C), temperature source Axillary, resp. rate 18, weight 126 lb 6.4 oz (57.3 kg), SpO2 93%.  Physical Exam  Vitals and nursing note reviewed.   Constitutional:       General: She is not in acute distress.     Appearance: She is ill-appearing. She is not toxic-appearing or diaphoretic.   HENT:      Head:      Comments: Temp wasting  Cardiovascular:      Rate and Rhythm: Normal rate.      Pulses: Normal pulses.   Pulmonary:      Breath sounds: Rhonchi present.   Abdominal:      General: Bowel sounds are normal.      Palpations: Abdomen is soft.   Skin:     General: Skin is warm and dry.      Capillary Refill: Capillary refill takes less than 2 seconds.   Neurological:      General: No focal deficit present.      Mental Status: She is alert and oriented to person, place, and time.   Psychiatric:         Behavior: Behavior normal.         Judgment: Judgment normal.         Results:   Lab Results   Component Value Date    WBC 13.2 (H) 2025    HGB 10.2 (L) 2025    HCT 30.7 (L) 2025    .0 2025    CREATSERUM 0.42 (L) 2025    BUN 9 2025     2025    K 3.1 (L) 2025     2025    CO2 24.0 2025    GLU 81 2025     CA 7.5 (L) 04/28/2025    ALB 2.0 (L) 04/24/2025    ALKPHO 89 04/24/2025    BILT 0.2 04/24/2025    TP 4.7 (L) 04/24/2025    AST 12 04/24/2025    ALT <7 (L) 04/24/2025    T4F 1.1 04/23/2025    TSH 1.307 04/23/2025    LIP 22 01/14/2025    CRP <0.40 11/19/2024    MG 1.7 04/28/2025    PHOS 1.7 (L) 04/28/2025    TROPHS 11 11/15/2024    B12 604 04/24/2025       US VENOUS DOPPLER ARM RIGHT - DIAG IMG (CPT=93971)  Result Date: 4/27/2025  CONCLUSION:  1. No right upper extremity DVT. 2. Superficial venous thrombosis involving a segment of right basilic vein.    Dictated by (CST): Yfn Workman MD on 4/27/2025 at 0:54 AM     Finalized by (CST): Yfn Workman MD on 4/27/2025 at 0:57 AM                  Assessment & Plan:     1. Urinary tract infection resist ecoli changed to rocephin  2. Right lower extremity cellulitis really seems more edema small old sub popliteal dvts  and echo pend  Family refuses scd's on heparin  3. Liquid diarrhea post antibiotic exposure. Rule out Clostridium difficile infection. Studies neg; better  4. Full code dw son 4/23; now dnar/select after pall discussion 4/28  5. Passing out per caregiver will place on tele; echo ok  6. Poor uo no obs; incontinent; renal function stable  7. Neutrophilia worse checked cxr; atelectasis  8. Tachycardia from inf EKG and echo ok  9. Hypoalbuminemia with swelling from severe protein suellen maln; started to eat; doing well  10. Right arm swelling ro dvt right; no dvt superficial clot  Patient will require inpatient services that will reasonably be expected to span two midnight's based on the clinical documentation in H+P.   Based on patients current state of illness, I anticipate that, after discharge, patient will require TBD.  Complex mdm; coordinating care with nurse and counseling pt and with her nodded permission her son ion phone about care/diet    BOYD SERVIN MD

## 2025-04-28 NOTE — PROGRESS NOTES
[facility] received palliative referral for community PC services. Waiting to obtain insurance (verification/authorization) prior to pursuing.     11:18a Insurance accepted.    Izabel Washington  [facility]iaison   [phone]

## 2025-04-28 NOTE — CM/SW NOTE
PC APN order-Please set up a community palliative care program to follow on dc home.     Ref sent, [facility] liaison Kenney notified    / to remain available for support and/or discharge planning.     Haritha Marsh RN    Ext [phone]

## 2025-04-28 NOTE — SLP NOTE
SPEECH DAILY NOTE - INPATIENT    ASSESSMENT & PLAN   ASSESSMENT  PPE REQUIRED. THIS THERAPIST WORE GLOVES FOR DURATION OF SESSION. HANDS WASHED UPON ENTRANCE/EXIT.    SLP f/u for ongoing dysphagia tx/meal assessment per recommendations of minced & moist/mildly thick liquids per swallow f/u. RN reports pt tolerates diet and medication well with no overt clinical s/s aspiration. Pt denies any swallowing challenges.     Pt positioned upright in bed, fatigued/cooperative. Pt afebrile, tolerating room air with oxygen status 93% prior to the start of oral trials. SLP reviewed aspiration precautions and safe swallowing compensatory strategies with the patient. Safe swallow guidelines remain written on the white board in purple. Patient v/u, no family present. Provided 1:1 assistance, pt tolerates soft solids and mildly thick liquids via tsp with no overt clinical signs/symptoms of aspiration. Oxygen status remained stable t/o the entire session. Recommend remain on current diet with adherence to aspiration precautions and swallow strategies. No further swallow services warranted at this time. Please re consult if needed. RN alerted with results and recommendations.     MOST RECENT CXR 4/25   CONCLUSION:   1. Small left basilar pleural effusion with adjacent atelectasis or less likely pneumonia.   2. Small right basilar pleural effusion.    3. 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       Diet Recommendations - Solids: Mechanical soft ground/ Minced & Moist  Diet Recommendations - Liquids: Nectar thick liquids/ Mildly thick     Aspiration Precautions: Upright position, Slow rate, Small bites, Small sips, No straw, 1:1 feeding  Medication Administration Recommendations: Crushed in puree    Patient Experiencing Pain: No              Treatment Plan  Treatment Plan/Recommendations: No further inpatient SLP service warranted, Aspiration precautions    Interdisciplinary Communication: Plan posted at bedside           GOALS  Goal #1 The patient will tolerate minced and moist consistency and mildly liquids without overt signs or symptoms of aspiration with 90 % accuracy over 1 session(s).  Met   Goal #2 The patient/family/caregiver will demonstrate understanding and implementation of aspiration precautions and swallow strategies independently over 2-3 session(s).    Met    Goal #3 The patient will tolerate trial upgrade of solid consistency and thin liquids without overt signs or symptoms of aspiration with 90 % accuracy over 2-3 session(s).  Discontinue   Goal #4 The patient will utilize compensatory strategies as outlined by  BSSE (clinical evaluation) including Slow rate, Small bites, Small sips, Alternate liquids/solids, No straws, Upright 90 degrees, Upright 90 degrees 30 mins after meal, Eliminate distractions with PRN assistance 100 % of the time across 2 sessions.    Met     FOLLOW UP  Follow Up Needed (Documentation Required): No  SLP Follow-up Date: 04/28/25  Duration: 1 week    Session: 2    If you have any questions, please contact MICHELL Blanco M.S. CCC-SLP  Speech Language Pathologist  Phone Number [phone]. [phone]

## 2025-04-28 NOTE — DIETARY NOTE
NUTRITION EDUCATION NOTE     Received consult for \"family would like education on high protein options for patient\" Verbally reviewed high protein/high calorie appropriate foods on mildly thick liquids and minced and moist diet. Provided with high protein and high calorie handout to reinforce. Receptive to instruction.    JOHNSON Vega  Clinical Dietitian  P: [phone]

## 2025-04-29 LAB
ANION GAP SERPL CALC-SCNC: 10 MMOL/L (ref 0–18)
BASOPHILS # BLD AUTO: 0.02 X10(3) UL (ref 0–0.2)
BASOPHILS NFR BLD AUTO: 0.2 %
BUN BLD-MCNC: 7 MG/DL (ref 9–23)
BUN/CREAT SERPL: 17.5 (ref 10–20)
C DIFF TOX B STL QL: NEGATIVE
CALCIUM BLD-MCNC: 7.8 MG/DL (ref 8.7–10.4)
CHLORIDE SERPL-SCNC: 107 MMOL/L (ref 98–112)
CO2 SERPL-SCNC: 25 MMOL/L (ref 21–32)
CREAT BLD-MCNC: 0.4 MG/DL (ref 0.55–1.02)
DEPRECATED RDW RBC AUTO: 56.9 FL (ref 35.1–46.3)
EGFRCR SERPLBLD CKD-EPI 2021: 88 ML/MIN/1.73M2 (ref 60–?)
EOSINOPHIL # BLD AUTO: 0.07 X10(3) UL (ref 0–0.7)
EOSINOPHIL NFR BLD AUTO: 0.6 %
ERYTHROCYTE [DISTWIDTH] IN BLOOD BY AUTOMATED COUNT: 18 % (ref 11–15)
GLUCOSE BLD-MCNC: 87 MG/DL (ref 70–99)
HCT VFR BLD AUTO: 25.2 % (ref 35–48)
HGB BLD-MCNC: 8.2 G/DL (ref 12–16)
IMM GRANULOCYTES # BLD AUTO: 0.06 X10(3) UL (ref 0–1)
IMM GRANULOCYTES NFR BLD: 0.5 %
LYMPHOCYTES # BLD AUTO: 2.72 X10(3) UL (ref 1–4)
LYMPHOCYTES NFR BLD AUTO: 24.9 %
MAGNESIUM SERPL-MCNC: 2.1 MG/DL (ref 1.6–2.6)
MCH RBC QN AUTO: 28.7 PG (ref 26–34)
MCHC RBC AUTO-ENTMCNC: 32.5 G/DL (ref 31–37)
MCV RBC AUTO: 88.1 FL (ref 80–100)
MONOCYTES # BLD AUTO: 0.71 X10(3) UL (ref 0.1–1)
MONOCYTES NFR BLD AUTO: 6.5 %
NEUTROPHILS # BLD AUTO: 7.34 X10 (3) UL (ref 1.5–7.7)
NEUTROPHILS # BLD AUTO: 7.34 X10(3) UL (ref 1.5–7.7)
NEUTROPHILS NFR BLD AUTO: 67.3 %
OSMOLALITY SERPL CALC.SUM OF ELEC: 291 MOSM/KG (ref 275–295)
PHOSPHATE SERPL-MCNC: 1.5 MG/DL (ref 2.4–5.1)
PLATELET # BLD AUTO: 247 10(3)UL (ref 150–450)
POTASSIUM SERPL-SCNC: 2.8 MMOL/L (ref 3.5–5.1)
POTASSIUM SERPL-SCNC: 2.8 MMOL/L (ref 3.5–5.1)
POTASSIUM SERPL-SCNC: 3.3 MMOL/L (ref 3.5–5.1)
RBC # BLD AUTO: 2.86 X10(6)UL (ref 3.8–5.3)
SODIUM SERPL-SCNC: 142 MMOL/L (ref 136–145)
WBC # BLD AUTO: 10.9 X10(3) UL (ref 4–11)

## 2025-04-29 PROCEDURE — 99233 SBSQ HOSP IP/OBS HIGH 50: CPT | Performed by: HOSPITALIST

## 2025-04-29 PROCEDURE — 99233 SBSQ HOSP IP/OBS HIGH 50: CPT | Performed by: REGISTERED NURSE

## 2025-04-29 PROCEDURE — 99497 ADVNCD CARE PLAN 30 MIN: CPT | Performed by: REGISTERED NURSE

## 2025-04-29 RX ORDER — POTASSIUM CHLORIDE 1500 MG/1
40 TABLET, EXTENDED RELEASE ORAL EVERY 4 HOURS
Status: COMPLETED | OUTPATIENT
Start: 2025-04-29 | End: 2025-04-29

## 2025-04-29 RX ORDER — DICYCLOMINE HYDROCHLORIDE 10 MG/1
10 CAPSULE ORAL 3 TIMES DAILY PRN
Status: DISCONTINUED | OUTPATIENT
Start: 2025-04-29 | End: 2025-05-01

## 2025-04-29 NOTE — PHYSICAL THERAPY NOTE
PHYSICAL THERAPY EVALUATION - INPATIENT     Room Number: 468/468-A  Evaluation Date: 4/29/2025  Type of Evaluation: Initial   Physician Order: PT Eval and Treat    Presenting Problem: acute cystitis without hematuria     Reason for Therapy: Mobility Dysfunction and Discharge Planning    PHYSICAL THERAPY ASSESSMENT   Patient is a 89 year old female admitted 4/22/2025 for acute cystitis without hematuria.  Prior to admission, patient's baseline is from home with assist, pt was working on SPT transfers and standing with home health PT per dtr at bedside.  Patient is currently functioning below baseline with bed mobility, transfers, and gait, however pt with hospice consult and making statements consistent with goals of hospice care.     PLAN  Patient has been evaluated and presents with no skilled Physical Therapy needs at this time.  Patient will be discharged from Physical Therapy services. Please re-order if a new functional limitation presents during this admission.         PHYSICAL THERAPY MEDICAL/SOCIAL HISTORY   History related to current admission: UTI, weakness     Problem List  Principal Problem:    Acute cystitis without hematuria  Active Problems:    Weakness generalized    UTI (urinary tract infection)    Goals of care, counseling/discussion    Advance care planning    Palliative care by specialist    Difficult intravenous access      HOME SITUATION  Type of Home: House                                          SUBJECTIVE  \"That tube hurts.\"     PHYSICAL THERAPY EXAMINATION   OBJECTIVE  Precautions: Bed/chair alarm  Fall Risk: High fall risk    WEIGHT BEARING RESTRICTION     No restrictions        PAIN ASSESSMENT     Location: discomfort at rectal tube       COGNITION  Oriented to self and place, cone    BALANCE  Static Sitting: Not tested  Dynamic Sitting: Not tested  Static Standing: Not tested  Dynamic Standing: Not tested    AM-PAC '6-Clicks' INPATIENT SHORT FORM - BASIC MOBILITY  How much difficulty  does the patient currently have...  Patient Difficulty: Turning over in bed (including adjusting bedclothes, sheets and blankets)?: Unable   Patient Difficulty: Sitting down on and standing up from a chair with arms (e.g., wheelchair, bedside commode, etc.): Unable   Patient Difficulty: Moving from lying on back to sitting on the side of the bed?: Unable   How much help from another person does the patient currently need...   Help from Another: Moving to and from a bed to a chair (including a wheelchair)?: Total   Help from Another: Need to walk in hospital room?: Total   Help from Another: Climbing 3-5 steps with a railing?: Total     AM-PAC Score:  Raw Score: 6   Approx Degree of Impairment: 100%   Standardized Score (AM-PAC Scale): 23.55   CMS Modifier (G-Code): CN    FUNCTIONAL ABILITY STATUS  Functional Mobility/Gait Assessment  Gait Assistance: Not tested  Rolling: dependent, R and L for repositioning  AAROM hip and knee flexion for positioning and UE AAROM shoulder flexion for positioning     The patient's Approx Degree of Impairment: 100% has been calculated based on documentation in the [facility] '6 clicks' Inpatient Basic Mobility Short Form.  Research supports that patients with this level of impairment may benefit from LTAC.  Final disposition will be made by interdisciplinary medical team.    Patient End of Session: In bed, Needs met, Call light within reach, RN aware of session/findings, All patient questions and concerns addressed, Family present    Patient was able to achieve the following ...   Patient able to transfer   Unable and not clinically appropriate     Patient able to ambulate on level surfaces   Unable and not clinically appropriate      Patient Evaluation Complexity Level:  History Moderate - 1 or 2 personal factors and/or co-morbidities   Examination of body systems Moderate - addressing a total of 3 or more elements   Clinical Presentation  Moderate - Evolving   Clinical Decision Making   Moderate Complexity     Therapeutic Activity:  9 minutes

## 2025-04-29 NOTE — CM/SW NOTE
PC APN order-Family agreeable to meeting with [facility] for informational session-Please call son Tristen (POA) #[phone] first, if he doesn't answer please call dtr Christy Betancourt [phone] to set up a time to meet with family. This would be for home hospice with their 24 hr CG support.     CM notified [facility] of above.    Family expressed need for medical bed at home, deferred to RH pending family agrees to hospice care.    Plan  Hospice Pending decision    / to remain available for support and/or discharge planning.     Haritha Marsh, RN    Ext [phone]

## 2025-04-29 NOTE — PLAN OF CARE
New IV access in R forearm. Albumin started. Multiple incontinent loose bowel movements. K and mag replaced per protocol. POLST form signed by family and in chart-MD notified. Mepilex and barrier cream over sacrum and cream over ronnie area. Home caregiver at bedside.    Problem: Patient Centered Care  Goal: Patient preferences are identified and integrated in the patient's plan of care  Description: Interventions:- What would you like us to know as we care for you? Patient lives at home with a caregiver.  Her children are also involved in her care.      - Provide timely, complete, and accurate information to patient/family  - Incorporate patient and family knowledge, values, beliefs, and cultural backgrounds into the planning and delivery of care  - Encourage patient/family to participate in care and decision-making at the level they choose  - Honor patient and family perspectives and choices  Outcome: Progressing

## 2025-04-29 NOTE — HOSPICE RN NOTE
[facility] new referral received. CINDY sent. Hospice will reach out to Tristen/ROMEONICOLA to set up a time to meet.    1215-spoke to Tristen/ALISSA via phone. He stated that it may be difficult to coordinate a time for all of his siblings to gather for a meeting. He requested that the Hospice Care Companion booklet be left at the bedside and a callback number be provided. We will follow up tomorrow.    Jocelynn FELIZ, RN CHPN  Transitional Nurse Liaison  [facility]  [phone] (After-hours)

## 2025-04-29 NOTE — SPIRITUAL CARE NOTE
Spiritual Care Visit Note    Patient Name: Christy Lowry Date of Spiritual Care Visit: 25   : 1924 Primary Dx: Acute cystitis without hematuria       Referred By:      Spiritual Care Taxonomy:    Intended Effects: Demonstrate caring and concern    Methods: Collaborate with care team member, Offer support    Interventions: Silent prayer    Visit Type/Summary:     - Spiritual Care: Consulted with RN prior to visit. Attempted visit. Patient is unavailable. Left a Spiritual Care contact information card with RN for family. No family at bedside.  remains available as needed for follow up.     Bharat Cuevas [facility]   [id number]     Spiritual Care support can be requested via an [employer] consult. For urgent/immediate needs, please contact the On Call  at: [place]: ext [phone]

## 2025-04-29 NOTE — PLAN OF CARE
Christy is alert and oriented x 2. She is voiding via the purewick. Patient had 6 bowel movements overnight. Patient very excoriated and red. Flexiseal in place now. Patient tolerating very little of meals. Patient rolling side to side. Family at bedside. Call light within reach.   Problem: Patient Centered Care  Goal: Patient preferences are identified and integrated in the patient's plan of care  Description: Interventions:- What would you like us to know as we care for you? Patient lives at home with a caregiver.  Her children are also involved in her care.      - Provide timely, complete, and accurate information to patient/family  - Incorporate patient and family knowledge, values, beliefs, and cultural backgrounds into the planning and delivery of care  - Encourage patient/family to participate in care and decision-making at the level they choose  - Honor patient and family perspectives and choices  Outcome: Progressing     Problem: SKIN/TISSUE INTEGRITY - ADULT  Goal: Skin integrity remains intact  Description: INTERVENTIONS- Assess and document risk factors for pressure ulcer development- Assess and document skin integrity- Monitor for areas of redness and/or skin breakdown- Initiate interventions, skin care algorithm/standards of care as needed  Outcome: Progressing  Goal: Incision(s), wounds(s) or drain site(s) healing without S/S of infection  Description: INTERVENTIONS:- Assess and document risk factors for pressure ulcer development- Assess and document skin integrity- Assess and document dressing/incision, wound bed, drain sites and surrounding tissue- Implement wound care per orders- Initiate isolation precautions as appropriate- Initiate Pressure Ulcer prevention bundle as indicated  Outcome: Progressing     Problem: Patient/Family Goals  Goal: Patient/Family Long Term Goal  Description: Patient's Long Term Goal: Discharge home    Interventions:  - Resolve urinary tract infection  - Tolerate adequate  oral intake  - regain fluid/electrolyte balance  - See additional Care Plan goals for specific interventions  Outcome: Progressing  Goal: Patient/Family Short Term Goal  Description: Patient's Short Term Goal: Resolve urinary tract infection    Interventions:   - Maintain appropriate antibiotic therapy  - Promote positive perineal hygiene  - Trend WBC count and labs daily  - See additional Care Plan goals for specific interventions  Outcome: Progressing     Problem: PAIN - ADULT  Goal: Verbalizes/displays adequate comfort level or patient's stated pain goal  Description: INTERVENTIONS:- Encourage pt to monitor pain and request assistance- Assess pain using appropriate pain scale- Administer analgesics based on type and severity of pain and evaluate response- Implement non-pharmacological measures as appropriate and evaluate response- Consider cultural and social influences on pain and pain management- Manage/alleviate anxiety- Utilize distraction and/or relaxation techniques- Monitor for opioid side effects- Notify MD/LIP if interventions unsuccessful or patient reports new pain- Anticipate increased pain with activity and pre-medicate as appropriate  Outcome: Progressing     Problem: RISK FOR INFECTION - ADULT  Goal: Absence of fever/infection during anticipated neutropenic period  Description: INTERVENTIONS- Monitor WBC- Administer growth factors as ordered- Implement neutropenic guidelines  Outcome: Progressing     Problem: SAFETY ADULT - FALL  Goal: Free from fall injury  Description: INTERVENTIONS:- Assess pt frequently for physical needs- Identify cognitive and physical deficits and behaviors that affect risk of falls.- [place] fall precautions as indicated by assessment.- Educate pt/family on patient safety including physical limitations- Instruct pt to call for assistance with activity based on assessment- Modify environment to reduce risk of injury- Provide assistive devices as appropriate- Consider OT/PT  consult to assist with strengthening/mobility- Encourage toileting schedule  Outcome: Progressing     Problem: DISCHARGE PLANNING  Goal: Discharge to home or other facility with appropriate resources  Description: INTERVENTIONS:- Identify barriers to discharge w/pt and caregiver- Include patient/family/discharge partner in discharge planning- Arrange for needed discharge resources and transportation as appropriate- Identify discharge learning needs (meds, wound care, etc)- Arrange for interpreters to assist at discharge as needed- Consider post-discharge preferences of patient/family/discharge partner- Complete POLST form as appropriate- Assess patient's ability to be responsible for managing their own health- Refer to Case Management Department for coordinating discharge planning if the patient needs post-hospital services based on physician/LIP order or complex needs related to functional status, cognitive ability or social support system  Outcome: Progressing     Problem: METABOLIC/FLUID AND ELECTROLYTES - ADULT  Goal: Electrolytes maintained within normal limits  Description: INTERVENTIONS:- Monitor labs and rhythm and assess patient for signs and symptoms of electrolyte imbalances- Administer electrolyte replacement as ordered- Monitor response to electrolyte replacements, including rhythm and repeat lab results as appropriate- Fluid restriction as ordered- Instruct patient on fluid and nutrition restrictions as appropriate  Outcome: Progressing

## 2025-04-29 NOTE — PLAN OF CARE
Patient a+o x4, on RA. Rolling side to side. Incontinent of both. Multiple episodes of loose stool overnight, yellow color. Cream and aquacel foam applied to buttocks for redness. IVF running. Received IV albumin. Denies pain or nausea. Tolerating nectar thick diet. Son at bedside overnight. Call light in reach.     Problem: Patient Centered Care  Goal: Patient preferences are identified and integrated in the patient's plan of care  Description: Interventions:- What would you like us to know as we care for you? Patient lives at home with a caregiver.  Her children are also involved in her care.      - Provide timely, complete, and accurate information to patient/family  - Incorporate patient and family knowledge, values, beliefs, and cultural backgrounds into the planning and delivery of care  - Encourage patient/family to participate in care and decision-making at the level they choose  - Honor patient and family perspectives and choices  Outcome: Progressing     Problem: SKIN/TISSUE INTEGRITY - ADULT  Goal: Skin integrity remains intact  Description: INTERVENTIONS- Assess and document risk factors for pressure ulcer development- Assess and document skin integrity- Monitor for areas of redness and/or skin breakdown- Initiate interventions, skin care algorithm/standards of care as needed  Outcome: Progressing  Goal: Incision(s), wounds(s) or drain site(s) healing without S/S of infection  Description: INTERVENTIONS:- Assess and document risk factors for pressure ulcer development- Assess and document skin integrity- Assess and document dressing/incision, wound bed, drain sites and surrounding tissue- Implement wound care per orders- Initiate isolation precautions as appropriate- Initiate Pressure Ulcer prevention bundle as indicated  Outcome: Progressing     Problem: Patient/Family Goals  Goal: Patient/Family Long Term Goal  Description: Patient's Long Term Goal: Discharge home    Interventions:  - Resolve urinary  tract infection  - Tolerate adequate oral intake  - regain fluid/electrolyte balance  - See additional Care Plan goals for specific interventions  Outcome: Progressing  Goal: Patient/Family Short Term Goal  Description: Patient's Short Term Goal: Resolve urinary tract infection    Interventions:   - Maintain appropriate antibiotic therapy  - Promote positive perineal hygiene  - Trend WBC count and labs daily  - See additional Care Plan goals for specific interventions  Outcome: Progressing     Problem: PAIN - ADULT  Goal: Verbalizes/displays adequate comfort level or patient's stated pain goal  Description: INTERVENTIONS:- Encourage pt to monitor pain and request assistance- Assess pain using appropriate pain scale- Administer analgesics based on type and severity of pain and evaluate response- Implement non-pharmacological measures as appropriate and evaluate response- Consider cultural and social influences on pain and pain management- Manage/alleviate anxiety- Utilize distraction and/or relaxation techniques- Monitor for opioid side effects- Notify MD/LIP if interventions unsuccessful or patient reports new pain- Anticipate increased pain with activity and pre-medicate as appropriate  Outcome: Progressing     Problem: RISK FOR INFECTION - ADULT  Goal: Absence of fever/infection during anticipated neutropenic period  Description: INTERVENTIONS- Monitor WBC- Administer growth factors as ordered- Implement neutropenic guidelines  Outcome: Progressing     Problem: SAFETY ADULT - FALL  Goal: Free from fall injury  Description: INTERVENTIONS:- Assess pt frequently for physical needs- Identify cognitive and physical deficits and behaviors that affect risk of falls.- [place] fall precautions as indicated by assessment.- Educate pt/family on patient safety including physical limitations- Instruct pt to call for assistance with activity based on assessment- Modify environment to reduce risk of injury- Provide assistive  devices as appropriate- Consider OT/PT consult to assist with strengthening/mobility- Encourage toileting schedule  Outcome: Progressing     Problem: DISCHARGE PLANNING  Goal: Discharge to home or other facility with appropriate resources  Description: INTERVENTIONS:- Identify barriers to discharge w/pt and caregiver- Include patient/family/discharge partner in discharge planning- Arrange for needed discharge resources and transportation as appropriate- Identify discharge learning needs (meds, wound care, etc)- Arrange for interpreters to assist at discharge as needed- Consider post-discharge preferences of patient/family/discharge partner- Complete POLST form as appropriate- Assess patient's ability to be responsible for managing their own health- Refer to Case Management Department for coordinating discharge planning if the patient needs post-hospital services based on physician/LIP order or complex needs related to functional status, cognitive ability or social support system  Outcome: Progressing     Problem: METABOLIC/FLUID AND ELECTROLYTES - ADULT  Goal: Electrolytes maintained within normal limits  Description: INTERVENTIONS:- Monitor labs and rhythm and assess patient for signs and symptoms of electrolyte imbalances- Administer electrolyte replacement as ordered- Monitor response to electrolyte replacements, including rhythm and repeat lab results as appropriate- Fluid restriction as ordered- Instruct patient on fluid and nutrition restrictions as appropriate  Outcome: Progressing

## 2025-04-29 NOTE — PALLIATIVE CARE NOTE
[facility]  part of [facility]  Palliative Care Progress Note    Christy Lowry Patient Status:  Inpatient    1924 MRN [id number]   Location [facility] 4W/SW/SE Attending Abdelrahman Jimenez,*   Hosp Day # 7 PCP Patrick Stafford MD     The  Cures Act makes medical notes like these available to patients in the interest of transparency. Please be advised this is a medical document. Medical documents are intended to carry relevant information, facts as evident, and the clinical opinion of the practitioner. The medical note is intended as peer to peer communication and may appear blunt or direct. It is written in medical language and may contain abbreviations or verbiage that are unfamiliar.       Subjective     Reviewed symptom medications past 24 hrs: none    Patient was seen and examined in bed with her son Pancho at the bedside. Pt is A&OX2 and appears very weak. She reports ongoing fatigue and didn't sleep well overnight d/t multiple bouts of diarrhea ongoing. RN says they are going to place flexiseal. Son is asking about adding Bentyl prn as he says this has helped her in the past, family prefers to avoid imodium. Appetite remains poor, denies abdominal pain, n/v symptoms.      See summary of discussion below.     Review of Systems:  Pertinent items are noted in subjective    Allergies:  Allergies[1]    Medications:   Current Hospital Medications[2]    Objective     Vital Signs:  Blood pressure 118/60, pulse 87, temperature 98.2 °F (36.8 °C), temperature source Axillary, resp. rate 16, weight 126 lb 6.4 oz (57.3 kg), SpO2 94%.  Body mass index is 24.69 kg/m².  Present Level of pain: 0/10  Non-verbal signs of pain present: No    Physical Exam:  General: Alert and in no apparent distress. Weak, frail appearing  HEENT: No focal deficits.  Cardiac: Regular rate and rhythm, S1, S2 normal, no murmur, rub or gallop.  Lungs: Clear without wheezes, rales, rhonchi or  dullness.  Normal excursions and effort.  Abdomen: Soft, non-tender, normal bowel sounds X 4 quadrants, no rebound or guarding  Extremities: Without clubbing, cyanosis. Peripheral pulses are 2+. B ULE Edema present  Neurologic: Alert and oriented X2, forgetful, normal affect.  Skin: Warm and dry. Pale    Prior to admission Palliative performance scale PPSv2 (%): 40    Current PPS 30%    Hematology:  Lab Results   Component Value Date    WBC 10.9 04/29/2025    HGB 8.2 (L) 04/29/2025    HCT 25.2 (L) 04/29/2025    .0 04/29/2025       Coags:No results found for: \"PT\", \"INR\", \"PTT\"    Chemistry:  Lab Results   Component Value Date    CREATSERUM 0.40 (L) 04/29/2025    BUN 7 (L) 04/29/2025     04/29/2025    K 2.8 (LL) 04/29/2025    K 2.8 (LL) 04/29/2025     04/29/2025    CO2 25.0 04/29/2025    GLU 87 04/29/2025    CA 7.8 (L) 04/29/2025    ALB 2.0 (L) 04/24/2025    ALKPHO 89 04/24/2025    BILT 0.2 04/24/2025    TP 4.7 (L) 04/24/2025    AST 12 04/24/2025    ALT <7 (L) 04/24/2025    MG 2.1 04/29/2025    PHOS 1.5 (L) 04/29/2025       Imaging:  US VENOUS DOPPLER ARM RIGHT - DIAG IMG (CPT=93971)  Result Date: 4/27/2025  CONCLUSION:  1. No right upper extremity DVT. 2. Superficial venous thrombosis involving a segment of right basilic vein.    Dictated by (CST): Yfn Workman MD on 4/27/2025 at 0:54 AM     Finalized by (CST): Yfn Workman MD on 4/27/2025 at 0:57 AM            Follow up GOC Discussion      4/29/25-I met with Pancho outside of the room and we had conference call with son Ramin Ramon Mark, dtr TATE Urbano and Uma all present. Discussed pt's clinical condition and we talked about pt's recent decline. I reiterated the benefits of palliative care with family. Provided education the differences between community palliative care vs hospice care in the home setting. I provided overview of Medicare hospice benefit and philosophy. I discussed hospice offers more robust care team in home  setting vs [facility].  I discussed pt will be at risk for recurrent hospitalizations and encouraged them to consider getting hospice informational session. Pt has a home MD/PT/OT currently following along with 24 hr CG support and they are asking about getting a hospital bed at home. I told them will have  help with DME and discussed hospice covers DME. Family wants to talk amongst themselves and will let me know if they are in agreement for hospice informational session.     I also confirmed wishes for DNAR/DNI and will continue with supportive care while hospitalized. They will consider comfort care. Son Tristen had completed POLST form with DNAR/DNI-comfort, trial of temporary feeding tubes which I signed. I discussed feeding tubes are not recommended in her clinical picture with advanced age. I encouraged family to keep her comfort/QOL a priority when making medical decisions. I gave original POLST form back to family and sent copy to registration for scan into EPIC. Provided emotional support to pt's family and they have my contact info.    Discussed with Patient and Family: Yes  Patient's preference about sharing medical information: speak to pt's family  Patient's decision making preferences: speak to pt's son Tristen-HCPOA  Code status: DNAR/DNI-selective  Have advanced directives been discussed with patient or healthcare power of : Yes        Healthcare Agent Appointed: Yes  Healthcare Agent's Name: Tristen Lowry  Healthcare Agent's Phone Number: [phone]               Palliative disposition: Ongoing discussions    Procedures:  No intubation      Palliative Care Assessment and Recommendations     Problem List:     UTI  RLE cellulitis  Diarrhea  Hypokalemia  Dysphagia  Moderate Malnutrition  GERD  Hypothyroidism  Weakness    Diarrhea  -MD rechecking c-diff/flexiseal, ID consulted  -Add bentyl 10 mg po TID prn, family prefers to hold on imodium prn for now     Goals of care counseling  -see above for  details  -Confirmed wishes for DNAR/DNI-selective and continue supportive care. Family may consider comfort care route.  -Residential hospice to provide info session for possible home hospice care.    -Discussed dysphagia issues/poor po intake with pt's family-discussed pt would be a very poor candidate for feeding tube given advanced age/debility. SLP and dietitian following.   -Ongoing GOC discussions will be needed through clinical course and over time.  -Agreeable to palliative care following  -Dispo:  Return home with 24 hr CG support with possible hospice vs community palliative care program to follow pending meeting outcome. Family asking for hospital bed. SW to help with dc planning.   -Provided emotional support to pt/family who are coping adequately.     Advance care planning  -see above for details  -Pt's son Tristen Lowry is primary HCPOA #[phone].  -Previously completed living will/HCPOA paperwork on file in EPIC.   -Completed POLST form, copy sent to registration.    Advance Care Planning   A voluntarily discussion and explanation regarding Advance Care Planning (ACP) took place today with pt's children as above. We discussed the risks vs benefits of life sustaining treatments in the setting of Christy Lowry's comorbid medical conditions. Items addressed: patient preferences , code status , and POLST (by section in detail). This resulted in a better understanding of pt's expressed wishes/preferences , confirmation of code status , and completion of POLST.   Summary:     Total time dedicated to ACP >16 minutes.       Palliative Performance Scale 30%     Emotion support provided to patient/family today: Yes    A total of 55 mins were spent on this consult, which included all of the following: direct face to face contact, history taking, physical examination, and >50% was spent counseling and coordinating care.    Discussed today's visit with Dr. Jimenez, Renato Londono RN, [facility]  RN.    I will continue to follow clinically.      Shannan Hennessy, ANP-BC, [facility] [id number]  4/29/2025  10:49 AM  Palliative Care Services          [1] No Known Allergies  [2]   Current Facility-Administered Medications:     potassium chloride (Klor-Con M20) tab 40 mEq, 40 mEq, Oral, Q4H    dicyclomine (Bentyl) cap 10 mg, 10 mg, Oral, TID PRN    hydrOXYzine (Atarax) tab 5 mg, 5 mg, Oral, Nightly PRN    sodium phosphate 15 mmol in 0.9% NaCl 100mL IVPB premix, 15 mmol, Intravenous, Once    cefTRIAXone (Rocephin) 1 g in sodium chloride 0.9% 100 mL IVPB-ADDV, 1 g, Intravenous, Q24H    heparin (Porcine) 5000 UNIT/ML injection 5,000 Units, 5,000 Units, Subcutaneous, 2 times per day    acetaminophen (Tylenol Extra Strength) tab 500 mg, 500 mg, Oral, Q4H PRN    ondansetron (Zofran) 4 MG/2ML injection 4 mg, 4 mg, Intravenous, Q6H PRN    metoclopramide (Reglan) 5 mg/mL injection 5 mg, 5 mg, Intravenous, Q8H PRN    budesonide (Entocort EC) DR cap 3 mg, 3 mg, Oral, QAM    levothyroxine (Synthroid) tab 75 mcg, 75 mcg, Oral, Daily    pantoprazole (Protonix) DR tab 40 mg, 40 mg, Oral, QAM AC    vancomycin (Vancocin) cap 125 mg, 125 mg, Oral, Daily

## 2025-04-29 NOTE — PROGRESS NOTES
[facility]  part of [facility]    Progress Note    Christy Lowry Patient Status:  Inpatient    1924 MRN [id number]   Location [facility] 4W/SW/SE Attending Abdelrahman Jimenez,*   Hosp Day # 7 PCP Patrick Stafford MD     Chief Complaint: ok; does not like rectal tube    Subjective:     Constitutional:  Positive for appetite change and fatigue.   HENT:  Positive for congestion.    Respiratory:  Positive for choking.    Cardiovascular:  Positive for leg swelling.   Gastrointestinal:  Negative for abdominal pain.   Genitourinary:  Negative for dysuria.   Neurological:  Positive for weakness.   Psychiatric/Behavioral:  Negative for decreased concentration.        Objective:   Blood pressure 132/63, pulse 85, temperature 97.6 °F (36.4 °C), temperature source Temporal, resp. rate 18, weight 126 lb 6.4 oz (57.3 kg), SpO2 94%.  Physical Exam  Vitals and nursing note reviewed.   Constitutional:       General: She is not in acute distress.     Appearance: She is ill-appearing. She is not toxic-appearing or diaphoretic.   HENT:      Head:      Comments: Temp wasting  Cardiovascular:      Rate and Rhythm: Normal rate.      Pulses: Normal pulses.   Pulmonary:      Breath sounds: Rhonchi present.   Abdominal:      General: Bowel sounds are normal.      Palpations: Abdomen is soft.   Skin:     General: Skin is warm and dry.      Capillary Refill: Capillary refill takes less than 2 seconds.   Neurological:      General: No focal deficit present.      Mental Status: She is alert and oriented to person, place, and time.   Psychiatric:         Behavior: Behavior normal.         Judgment: Judgment normal.         Results:   Lab Results   Component Value Date    WBC 10.9 2025    HGB 8.2 (L) 2025    HCT 25.2 (L) 2025    .0 2025    CREATSERUM 0.40 (L) 2025    BUN 7 (L) 2025     2025    K 2.8 (LL) 2025    K 2.8 (LL) 2025      04/29/2025    CO2 25.0 04/29/2025    GLU 87 04/29/2025    CA 7.8 (L) 04/29/2025    ALB 2.0 (L) 04/24/2025    ALKPHO 89 04/24/2025    BILT 0.2 04/24/2025    TP 4.7 (L) 04/24/2025    AST 12 04/24/2025    ALT <7 (L) 04/24/2025    T4F 1.1 04/23/2025    TSH 1.307 04/23/2025    LIP 22 01/14/2025    CRP <0.40 11/19/2024    MG 2.1 04/29/2025    PHOS 1.5 (L) 04/29/2025    TROPHS 11 11/15/2024    B12 604 04/24/2025       No results found.            Assessment & Plan:     1. Urinary tract infection resist ecoli changed to rocephin  2. Right lower extremity cellulitis really seems more edema small old sub popliteal dvts  and echo pend  Family refuses scd's on heparin  3. Liquid diarrhea post antibiotic exposure. Rule out Clostridium difficile infection. Studies neg 4/22 and 4/29 add bentyl; long hist of diarrhea at noc; would not do aggressive saxena  4. Full code dw son 4/23; now dnar/select after pall discussion 4/28  5. Passing out per caregiver will place on tele; echo ok  6. Poor uo no obs; incontinent; renal function stable  7. Neutrophilia worse checked cxr; atelectasis  8. Tachycardia from inf EKG and echo ok  9. Hypoalbuminemia with swelling from severe protein suellen maln; started to eat; doing well  10. Right arm swelling ro dvt right; no dvt superficial clot; elevate  11. Hypokalemia  12. hypophos  Patient will require inpatient services that will reasonably be expected to span two midnight's based on the clinical documentation in H+P.   Based on patients current state of illness, I anticipate that, after discharge, patient will require TBD.  Complex mdm; coordinating care with nurse and counseling pt and with her  permission her daughter in room about care/diet/diarrhea    BOYD SERVIN MD

## 2025-04-29 NOTE — OCCUPATIONAL THERAPY NOTE
OT orders generated from a functional mobility screen, per PT, pt requires assist for ADLs and mobility at bed level; pending hospice consult; will d/c OT orders as pt appears to be at a long-term level of care; If any new functional needs arise, please re-order OT services.

## 2025-04-30 LAB
ANION GAP SERPL CALC-SCNC: 10 MMOL/L (ref 0–18)
BASOPHILS # BLD AUTO: 0.03 X10(3) UL (ref 0–0.2)
BASOPHILS NFR BLD AUTO: 0.2 %
BUN BLD-MCNC: <5 MG/DL (ref 9–23)
CALCIUM BLD-MCNC: 8 MG/DL (ref 8.7–10.4)
CHLORIDE SERPL-SCNC: 112 MMOL/L (ref 98–112)
CO2 SERPL-SCNC: 23 MMOL/L (ref 21–32)
CREAT BLD-MCNC: 0.39 MG/DL (ref 0.55–1.02)
DEPRECATED RDW RBC AUTO: 55.8 FL (ref 35.1–46.3)
EGFRCR SERPLBLD CKD-EPI 2021: 88 ML/MIN/1.73M2 (ref 60–?)
EOSINOPHIL # BLD AUTO: 0.04 X10(3) UL (ref 0–0.7)
EOSINOPHIL NFR BLD AUTO: 0.3 %
ERYTHROCYTE [DISTWIDTH] IN BLOOD BY AUTOMATED COUNT: 18.6 % (ref 11–15)
GLUCOSE BLD-MCNC: 82 MG/DL (ref 70–99)
HCT VFR BLD AUTO: 29.4 % (ref 35–48)
HGB BLD-MCNC: 9.7 G/DL (ref 12–16)
IMM GRANULOCYTES # BLD AUTO: 0.1 X10(3) UL (ref 0–1)
IMM GRANULOCYTES NFR BLD: 0.7 %
LYMPHOCYTES # BLD AUTO: 2.93 X10(3) UL (ref 1–4)
LYMPHOCYTES NFR BLD AUTO: 20.6 %
MAGNESIUM SERPL-MCNC: 2 MG/DL (ref 1.6–2.6)
MCH RBC QN AUTO: 28 PG (ref 26–34)
MCHC RBC AUTO-ENTMCNC: 33 G/DL (ref 31–37)
MCV RBC AUTO: 85 FL (ref 80–100)
MONOCYTES # BLD AUTO: 0.87 X10(3) UL (ref 0.1–1)
MONOCYTES NFR BLD AUTO: 6.1 %
NEUTROPHILS # BLD AUTO: 10.22 X10 (3) UL (ref 1.5–7.7)
NEUTROPHILS # BLD AUTO: 10.22 X10(3) UL (ref 1.5–7.7)
NEUTROPHILS NFR BLD AUTO: 72.1 %
PHOSPHATE SERPL-MCNC: 2.7 MG/DL (ref 2.4–5.1)
PLATELET # BLD AUTO: 290 10(3)UL (ref 150–450)
POTASSIUM SERPL-SCNC: 3.5 MMOL/L (ref 3.5–5.1)
RBC # BLD AUTO: 3.46 X10(6)UL (ref 3.8–5.3)
SODIUM SERPL-SCNC: 145 MMOL/L (ref 136–145)
WBC # BLD AUTO: 14.2 X10(3) UL (ref 4–11)

## 2025-04-30 PROCEDURE — 99232 SBSQ HOSP IP/OBS MODERATE 35: CPT | Performed by: REGISTERED NURSE

## 2025-04-30 PROCEDURE — 99233 SBSQ HOSP IP/OBS HIGH 50: CPT | Performed by: HOSPITALIST

## 2025-04-30 NOTE — PROGRESS NOTES
[facility]  part of [facility]    Progress Note    Christy Lowry Patient Status:  Inpatient    1924 MRN [id number]   Location [facility] 4W/SW/SE Attending Cynthia Webb,    Hosp Day # 8 PCP Patrick Stafford MD     Chief complaint pain     Subjective:   Christy Lowry is a(n) 89 year old female pt doing well today. Pain controlled     ROS:   No cp, sob   No c/d   No n/v     Objective:   Blood pressure 129/68, pulse 91, temperature 99.1 °F (37.3 °C), temperature source Oral, resp. rate 18, weight 126 lb 6.4 oz (57.3 kg), SpO2 92%.      Intake/Output Summary (Last 24 hours) at 2025 1645  Last data filed at 2025 1100  Gross per 24 hour   Intake 120 ml   Output 170 ml   Net -50 ml       Patient Weight(s) for the past 336 hrs:   Weight   25 2324 126 lb 6.4 oz (57.3 kg)           General appearance: alert, appears stated age and cooperative  Pulmonary:  clear to auscultation bilaterally  Cardiovascular: S1, S2 normal, no murmur, click, rub or gallop, regular rate and rhythm  Abdominal: soft, non-tender; bowel sounds normal; no masses,  no organomegaly  Extremities: extremities normal, atraumatic, no cyanosis or edema        Medicines:   Current Hospital Medications[1]                    Medication Infusions[2]        Lab Results   Component Value Date    WBC 14.2 (H) 2025    HGB 9.7 (L) 2025    HCT 29.4 (L) 2025    .0 2025    CREATSERUM 0.39 (L) 2025    BUN <5 (L) 2025     2025    K 3.5 2025     2025    CO2 23.0 2025    GLU 82 2025    CA 8.0 (L) 2025    ALB 2.0 (L) 2025    ALKPHO 89 2025    BILT 0.2 2025    TP 4.7 (L) 2025    AST 12 2025    ALT <7 (L) 2025    T4F 1.1 2025    TSH 1.307 2025    LIP 22 2025    CRP <0.40 2024    MG 2.0 2025    PHOS 2.7 2025    B12 604 2025       No results found.         Results:     CBC:    Lab Results   Component Value Date    WBC 14.2 (H) 04/30/2025    WBC 10.9 04/29/2025    WBC 13.2 (H) 04/28/2025     Lab Results   Component Value Date    HGB 9.7 (L) 04/30/2025    HGB 8.2 (L) 04/29/2025    HGB 10.2 (L) 04/28/2025      Lab Results   Component Value Date    .0 04/30/2025    .0 04/29/2025    .0 04/28/2025       Recent Labs   Lab 04/28/25  0638 04/29/25  0635 04/29/25  1611 04/30/25  0723   GLU 81 87  --  82   BUN 9 7*  --  <5*   CREATSERUM 0.42* 0.40*  --  0.39*   CA 7.5* 7.8*  --  8.0*    142  --  145   K 3.1* 2.8*  2.8* 3.3* 3.5    107  --  112   CO2 24.0 25.0  --  23.0           Assessment and Plan:       Urinary tract infection resist ecoli changed to rocephin   Right lower extremity cellulitis really seems more edema small old sub popliteal dvts   Family refuses scd's on heparin  3. Liquid diarrhea post antibiotic exposure. Rule out Clostridium difficile infection. Studies neg 4/22 and 4/29 add bentyl; long hist of diarrhea at noc; would not do aggressive saxena  4. Full code dw son 4/23; now dnar/select after pall discussion 4/28  5. Passing out per caregiver will place on tele; echo ok  6. Poor uo no obs; incontinent; renal function stable  7. Neutrophilia worse checked cxr; atelectasis  8. Tachycardia from inf EKG and echo ok  9. Hypoalbuminemia with swelling from severe protein suellen maln; started to eat; doing well  10. Right arm swelling ro dvt right; no dvt superficial clot; elevate  11. Hypokalemia  12. Hypophos    Discussed with palliative care and hospice pt has decided on hospice at home . Possible dc home tomorrow           Cynthia Webb DO         Chart reviewed, including current vitals, notes, labs and imaging  Labs ordered and medications adjusted as outlined above  Coordinate care with care team/consultants  Discussed with patient results of tests, management plan as outlined above, and the need for ongoing  hospitalization  D/w RN     [facility]        4/30/2025             Supplementary Documentation:   DVT Mechanical Prophylaxis:        DVT Pharmacologic Prophylaxis   Medication    heparin (Porcine) 5000 UNIT/ML injection 5,000 Units                Code Status: DNAR/Selective Treatment  Severino: External urinary catheter in place  Severino Duration (in days):   Central line:    SERGIO: 5/1/2025              Dietitian Malnutrition Assessment    Evaluation for Malnutrition: Criteria for non-severe malnutrition diagnosis-         chronic illness related to   Energy intake less than 75% for greater than 1 month., Body fat mild depletion., Muscle mass mild depletion.       RD Malnutrition Care Plan:      Body Fat/Muscle Mass: Mild depletion body fat., Mild depletion muscle mass. orbital region. temple region.      Patient has a diagnosis of severe malnutrition             [1]   Current Facility-Administered Medications   Medication Dose Route Frequency    dicyclomine (Bentyl) cap 10 mg  10 mg Oral TID PRN    hydrOXYzine (Atarax) tab 5 mg  5 mg Oral Nightly PRN    cefTRIAXone (Rocephin) 1 g in sodium chloride 0.9% 100 mL IVPB-ADDV  1 g Intravenous Q24H    heparin (Porcine) 5000 UNIT/ML injection 5,000 Units  5,000 Units Subcutaneous 2 times per day    acetaminophen (Tylenol Extra Strength) tab 500 mg  500 mg Oral Q4H PRN    ondansetron (Zofran) 4 MG/2ML injection 4 mg  4 mg Intravenous Q6H PRN    metoclopramide (Reglan) 5 mg/mL injection 5 mg  5 mg Intravenous Q8H PRN    budesonide (Entocort EC) DR cap 3 mg  3 mg Oral QAM    levothyroxine (Synthroid) tab 75 mcg  75 mcg Oral Daily    pantoprazole (Protonix) DR tab 40 mg  40 mg Oral QAM AC    vancomycin (Vancocin) cap 125 mg  125 mg Oral Daily   [2]

## 2025-04-30 NOTE — PLAN OF CARE
Patient a+o x4, on RA. Rolling side to side. Purewick in place. Flexiseal in place. Cream and aquacel foam applied to buttocks for redness. IVF running. Prn zofran given x1 for nausea. Denies pain. Tolerating nectar thick diet. Son at bedside overnight. Call light in reach.      Problem: Patient Centered Care  Goal: Patient preferences are identified and integrated in the patient's plan of care  Description: Interventions:- What would you like us to know as we care for you? Patient lives at home with a caregiver.  Her children are also involved in her care.      - Provide timely, complete, and accurate information to patient/family  - Incorporate patient and family knowledge, values, beliefs, and cultural backgrounds into the planning and delivery of care  - Encourage patient/family to participate in care and decision-making at the level they choose  - Honor patient and family perspectives and choices  Outcome: Progressing     Problem: SKIN/TISSUE INTEGRITY - ADULT  Goal: Skin integrity remains intact  Description: INTERVENTIONS- Assess and document risk factors for pressure ulcer development- Assess and document skin integrity- Monitor for areas of redness and/or skin breakdown- Initiate interventions, skin care algorithm/standards of care as needed  Outcome: Progressing  Goal: Incision(s), wounds(s) or drain site(s) healing without S/S of infection  Description: INTERVENTIONS:- Assess and document risk factors for pressure ulcer development- Assess and document skin integrity- Assess and document dressing/incision, wound bed, drain sites and surrounding tissue- Implement wound care per orders- Initiate isolation precautions as appropriate- Initiate Pressure Ulcer prevention bundle as indicated  Outcome: Progressing     Problem: Patient/Family Goals  Goal: Patient/Family Long Term Goal  Description: Patient's Long Term Goal: Discharge home    Interventions:  - Resolve urinary tract infection  - Tolerate adequate  oral intake  - regain fluid/electrolyte balance  - See additional Care Plan goals for specific interventions  Outcome: Progressing  Goal: Patient/Family Short Term Goal  Description: Patient's Short Term Goal: Resolve urinary tract infection    Interventions:   - Maintain appropriate antibiotic therapy  - Promote positive perineal hygiene  - Trend WBC count and labs daily  - See additional Care Plan goals for specific interventions  Outcome: Progressing     Problem: PAIN - ADULT  Goal: Verbalizes/displays adequate comfort level or patient's stated pain goal  Description: INTERVENTIONS:- Encourage pt to monitor pain and request assistance- Assess pain using appropriate pain scale- Administer analgesics based on type and severity of pain and evaluate response- Implement non-pharmacological measures as appropriate and evaluate response- Consider cultural and social influences on pain and pain management- Manage/alleviate anxiety- Utilize distraction and/or relaxation techniques- Monitor for opioid side effects- Notify MD/LIP if interventions unsuccessful or patient reports new pain- Anticipate increased pain with activity and pre-medicate as appropriate  Outcome: Progressing     Problem: RISK FOR INFECTION - ADULT  Goal: Absence of fever/infection during anticipated neutropenic period  Description: INTERVENTIONS- Monitor WBC- Administer growth factors as ordered- Implement neutropenic guidelines  Outcome: Progressing     Problem: SAFETY ADULT - FALL  Goal: Free from fall injury  Description: INTERVENTIONS:- Assess pt frequently for physical needs- Identify cognitive and physical deficits and behaviors that affect risk of falls.- [place] fall precautions as indicated by assessment.- Educate pt/family on patient safety including physical limitations- Instruct pt to call for assistance with activity based on assessment- Modify environment to reduce risk of injury- Provide assistive devices as appropriate- Consider OT/PT  consult to assist with strengthening/mobility- Encourage toileting schedule  Outcome: Progressing     Problem: DISCHARGE PLANNING  Goal: Discharge to home or other facility with appropriate resources  Description: INTERVENTIONS:- Identify barriers to discharge w/pt and caregiver- Include patient/family/discharge partner in discharge planning- Arrange for needed discharge resources and transportation as appropriate- Identify discharge learning needs (meds, wound care, etc)- Arrange for interpreters to assist at discharge as needed- Consider post-discharge preferences of patient/family/discharge partner- Complete POLST form as appropriate- Assess patient's ability to be responsible for managing their own health- Refer to Case Management Department for coordinating discharge planning if the patient needs post-hospital services based on physician/LIP order or complex needs related to functional status, cognitive ability or social support system  Outcome: Progressing     Problem: METABOLIC/FLUID AND ELECTROLYTES - ADULT  Goal: Electrolytes maintained within normal limits  Description: INTERVENTIONS:- Monitor labs and rhythm and assess patient for signs and symptoms of electrolyte imbalances- Administer electrolyte replacement as ordered- Monitor response to electrolyte replacements, including rhythm and repeat lab results as appropriate- Fluid restriction as ordered- Instruct patient on fluid and nutrition restrictions as appropriate  Outcome: Progressing

## 2025-04-30 NOTE — PLAN OF CARE
Christy is alert and oriented x 1-2. Patient is voiding via the purewick pt has decreased urine output due to decreased oral intake. Patient tolerating very little of meals. Patient having frequent episodes of diarrhea through the flexiseal. Family at bedside, patient to go home tomorrow with hospice. Call light within reach.  Problem: Patient Centered Care  Goal: Patient preferences are identified and integrated in the patient's plan of care  Description: Interventions:- What would you like us to know as we care for you? Patient lives at home with a caregiver.  Her children are also involved in her care.      - Provide timely, complete, and accurate information to patient/family  - Incorporate patient and family knowledge, values, beliefs, and cultural backgrounds into the planning and delivery of care  - Encourage patient/family to participate in care and decision-making at the level they choose  - Honor patient and family perspectives and choices  Outcome: Progressing     Problem: SKIN/TISSUE INTEGRITY - ADULT  Goal: Skin integrity remains intact  Description: INTERVENTIONS- Assess and document risk factors for pressure ulcer development- Assess and document skin integrity- Monitor for areas of redness and/or skin breakdown- Initiate interventions, skin care algorithm/standards of care as needed  Outcome: Progressing  Goal: Incision(s), wounds(s) or drain site(s) healing without S/S of infection  Description: INTERVENTIONS:- Assess and document risk factors for pressure ulcer development- Assess and document skin integrity- Assess and document dressing/incision, wound bed, drain sites and surrounding tissue- Implement wound care per orders- Initiate isolation precautions as appropriate- Initiate Pressure Ulcer prevention bundle as indicated  Outcome: Progressing     Problem: METABOLIC/FLUID AND ELECTROLYTES - ADULT  Goal: Electrolytes maintained within normal limits  Description: INTERVENTIONS:- Monitor labs and  rhythm and assess patient for signs and symptoms of electrolyte imbalances- Administer electrolyte replacement as ordered- Monitor response to electrolyte replacements, including rhythm and repeat lab results as appropriate- Fluid restriction as ordered- Instruct patient on fluid and nutrition restrictions as appropriate  Outcome: Progressing     Problem: Patient/Family Goals  Goal: Patient/Family Long Term Goal  Description: Patient's Long Term Goal: Discharge home    Interventions:  - Resolve urinary tract infection  - Tolerate adequate oral intake  - regain fluid/electrolyte balance  - See additional Care Plan goals for specific interventions  Outcome: Progressing  Goal: Patient/Family Short Term Goal  Description: Patient's Short Term Goal: Resolve urinary tract infection    Interventions:   - Maintain appropriate antibiotic therapy  - Promote positive perineal hygiene  - Trend WBC count and labs daily  - See additional Care Plan goals for specific interventions  Outcome: Progressing     Problem: PAIN - ADULT  Goal: Verbalizes/displays adequate comfort level or patient's stated pain goal  Description: INTERVENTIONS:- Encourage pt to monitor pain and request assistance- Assess pain using appropriate pain scale- Administer analgesics based on type and severity of pain and evaluate response- Implement non-pharmacological measures as appropriate and evaluate response- Consider cultural and social influences on pain and pain management- Manage/alleviate anxiety- Utilize distraction and/or relaxation techniques- Monitor for opioid side effects- Notify MD/LIP if interventions unsuccessful or patient reports new pain- Anticipate increased pain with activity and pre-medicate as appropriate  Outcome: Progressing     Problem: RISK FOR INFECTION - ADULT  Goal: Absence of fever/infection during anticipated neutropenic period  Description: INTERVENTIONS- Monitor WBC- Administer growth factors as ordered- Implement neutropenic  guidelines  Outcome: Progressing     Problem: SAFETY ADULT - FALL  Goal: Free from fall injury  Description: INTERVENTIONS:- Assess pt frequently for physical needs- Identify cognitive and physical deficits and behaviors that affect risk of falls.- [place] fall precautions as indicated by assessment.- Educate pt/family on patient safety including physical limitations- Instruct pt to call for assistance with activity based on assessment- Modify environment to reduce risk of injury- Provide assistive devices as appropriate- Consider OT/PT consult to assist with strengthening/mobility- Encourage toileting schedule  Outcome: Progressing     Problem: DISCHARGE PLANNING  Goal: Discharge to home or other facility with appropriate resources  Description: INTERVENTIONS:- Identify barriers to discharge w/pt and caregiver- Include patient/family/discharge partner in discharge planning- Arrange for needed discharge resources and transportation as appropriate- Identify discharge learning needs (meds, wound care, etc)- Arrange for interpreters to assist at discharge as needed- Consider post-discharge preferences of patient/family/discharge partner- Complete POLST form as appropriate- Assess patient's ability to be responsible for managing their own health- Refer to Case Management Department for coordinating discharge planning if the patient needs post-hospital services based on physician/LIP order or complex needs related to functional status, cognitive ability or social support system  Outcome: Progressing

## 2025-04-30 NOTE — DIETARY NOTE
ADULT NUTRITION REASSESSMENT    Pt is at moderate nutrition risk.  Pt meets moderate malnutrition criteria.      CRITERIA FOR MALNUTRITION DIAGNOSIS:  Criteria for non-severe malnutrition diagnosis: chronic illness related to energy intake less than75% for greater than 1 month, body fat mild depletion, and muscle mass mild depletion.    RECOMMENDATIONS TO MD: CPM    ADMITTING DIAGNOSIS:  Weakness generalized [R53.1]  Acute cystitis without hematuria [N30.00]  PERTINENT PAST MEDICAL HISTORY: Past Medical History[1]    PATIENT STATUS: Initial 04/25/25: Pt admit for UTI. PMH sig for See Above.   Pt assessed due to screening at risk for MD consult: Protein Calorie Malnutrition + MD Ordered ONS TID.   Resides with Caregiver.   Connected with Daughter-In-Law this afternoon. No other family or Caregiver available. Will follow up at a later time as able.   Patient was resting at visit. Patient woke up on her own to answer a few questions. Reports she does not eat much at home/small amounts of food. Hx of Ensure. Will have to discuss with Caregiver.     Consuming thickened cranberry juice. Noted Mighty Shake un-opened on tray.   Reviewed RD Assessments from 11/2024.     04/30/25 Update note: Monitor for GOC. Palliative care following; had a GOC discussion today. Family likely to proceed with hospice care at home. Patient has poor-fair intake. Continues on modified consistency diet; mildy thick liquids and minced and moist. Pt with multiple loose stools, now with flexiseal. This RD did speak with family on 4/28/25 regarding nutrition at home. See brief note.    FOOD/NUTRITION RELATED HISTORY:  Appetite: Decreased  Intake:  Noted 4/23 and 4/24 with fair to good oral intake. Documentation today: few bites taken today. Patient with nausea + emesis.   Intake Meeting Needs: No, but oral nutrition supplements (ONS) to maximize  Percent Meals Eaten (last 3 days)       Date/Time Percent Meals Eaten (%)    04/27/25 0859 95 %     04/27/25 1416 90 %    04/28/25 0900 50 %    04/28/25 1344 50 %    04/28/25 1700 50 %    04/29/25 1121 50 %           Food Allergies: No Known Food Allergies (NKFA)  Cultural/Ethnic/Yarsani Preferences: None    GASTROINTESTINAL: Swallow evaluation noted  + liquid stools (diarrhea) per MD post antibiotic exposure.   Missing Teeth.  + very small amount of emesis/\"spit up\" today per RN     MEDICATIONS: reviewed  Scheduled Medications[2]    LABS: reviewed  Recent Labs     04/28/25  0638 04/29/25  0635 04/29/25  1611 04/30/25  0723   GLU 81 87  --  82   BUN 9 7*  --  <5*   CREATSERUM 0.42* 0.40*  --  0.39*   CA 7.5* 7.8*  --  8.0*   MG 1.7 2.1  --  2.0    142  --  145   K 3.1* 2.8*  2.8* 3.3* 3.5    107  --  112   CO2 24.0 25.0  --  23.0   PHOS 1.7* 1.5*  --  2.7   OSMOCALC 290 291  --   --      NUTRITION RELATED PHYSICAL FINDINGS:  - Nutrition Focused Physical Exam (NFPE): mild depletion body fat orbital region and mild depletion muscle mass temple region - Sarcopenia consistent with advanced age.    - Fluid Accumulation: non-pitting Bilateral Upper extremity and Lower extremity see RN documentation for details  - Skin Integrity: at risk and (R) LE Cellulitis (MD notes)  see RN documentation for details    ANTHROPOMETRICS:  HT:  4'11\" - Discussed with patient and family member   WT: 57.3 kg (126 lb 6.4 oz)   BMI: Body mass index is 24.69 kg/m². BMI: 25.44 (Using Ht: 59\")   BMI CLASSIFICATION: 25-29.9 kg/m2 - overweight  IBW: 95 lbs        133% IBW  Usual Body Wt: 127 lbs 11/5/24      99% UBW  Noted weight fluctuations   WEIGHT HISTORY:  Patient Weight(s) for the past 336 hrs:   Weight   04/22/25 2324 57.3 kg (126 lb 6.4 oz)     Wt Readings from Last 10 Encounters:   04/22/25 57.3 kg (126 lb 6.4 oz)   01/14/25 58.1 kg (128 lb)   12/16/24 63.5 kg (140 lb)   11/19/24 63.7 kg (140 lb 6.4 oz)   11/05/24 68.9 kg (152 lb)   10/31/24 58.1 kg (128 lb 1.4 oz)   07/18/23 58.1 kg (128 lb)   07/08/23 62.1 kg (136 lb  14.5 oz)   06/14/23 62.1 kg (136 lb 14.5 oz)   05/30/23 62.1 kg (137 lb)   Noted 2020 weights: 112-123#     NUTRITION DIAGNOSIS/PROBLEM:    Moderate Malnutrition related to Chronic - Physiological causes resulting in anorexia or diminished intake as evidenced by mild body fat and muscle mass losses.     NUTRITION INTERVENTION:     NUTRITION PRESCRIPTION:   Estimated Nutrition needs: --dosing wt of 57 kg - wt taken on 4/22/25  Calories: 1425 calories/day (25 calories per kg Dosing wt)  Protein: 57 g protein/day (1.0 g protein/kg Dosing wt)  Fluid Needs: per MD     - Diet:       Procedures    Regular/General diet Fluid Consistency: Nectar Thick / Mildly Thick Liquids; Texture Consistency: Ground / Minced & Moist; Is Patient on Accuchecks? No; Misc Restriction: No Straw    Added No Straw per Speech Therapist Recommendation.     - RD Malnutrition Care Plan:  MD ordered ONS TID  Encourage good po intake at meals.  - Medical Food Supplements-RD added Magic Cup (290 calories/ 9 g protein each) BID Orange, Vanilla, Chocolate, or Mixed berry and Mighty Shake (300 calories/ 9 g protein each) Daily Vanilla. Rational/use of oral supplements discussed with RN.   Hx of receiving Ensure Compact Chocolate and Ensure Clear Apple.      - Vitamin and mineral supplements: none  - Feeding assistance: meal set up - feed as needed   - Nutrition education: not appropriate at this time   Hx of RD discussing importance of adequate energy and protein intake w/Caregiver + Use of ONS at home.   - Coordination of nutrition care: collaboration with other providers - Discussed w/RN.  - Discharge and transfer of nutrition care to new setting or provider: monitor plans - From home with Caregiver.     MONITOR AND EVALUATE/NUTRITION GOALS:  - Food and Nutrient Intake:      Monitor: adequacy of PO intake, tolerance of PO intake, adequacy of supplement intake, and tolerance of supplement intake  - Food and Nutrient Administration:      Monitor: N/A  -  Anthropometric Measurement:    Monitor weight  - Nutrition Goals:      allow wt loss due to fluid losses, PO and supplement greater than 75% of needs, minimize lean body mass loss, prevent skin breakdown, and improved GI status    DIETITIAN FOLLOW UP: RD to follow and monitor nutrition status    JOHNSON Vega  Clinical Dietitian  P: [phone]          [1]   Past Medical History:   Actinic keratosis    hypertrophic AK - left central forehead    Benzodiazepine withdrawal without complication (HCC)    Chronic GERD    Constipation    Diarrhea    Difficult intravenous access    Eustachian tube dysfunction    Heart palpitations    Loose stools    Occult blood in stools    SCC (squamous cell carcinoma)    left forehead, invasive, well-diff.     SCC (squamous cell carcinoma)    right temple    SCC (squamous cell carcinoma)    right neck    Skin cancer   [2]    cefTRIAXone  1 g Intravenous Q24H    heparin  5,000 Units Subcutaneous 2 times per day    budesonide  3 mg Oral QAM    levothyroxine  75 mcg Oral Daily    pantoprazole  40 mg Oral QAM AC    vancomycin  125 mg Oral Daily

## 2025-04-30 NOTE — HOSPICE RN NOTE
[facility] RN and LSW met with daughter Christy Betancourt to discuss comfort care. Patient is awake and alert, oriented to self only. Informational meeting complete. Hospice philosophy, Medicare benefit, and levels of care discussed. All questions answered. The family would like to proceed. Viviana gave verbal authorization for Christy Betancourt to sign hospice consents. Planning for a discharge tomorrow with hospice. [facility] will pick-up patient at 4:30p. [facility] will remain available for discharge planning.    Jocelynn FELIZ, RN CHPN  Transitional Nurse Liaison  [facility]  [phone] (After-hours)

## 2025-04-30 NOTE — PALLIATIVE CARE NOTE
[facility]  part of [facility]  Palliative Care Progress Note    Christy Lowry Patient Status:  Inpatient    1924 MRN [id number]   Location [facility] 4W/SW/SE Attending Abdelrahman Jimenez,*   Hosp Day # 8 PCP Patrick Stafford MD     The  Cures Act makes medical notes like these available to patients in the interest of transparency. Please be advised this is a medical document. Medical documents are intended to carry relevant information, facts as evident, and the clinical opinion of the practitioner. The medical note is intended as peer to peer communication and may appear blunt or direct. It is written in medical language and may contain abbreviations or verbiage that are unfamiliar.       Subjective     Reviewed symptom medications past 24 hrs: Zofran 4 mg IVP X1    Patient was seen and examined in bed with her CG at the bedside. Pt is A&OX2 and appears frail. Pt tells me she wishes she could just go home. She tells me she is having some discomfort from flexiseal, but likes that it is preventing her from having to be cleaned up so much. Denies any significant pain symptoms. Encouraged taking some Tylenol prn this am. She denies any dyspnea symptoms on RA. Appetite remains poor, denies abdominal pain, had some nausea earlier this am which now is resolved, having large amount of liquid brown diarrhea noted in flexiseal.      See summary of discussion below.     Review of Systems:  Pertinent items are noted in subjective    Allergies:  Allergies[1]    Medications:   Current Hospital Medications[2]    Objective     Vital Signs:  Blood pressure 135/47, pulse 94, temperature 98.6 °F (37 °C), temperature source Oral, resp. rate 16, weight 126 lb 6.4 oz (57.3 kg), SpO2 93%.  Body mass index is 24.69 kg/m².  Present Level of pain: 0/10  Non-verbal signs of pain present: No    Physical Exam:  General: Alert and in no apparent distress. Weak, frail appearing  HEENT: No  focal deficits.  Cardiac: Regular rate and rhythm, S1, S2 normal, no murmur, rub or gallop.  Lungs: Clear without wheezes, rales, rhonchi or dullness.  Normal excursions and effort.  Abdomen: Soft, non-tender, normal bowel sounds X 4 quadrants, no rebound or guarding  Extremities: Without clubbing, cyanosis. Peripheral pulses are 2+. BULE Edema present  Neurologic: Alert and oriented X2, forgetful, normal affect.  Skin: Warm and dry. Pale    Prior to admission Palliative performance scale PPSv2 (%): 40    Current PPS 30%    Hematology:  Lab Results   Component Value Date    WBC 10.9 04/29/2025    HGB 8.2 (L) 04/29/2025    HCT 25.2 (L) 04/29/2025    .0 04/29/2025       Coags:No results found for: \"PT\", \"INR\", \"PTT\"    Chemistry:  Lab Results   Component Value Date    CREATSERUM 0.39 (L) 04/30/2025    BUN <5 (L) 04/30/2025     04/30/2025    K 3.5 04/30/2025     04/30/2025    CO2 23.0 04/30/2025    GLU 82 04/30/2025    CA 8.0 (L) 04/30/2025    ALB 2.0 (L) 04/24/2025    ALKPHO 89 04/24/2025    BILT 0.2 04/24/2025    TP 4.7 (L) 04/24/2025    AST 12 04/24/2025    ALT <7 (L) 04/24/2025    MG 2.0 04/30/2025    PHOS 2.7 04/30/2025       Imaging:  No results found.      Follow up [facility] Discussion      4/30/25-I spoke to pt's son Tristen this am with clinical update. He tells me family didn't end up meeting with hospice yesterday as they couldn't coordinate their schedules. He says they are going to likely proceed with hospice care at home on dc. I told him I would have the hospice team follow back with family to help with planning. Provided emotional support to pt/son who are coping adequately.     4/29/25-I met with Pancho outside of the room and we had conference call with son Tristen Ramin, Niranjan, dtr Yolie, TATE Mccallum and Uma all present. Discussed pt's clinical condition and we talked about pt's recent decline. I reiterated the benefits of palliative care with family. Provided education the differences  between community palliative care vs hospice care in the home setting. I provided overview of Medicare hospice benefit and philosophy. I discussed hospice offers more robust care team in home setting vs [facility].  I discussed pt will be at risk for recurrent hospitalizations and encouraged them to consider getting hospice informational session. Pt has a home MD/PT/OT currently following along with 24 hr CG support and they are asking about getting a hospital bed at home. I told them will have SW help with DME and discussed hospice covers DME. Family wants to talk amongst themselves and will let me know if they are in agreement for hospice informational session.     I also confirmed wishes for DNAR/DNI and will continue with supportive care while hospitalized. They will consider comfort care. Son Tristen had completed POLST form with DNAR/DNI-comfort, trial of temporary feeding tubes which I signed. I discussed feeding tubes are not recommended in her clinical picture with advanced age. I encouraged family to keep her comfort/QOL a priority when making medical decisions. I gave original POLST form back to family and sent copy to registration for scan into EPIC. Provided emotional support to pt's family and they have my contact info.    Discussed with Patient and Family: Yes  Patient's preference about sharing medical information: speak to pt's family  Patient's decision making preferences: speak to pt's son Tristen-HCPOA  Code status: DNAR/DNI-selective  Have advanced directives been discussed with patient or healthcare power of : Yes        Healthcare Agent Appointed: Yes  Healthcare Agent's Name: Tristen Lowry  Healthcare Agent's Phone Number: [phone]               Palliative disposition: Ongoing discussions    Procedures:  No intubation      Palliative Care Assessment and Recommendations     Problem List:     UTI  RLE cellulitis  Diarrhea  Hypokalemia  Dysphagia  Moderate  Malnutrition  GERD  Hypothyroidism  Weakness    Diarrhea  -Ongoing issue, Flexiseal in place, ID consulted  -Continue bentyl 10 mg po TID prn, family prefers to hold on imodium prn for now  -Use tylenol prn for rectal pain from flexiseal or consider removal if too uncomfortable.     Goals of care counseling  -see above for details  -Confirmed wishes for DNAR/DNI-selective and continue supportive care. Family is considering comfort care with hospice on dc.  -Residential hospice following and will follow back with pt's family.   -Discussed dysphagia issues/poor po intake with pt's family-discussed pt would be a very poor candidate for feeding tube given advanced age/debility. SLP and dietitian following.   -Ongoing GOC discussions may be needed through clinical course.  -Agreeable to palliative care following  -Dispo:  Return home with 24 hr CG support with likely hospice care pending meeting outcome. Family asking for hospital bed. If for some reason hospice is not chosen, recommend community palliative care program to follow. SW to help with dc planning.   -Provided emotional support to pt/family who are coping adequately.     Advance care planning  -see above for details  -Pt's son Tristen Lowry is primary HCPOA #[phone].  -Previously completed POLST/living will/HCPOA paperwork on file in [employer].     Palliative Performance Scale 30%     Emotion support provided to patient/family today: Yes    A total of 35 mins were spent on this consult, which included all of the following: direct face to face contact, history taking, physical examination, and >50% was spent counseling and coordinating care.    Discussed today's visit with message to Dr. Webb,Dr. VAISHALI Mg, Renato RN, and Ninfa [facility] hospice RN.    I will sign off. Please call if needed again.       Shannan Hennessy, ANP-BC, ACHPN [id number]  4/30/2025  9:18 AM  Palliative Care Services          [1] No Known Allergies  [2]   Current Facility-Administered  Medications:     dicyclomine (Bentyl) cap 10 mg, 10 mg, Oral, TID PRN    hydrOXYzine (Atarax) tab 5 mg, 5 mg, Oral, Nightly PRN    cefTRIAXone (Rocephin) 1 g in sodium chloride 0.9% 100 mL IVPB-ADDV, 1 g, Intravenous, Q24H    heparin (Porcine) 5000 UNIT/ML injection 5,000 Units, 5,000 Units, Subcutaneous, 2 times per day    acetaminophen (Tylenol Extra Strength) tab 500 mg, 500 mg, Oral, Q4H PRN    ondansetron (Zofran) 4 MG/2ML injection 4 mg, 4 mg, Intravenous, Q6H PRN    metoclopramide (Reglan) 5 mg/mL injection 5 mg, 5 mg, Intravenous, Q8H PRN    budesonide (Entocort EC) DR cap 3 mg, 3 mg, Oral, QAM    levothyroxine (Synthroid) tab 75 mcg, 75 mcg, Oral, Daily    pantoprazole (Protonix) DR tab 40 mg, 40 mg, Oral, QAM AC    vancomycin (Vancocin) cap 125 mg, 125 mg, Oral, Daily

## 2025-05-01 VITALS
BODY MASS INDEX: 25 KG/M2 | SYSTOLIC BLOOD PRESSURE: 157 MMHG | RESPIRATION RATE: 18 BRPM | OXYGEN SATURATION: 94 % | WEIGHT: 126.38 LBS | DIASTOLIC BLOOD PRESSURE: 89 MMHG | TEMPERATURE: 100 F | HEART RATE: 116 BPM

## 2025-05-01 PROCEDURE — 99239 HOSP IP/OBS DSCHRG MGMT >30: CPT | Performed by: HOSPITALIST

## 2025-05-01 RX ORDER — BUDESONIDE 3 MG/1
3 CAPSULE, COATED PELLETS ORAL EVERY MORNING
Qty: 90 CAPSULE | Refills: 0 | Status: SHIPPED | OUTPATIENT
Start: 2025-05-01 | End: 2025-07-30

## 2025-05-01 NOTE — PLAN OF CARE
Patient is alert & oriented x1. On room air.  Vitals stable. Continues with diarrhea with flexiseal in place. Voiding with purewick. IV rocephin. Nectar thick liquids. Max assist. Plan to dc with home hospice this AM. Will continue to monitor      Problem: Patient Centered Care  Goal: Patient preferences are identified and integrated in the patient's plan of care  Description: Interventions:- What would you like us to know as we care for you? Patient lives at home with a caregiver.  Her children are also involved in her care.      - Provide timely, complete, and accurate information to patient/family  - Incorporate patient and family knowledge, values, beliefs, and cultural backgrounds into the planning and delivery of care  - Encourage patient/family to participate in care and decision-making at the level they choose  - Honor patient and family perspectives and choices  Outcome: Progressing     Problem: SKIN/TISSUE INTEGRITY - ADULT  Goal: Skin integrity remains intact  Description: INTERVENTIONS- Assess and document risk factors for pressure ulcer development- Assess and document skin integrity- Monitor for areas of redness and/or skin breakdown- Initiate interventions, skin care algorithm/standards of care as needed  Outcome: Progressing  Goal: Incision(s), wounds(s) or drain site(s) healing without S/S of infection  Description: INTERVENTIONS:- Assess and document risk factors for pressure ulcer development- Assess and document skin integrity- Assess and document dressing/incision, wound bed, drain sites and surrounding tissue- Implement wound care per orders- Initiate isolation precautions as appropriate- Initiate Pressure Ulcer prevention bundle as indicated  Outcome: Progressing     Problem: Patient/Family Goals  Goal: Patient/Family Long Term Goal  Description: Patient's Long Term Goal: Discharge home    Interventions:  - Resolve urinary tract infection  - Tolerate adequate oral intake  - regain  fluid/electrolyte balance  - See additional Care Plan goals for specific interventions  Outcome: Progressing  Goal: Patient/Family Short Term Goal  Description: Patient's Short Term Goal: Resolve urinary tract infection    Interventions:   - Maintain appropriate antibiotic therapy  - Promote positive perineal hygiene  - Trend WBC count and labs daily  - See additional Care Plan goals for specific interventions  Outcome: Progressing     Problem: PAIN - ADULT  Goal: Verbalizes/displays adequate comfort level or patient's stated pain goal  Description: INTERVENTIONS:- Encourage pt to monitor pain and request assistance- Assess pain using appropriate pain scale- Administer analgesics based on type and severity of pain and evaluate response- Implement non-pharmacological measures as appropriate and evaluate response- Consider cultural and social influences on pain and pain management- Manage/alleviate anxiety- Utilize distraction and/or relaxation techniques- Monitor for opioid side effects- Notify MD/LIP if interventions unsuccessful or patient reports new pain- Anticipate increased pain with activity and pre-medicate as appropriate  Outcome: Progressing     Problem: RISK FOR INFECTION - ADULT  Goal: Absence of fever/infection during anticipated neutropenic period  Description: INTERVENTIONS- Monitor WBC- Administer growth factors as ordered- Implement neutropenic guidelines  Outcome: Progressing     Problem: SAFETY ADULT - FALL  Goal: Free from fall injury  Description: INTERVENTIONS:- Assess pt frequently for physical needs- Identify cognitive and physical deficits and behaviors that affect risk of falls.- [place] fall precautions as indicated by assessment.- Educate pt/family on patient safety including physical limitations- Instruct pt to call for assistance with activity based on assessment- Modify environment to reduce risk of injury- Provide assistive devices as appropriate- Consider OT/PT consult to assist  with strengthening/mobility- Encourage toileting schedule  Outcome: Progressing     Problem: DISCHARGE PLANNING  Goal: Discharge to home or other facility with appropriate resources  Description: INTERVENTIONS:- Identify barriers to discharge w/pt and caregiver- Include patient/family/discharge partner in discharge planning- Arrange for needed discharge resources and transportation as appropriate- Identify discharge learning needs (meds, wound care, etc)- Arrange for interpreters to assist at discharge as needed- Consider post-discharge preferences of patient/family/discharge partner- Complete POLST form as appropriate- Assess patient's ability to be responsible for managing their own health- Refer to Case Management Department for coordinating discharge planning if the patient needs post-hospital services based on physician/LIP order or complex needs related to functional status, cognitive ability or social support system  Outcome: Progressing     Problem: METABOLIC/FLUID AND ELECTROLYTES - ADULT  Goal: Electrolytes maintained within normal limits  Description: INTERVENTIONS:- Monitor labs and rhythm and assess patient for signs and symptoms of electrolyte imbalances- Administer electrolyte replacement as ordered- Monitor response to electrolyte replacements, including rhythm and repeat lab results as appropriate- Fluid restriction as ordered- Instruct patient on fluid and nutrition restrictions as appropriate  Outcome: Progressing

## 2025-05-01 NOTE — PLAN OF CARE
Orders received for patient discharge. [facility]. Going home with residential home hospice. All discharge instructions were discussed with family. Family verbalizes understanding and agreeable to plan of care & follow-up plan as outlined in discharge summary / AVS and had no further questions regarding discharge instruction. All patient belongings were transported with patient/family at time of discharge.    Christy is aox1, tolerating diet, rolling side to side, BM via flexiseal. Denies nausea. Denies pain. [place] inserted prior to discharge for hospice/comfort care. Discharging with h[place] and flexiseal. Caregiver at bedside. Call light within reach. Safety precautions in place.     Problem: Patient Centered Care  Goal: Patient preferences are identified and integrated in the patient's plan of care  Description: Interventions:- What would you like us to know as we care for you? Patient lives at home with a caregiver.  Her children are also involved in her care.      - Provide timely, complete, and accurate information to patient/family  - Incorporate patient and family knowledge, values, beliefs, and cultural backgrounds into the planning and delivery of care  - Encourage patient/family to participate in care and decision-making at the level they choose  - Honor patient and family perspectives and choices  Outcome: Adequate for Discharge     Problem: SKIN/TISSUE INTEGRITY - ADULT  Goal: Skin integrity remains intact  Description: INTERVENTIONS- Assess and document risk factors for pressure ulcer development- Assess and document skin integrity- Monitor for areas of redness and/or skin breakdown- Initiate interventions, skin care algorithm/standards of care as needed  Outcome: Adequate for Discharge  Goal: Incision(s), wounds(s) or drain site(s) healing without S/S of infection  Description: INTERVENTIONS:- Assess and document risk factors for pressure ulcer development- Assess and  document skin integrity- Assess and document dressing/incision, wound bed, drain sites and surrounding tissue- Implement wound care per orders- Initiate isolation precautions as appropriate- Initiate Pressure Ulcer prevention bundle as indicated  Outcome: Adequate for Discharge     Problem: Patient/Family Goals  Goal: Patient/Family Long Term Goal  Description: Patient's Long Term Goal: Discharge home    Interventions:  - Resolve urinary tract infection  - Tolerate adequate oral intake  - regain fluid/electrolyte balance  - See additional Care Plan goals for specific interventions  Outcome: Adequate for Discharge  Goal: Patient/Family Short Term Goal  Description: Patient's Short Term Goal: Resolve urinary tract infection    Interventions:   - Maintain appropriate antibiotic therapy  - Promote positive perineal hygiene  - Trend WBC count and labs daily  - See additional Care Plan goals for specific interventions  Outcome: Adequate for Discharge     Problem: PAIN - ADULT  Goal: Verbalizes/displays adequate comfort level or patient's stated pain goal  Description: INTERVENTIONS:- Encourage pt to monitor pain and request assistance- Assess pain using appropriate pain scale- Administer analgesics based on type and severity of pain and evaluate response- Implement non-pharmacological measures as appropriate and evaluate response- Consider cultural and social influences on pain and pain management- Manage/alleviate anxiety- Utilize distraction and/or relaxation techniques- Monitor for opioid side effects- Notify MD/LIP if interventions unsuccessful or patient reports new pain- Anticipate increased pain with activity and pre-medicate as appropriate  Outcome: Adequate for Discharge     Problem: RISK FOR INFECTION - ADULT  Goal: Absence of fever/infection during anticipated neutropenic period  Description: INTERVENTIONS- Monitor WBC- Administer growth factors as ordered- Implement neutropenic guidelines  Outcome: Adequate  for Discharge     Problem: SAFETY ADULT - FALL  Goal: Free from fall injury  Description: INTERVENTIONS:- Assess pt frequently for physical needs- Identify cognitive and physical deficits and behaviors that affect risk of falls.- [place] fall precautions as indicated by assessment.- Educate pt/family on patient safety including physical limitations- Instruct pt to call for assistance with activity based on assessment- Modify environment to reduce risk of injury- Provide assistive devices as appropriate- Consider OT/PT consult to assist with strengthening/mobility- Encourage toileting schedule  Outcome: Adequate for Discharge     Problem: DISCHARGE PLANNING  Goal: Discharge to home or other facility with appropriate resources  Description: INTERVENTIONS:- Identify barriers to discharge w/pt and caregiver- Include patient/family/discharge partner in discharge planning- Arrange for needed discharge resources and transportation as appropriate- Identify discharge learning needs (meds, wound care, etc)- Arrange for interpreters to assist at discharge as needed- Consider post-discharge preferences of patient/family/discharge partner- Complete POLST form as appropriate- Assess patient's ability to be responsible for managing their own health- Refer to Case Management Department for coordinating discharge planning if the patient needs post-hospital services based on physician/LIP order or complex needs related to functional status, cognitive ability or social support system  Outcome: Adequate for Discharge     Problem: METABOLIC/FLUID AND ELECTROLYTES - ADULT  Goal: Electrolytes maintained within normal limits  Description: INTERVENTIONS:- Monitor labs and rhythm and assess patient for signs and symptoms of electrolyte imbalances- Administer electrolyte replacement as ordered- Monitor response to electrolyte replacements, including rhythm and repeat lab results as appropriate- Fluid restriction as ordered- Instruct patient  on fluid and nutrition restrictions as appropriate  Outcome: Adequate for Discharge

## 2025-05-01 NOTE — DISCHARGE SUMMARY
[facility]  part of [facility]    Discharge Summary    Christy Lowry Patient Status:  Inpatient    1924 MRN [id number]   Location [facility] 4W/SW/SE Attending Cynthia Webb, DO   Hosp Day # 9 PCP Patrick Stafford MD     Date of Admission: 2025 Disposition: Home Health Care Services     Date of Discharge: 2025      Admitting Diagnosis: Weakness generalized [R53.1]  Acute cystitis without hematuria [N30.00]    Hospital Discharge Diagnoses:  as above     Hospital Discharge Diagnoses:  as above     Lace+ Score: 65  59-90 High Risk  29-58 Medium Risk  0-28   Low Risk.    TCM Follow-Up Recommendation:  LACE > 58: High Risk of readmission after discharge from the hospital.          Lace+ Score: 65  59-90 High Risk  29-58 Medium Risk  0-28   Low Risk    Risk of readmission: Christy Lowry has Moderate Risk of readmission after discharge from the hospital.    Problem List: Problem List[1]    Reason for Admission:       Physical Exam:   General appearance: alert, appears stated age and cooperative  Pulmonary:  clear to auscultation bilaterally  Cardiovascular: S1, S2 normal, no murmur, click, rub or gallop, regular rate and rhythm  Abdominal: soft, non-tender; bowel sounds normal; no masses,  no organomegaly  Extremities: extremities normal, atraumatic, no cyanosis or edema  Psychiatric: calm        History of Present Illness:         CHIEF COMPLAINT:   Urinary tract infection, possible C difficile infection.     HISTORY OF PRESENT ILLNESS:  The patient is a 89-year-old  female who was treated recently for urinary tract infection.  Culture positive for pansensitive E coli on  with 10-day course of cephalexin.  Patient initially improved then family reported that she become very drowsy in the last few days.  Today, she had a liquid bowel movement.  They brought her in today to the emergency department for evaluation.  Repeat urinalysis showed gross urinary  tract infection.  CBC showed white blood cell count of 18.3 with left shift.  Chemistry:  Sodium 131, chloride 96, potassium 4.0, normal GFR.  Started empirically on IV Ancef and oral vancomycin.  Stool for C difficile was obtained.  Patient will be admitted to the hospital for further management.  Hospital Course:        Urinary tract infection resist ecoli changed to rocephin   Right lower extremity cellulitis really seems more edema small old sub popliteal dvts   Family refuses scd's on heparin  3. Liquid diarrhea post antibiotic exposure. Rule out Clostridium difficile infection. Studies neg 4/22 and 4/29 add bentyl; long hist of diarrhea at noc; would not do aggressive saxena  4. Full code dw son 4/23; now dnar/select after pall discussion 4/28  5. Passing out per caregiver will place on tele; echo ok  6. Poor uo no obs; incontinent; renal function stable  7. Neutrophilia worse checked cxr; atelectasis  8. Tachycardia from inf EKG and echo ok  9. Hypoalbuminemia with swelling from severe protein suellen maln; started to eat; doing well  10. Right arm swelling ro dvt right; no dvt superficial clot; elevate  11. Hypokalemia  12. Hypophos     Discussed with palliative care and hospice pt has decided on hospice at home . Possible dc home tomorrow               Cynthia Webb,            Chart reviewed, including current vitals, notes, labs and imaging  Labs ordered and medications adjusted as outlined above  Coordinate care with care team/consultants  Discussed with patient results of tests, management plan as outlined above, and the need for ongoing hospitalization  D/w RN     MDM high         Consultations: Pall care     Procedures: none    Complications: none    Discharge Condition: Good    Discharge Medications:      Discharge Medications        CONTINUE taking these medications        Instructions Prescription details   budesonide 3 MG Cpep  Commonly known as: Entocort EC      Take 1 capsule (3 mg total) by mouth  every morning.   Stop taking on: July 30, 2025  Quantity: 90 capsule  Refills: 0     carboxymethylcellulose 1 % Gel  Commonly known as: Celluvisc      Place 1-2 drops into both eyes as needed.   Refills: 0     ergocalciferol 1.25 MG (52787 UT) Caps  Commonly known as: Vitamin D2      Take 1 capsule (50,000 Units total) by mouth once a week.   Refills: 0     FeroSul 325 (65 Fe) MG Tabs  Generic drug: Ferrous Sulfate      Take 1 tablet (325 mg total) by mouth in the morning.   Refills: 0     levothyroxine 75 MCG Tabs  Commonly known as: Synthroid      Take 1 tablet (75 mcg total) by mouth in the morning.   Refills: 0     pantoprazole 40 MG Tbec  Commonly known as: Protonix      Take 1 tablet (40 mg total) by mouth every morning before breakfast.   Quantity: 90 tablet  Refills: 3               Where to Get Your Medications        These medications were sent to [id number], [place], operated by [facility] [address] AT [facility] [address], [phone], [phone]  [facility] [address], [facility] [address]      Phone: [phone]   budesonide 3 MG Cpep         Follow up Visits: Follow-up with pcp  in 1 week    Follow up Labs: none     Other Discharge Instructions: none    Cynthia Webb DO  5/1/2025  4:58 PM    > 35 min        [1]   Patient Active Problem List  Diagnosis    Skin cancer    Acquired hypothyroidism    Arthralgia of right lower leg    Neoplasm of uncertain behavior of skin    Generalized anxiety disorder    Irritable bowel syndrome with diarrhea    Nausea and vomiting in adult    Persistent vomiting in adult patient    Enteritis    Vomiting    Vomiting, unspecified vomiting type, unspecified whether nausea present    Ileus (HCC)    Diffuse abdominal pain    Abnormal CT scan, gastrointestinal tract    Nausea vomiting and diarrhea    Lymphocytic colitis    Abnormal CT scan, colon    Leukocytosis    Hyperglycemia    Abdominal pain, acute    Leukocytosis, unspecified type    Colitis    Acute cystitis  without hematuria    Weakness generalized    UTI (urinary tract infection)    Goals of care, counseling/discussion    Advance care planning    Palliative care by specialist    Difficult intravenous access

## 2025-05-01 NOTE — CM/SW NOTE
05/01/25 1000   Discharge disposition   Expected discharge disposition Hospice/Home   Post Acute Care Provider [facility]   Discharge transportation [facility]     Patient with dc order in. CM discussed with patient's RN, plan for home with hospice today.     CM received update from Ken[facility] with [facility]. Kenney confirms they are arranging [facility] for 4:30p  and will complete PCS. No other care coordination needs from CM.     Patient's RN updated.     [facility] extension: [phone]  [facility]: [phone]    Anna Colon, RN, BSN

## (undated) DEVICE — MEDI-VAC NON-CONDUCTIVE SUCTION TUBING: Brand: CARDINAL HEALTH

## (undated) DEVICE — MEDI-VAC NON-CONDUCTIVE SUCTION TUBING 6MM X 1.8M (6FT.) L: Brand: CARDINAL HEALTH

## (undated) DEVICE — KIT ENDO ORCAPOD 160/180/190

## (undated) DEVICE — SYRINGE, LUER SLIP, STERILE, 60ML: Brand: MEDLINE

## (undated) DEVICE — YANKAUER,BULB TIP,W/O VENT,RIGID,STERILE: Brand: MEDLINE

## (undated) DEVICE — V2 SPECIMEN COLLECTION MANIFOLD KIT: Brand: NEPTUNE

## (undated) DEVICE — KIT CLEAN ENDOKIT 1.1OZ GOWNX2

## (undated) DEVICE — CO2 CANNULA,SSOFT,ADLT,7O2,4CO2,FEMALE: Brand: MEDLINE

## (undated) DEVICE — CONMED SCOPE SAVER BITE BLOCK, 20X27 MM: Brand: SCOPE SAVER

## (undated) NOTE — MR AVS SNAPSHOT
[address] [address]  [phone]               Thank you for choosing us for your health care visit with Rg Villafuerte MD.  We are glad to serve you and happy to provide you with this s Assoc Dx:  Acquired hypothyroidism [E03.9]                 MyChart               Educational Information     Your blood pressure indicates you may be at-risk for Hypertension. Please consider the following Lifestyle Modifications.   Also, please return f

## (undated) NOTE — LETTER
11/28/18        Clementina Rodriguez 7      Dear Maggy Gaona records indicate that you have outstanding lab work and or testing that was ordered for you and has not yet been completed:  Orders Placed This Encounter      TSH

## (undated) NOTE — Clinical Note
TCM call completed. Pt is set to see Home MD next week. Pt is not well today, son is taking pt back to ED due to symptoms and pain. Thank you!

## (undated) NOTE — LETTER
[facility]  Administration of Anesthesia  IChristy agree to be cared for by a physician anesthesiologist alone and/or with a nurse anesthetist, who is specially trained to monitor me and give me medicine to put me to sleep or keep me comfortable during my procedure    I understand that my anesthesiologist and/or anesthetist is not an employee or agent of [facility] or [employer]. He or she works for [facility]    As the patient asking for anesthesia services, I agree to:  Allow the anesthesiologist (anesthesia doctor) to give me medicine and do additional procedures as necessary. Some examples are: Starting or using an “IV” to give me medicine, fluids or blood during my procedure, and having a breathing tube placed to help me breathe when I’m asleep (intubation). In the event that my heart stops working properly, I understand that my anesthesiologist will make every effort to sustain my life, unless otherwise directed by [facility] Do Not Resuscitate documents.  Tell my anesthesia doctor before my procedure:  If I am pregnant.  The last time that I ate or drank.  iii. All of the medicines I take (including prescriptions, herbal supplements, and pills I can buy without a prescription (including street drugs/illegal medications). Failure to inform my anesthesiologist about these medicines may increase my risk of anesthetic complications.  iv.If I am allergic to anything or have had a reaction to anesthesia before.  I understand how the anesthesia medicine will help me (benefits).  I understand that with any type of anesthesia medicine there are risks:  The most common risks are: nausea, vomiting, sore throat, muscle soreness, damage to my eyes, mouth, or teeth (from breathing tube placement).  Rare risks include: remembering what happened during my procedure, allergic reactions to medications, injury to my airway, heart, lungs, vision, nerves, or  muscles and in extremely rare instances death.  My doctor has explained to me other choices available to me for my care (alternatives).  Pregnant Patients (“epidural”):  I understand that the risks of having an epidural (medicine given into my back to help control pain during labor), include itching, low blood pressure, difficulty urinating, headache or slowing of the baby’s heart. Very rare risks include infection, bleeding, seizure, irregular heart rhythms and nerve injury.  Regional Anesthesia (“spinal”, “epidural”, & “nerve blocks”):  I understand that rare but potential complications include headache, bleeding, infection, seizure, irregular heart rhythms, and nerve injury.    _____________________________________________________________________________  Patient (or Representative) Signature/Relationship to Patient  Date   Time    _____________________________________________________________________________   Name (if used)    Language/Organization   Time    _____________________________________________________________________________  Nurse Anesthetist Signature     Date   Time  _____________________________________________________________________________  Anesthesiologist Signature     Date   Time  I have discussed the procedure and information above with the patient (or patient’s representative) and answered their questions. The patient or their representative has agreed to have anesthesia services.    _____________________________________________________________________________  Witness        Date   Time  I have verified that the signature is that of the patient or patient’s representative, and that it was signed before the procedure  Patient Name: Christy Lowry     : 1924                 Printed: 2024 at 7:08 AM    Medical Record #: [id number]                                            Page 1 of 1  ----------ANESTHESIA CONSENT----------

## (undated) NOTE — MR AVS SNAPSHOT
[facility] \[facility] [place] [facility]\"" - [place] [address] [address] [address] [phone]               Thank you for choosing us for your health care visit with Caryn Mary MD.  We are glad to serve you and happy to provide you with this summar [address] [place] [address] 174, [phone], [address] [facility] [address], [address] [facility] [address]     Phone:  [phone]    - betamethasone dipropionate 0.05 % Crea            Results of Recent Testing       [facility] Tips for increasing your physical activity – Adults who are physically active are less likely to develop some chronic diseases than adults who are inactive.      HOW TO GET STARTED: HOW TO STAY MOTIVATED:   Start activities slowly and build up over time Do

## (undated) NOTE — LETTER
[facility]  [address], [place], [place]    Authorization for Surgical Operation and Procedure                               I hereby authorize Breana Barbosa MD, my physician and his/her assistants (if applicable), which may include medical students, residents, and/or fellows, to perform the following surgical operation/ procedure and administer such anesthesia as may be determined necessary by my physician: Operation/Procedure name (s) ESOPHAGOGASTRODUODENOSCOPY (EGD) on Christy Lowry   2.   I recognize that during the surgical operation/procedure, unforeseen conditions may necessitate additional or different procedures than those listed above.  I, therefore, further authorize and request that the above-named surgeon, assistants, or designees perform such procedures as are, in their judgment, necessary and desirable.    3.   My surgeon/physician has discussed prior to my surgery the potential benefits, risks and side effects of this procedure; the likelihood of achieving goals; and potential problems that might occur during recuperation.  They also discussed reasonable alternatives to the procedure, including risks, benefits, and side effects related to the alternatives and risks related to not receiving this procedure.  I have had all my questions answered and I acknowledge that no guarantee has been made as to the result that may be obtained.    4.   Should the need arise during my operation/procedure, which includes change of level of care prior to discharge, I also consent to the administration of blood and/or blood products.  Further, I understand that despite careful testing and screening of blood or blood products by collecting agencies, I may still be subject to ill effects as a result of receiving a blood transfusion and/or blood products.  The following are some, but not all, of the potential risks that can occur: fever and allergic reactions, hemolytic reactions, transmission of  diseases such as Hepatitis, AIDS and Cytomegalovirus (CMV) and fluid overload.  In the event that I wish to have an autologous transfusion of my own blood, or a directed donor transfusion, I will discuss this with my physician.  Check only if Refusing Blood or Blood Products  I understand refusal of blood or blood products as deemed necessary by my physician may have serious consequences to my condition to include possible death. I hereby assume responsibility for my refusal and release the hospital, its personnel, and my physicians from any responsibility for the consequences of my refusal.    o  Refuse   5.   I authorize the use of any specimen, organs, tissues, body parts or foreign objects that may be removed from my body during the operation/procedure for diagnosis, research or teaching purposes and their subsequent disposal by hospital authorities.  I also authorize the release of specimen test results and/or written reports to my treating physician on the hospital medical staff or other referring or consulting physicians involved in my care, at the discretion of the Pathologist or my treating physician.    6.   I consent to the photographing or videotaping of the operations or procedures to be performed, including appropriate portions of my body for medical, scientific, or educational purposes, provided my identity is not revealed by the pictures or by descriptive texts accompanying them.  If the procedure has been photographed/videotaped, the surgeon will obtain the original picture, image, videotape or CD.  The hospital will not be responsible for storage, release or maintenance of the picture, image, tape or CD.    7.   I consent to the presence of a  or observers in the operating room as deemed necessary by my physician or their designees.    8.   I recognize that in the event my procedure results in extended X-Ray/fluoroscopy time, I may develop a skin reaction.    9. If I have a Do Not  Attempt Resuscitation (DNAR) order in place, that status will be suspended while in the operating room, procedural suite, and during the recovery period unless otherwise explicitly stated by me (or a person authorized to consent on my behalf). The surgeon or my attending physician will determine when the applicable recovery period ends for purposes of reinstating the DNAR order.  10. Patients having a sterilization procedure: I understand that if the procedure is successful the results will be permanent and it will therefore be impossible for me to inseminate, conceive, or bear children.  I also understand that the procedure is intended to result in sterility, although the result has not been guaranteed.   11. I acknowledge that my physician has explained sedation/analgesia administration to me including the risk and benefits I consent to the administration of sedation/analgesia as may be necessary or desirable in the judgment of my physician.    I CERTIFY THAT I HAVE READ AND FULLY UNDERSTAND THE ABOVE CONSENT TO OPERATION and/or OTHER PROCEDURE.     ____________________________________  _________________________________        ______________________________  Signature of Patient    Signature of Responsible Person                Printed Name of Responsible Person                                      ____________________________________  _____________________________                ________________________________  Signature of Witness        Date  Time         Relationship to Patient    STATEMENT OF PHYSICIAN My signature below affirms that prior to the time of the procedure; I have explained to the patient and/or his/her legal representative, the risks and benefits involved in the proposed treatment and any reasonable alternative to the proposed treatment. I have also explained the risks and benefits involved in refusal of the proposed treatment and alternatives to the proposed treatment and have answered the  patient's questions. If I have a significant financial interest in a co-management agreement or a significant financial interest in any product or implant, or other significant relationship used in this procedure/surgery, I have disclosed this and had a discussion with my patient.     _____________________________________________________              _____________________________  (Signature of Physician)                                                                                         (Date)                                   (Time)  Patient Name: Christy Lowry      : 1924      Printed: 2024     Medical Record #: [id number]                                      Page 1 of 1

## (undated) NOTE — Clinical Note
Transitional Care Management call completed. A virtual (per family request) Transitional Care Management appointment is scheduled for 12/4/2024. Thank you.

## (undated) NOTE — Clinical Note
Initial assessment completed with patient. Dtr declined an appt at this time.  Pt is to resume HHC and FU with GI.  Thank you!

## (undated) NOTE — LETTER
[facility]  [address], [place], [place]    Authorization for Surgical Operation and Procedure                               I hereby authorize Breana Barbosa MD, my physician and his/her assistants (if applicable), which may include medical students, residents, and/or fellows, to perform the following surgical operation/ procedure and administer such anesthesia as may be determined necessary by my physician: Operation/Procedure name (s) ESOPHAGOGASTRODUODENOSCOPY (EGD) flexible sigmoidoscopy on Christy Lowry   2.   I recognize that during the surgical operation/procedure, unforeseen conditions may necessitate additional or different procedures than those listed above.  I, therefore, further authorize and request that the above-named surgeon, assistants, or designees perform such procedures as are, in their judgment, necessary and desirable.    3.   My surgeon/physician has discussed prior to my surgery the potential benefits, risks and side effects of this procedure; the likelihood of achieving goals; and potential problems that might occur during recuperation.  They also discussed reasonable alternatives to the procedure, including risks, benefits, and side effects related to the alternatives and risks related to not receiving this procedure.  I have had all my questions answered and I acknowledge that no guarantee has been made as to the result that may be obtained.    4.   Should the need arise during my operation/procedure, which includes change of level of care prior to discharge, I also consent to the administration of blood and/or blood products.  Further, I understand that despite careful testing and screening of blood or blood products by collecting agencies, I may still be subject to ill effects as a result of receiving a blood transfusion and/or blood products.  The following are some, but not all, of the potential risks that can occur: fever and allergic reactions, hemolytic  reactions, transmission of diseases such as Hepatitis, AIDS and Cytomegalovirus (CMV) and fluid overload.  In the event that I wish to have an autologous transfusion of my own blood, or a directed donor transfusion, I will discuss this with my physician.  Check only if Refusing Blood or Blood Products  I understand refusal of blood or blood products as deemed necessary by my physician may have serious consequences to my condition to include possible death. I hereby assume responsibility for my refusal and release the hospital, its personnel, and my physicians from any responsibility for the consequences of my refusal.    o  Refuse   5.   I authorize the use of any specimen, organs, tissues, body parts or foreign objects that may be removed from my body during the operation/procedure for diagnosis, research or teaching purposes and their subsequent disposal by hospital authorities.  I also authorize the release of specimen test results and/or written reports to my treating physician on the hospital medical staff or other referring or consulting physicians involved in my care, at the discretion of the Pathologist or my treating physician.    6.   I consent to the photographing or videotaping of the operations or procedures to be performed, including appropriate portions of my body for medical, scientific, or educational purposes, provided my identity is not revealed by the pictures or by descriptive texts accompanying them.  If the procedure has been photographed/videotaped, the surgeon will obtain the original picture, image, videotape or CD.  The hospital will not be responsible for storage, release or maintenance of the picture, image, tape or CD.    7.   I consent to the presence of a  or observers in the operating room as deemed necessary by my physician or their designees.    8.   I recognize that in the event my procedure results in extended X-Ray/fluoroscopy time, I may develop a skin  reaction.    9. If I have a Do Not Attempt Resuscitation (DNAR) order in place, that status will be suspended while in the operating room, procedural suite, and during the recovery period unless otherwise explicitly stated by me (or a person authorized to consent on my behalf). The surgeon or my attending physician will determine when the applicable recovery period ends for purposes of reinstating the DNAR order.  10. Patients having a sterilization procedure: I understand that if the procedure is successful the results will be permanent and it will therefore be impossible for me to inseminate, conceive, or bear children.  I also understand that the procedure is intended to result in sterility, although the result has not been guaranteed.   11. I acknowledge that my physician has explained sedation/analgesia administration to me including the risk and benefits I consent to the administration of sedation/analgesia as may be necessary or desirable in the judgment of my physician.    I CERTIFY THAT I HAVE READ AND FULLY UNDERSTAND THE ABOVE CONSENT TO OPERATION and/or OTHER PROCEDURE.     ____________________________________  _________________________________        ______________________________  Signature of Patient    Signature of Responsible Person                Printed Name of Responsible Person                                      ____________________________________  _____________________________                ________________________________  Signature of Witness        Date  Time         Relationship to Patient    STATEMENT OF PHYSICIAN My signature below affirms that prior to the time of the procedure; I have explained to the patient and/or his/her legal representative, the risks and benefits involved in the proposed treatment and any reasonable alternative to the proposed treatment. I have also explained the risks and benefits involved in refusal of the proposed treatment and alternatives to the proposed  treatment and have answered the patient's questions. If I have a significant financial interest in a co-management agreement or a significant financial interest in any product or implant, or other significant relationship used in this procedure/surgery, I have disclosed this and had a discussion with my patient.     _____________________________________________________              _____________________________  (Signature of Physician)                                                                                         (Date)                                   (Time)  Patient Name: Christy Lowry      : 1924      Printed: 2024     Medical Record #: [id number]                                      Page 1 of 1